# Patient Record
Sex: MALE | Race: BLACK OR AFRICAN AMERICAN | Employment: OTHER | URBAN - METROPOLITAN AREA
[De-identification: names, ages, dates, MRNs, and addresses within clinical notes are randomized per-mention and may not be internally consistent; named-entity substitution may affect disease eponyms.]

---

## 2017-04-26 RX ORDER — CHLORTHALIDONE 25 MG/1
TABLET ORAL
Qty: 90 TABLET | Refills: 3 | Status: SHIPPED | OUTPATIENT
Start: 2017-04-26 | End: 2017-06-27

## 2017-04-26 RX ORDER — AMLODIPINE BESYLATE 10 MG/1
TABLET ORAL
Qty: 90 TABLET | Refills: 3 | Status: SHIPPED | OUTPATIENT
Start: 2017-04-26 | End: 2017-06-27 | Stop reason: SDUPTHER

## 2017-06-27 ENCOUNTER — OFFICE VISIT (OUTPATIENT)
Dept: INTERNAL MEDICINE | Age: 62
End: 2017-06-27

## 2017-06-27 VITALS
OXYGEN SATURATION: 96 % | BODY MASS INDEX: 26.48 KG/M2 | DIASTOLIC BLOOD PRESSURE: 48 MMHG | RESPIRATION RATE: 20 BRPM | HEART RATE: 82 BPM | WEIGHT: 178.8 LBS | TEMPERATURE: 97.8 F | SYSTOLIC BLOOD PRESSURE: 116 MMHG | HEIGHT: 69 IN

## 2017-06-27 DIAGNOSIS — J44.9 OBSTRUCTIVE CHRONIC BRONCHITIS WITHOUT EXACERBATION (HCC): ICD-10-CM

## 2017-06-27 DIAGNOSIS — Z13.220 SCREENING FOR CHOLESTEROL LEVEL: ICD-10-CM

## 2017-06-27 DIAGNOSIS — I10 ESSENTIAL HYPERTENSION, BENIGN: Primary | ICD-10-CM

## 2017-06-27 DIAGNOSIS — Z12.5 SCREENING FOR PROSTATE CANCER: ICD-10-CM

## 2017-06-27 PROBLEM — J44.89 OBSTRUCTIVE CHRONIC BRONCHITIS WITHOUT EXACERBATION: Status: ACTIVE | Noted: 2017-06-27

## 2017-06-27 LAB
ALBUMIN SERPL-MCNC: 4.3 G/DL (ref 3.9–4.9)
ALP BLD-CCNC: 99 U/L (ref 35–104)
ALT SERPL-CCNC: 43 U/L (ref 0–41)
ANION GAP SERPL CALCULATED.3IONS-SCNC: 12 MEQ/L (ref 7–13)
AST SERPL-CCNC: 48 U/L (ref 0–40)
BILIRUB SERPL-MCNC: 0.5 MG/DL (ref 0–1.2)
BUN BLDV-MCNC: 11 MG/DL (ref 8–23)
CALCIUM SERPL-MCNC: 9.5 MG/DL (ref 8.6–10.2)
CHLORIDE BLD-SCNC: 99 MEQ/L (ref 98–107)
CHOLESTEROL, TOTAL: 209 MG/DL (ref 0–199)
CO2: 28 MEQ/L (ref 22–29)
CREAT SERPL-MCNC: 0.7 MG/DL (ref 0.7–1.2)
GFR AFRICAN AMERICAN: >60
GFR NON-AFRICAN AMERICAN: >60
GLOBULIN: 2.6 G/DL (ref 2.3–3.5)
GLUCOSE BLD-MCNC: 86 MG/DL (ref 74–109)
HDLC SERPL-MCNC: 69 MG/DL (ref 40–59)
LDL CHOLESTEROL CALCULATED: 104 MG/DL (ref 0–129)
POTASSIUM SERPL-SCNC: 4.3 MEQ/L (ref 3.5–5.1)
PROSTATE SPECIFIC ANTIGEN: 2.84 NG/ML (ref 0–5.4)
SODIUM BLD-SCNC: 139 MEQ/L (ref 132–144)
TOTAL PROTEIN: 6.9 G/DL (ref 6.4–8.1)
TRIGL SERPL-MCNC: 181 MG/DL (ref 0–200)

## 2017-06-27 PROCEDURE — G8926 SPIRO NO PERF OR DOC: HCPCS | Performed by: FAMILY MEDICINE

## 2017-06-27 PROCEDURE — 36415 COLL VENOUS BLD VENIPUNCTURE: CPT | Performed by: FAMILY MEDICINE

## 2017-06-27 PROCEDURE — 94010 BREATHING CAPACITY TEST: CPT | Performed by: FAMILY MEDICINE

## 2017-06-27 PROCEDURE — 4004F PT TOBACCO SCREEN RCVD TLK: CPT | Performed by: FAMILY MEDICINE

## 2017-06-27 PROCEDURE — 3023F SPIROM DOC REV: CPT | Performed by: FAMILY MEDICINE

## 2017-06-27 PROCEDURE — G8419 CALC BMI OUT NRM PARAM NOF/U: HCPCS | Performed by: FAMILY MEDICINE

## 2017-06-27 PROCEDURE — G8427 DOCREV CUR MEDS BY ELIG CLIN: HCPCS | Performed by: FAMILY MEDICINE

## 2017-06-27 PROCEDURE — 99214 OFFICE O/P EST MOD 30 MIN: CPT | Performed by: FAMILY MEDICINE

## 2017-06-27 PROCEDURE — 3017F COLORECTAL CA SCREEN DOC REV: CPT | Performed by: FAMILY MEDICINE

## 2017-06-27 RX ORDER — OXYCODONE HCL 40 MG/1
40 TABLET, FILM COATED, EXTENDED RELEASE ORAL EVERY 12 HOURS
COMMUNITY
End: 2019-02-11 | Stop reason: ALTCHOICE

## 2017-06-27 RX ORDER — POTASSIUM CHLORIDE 750 MG/1
10 TABLET, FILM COATED, EXTENDED RELEASE ORAL DAILY
Qty: 90 TABLET | Refills: 3 | Status: CANCELLED | OUTPATIENT
Start: 2017-06-27

## 2017-06-27 RX ORDER — AMLODIPINE BESYLATE 5 MG/1
5 TABLET ORAL DAILY
Qty: 90 TABLET | Refills: 1 | Status: SHIPPED | OUTPATIENT
Start: 2017-06-27 | End: 2017-12-18 | Stop reason: SDUPTHER

## 2017-06-27 ASSESSMENT — PATIENT HEALTH QUESTIONNAIRE - PHQ9
SUM OF ALL RESPONSES TO PHQ QUESTIONS 1-9: 0
SUM OF ALL RESPONSES TO PHQ9 QUESTIONS 1 & 2: 0
2. FEELING DOWN, DEPRESSED OR HOPELESS: 0
1. LITTLE INTEREST OR PLEASURE IN DOING THINGS: 0

## 2017-06-28 DIAGNOSIS — E78.5 DYSLIPIDEMIA: Primary | ICD-10-CM

## 2017-06-28 RX ORDER — ATORVASTATIN CALCIUM 10 MG/1
10 TABLET, FILM COATED ORAL DAILY
Qty: 30 TABLET | Refills: 5 | Status: SHIPPED | OUTPATIENT
Start: 2017-06-28 | End: 2017-09-06 | Stop reason: SDUPTHER

## 2017-07-21 ENCOUNTER — OFFICE VISIT (OUTPATIENT)
Dept: INTERNAL MEDICINE | Age: 62
End: 2017-07-21

## 2017-07-21 VITALS
OXYGEN SATURATION: 97 % | WEIGHT: 175.6 LBS | BODY MASS INDEX: 26.01 KG/M2 | RESPIRATION RATE: 16 BRPM | TEMPERATURE: 98 F | DIASTOLIC BLOOD PRESSURE: 70 MMHG | HEART RATE: 92 BPM | SYSTOLIC BLOOD PRESSURE: 154 MMHG | HEIGHT: 69 IN

## 2017-07-21 DIAGNOSIS — M54.2 NECK PAIN ON RIGHT SIDE: ICD-10-CM

## 2017-07-21 DIAGNOSIS — L23.7 POISON IVY DERMATITIS: Primary | ICD-10-CM

## 2017-07-21 DIAGNOSIS — L21.9 SEBORRHEIC DERMATITIS: ICD-10-CM

## 2017-07-21 PROCEDURE — G8419 CALC BMI OUT NRM PARAM NOF/U: HCPCS | Performed by: FAMILY MEDICINE

## 2017-07-21 PROCEDURE — G8427 DOCREV CUR MEDS BY ELIG CLIN: HCPCS | Performed by: FAMILY MEDICINE

## 2017-07-21 PROCEDURE — 4004F PT TOBACCO SCREEN RCVD TLK: CPT | Performed by: FAMILY MEDICINE

## 2017-07-21 PROCEDURE — 99213 OFFICE O/P EST LOW 20 MIN: CPT | Performed by: FAMILY MEDICINE

## 2017-07-21 PROCEDURE — 3017F COLORECTAL CA SCREEN DOC REV: CPT | Performed by: FAMILY MEDICINE

## 2017-07-21 RX ORDER — HYDROCODONE BITARTRATE AND ACETAMINOPHEN 5; 325 MG/1; MG/1
1 TABLET ORAL EVERY 6 HOURS PRN
Qty: 28 TABLET | Refills: 0 | Status: SHIPPED | OUTPATIENT
Start: 2017-07-21 | End: 2017-07-28

## 2017-07-21 RX ORDER — KETOROLAC TROMETHAMINE 30 MG/ML
60 INJECTION, SOLUTION INTRAMUSCULAR; INTRAVENOUS ONCE
Status: DISCONTINUED | OUTPATIENT
Start: 2017-07-21 | End: 2017-07-21

## 2017-07-21 RX ORDER — KETOCONAZOLE 20 MG/G
CREAM TOPICAL
Qty: 15 G | Refills: 0 | Status: SHIPPED | OUTPATIENT
Start: 2017-07-21 | End: 2019-02-11 | Stop reason: ALTCHOICE

## 2017-07-21 RX ORDER — PREDNISONE 1 MG/1
TABLET ORAL
Qty: 30 TABLET | Refills: 0 | Status: SHIPPED | OUTPATIENT
Start: 2017-07-21 | End: 2018-01-23 | Stop reason: ALTCHOICE

## 2017-08-10 ENCOUNTER — NURSE ONLY (OUTPATIENT)
Dept: INTERNAL MEDICINE | Age: 62
End: 2017-08-10

## 2017-08-10 ENCOUNTER — TELEPHONE (OUTPATIENT)
Dept: INTERNAL MEDICINE | Age: 62
End: 2017-08-10

## 2017-08-10 VITALS
TEMPERATURE: 98.6 F | WEIGHT: 174.4 LBS | HEART RATE: 83 BPM | DIASTOLIC BLOOD PRESSURE: 60 MMHG | BODY MASS INDEX: 25.83 KG/M2 | SYSTOLIC BLOOD PRESSURE: 130 MMHG | RESPIRATION RATE: 16 BRPM | HEIGHT: 69 IN | OXYGEN SATURATION: 96 %

## 2017-08-10 DIAGNOSIS — I10 ESSENTIAL HYPERTENSION, BENIGN: Primary | ICD-10-CM

## 2017-09-06 RX ORDER — ATORVASTATIN CALCIUM 10 MG/1
10 TABLET, FILM COATED ORAL DAILY
Qty: 90 TABLET | Refills: 1 | Status: SHIPPED | OUTPATIENT
Start: 2017-09-06 | End: 2018-03-10 | Stop reason: SDUPTHER

## 2017-10-02 ENCOUNTER — CARE COORDINATION (OUTPATIENT)
Dept: CARE COORDINATION | Age: 62
End: 2017-10-02

## 2017-10-02 NOTE — CARE COORDINATION
Patient Excluded from Care Coordination?  Yes     The patient will be excluded from Care Coordination for the following reason: Patient declined care coordination

## 2017-12-14 ENCOUNTER — OFFICE VISIT (OUTPATIENT)
Dept: INTERNAL MEDICINE | Age: 62
End: 2017-12-14

## 2017-12-14 VITALS
SYSTOLIC BLOOD PRESSURE: 140 MMHG | WEIGHT: 178.4 LBS | OXYGEN SATURATION: 95 % | HEIGHT: 69 IN | BODY MASS INDEX: 26.42 KG/M2 | RESPIRATION RATE: 16 BRPM | DIASTOLIC BLOOD PRESSURE: 62 MMHG | TEMPERATURE: 98.3 F | HEART RATE: 87 BPM

## 2017-12-14 DIAGNOSIS — Z11.59 ENCOUNTER FOR HEPATITIS C SCREENING TEST FOR LOW RISK PATIENT: ICD-10-CM

## 2017-12-14 DIAGNOSIS — E78.5 DYSLIPIDEMIA: ICD-10-CM

## 2017-12-14 DIAGNOSIS — Z23 NEED FOR INFLUENZA VACCINATION: ICD-10-CM

## 2017-12-14 DIAGNOSIS — J06.9 UPPER RESPIRATORY TRACT INFECTION, UNSPECIFIED TYPE: ICD-10-CM

## 2017-12-14 DIAGNOSIS — Z11.4 ENCOUNTER FOR SCREENING FOR HIV: ICD-10-CM

## 2017-12-14 DIAGNOSIS — I10 ESSENTIAL HYPERTENSION, BENIGN: Primary | ICD-10-CM

## 2017-12-14 LAB
ALBUMIN SERPL-MCNC: 4.3 G/DL (ref 3.9–4.9)
ALP BLD-CCNC: 94 U/L (ref 35–104)
ALT SERPL-CCNC: 29 U/L (ref 0–41)
ANION GAP SERPL CALCULATED.3IONS-SCNC: 13 MEQ/L (ref 7–13)
AST SERPL-CCNC: 37 U/L (ref 0–40)
BILIRUB SERPL-MCNC: 0.3 MG/DL (ref 0–1.2)
BUN BLDV-MCNC: 13 MG/DL (ref 8–23)
CALCIUM SERPL-MCNC: 9.9 MG/DL (ref 8.6–10.2)
CHLORIDE BLD-SCNC: 98 MEQ/L (ref 98–107)
CHOLESTEROL, TOTAL: 150 MG/DL (ref 0–199)
CO2: 27 MEQ/L (ref 22–29)
CREAT SERPL-MCNC: 0.7 MG/DL (ref 0.7–1.2)
GFR AFRICAN AMERICAN: >60
GFR NON-AFRICAN AMERICAN: >60
GLOBULIN: 2.8 G/DL (ref 2.3–3.5)
GLUCOSE BLD-MCNC: 109 MG/DL (ref 74–109)
HDLC SERPL-MCNC: 71 MG/DL (ref 40–59)
HEPATITIS C ANTIBODY INTERPRETATION: NORMAL
LDL CHOLESTEROL CALCULATED: 69 MG/DL (ref 0–129)
POTASSIUM SERPL-SCNC: 4.3 MEQ/L (ref 3.5–5.1)
SODIUM BLD-SCNC: 138 MEQ/L (ref 132–144)
TOTAL PROTEIN: 7.1 G/DL (ref 6.4–8.1)
TRIGL SERPL-MCNC: 51 MG/DL (ref 0–200)

## 2017-12-14 PROCEDURE — G8427 DOCREV CUR MEDS BY ELIG CLIN: HCPCS | Performed by: FAMILY MEDICINE

## 2017-12-14 PROCEDURE — G0008 ADMIN INFLUENZA VIRUS VAC: HCPCS | Performed by: FAMILY MEDICINE

## 2017-12-14 PROCEDURE — 99214 OFFICE O/P EST MOD 30 MIN: CPT | Performed by: FAMILY MEDICINE

## 2017-12-14 PROCEDURE — G8484 FLU IMMUNIZE NO ADMIN: HCPCS | Performed by: FAMILY MEDICINE

## 2017-12-14 PROCEDURE — 4004F PT TOBACCO SCREEN RCVD TLK: CPT | Performed by: FAMILY MEDICINE

## 2017-12-14 PROCEDURE — 36415 COLL VENOUS BLD VENIPUNCTURE: CPT | Performed by: FAMILY MEDICINE

## 2017-12-14 PROCEDURE — G8417 CALC BMI ABV UP PARAM F/U: HCPCS | Performed by: FAMILY MEDICINE

## 2017-12-14 PROCEDURE — 90688 IIV4 VACCINE SPLT 0.5 ML IM: CPT | Performed by: FAMILY MEDICINE

## 2017-12-14 PROCEDURE — 3017F COLORECTAL CA SCREEN DOC REV: CPT | Performed by: FAMILY MEDICINE

## 2017-12-14 RX ORDER — AZITHROMYCIN 250 MG/1
TABLET, FILM COATED ORAL
Qty: 1 PACKET | Refills: 0 | Status: SHIPPED | OUTPATIENT
Start: 2017-12-14 | End: 2017-12-24

## 2017-12-14 NOTE — PROGRESS NOTES
Patient: Bert Sever    YOB: 1955    Date: 12/14/17    Chief Complaint   Patient presents with    Hypertension     6 month follow up. He is doing well. He is due for lab work    Flu Vaccine     He would like to get flu vaccine. Patient Active Problem List    Diagnosis Date Noted    Dyslipidemia 06/28/2017    Obstructive chronic bronchitis without exacerbation ([facility] [address] 06/27/2017    Cervical radiculopathy at C8 10/13/2013    Essential hypertension, benign 09/25/2013    Chronic back pain     Insomnia        Allergies   Allergen Reactions    Ace Inhibitors      ANGIOEDEMA    Nsaids        Vitals:    12/14/17 0904 12/14/17 0910   BP: (!) 142/60 (!) 140/62   Site: Right Arm Left Arm   Position: Sitting Sitting   Cuff Size: Large Adult Large Adult   Pulse: 87    Resp: 16    Temp: 98.3 °F (36.8 °C)    TempSrc: Oral    SpO2: 95%    Weight: 178 lb 6.4 oz (80.9 kg)    Height: 5' 9\" (1.753 m)      Body mass index is 26.35 kg/m². HPI    He is feeling well and just got back from vacation. He denies any cough chest pain ear pain nausea vomiting or diarrhea. It is flu vaccine today. We need to get some lab work to follow-up on his hypertension his lipids. Review of Systems    Constitutional: Negative for fatigue, fever and sweats. HEENT: Negative for eye discharge and vision loss. Negative for ear drainage, hearing loss and nasal drainage. Respiratory: Negative for cough, dyspnea and wheezing. Cardiovascular:  Negative for chest pain, claudication and irregular heartbeat/palpitations. Gastrointestinal: Negative for abdominal pain, nausea, vomiting, constipation and diarrhea. Genitourinary: No dysuria or penile discharge. Metabolic/Endocrine: Negative for cold intolerance, heat intolerance, polydipsia and polyphagia. No unintended weight loss or gain. Neuro/Psychiatric: Negative for gait disturbance. Negative for psychiatric symptoms.   Dermatologic: Negative for pruritus and

## 2017-12-16 LAB — HIV-1 AND HIV-2 ANTIBODIES: NEGATIVE

## 2018-01-23 ENCOUNTER — HOSPITAL ENCOUNTER (OUTPATIENT)
Dept: GENERAL RADIOLOGY | Age: 63
Discharge: HOME | End: 2018-01-23
Payer: MEDICARE

## 2018-01-23 ENCOUNTER — OFFICE VISIT (OUTPATIENT)
Dept: INTERNAL MEDICINE CLINIC | Age: 63
End: 2018-01-23
Payer: MEDICARE

## 2018-01-23 VITALS
TEMPERATURE: 98.2 F | BODY MASS INDEX: 25.98 KG/M2 | SYSTOLIC BLOOD PRESSURE: 138 MMHG | HEIGHT: 69 IN | WEIGHT: 175.4 LBS | HEART RATE: 83 BPM | DIASTOLIC BLOOD PRESSURE: 90 MMHG | OXYGEN SATURATION: 97 %

## 2018-01-23 DIAGNOSIS — R07.1 CHEST PAIN ON BREATHING: ICD-10-CM

## 2018-01-23 DIAGNOSIS — M54.6 ACUTE MIDLINE THORACIC BACK PAIN: ICD-10-CM

## 2018-01-23 DIAGNOSIS — R07.1 CHEST PAIN ON BREATHING: Primary | ICD-10-CM

## 2018-01-23 DIAGNOSIS — I10 ESSENTIAL HYPERTENSION: ICD-10-CM

## 2018-01-23 DIAGNOSIS — J44.9 CHRONIC OBSTRUCTIVE PULMONARY DISEASE, UNSPECIFIED COPD TYPE (HCC): ICD-10-CM

## 2018-01-23 PROCEDURE — 3023F SPIROM DOC REV: CPT | Performed by: PHYSICIAN ASSISTANT

## 2018-01-23 PROCEDURE — 4004F PT TOBACCO SCREEN RCVD TLK: CPT | Performed by: PHYSICIAN ASSISTANT

## 2018-01-23 PROCEDURE — G8427 DOCREV CUR MEDS BY ELIG CLIN: HCPCS | Performed by: PHYSICIAN ASSISTANT

## 2018-01-23 PROCEDURE — 99213 OFFICE O/P EST LOW 20 MIN: CPT | Performed by: PHYSICIAN ASSISTANT

## 2018-01-23 PROCEDURE — G8926 SPIRO NO PERF OR DOC: HCPCS | Performed by: PHYSICIAN ASSISTANT

## 2018-01-23 PROCEDURE — 3017F COLORECTAL CA SCREEN DOC REV: CPT | Performed by: PHYSICIAN ASSISTANT

## 2018-01-23 PROCEDURE — G8417 CALC BMI ABV UP PARAM F/U: HCPCS | Performed by: PHYSICIAN ASSISTANT

## 2018-01-23 PROCEDURE — G8484 FLU IMMUNIZE NO ADMIN: HCPCS | Performed by: PHYSICIAN ASSISTANT

## 2018-01-23 PROCEDURE — 71046 X-RAY EXAM CHEST 2 VIEWS: CPT

## 2018-01-23 PROCEDURE — 72074 X-RAY EXAM THORAC SPINE4/>VW: CPT

## 2018-01-23 RX ORDER — CYCLOBENZAPRINE HCL 10 MG
10 TABLET ORAL 3 TIMES DAILY PRN
Qty: 30 TABLET | Refills: 0 | Status: SHIPPED | OUTPATIENT
Start: 2018-01-23 | End: 2018-02-02

## 2018-01-23 ASSESSMENT — ENCOUNTER SYMPTOMS
SORE THROAT: 0
HEARTBURN: 0
ABDOMINAL PAIN: 0
BLURRED VISION: 0
NAUSEA: 0
DIARRHEA: 0
DOUBLE VISION: 0
SINUS PAIN: 0
BACK PAIN: 1
WHEEZING: 0
SHORTNESS OF BREATH: 0

## 2018-03-12 RX ORDER — ATORVASTATIN CALCIUM 10 MG/1
TABLET, FILM COATED ORAL
Qty: 90 TABLET | Refills: 3 | Status: SHIPPED | OUTPATIENT
Start: 2018-03-12 | End: 2019-02-26 | Stop reason: SDUPTHER

## 2018-04-18 RX ORDER — CHLORTHALIDONE 25 MG/1
TABLET ORAL
Qty: 90 TABLET | Refills: 0 | Status: SHIPPED | OUTPATIENT
Start: 2018-04-18 | End: 2019-02-11 | Stop reason: ALTCHOICE

## 2018-12-09 ENCOUNTER — CARE COORDINATION (OUTPATIENT)
Dept: CARE COORDINATION | Age: 63
End: 2018-12-09

## 2018-12-10 ENCOUNTER — CARE COORDINATION (OUTPATIENT)
Dept: CARE COORDINATION | Age: 63
End: 2018-12-10

## 2018-12-19 ENCOUNTER — CARE COORDINATION (OUTPATIENT)
Dept: CARE COORDINATION | Age: 63
End: 2018-12-19

## 2018-12-26 ENCOUNTER — CARE COORDINATION (OUTPATIENT)
Dept: CARE COORDINATION | Age: 63
End: 2018-12-26

## 2019-02-11 ENCOUNTER — OFFICE VISIT (OUTPATIENT)
Dept: INTERNAL MEDICINE CLINIC | Age: 64
End: 2019-02-11
Payer: MEDICARE

## 2019-02-11 VITALS
TEMPERATURE: 98 F | BODY MASS INDEX: 29.21 KG/M2 | SYSTOLIC BLOOD PRESSURE: 164 MMHG | RESPIRATION RATE: 12 BRPM | HEIGHT: 69 IN | DIASTOLIC BLOOD PRESSURE: 60 MMHG | WEIGHT: 197.2 LBS | OXYGEN SATURATION: 96 % | HEART RATE: 105 BPM

## 2019-02-11 DIAGNOSIS — J44.9 OBSTRUCTIVE CHRONIC BRONCHITIS WITHOUT EXACERBATION (HCC): ICD-10-CM

## 2019-02-11 DIAGNOSIS — F51.01 PRIMARY INSOMNIA: ICD-10-CM

## 2019-02-11 DIAGNOSIS — I10 ESSENTIAL HYPERTENSION, BENIGN: Primary | ICD-10-CM

## 2019-02-11 DIAGNOSIS — E78.5 DYSLIPIDEMIA: ICD-10-CM

## 2019-02-11 DIAGNOSIS — Z23 NEEDS FLU SHOT: ICD-10-CM

## 2019-02-11 PROCEDURE — 4004F PT TOBACCO SCREEN RCVD TLK: CPT | Performed by: FAMILY MEDICINE

## 2019-02-11 PROCEDURE — 3017F COLORECTAL CA SCREEN DOC REV: CPT | Performed by: FAMILY MEDICINE

## 2019-02-11 PROCEDURE — G8926 SPIRO NO PERF OR DOC: HCPCS | Performed by: FAMILY MEDICINE

## 2019-02-11 PROCEDURE — G8427 DOCREV CUR MEDS BY ELIG CLIN: HCPCS | Performed by: FAMILY MEDICINE

## 2019-02-11 PROCEDURE — G8482 FLU IMMUNIZE ORDER/ADMIN: HCPCS | Performed by: FAMILY MEDICINE

## 2019-02-11 PROCEDURE — G8419 CALC BMI OUT NRM PARAM NOF/U: HCPCS | Performed by: FAMILY MEDICINE

## 2019-02-11 PROCEDURE — 90688 IIV4 VACCINE SPLT 0.5 ML IM: CPT | Performed by: FAMILY MEDICINE

## 2019-02-11 PROCEDURE — 99214 OFFICE O/P EST MOD 30 MIN: CPT | Performed by: FAMILY MEDICINE

## 2019-02-11 PROCEDURE — G0008 ADMIN INFLUENZA VIRUS VAC: HCPCS | Performed by: FAMILY MEDICINE

## 2019-02-11 PROCEDURE — 3023F SPIROM DOC REV: CPT | Performed by: FAMILY MEDICINE

## 2019-02-11 RX ORDER — TRAZODONE HYDROCHLORIDE 50 MG/1
TABLET ORAL
Refills: 0 | COMMUNITY
Start: 2019-01-17 | End: 2019-02-11 | Stop reason: SDUPTHER

## 2019-02-11 RX ORDER — TRAZODONE HYDROCHLORIDE 50 MG/1
50 TABLET ORAL NIGHTLY
Qty: 90 TABLET | Refills: 1 | Status: SHIPPED | OUTPATIENT
Start: 2019-02-11 | End: 2019-08-19 | Stop reason: SDUPTHER

## 2019-02-11 RX ORDER — TRIAMTERENE AND HYDROCHLOROTHIAZIDE 37.5; 25 MG/1; MG/1
1 TABLET ORAL DAILY
Qty: 90 TABLET | Refills: 3 | Status: SHIPPED | OUTPATIENT
Start: 2019-02-11 | End: 2020-03-30 | Stop reason: SDUPTHER

## 2019-02-11 ASSESSMENT — PATIENT HEALTH QUESTIONNAIRE - PHQ9
SUM OF ALL RESPONSES TO PHQ QUESTIONS 1-9: 0
SUM OF ALL RESPONSES TO PHQ QUESTIONS 1-9: 0
1. LITTLE INTEREST OR PLEASURE IN DOING THINGS: 0
DEPRESSION UNABLE TO ASSESS: FUNCTIONAL CAPACITY MOTIVATION LIMITS ACCURACY
2. FEELING DOWN, DEPRESSED OR HOPELESS: 0
SUM OF ALL RESPONSES TO PHQ9 QUESTIONS 1 & 2: 0

## 2019-02-26 RX ORDER — ATORVASTATIN CALCIUM 10 MG/1
TABLET, FILM COATED ORAL
Qty: 90 TABLET | Refills: 3 | Status: SHIPPED | OUTPATIENT
Start: 2019-02-26 | End: 2019-11-06 | Stop reason: SDUPTHER

## 2019-08-19 DIAGNOSIS — F51.01 PRIMARY INSOMNIA: ICD-10-CM

## 2019-08-19 RX ORDER — TRAZODONE HYDROCHLORIDE 50 MG/1
50 TABLET ORAL NIGHTLY
Qty: 90 TABLET | Refills: 1 | Status: SHIPPED | OUTPATIENT
Start: 2019-08-19 | End: 2020-02-07

## 2019-11-06 ENCOUNTER — OFFICE VISIT (OUTPATIENT)
Dept: FAMILY MEDICINE CLINIC | Age: 64
End: 2019-11-06
Payer: MEDICARE

## 2019-11-06 VITALS
HEIGHT: 69 IN | BODY MASS INDEX: 27.4 KG/M2 | HEART RATE: 108 BPM | DIASTOLIC BLOOD PRESSURE: 82 MMHG | SYSTOLIC BLOOD PRESSURE: 170 MMHG | RESPIRATION RATE: 11 BRPM | WEIGHT: 185 LBS | TEMPERATURE: 99.4 F | OXYGEN SATURATION: 87 %

## 2019-11-06 DIAGNOSIS — E78.5 DYSLIPIDEMIA: ICD-10-CM

## 2019-11-06 DIAGNOSIS — J44.9 OBSTRUCTIVE CHRONIC BRONCHITIS WITHOUT EXACERBATION (HCC): ICD-10-CM

## 2019-11-06 DIAGNOSIS — I10 ESSENTIAL HYPERTENSION, BENIGN: Primary | ICD-10-CM

## 2019-11-06 DIAGNOSIS — R25.1 TREMORS OF NERVOUS SYSTEM: ICD-10-CM

## 2019-11-06 PROCEDURE — G8417 CALC BMI ABV UP PARAM F/U: HCPCS | Performed by: FAMILY MEDICINE

## 2019-11-06 PROCEDURE — 4040F PNEUMOC VAC/ADMIN/RCVD: CPT | Performed by: FAMILY MEDICINE

## 2019-11-06 PROCEDURE — 1123F ACP DISCUSS/DSCN MKR DOCD: CPT | Performed by: FAMILY MEDICINE

## 2019-11-06 PROCEDURE — 3023F SPIROM DOC REV: CPT | Performed by: FAMILY MEDICINE

## 2019-11-06 PROCEDURE — G8482 FLU IMMUNIZE ORDER/ADMIN: HCPCS | Performed by: FAMILY MEDICINE

## 2019-11-06 PROCEDURE — 99214 OFFICE O/P EST MOD 30 MIN: CPT | Performed by: FAMILY MEDICINE

## 2019-11-06 PROCEDURE — 4004F PT TOBACCO SCREEN RCVD TLK: CPT | Performed by: FAMILY MEDICINE

## 2019-11-06 PROCEDURE — G8926 SPIRO NO PERF OR DOC: HCPCS | Performed by: FAMILY MEDICINE

## 2019-11-06 PROCEDURE — G8427 DOCREV CUR MEDS BY ELIG CLIN: HCPCS | Performed by: FAMILY MEDICINE

## 2019-11-06 PROCEDURE — 3017F COLORECTAL CA SCREEN DOC REV: CPT | Performed by: FAMILY MEDICINE

## 2019-11-06 RX ORDER — METOPROLOL SUCCINATE 100 MG/1
100 TABLET, EXTENDED RELEASE ORAL DAILY
Qty: 90 TABLET | Refills: 1 | Status: SHIPPED | OUTPATIENT
Start: 2019-11-06 | End: 2020-03-30 | Stop reason: SDUPTHER

## 2019-11-06 RX ORDER — ATORVASTATIN CALCIUM 40 MG/1
40 TABLET, FILM COATED ORAL DAILY
Qty: 90 TABLET | Refills: 1 | Status: SHIPPED | OUTPATIENT
Start: 2019-11-06 | End: 2020-04-28

## 2019-11-22 DIAGNOSIS — E78.5 DYSLIPIDEMIA: ICD-10-CM

## 2019-11-22 DIAGNOSIS — I10 ESSENTIAL HYPERTENSION, BENIGN: ICD-10-CM

## 2019-11-22 LAB
ALBUMIN SERPL-MCNC: 4.6 G/DL (ref 3.5–4.6)
ALP BLD-CCNC: 92 U/L (ref 35–104)
ALT SERPL-CCNC: 30 U/L (ref 0–41)
ANION GAP SERPL CALCULATED.3IONS-SCNC: 19 MEQ/L (ref 9–15)
AST SERPL-CCNC: 62 U/L (ref 0–40)
BILIRUB SERPL-MCNC: 0.4 MG/DL (ref 0.2–0.7)
BUN BLDV-MCNC: 17 MG/DL (ref 8–23)
CALCIUM SERPL-MCNC: 9.9 MG/DL (ref 8.5–9.9)
CHLORIDE BLD-SCNC: 98 MEQ/L (ref 95–107)
CHOLESTEROL, FASTING: 151 MG/DL (ref 0–199)
CO2: 22 MEQ/L (ref 20–31)
CREAT SERPL-MCNC: 1.02 MG/DL (ref 0.7–1.2)
GFR AFRICAN AMERICAN: >60
GFR NON-AFRICAN AMERICAN: >60
GLOBULIN: 3.4 G/DL (ref 2.3–3.5)
GLUCOSE BLD-MCNC: 64 MG/DL (ref 70–99)
HDLC SERPL-MCNC: 84 MG/DL (ref 40–59)
LDL CHOLESTEROL CALCULATED: 46 MG/DL (ref 0–129)
POTASSIUM SERPL-SCNC: 4.2 MEQ/L (ref 3.4–4.9)
SODIUM BLD-SCNC: 139 MEQ/L (ref 135–144)
TOTAL PROTEIN: 8 G/DL (ref 6.3–8)
TRIGLYCERIDE, FASTING: 107 MG/DL (ref 0–150)

## 2019-12-04 ENCOUNTER — OFFICE VISIT (OUTPATIENT)
Dept: FAMILY MEDICINE CLINIC | Age: 64
End: 2019-12-04
Payer: MEDICARE

## 2019-12-04 VITALS
OXYGEN SATURATION: 98 % | WEIGHT: 179.8 LBS | TEMPERATURE: 99.2 F | HEART RATE: 95 BPM | SYSTOLIC BLOOD PRESSURE: 126 MMHG | BODY MASS INDEX: 26.63 KG/M2 | RESPIRATION RATE: 18 BRPM | HEIGHT: 69 IN | DIASTOLIC BLOOD PRESSURE: 82 MMHG

## 2019-12-04 DIAGNOSIS — E78.5 DYSLIPIDEMIA: ICD-10-CM

## 2019-12-04 DIAGNOSIS — I10 ESSENTIAL HYPERTENSION, BENIGN: Primary | ICD-10-CM

## 2019-12-04 DIAGNOSIS — R25.1 TREMORS OF NERVOUS SYSTEM: ICD-10-CM

## 2019-12-04 PROCEDURE — 4040F PNEUMOC VAC/ADMIN/RCVD: CPT | Performed by: FAMILY MEDICINE

## 2019-12-04 PROCEDURE — G8427 DOCREV CUR MEDS BY ELIG CLIN: HCPCS | Performed by: FAMILY MEDICINE

## 2019-12-04 PROCEDURE — G8417 CALC BMI ABV UP PARAM F/U: HCPCS | Performed by: FAMILY MEDICINE

## 2019-12-04 PROCEDURE — 4004F PT TOBACCO SCREEN RCVD TLK: CPT | Performed by: FAMILY MEDICINE

## 2019-12-04 PROCEDURE — 1123F ACP DISCUSS/DSCN MKR DOCD: CPT | Performed by: FAMILY MEDICINE

## 2019-12-04 PROCEDURE — 3017F COLORECTAL CA SCREEN DOC REV: CPT | Performed by: FAMILY MEDICINE

## 2019-12-04 PROCEDURE — 99213 OFFICE O/P EST LOW 20 MIN: CPT | Performed by: FAMILY MEDICINE

## 2019-12-04 PROCEDURE — G8482 FLU IMMUNIZE ORDER/ADMIN: HCPCS | Performed by: FAMILY MEDICINE

## 2019-12-04 RX ORDER — AMITRIPTYLINE HYDROCHLORIDE 25 MG/1
TABLET, FILM COATED ORAL
Status: ON HOLD | COMMUNITY
Start: 2004-09-09 | End: 2020-03-16

## 2020-02-07 RX ORDER — TRAZODONE HYDROCHLORIDE 50 MG/1
50 TABLET ORAL NIGHTLY
Qty: 90 TABLET | Refills: 1 | Status: SHIPPED | OUTPATIENT
Start: 2020-02-07 | End: 2020-08-03

## 2020-03-09 ENCOUNTER — OFFICE VISIT (OUTPATIENT)
Dept: NEUROLOGY | Age: 65
End: 2020-03-09
Payer: MEDICARE

## 2020-03-09 VITALS
WEIGHT: 192.7 LBS | DIASTOLIC BLOOD PRESSURE: 54 MMHG | SYSTOLIC BLOOD PRESSURE: 137 MMHG | BODY MASS INDEX: 28.46 KG/M2 | HEART RATE: 76 BPM

## 2020-03-09 DIAGNOSIS — R25.1 TREMOR: ICD-10-CM

## 2020-03-09 LAB
ALBUMIN SERPL-MCNC: 3.8 G/DL (ref 3.5–4.6)
ALP BLD-CCNC: 95 U/L (ref 35–104)
ALT SERPL-CCNC: 11 U/L (ref 0–41)
AST SERPL-CCNC: 18 U/L (ref 0–40)
BILIRUB SERPL-MCNC: <0.2 MG/DL (ref 0.2–0.7)
BILIRUBIN DIRECT: <0.2 MG/DL (ref 0–0.4)
BILIRUBIN, INDIRECT: NORMAL MG/DL (ref 0–0.6)
TOTAL PROTEIN: 6.9 G/DL (ref 6.3–8)
TSH REFLEX: 2.17 UIU/ML (ref 0.44–3.86)

## 2020-03-09 PROCEDURE — G8417 CALC BMI ABV UP PARAM F/U: HCPCS | Performed by: PSYCHIATRY & NEUROLOGY

## 2020-03-09 PROCEDURE — 99204 OFFICE O/P NEW MOD 45 MIN: CPT | Performed by: PSYCHIATRY & NEUROLOGY

## 2020-03-09 PROCEDURE — 1123F ACP DISCUSS/DSCN MKR DOCD: CPT | Performed by: PSYCHIATRY & NEUROLOGY

## 2020-03-09 PROCEDURE — G8482 FLU IMMUNIZE ORDER/ADMIN: HCPCS | Performed by: PSYCHIATRY & NEUROLOGY

## 2020-03-09 PROCEDURE — 3017F COLORECTAL CA SCREEN DOC REV: CPT | Performed by: PSYCHIATRY & NEUROLOGY

## 2020-03-09 PROCEDURE — 4004F PT TOBACCO SCREEN RCVD TLK: CPT | Performed by: PSYCHIATRY & NEUROLOGY

## 2020-03-09 PROCEDURE — G8427 DOCREV CUR MEDS BY ELIG CLIN: HCPCS | Performed by: PSYCHIATRY & NEUROLOGY

## 2020-03-09 PROCEDURE — 4040F PNEUMOC VAC/ADMIN/RCVD: CPT | Performed by: PSYCHIATRY & NEUROLOGY

## 2020-03-09 RX ORDER — PRIMIDONE 50 MG/1
TABLET ORAL
Qty: 60 TABLET | Refills: 3 | Status: ON HOLD | OUTPATIENT
Start: 2020-03-09 | End: 2020-03-16

## 2020-03-09 ASSESSMENT — ENCOUNTER SYMPTOMS
VOMITING: 0
SHORTNESS OF BREATH: 0
NAUSEA: 0
BACK PAIN: 0
PHOTOPHOBIA: 0
TROUBLE SWALLOWING: 0
CHOKING: 0

## 2020-03-09 NOTE — PROGRESS NOTES
unremarkable. CONCLUSION: NO ACUTE PROCESS    Xr Thoracic Spine (min 4 Views)    Result Date: 1/24/2018  XR THORACIC SPINE STANDARD WIRE DURING MEASURING 8 OH BILATERAL VERTEBRAL PULMONARY VASCULAR VW CLINICAL HISTORY:  M54.6 Acute midline thoracic back pain ICD10 no history of trauma reported COMPARISON:  none FINDINGS: 4 views of the thoracic spine are submitted. There are no acute fractures. Disk spaces are intact. No significant spondylolisthesis. There is multilevel degenerative changes marginal spurring noted. NO ACUTE FRACTURES. Lab Results   Component Value Date    WBC 7.2 02/03/2016    RBC 3.74 02/03/2016    RBC 3.78 04/01/2012    HGB 12.2 02/03/2016    HCT 36.8 02/03/2016    MCV 98.4 02/03/2016    MCH 32.7 02/03/2016    MCHC 33.2 02/03/2016    RDW 16.4 02/03/2016     02/03/2016    MPV 7.4 02/22/2014     Lab Results   Component Value Date     11/22/2019    K 4.2 11/22/2019    CL 98 11/22/2019    CO2 22 11/22/2019    BUN 17 11/22/2019    CREATININE 1.02 11/22/2019    GFRAA >60.0 11/22/2019    LABGLOM >60.0 11/22/2019    GLUCOSE 64 11/22/2019    GLUCOSE 86 04/01/2012    PROT 8.0 11/22/2019    LABALBU 4.6 11/22/2019    LABALBU 3.5 04/01/2012    CALCIUM 9.9 11/22/2019    BILITOT 0.4 11/22/2019    ALKPHOS 92 11/22/2019    AST 62 11/22/2019    ALT 30 11/22/2019     Lab Results   Component Value Date    PROTIME 10.5 02/22/2014    PROTIME 10.5 04/01/2012    INR 1.0 02/22/2014     Lab Results   Component Value Date    TSH 0.941 02/21/2014     Lab Results   Component Value Date    TRIG 51 12/14/2017    HDL 84 11/22/2019    LDLCALC 46 11/22/2019     No results found for: Catherin Shells, LABBENZ, CANNAB, COCAINESCRN, LABMETH, OPIATESCREENURINE, PHENCYCLIDINESCREENURINE, PPXUR, ETOH  No results found for: LITHIUM, DILFRTOT, VALPROATE    Assessment:       Diagnosis Orders   1. Tremor  Heavy Metals, Blood    TSH with Reflex    Hepatic Function Panel   2.  Tremors of nervous system     Action

## 2020-03-12 LAB
ARSENIC BLOOD: <10 UG/L (ref 0–12)
LEAD LEVEL BLOOD: 6.9 UG/DL (ref 0–4.9)
MERCURY BLOOD: <2.5 UG/L (ref 0–10)

## 2020-03-16 ENCOUNTER — HOSPITAL ENCOUNTER (INPATIENT)
Age: 65
LOS: 8 days | Discharge: HOME | DRG: 377 | End: 2020-03-24
Attending: EMERGENCY MEDICINE | Admitting: INTERNAL MEDICINE
Payer: MEDICARE

## 2020-03-16 ENCOUNTER — APPOINTMENT (OUTPATIENT)
Dept: GENERAL RADIOLOGY | Age: 65
DRG: 377 | End: 2020-03-16
Payer: MEDICARE

## 2020-03-16 PROBLEM — D64.9 ANEMIA: Status: ACTIVE | Noted: 2020-03-16

## 2020-03-16 PROBLEM — K92.2 GI BLEED: Status: ACTIVE | Noted: 2020-03-16

## 2020-03-16 LAB
ABO/RH: NORMAL
ALBUMIN SERPL-MCNC: 3.8 G/DL (ref 3.5–4.6)
ALP BLD-CCNC: 92 U/L (ref 35–104)
ALT SERPL-CCNC: <5 U/L (ref 0–41)
ANION GAP SERPL CALCULATED.3IONS-SCNC: 13 MEQ/L (ref 9–15)
ANISOCYTOSIS: ABNORMAL
ANTIBODY SCREEN: NORMAL
AST SERPL-CCNC: 19 U/L (ref 0–40)
ATYPICAL LYMPHOCYTE RELATIVE PERCENT: 4 %
BASOPHILS ABSOLUTE: 0 K/UL (ref 0–0.2)
BASOPHILS RELATIVE PERCENT: 0.6 %
BILIRUB SERPL-MCNC: 0.4 MG/DL (ref 0.2–0.7)
BUN BLDV-MCNC: 6 MG/DL (ref 8–23)
CALCIUM SERPL-MCNC: 9.2 MG/DL (ref 8.5–9.9)
CHLORIDE BLD-SCNC: 104 MEQ/L (ref 95–107)
CO2: 24 MEQ/L (ref 20–31)
CREAT SERPL-MCNC: 0.69 MG/DL (ref 0.7–1.2)
EOSINOPHILS ABSOLUTE: 0.1 K/UL (ref 0–0.7)
EOSINOPHILS RELATIVE PERCENT: 1 %
FERRITIN: 20.2 NG/ML (ref 30–400)
FOLATE: 6.2 NG/ML (ref 7.3–26.1)
GFR AFRICAN AMERICAN: >60
GFR NON-AFRICAN AMERICAN: >60
GLOBULIN: 3.3 G/DL (ref 2.3–3.5)
GLUCOSE BLD-MCNC: 101 MG/DL (ref 70–99)
HCT VFR BLD CALC: 18.2 % (ref 42–52)
HCT VFR BLD CALC: 19.7 % (ref 42–52)
HEMATOLOGY PATH CONSULT: YES
HEMOGLOBIN: 6 G/DL (ref 14–18)
HYPOCHROMIA: ABNORMAL
INFLUENZA A BY PCR: NEGATIVE
INFLUENZA B BY PCR: NEGATIVE
INR BLD: 1
IRON SATURATION: 10 % (ref 11–46)
IRON: 40 UG/DL (ref 59–158)
LACTATE DEHYDROGENASE: 225 U/L (ref 135–225)
LACTIC ACID: 1.9 MMOL/L (ref 0.5–2.2)
LYMPHOCYTES ABSOLUTE: 1.1 K/UL (ref 1–4.8)
LYMPHOCYTES RELATIVE PERCENT: 9 %
MACROCYTES: ABNORMAL
MCH RBC QN AUTO: 24.3 PG (ref 27–31.3)
MCHC RBC AUTO-ENTMCNC: 30.7 % (ref 33–37)
MCV RBC AUTO: 79 FL (ref 80–100)
MICROCYTES: ABNORMAL
MONOCYTES ABSOLUTE: 0.4 K/UL (ref 0.2–0.8)
MONOCYTES RELATIVE PERCENT: 3.8 %
NEUTROPHILS ABSOLUTE: 6.7 K/UL (ref 1.4–6.5)
NEUTROPHILS RELATIVE PERCENT: 81 %
PDW BLD-RTO: 27.5 % (ref 11.5–14.5)
PLATELET # BLD: 330 K/UL (ref 130–400)
PLATELET SLIDE REVIEW: ADEQUATE
POIKILOCYTES: ABNORMAL
POTASSIUM SERPL-SCNC: 3.7 MEQ/L (ref 3.4–4.9)
PRO-BNP: 1276 PG/ML
PROMONOCYTES: 1 %
PROTHROMBIN TIME: 14.1 SEC (ref 12.3–14.9)
RBC # BLD: 2.49 M/UL (ref 4.7–6.1)
RETICULOCYTE ABSOLUTE COUNT: 0.04 M/CUMM (ref 0.02–0.11)
RETICULOCYTE COUNT PCT: 1.5 % (ref 0.6–2.2)
SCHISTOCYTES: ABNORMAL
SLIDE REVIEW: ABNORMAL
SMUDGE CELLS: 0.9
SODIUM BLD-SCNC: 141 MEQ/L (ref 135–144)
SPHEROCYTES: ABNORMAL
TARGET CELLS: ABNORMAL
TEAR DROP CELLS: ABNORMAL
TOTAL IRON BINDING CAPACITY: 392 UG/DL (ref 178–450)
TOTAL PROTEIN: 7.1 G/DL (ref 6.3–8)
TROPONIN: <0.01 NG/ML (ref 0–0.01)
VITAMIN B-12: 252 PG/ML (ref 232–1245)
WBC # BLD: 8.3 K/UL (ref 4.8–10.8)

## 2020-03-16 PROCEDURE — 6370000000 HC RX 637 (ALT 250 FOR IP): Performed by: INTERNAL MEDICINE

## 2020-03-16 PROCEDURE — 86923 COMPATIBILITY TEST ELECTRIC: CPT

## 2020-03-16 PROCEDURE — 84484 ASSAY OF TROPONIN QUANT: CPT

## 2020-03-16 PROCEDURE — 85025 COMPLETE CBC W/AUTO DIFF WBC: CPT

## 2020-03-16 PROCEDURE — 36430 TRANSFUSION BLD/BLD COMPNT: CPT

## 2020-03-16 PROCEDURE — 96366 THER/PROPH/DIAG IV INF ADDON: CPT

## 2020-03-16 PROCEDURE — 84155 ASSAY OF PROTEIN SERUM: CPT

## 2020-03-16 PROCEDURE — 83880 ASSAY OF NATRIURETIC PEPTIDE: CPT

## 2020-03-16 PROCEDURE — 82746 ASSAY OF FOLIC ACID SERUM: CPT

## 2020-03-16 PROCEDURE — 86900 BLOOD TYPING SEROLOGIC ABO: CPT

## 2020-03-16 PROCEDURE — 2580000003 HC RX 258: Performed by: EMERGENCY MEDICINE

## 2020-03-16 PROCEDURE — 86850 RBC ANTIBODY SCREEN: CPT

## 2020-03-16 PROCEDURE — 6370000000 HC RX 637 (ALT 250 FOR IP): Performed by: EMERGENCY MEDICINE

## 2020-03-16 PROCEDURE — 82728 ASSAY OF FERRITIN: CPT

## 2020-03-16 PROCEDURE — 99285 EMERGENCY DEPT VISIT HI MDM: CPT

## 2020-03-16 PROCEDURE — P9016 RBC LEUKOCYTES REDUCED: HCPCS

## 2020-03-16 PROCEDURE — 6360000002 HC RX W HCPCS: Performed by: EMERGENCY MEDICINE

## 2020-03-16 PROCEDURE — 86334 IMMUNOFIX E-PHORESIS SERUM: CPT

## 2020-03-16 PROCEDURE — 94640 AIRWAY INHALATION TREATMENT: CPT

## 2020-03-16 PROCEDURE — 82955 ASSAY OF G6PD ENZYME: CPT

## 2020-03-16 PROCEDURE — 87502 INFLUENZA DNA AMP PROBE: CPT

## 2020-03-16 PROCEDURE — 84165 PROTEIN E-PHORESIS SERUM: CPT

## 2020-03-16 PROCEDURE — 85046 RETICYTE/HGB CONCENTRATE: CPT

## 2020-03-16 PROCEDURE — 85610 PROTHROMBIN TIME: CPT

## 2020-03-16 PROCEDURE — 82607 VITAMIN B-12: CPT

## 2020-03-16 PROCEDURE — 83615 LACTATE (LD) (LDH) ENZYME: CPT

## 2020-03-16 PROCEDURE — 71046 X-RAY EXAM CHEST 2 VIEWS: CPT

## 2020-03-16 PROCEDURE — 83605 ASSAY OF LACTIC ACID: CPT

## 2020-03-16 PROCEDURE — 96365 THER/PROPH/DIAG IV INF INIT: CPT

## 2020-03-16 PROCEDURE — 83540 ASSAY OF IRON: CPT

## 2020-03-16 PROCEDURE — 83550 IRON BINDING TEST: CPT

## 2020-03-16 PROCEDURE — 86901 BLOOD TYPING SEROLOGIC RH(D): CPT

## 2020-03-16 PROCEDURE — 83010 ASSAY OF HAPTOGLOBIN QUANT: CPT

## 2020-03-16 PROCEDURE — APPNB60 APP NON BILLABLE TIME 46-60 MINS: Performed by: NURSE PRACTITIONER

## 2020-03-16 PROCEDURE — 2580000003 HC RX 258: Performed by: INTERNAL MEDICINE

## 2020-03-16 PROCEDURE — 93005 ELECTROCARDIOGRAM TRACING: CPT | Performed by: EMERGENCY MEDICINE

## 2020-03-16 PROCEDURE — 36415 COLL VENOUS BLD VENIPUNCTURE: CPT

## 2020-03-16 PROCEDURE — 94761 N-INVAS EAR/PLS OXIMETRY MLT: CPT

## 2020-03-16 PROCEDURE — 82784 ASSAY IGA/IGD/IGG/IGM EACH: CPT

## 2020-03-16 PROCEDURE — 1210000000 HC MED SURG R&B

## 2020-03-16 PROCEDURE — 80053 COMPREHEN METABOLIC PANEL: CPT

## 2020-03-16 PROCEDURE — C9113 INJ PANTOPRAZOLE SODIUM, VIA: HCPCS | Performed by: EMERGENCY MEDICINE

## 2020-03-16 RX ORDER — IPRATROPIUM BROMIDE AND ALBUTEROL SULFATE 2.5; .5 MG/3ML; MG/3ML
1 SOLUTION RESPIRATORY (INHALATION) PRN
Status: DISCONTINUED | OUTPATIENT
Start: 2020-03-16 | End: 2020-03-17

## 2020-03-16 RX ORDER — SODIUM CHLORIDE 0.9 % (FLUSH) 0.9 %
10 SYRINGE (ML) INJECTION PRN
Status: DISCONTINUED | OUTPATIENT
Start: 2020-03-16 | End: 2020-03-24 | Stop reason: HOSPADM

## 2020-03-16 RX ORDER — METOPROLOL SUCCINATE 50 MG/1
100 TABLET, EXTENDED RELEASE ORAL ONCE
Status: COMPLETED | OUTPATIENT
Start: 2020-03-16 | End: 2020-03-16

## 2020-03-16 RX ORDER — TRAZODONE HYDROCHLORIDE 50 MG/1
50 TABLET ORAL NIGHTLY
Status: DISCONTINUED | OUTPATIENT
Start: 2020-03-16 | End: 2020-03-19

## 2020-03-16 RX ORDER — METOPROLOL SUCCINATE 50 MG/1
50 TABLET, EXTENDED RELEASE ORAL 2 TIMES DAILY
Status: DISCONTINUED | OUTPATIENT
Start: 2020-03-16 | End: 2020-03-21

## 2020-03-16 RX ORDER — SODIUM CHLORIDE 9 MG/ML
INJECTION, SOLUTION INTRAVENOUS CONTINUOUS
Status: DISCONTINUED | OUTPATIENT
Start: 2020-03-16 | End: 2020-03-17

## 2020-03-16 RX ORDER — POLYETHYLENE GLYCOL 3350 17 G/17G
17 POWDER, FOR SOLUTION ORAL DAILY PRN
Status: DISCONTINUED | OUTPATIENT
Start: 2020-03-16 | End: 2020-03-24 | Stop reason: HOSPADM

## 2020-03-16 RX ORDER — SODIUM CHLORIDE 0.9 % (FLUSH) 0.9 %
10 SYRINGE (ML) INJECTION EVERY 12 HOURS SCHEDULED
Status: DISCONTINUED | OUTPATIENT
Start: 2020-03-16 | End: 2020-03-24 | Stop reason: HOSPADM

## 2020-03-16 RX ORDER — ACETAMINOPHEN 325 MG/1
650 TABLET ORAL EVERY 6 HOURS PRN
Status: DISCONTINUED | OUTPATIENT
Start: 2020-03-16 | End: 2020-03-24 | Stop reason: HOSPADM

## 2020-03-16 RX ORDER — 0.9 % SODIUM CHLORIDE 0.9 %
20 INTRAVENOUS SOLUTION INTRAVENOUS ONCE
Status: COMPLETED | OUTPATIENT
Start: 2020-03-16 | End: 2020-03-16

## 2020-03-16 RX ORDER — ATORVASTATIN CALCIUM 40 MG/1
40 TABLET, FILM COATED ORAL DAILY
Status: DISCONTINUED | OUTPATIENT
Start: 2020-03-16 | End: 2020-03-24 | Stop reason: HOSPADM

## 2020-03-16 RX ORDER — PROMETHAZINE HYDROCHLORIDE 12.5 MG/1
12.5 TABLET ORAL EVERY 6 HOURS PRN
Status: DISCONTINUED | OUTPATIENT
Start: 2020-03-16 | End: 2020-03-24 | Stop reason: HOSPADM

## 2020-03-16 RX ORDER — ONDANSETRON 2 MG/ML
4 INJECTION INTRAMUSCULAR; INTRAVENOUS EVERY 6 HOURS PRN
Status: DISCONTINUED | OUTPATIENT
Start: 2020-03-16 | End: 2020-03-24 | Stop reason: HOSPADM

## 2020-03-16 RX ORDER — ACETAMINOPHEN 650 MG/1
650 SUPPOSITORY RECTAL EVERY 6 HOURS PRN
Status: DISCONTINUED | OUTPATIENT
Start: 2020-03-16 | End: 2020-03-24 | Stop reason: HOSPADM

## 2020-03-16 RX ORDER — TRIAMTERENE AND HYDROCHLOROTHIAZIDE 37.5; 25 MG/1; MG/1
1 TABLET ORAL ONCE
Status: COMPLETED | OUTPATIENT
Start: 2020-03-16 | End: 2020-03-16

## 2020-03-16 RX ORDER — HYDRALAZINE HYDROCHLORIDE 20 MG/ML
5 INJECTION INTRAMUSCULAR; INTRAVENOUS EVERY 4 HOURS PRN
Status: DISCONTINUED | OUTPATIENT
Start: 2020-03-16 | End: 2020-03-18

## 2020-03-16 RX ADMIN — METOPROLOL SUCCINATE 50 MG: 50 TABLET, FILM COATED, EXTENDED RELEASE ORAL at 14:00

## 2020-03-16 RX ADMIN — SODIUM CHLORIDE 80 MG: 9 INJECTION, SOLUTION INTRAVENOUS at 09:58

## 2020-03-16 RX ADMIN — SODIUM CHLORIDE: 9 INJECTION, SOLUTION INTRAVENOUS at 14:39

## 2020-03-16 RX ADMIN — SODIUM CHLORIDE 8 MG/HR: 9 INJECTION, SOLUTION INTRAVENOUS at 10:35

## 2020-03-16 RX ADMIN — METOPROLOL SUCCINATE 50 MG: 50 TABLET, FILM COATED, EXTENDED RELEASE ORAL at 21:19

## 2020-03-16 RX ADMIN — SODIUM CHLORIDE 20 ML: 9 INJECTION, SOLUTION INTRAVENOUS at 14:39

## 2020-03-16 RX ADMIN — IPRATROPIUM BROMIDE AND ALBUTEROL SULFATE 1 AMPULE: .5; 3 SOLUTION RESPIRATORY (INHALATION) at 08:26

## 2020-03-16 RX ADMIN — ATORVASTATIN CALCIUM 40 MG: 40 TABLET, FILM COATED ORAL at 14:00

## 2020-03-16 RX ADMIN — TRIAMTERENE AND HYDROCHLOROTHIAZIDE 1 TABLET: 37.5; 25 TABLET ORAL at 09:38

## 2020-03-16 RX ADMIN — Medication 10 ML: at 21:19

## 2020-03-16 RX ADMIN — METOPROLOL SUCCINATE 100 MG: 50 TABLET, EXTENDED RELEASE ORAL at 09:38

## 2020-03-16 RX ADMIN — SODIUM CHLORIDE 8 MG/HR: 9 INJECTION, SOLUTION INTRAVENOUS at 21:34

## 2020-03-16 RX ADMIN — TRAZODONE HYDROCHLORIDE 50 MG: 50 TABLET ORAL at 21:19

## 2020-03-16 ASSESSMENT — ENCOUNTER SYMPTOMS
VOMITING: 0
COUGH: 0
ABDOMINAL PAIN: 0
CHEST TIGHTNESS: 0
NAUSEA: 0
SORE THROAT: 0
SHORTNESS OF BREATH: 1
EYE PAIN: 0

## 2020-03-16 ASSESSMENT — PAIN SCALES - GENERAL
PAINLEVEL_OUTOF10: 0
PAINLEVEL_OUTOF10: 0

## 2020-03-16 ASSESSMENT — PULMONARY FUNCTION TESTS: PEFR_L/MIN: 230

## 2020-03-16 NOTE — H&P
Hospital Medicine  History and Physical    Patient:  Rudi May  MRN: [id number]    CHIEF COMPLAINT:    Chief Complaint   Patient presents with    Shortness of Breath     sob for the last 4 days. denies cough       History Obtained From:  Patient, EMR  Primary Care Physician: Gavin Levy MD    HISTORY OF PRESENT ILLNESS:   The patient is a 71 y.o. male hypertension. He has been having progressive shortness of breath and dyspnea on exertion. He was found to have a hemoglobin of 6. Patient denies any fever, chills, hematemesis, melena. No chest pain or palpitation. No skin rash. There are no abdominal pain. Past Medical History:      Diagnosis Date    Allergic rhinitis     Aortic regurgitation     Cervical radiculopathy at C8 10/13/2013    Chronic back pain     COPD (chronic obstructive pulmonary disease) (HCC)     Erectile dysfunction     GERD (gastroesophageal reflux disease)     Hyperlipidemia     Hypertension     Insomnia     Osteoarthritis     PUD (peptic ulcer disease)     Vitamin D deficiency        Past Surgical History:      Procedure Laterality Date    ELBOW SURGERY  12/2013    Dr. Lawana Pallas REPLACEMENT  2008    Left hip replacement    SPINE SURGERY  2006    fusion L4-L5       Medications Prior to Admission:    Prior to Admission medications    Medication Sig Start Date End Date Taking? Authorizing Provider   traZODone (DESYREL) 50 MG tablet take 1 tablet by mouth NIGHTLY 2/7/20  Yes Gavin Levy MD   metoprolol succinate (TOPROL XL) 100 MG extended release tablet Take 1 tablet by mouth daily 11/6/19  Yes Gavin Levy MD   atorvastatin (LIPITOR) 40 MG tablet Take 1 tablet by mouth daily 11/6/19   Gavin Levy MD   triamterene-hydrochlorothiazide [facility]) 37.5-25 MG per tablet Take 1 tablet by mouth daily 2/11/19   Gavin Levy MD       Allergies:  Patient has no known allergies. Social History:   TOBACCO:   reports that he has been smoking cigars.  He has smoked for the past 30.00 years. He has never used smokeless tobacco.  ETOH:   reports current alcohol use. Family History:       Problem Relation Age of Onset    Cancer Mother 67        brain       REVIEW OF SYSTEMS:  Ten systems reviewed and negative except for stated in HPI    Physical Exam:    Vitals: BP (!) 168/53   Pulse 56   Temp 98.1 °F (36.7 °C) (Oral)   Resp 16   Ht 5' 9\" (1.753 m)   Wt 190 lb (86.2 kg)   SpO2 100%   BMI 28.06 kg/m²   General appearance: alert, appears stated age and cooperative, no acute distress  Skin: Skin color, texture, turgor normal. No rashes or lesions pale conjunctiva  HEENT: Head: Normocephalic, no lesions, without obvious abnormality. Neck: no adenopathy, no carotid bruit, no JVD, supple, symmetrical, trachea midline and thyroid not enlarged, symmetric, no tenderness/mass/nodules  Lungs: clear to auscultation bilaterally  Heart: regular rate and rhythm, S1, S2 normal, no murmur, click, rub or gallop  Abdomen: soft, non-tender; bowel sounds normal; no masses,  no organomegaly  Extremities: extremities normal, atraumatic, no cyanosis or edema  Neurologic: Mental status: Alert, oriented, thought content appropriate     Recent Labs     03/16/20  0815   WBC 8.3   HGB 6.0*        Recent Labs     03/16/20  0815      K 3.7      CO2 24   BUN 6*   CREATININE 0.69*   GLUCOSE 101*   AST 19   ALT <5   BILITOT 0.4   ALKPHOS 92     Troponin T:   Recent Labs     03/16/20 0815   TROPONINI <0.010       ABGs: No results found for: PHART, PO2ART, CSG4XKQ  INR:   Recent Labs     03/16/20 0815   INR 1.0     URINALYSIS:No results for input(s): NITRITE, COLORU, PHUR, LABCAST, WBCUA, RBCUA, MUCUS, TRICHOMONAS, YEAST, BACTERIA, CLARITYU, SPECGRAV, LEUKOCYTESUR, UROBILINOGEN, BILIRUBINUR, BLOODU, GLUCOSEU, AMORPHOUS in the last 72 hours.     Invalid input(s): Cyndi Monroy  -----------------------------------------------------------------   Xr Chest Standard (2 Vw)    Result

## 2020-03-16 NOTE — ED NOTES
Pt resting quietly in ED cot w/no s/s of distress noted. Resp even and unlabored; pt denies SOB at this time. Pt's wife updated on pt's admission status, per pt's request.  Pt medicated per MAR.      Simba Alfredo RN  03/16/20 [id number]

## 2020-03-16 NOTE — CONSULTS
Relationship status: Not on file    Intimate partner violence     Fear of current or ex partner: Not on file     Emotionally abused: Not on file     Physically abused: Not on file     Forced sexual activity: Not on file   Other Topics Concern    Not on file   Social History Narrative    Not on file      [] Unable to obtain due to ventilated and/ or neurologic status    Home Medications:      Medications Prior to Admission: traZODone (DESYREL) 50 MG tablet, take 1 tablet by mouth NIGHTLY  metoprolol succinate (TOPROL XL) 100 MG extended release tablet, Take 1 tablet by mouth daily  [DISCONTINUED] primidone (MYSOLINE) 50 MG tablet, Half qhs for 1 week, then one qhs for 1 week, then 2 qhs  [DISCONTINUED] amitriptyline (ELAVIL) 25 MG tablet, Take by mouth  atorvastatin (LIPITOR) 40 MG tablet, Take 1 tablet by mouth daily  triamterene-hydrochlorothiazide (MAXZIDE-25) 37.5-25 MG per tablet, Take 1 tablet by mouth daily    Current Hospital Medications:   Scheduled Meds:   sodium chloride  20 mL Intravenous Once    atorvastatin  40 mg Oral Daily    metoprolol succinate  50 mg Oral BID    traZODone  50 mg Oral Nightly    sodium chloride flush  10 mL Intravenous 2 times per day     Continuous Infusions:   pantoprozole (PROTONIX) infusion 8 mg/hr (03/16/20 1035)    sodium chloride 50 mL/hr at 03/16/20 1439     PRN Meds:.ipratropium-albuterol, hydrALAZINE, sodium chloride flush, acetaminophen **OR** acetaminophen, polyethylene glycol, promethazine **OR** ondansetron   pantoprozole (PROTONIX) infusion 8 mg/hr (03/16/20 1035)    sodium chloride 50 mL/hr at 03/16/20 1439      Allergies:   No Known Allergies   Review of Systems:       [x] CV, Resp, Neuro, , and all other systems reviewed and negative other than listed in HPI.         Objective Findings:     Vitals:   Vitals:    03/16/20 1004 03/16/20 1153 03/16/20 1430 03/16/20 1453   BP: (!) 145/52 (!) 168/53 (!) 146/48 (!) 155/58   Pulse: 58 56 81 80   Resp: 13 16 16 16   Temp:  98.1 °F (36.7 °C) 98.4 °F (36.9 °C) 98.4 °F (36.9 °C)   TempSrc:  Oral Oral Oral   SpO2: 99% 100% 99% 98%   Weight:       Height:            Physical Examination:  General: A/O x3  HEENT: Normocephalic, no scleral icterus. Neck: No JVD. Heart: Regular, no murmur, no rub/gallop. No RV heave. Lungs: Clear to ascultation, no rales/wheezing/rhonchi. Good chest wall excursion. Abdomen: Appearance:, no Distension, Soft, mild epigastric tenderness,  Bowel sounds present   Extremities: No clubbing/cyanosis, no edema. Skin: Warm, dry, normal turgor, no rash, no bruise, no petichiae. Neuro: No myoclonus or tremor. Psych: Normal affect    Results/ Medications reviewed 3/16/2020, 3:52 PM     Laboratory, Microbiology, Pathology, Radiology, Cardiology, Medications and Transcriptions reviewed  Scheduled Meds:   sodium chloride  20 mL Intravenous Once    atorvastatin  40 mg Oral Daily    metoprolol succinate  50 mg Oral BID    traZODone  50 mg Oral Nightly    sodium chloride flush  10 mL Intravenous 2 times per day     Continuous Infusions:   pantoprozole (PROTONIX) infusion 8 mg/hr (03/16/20 1035)    sodium chloride 50 mL/hr at 03/16/20 1439       Recent Labs     03/16/20  0815 03/16/20  1423   WBC 8.3  --    HGB 6.0*  --    HCT 19.7* 18.2*   MCV 79.0*  --      --      Recent Labs     03/16/20  0815      K 3.7      CO2 24   BUN 6*   CREATININE 0.69*     Recent Labs     03/16/20  0815   AST 19   ALT <5   BILITOT 0.4   ALKPHOS 92     No results for input(s): LIPASE, AMYLASE in the last 72 hours. Recent Labs     03/16/20  0815   PROT 7.1   INR 1.0     Xr Chest Standard (2 Vw)    Result Date: 3/16/2020  XR CHEST (2 VW) Exam Date/Time:  3/16/2020 8:15 AM Clinical History:      Shortness of breath. Comparison:  1/23/2018  RESULT: Lungs and pleura:  Shallow inspiration with bibasilar atelectasis/opacities. No pleural effusion. No pneumothorax.  Mildly coarsened interstitial markings, accentuated by shallow inspiration. Cardiomediastinal silhouette:  Stable cardiomediastinal silhouette. Aortic atherosclerotic calcification. Other:  No acute osseous findings. Shallow inspiration with bibasilar atelectasis/opacities. Impression:   69-year-old -American male with microcytic anemia, dyspnea on exertion, no overt GI bleeding, no previous history of EGD, however previous history of colonoscopy in 2013 was positive for colon polyps internal hemorrhoids and diverticulosis. No alarming features such as dysphagia, unintentional weight loss, changes to his bowel habits, hematemesis, melena, or hematochezia. He would benefit from endoscopic evaluation and iron studies, timing to be determined by clinical course. May possibly need hematology work-up. Plan:   1. Supportive care, trend H&H, and transfuse to keep hemoglobin greater than 7   2. Agree with PPI  3. endoscopic evaluation timing to be determined by clinical course  4. Okay for clear liquid diet  5.  N.p.o. for EGD tomorrow, with colonoscopy to follow pending clinical course. Comments: Thank you for allowing us to participate in the care of this patient. Will continue to follow. Please call if questions or concerns arise. Electronically signed by NOAH Paniagua CNP on 3/16/2020 at 3:52 PM    Please note this report has been partially produced using speech recognition software and may cause contain errors related to that system including grammar, punctuation and spelling as well as words and phrases that may seem inappropriate. If there are questions or concerns please feel free to contact me to clarify.

## 2020-03-16 NOTE — ED PROVIDER NOTES
DEPARTMENT COURSE and DIFFERENTIAL DIAGNOSIS/MDM:   Vitals:    Vitals:    03/16/20 0741 03/16/20 0815 03/16/20 0902   BP: (!) 181/83 (!) 157/56 (!) 167/61   Pulse: 64 66 65   Resp: 19 20    Temp: 98 °F (36.7 °C)     TempSrc: Oral     SpO2: 96% 98% 98%   Weight: 190 lb (86.2 kg)     Height: 5' 9\" (1.753 m)         Patient comes in with shortness of breath. Patient was found to have a hemoglobin of 6. He is heme-negative at this moment in time. Vital signs stable. Patient has history of bleeding ulcer but has not noticed any blood or change in stool. I believe his dyspnea is related to his anemia. Iron studies have been ordered and blood has also been ordered 1 unit packed RBCs. MDM      REASSESSMENT          CRITICAL CARE TIME   Total Critical Care time was 30 minutes, excluding separatelyreportable procedures. There was a high probability ofclinically significant/life threatening deterioration in the patient's condition which required my urgent intervention. CONSULTS:  None    PROCEDURES:  Unless otherwise noted below, none     Procedures    FINAL IMPRESSION      1. Dyspnea, unspecified type    2. Iron deficiency anemia, unspecified iron deficiency anemia type    3. History of peptic ulcer disease          DISPOSITION/PLAN   DISPOSITION        PATIENT REFERREDTO:  No follow-up provider specified. DISCHARGEMEDICATIONS:  New Prescriptions    No medications on file     Controlled Substances Monitoring:     No flowsheet data found.     (Please note that portions of this note were completed with a voice recognition program.  Efforts were made to edit the dictations but occasionally words are mis-transcribed.)    Marv Teran DO (electronically signed)  Attending Emergency Physician          Marv Teran DO  03/16/20 3654

## 2020-03-16 NOTE — ED NOTES
Consent for blood transfusion obtained at this time.      [facility], [facility]  03/16/20 9412

## 2020-03-17 ENCOUNTER — ANESTHESIA EVENT (OUTPATIENT)
Dept: ICU | Age: 65
DRG: 377 | End: 2020-03-17
Payer: MEDICARE

## 2020-03-17 ENCOUNTER — APPOINTMENT (OUTPATIENT)
Dept: GENERAL RADIOLOGY | Age: 65
DRG: 377 | End: 2020-03-17
Payer: MEDICARE

## 2020-03-17 ENCOUNTER — APPOINTMENT (OUTPATIENT)
Dept: CT IMAGING | Age: 65
DRG: 377 | End: 2020-03-17
Payer: MEDICARE

## 2020-03-17 ENCOUNTER — ANESTHESIA (OUTPATIENT)
Dept: ICU | Age: 65
DRG: 377 | End: 2020-03-17
Payer: MEDICARE

## 2020-03-17 VITALS — OXYGEN SATURATION: 91 % | DIASTOLIC BLOOD PRESSURE: 91 MMHG | SYSTOLIC BLOOD PRESSURE: 216 MMHG

## 2020-03-17 LAB
ALBUMIN SERPL-MCNC: 3.8 G/DL (ref 3.5–4.6)
ALP BLD-CCNC: 90 U/L (ref 35–104)
ALT SERPL-CCNC: 10 U/L (ref 0–41)
ANION GAP SERPL CALCULATED.3IONS-SCNC: 14 MEQ/L (ref 9–15)
ANION GAP SERPL CALCULATED.3IONS-SCNC: 17 MEQ/L (ref 9–15)
ANISOCYTOSIS: ABNORMAL
AST SERPL-CCNC: 22 U/L (ref 0–40)
ATYPICAL LYMPHOCYTE RELATIVE PERCENT: 1 %
BASE EXCESS ARTERIAL: -5 (ref -3–3)
BASOPHILS ABSOLUTE: 0 K/UL (ref 0–0.2)
BASOPHILS RELATIVE PERCENT: 0.9 %
BILIRUB SERPL-MCNC: 1.4 MG/DL (ref 0.2–0.7)
BLOOD BANK DISPENSE STATUS: NORMAL
BLOOD BANK DISPENSE STATUS: NORMAL
BLOOD BANK PRODUCT CODE: NORMAL
BLOOD BANK PRODUCT CODE: NORMAL
BPU ID: NORMAL
BPU ID: NORMAL
BUN BLDV-MCNC: 6 MG/DL (ref 8–23)
BUN BLDV-MCNC: 6 MG/DL (ref 8–23)
CALCIUM IONIZED: 1.31 MMOL/L (ref 1.12–1.32)
CALCIUM SERPL-MCNC: 8.7 MG/DL (ref 8.5–9.9)
CALCIUM SERPL-MCNC: 9.4 MG/DL (ref 8.5–9.9)
CHLORIDE BLD-SCNC: 104 MEQ/L (ref 95–107)
CHLORIDE BLD-SCNC: 104 MEQ/L (ref 95–107)
CO2: 19 MEQ/L (ref 20–31)
CO2: 21 MEQ/L (ref 20–31)
CREAT SERPL-MCNC: 0.7 MG/DL (ref 0.7–1.2)
CREAT SERPL-MCNC: 0.76 MG/DL (ref 0.7–1.2)
DESCRIPTION BLOOD BANK: NORMAL
DESCRIPTION BLOOD BANK: NORMAL
EKG ATRIAL RATE: 59 BPM
EKG ATRIAL RATE: 76 BPM
EKG P AXIS: 37 DEGREES
EKG P-R INTERVAL: 204 MS
EKG Q-T INTERVAL: 406 MS
EKG Q-T INTERVAL: 446 MS
EKG QRS DURATION: 76 MS
EKG QRS DURATION: 86 MS
EKG QTC CALCULATION (BAZETT): 434 MS
EKG QTC CALCULATION (BAZETT): 459 MS
EKG R AXIS: -43 DEGREES
EKG R AXIS: -52 DEGREES
EKG T AXIS: -9 DEGREES
EKG T AXIS: 11 DEGREES
EKG VENTRICULAR RATE: 57 BPM
EKG VENTRICULAR RATE: 77 BPM
EOSINOPHILS ABSOLUTE: 0.2 K/UL (ref 0–0.7)
EOSINOPHILS RELATIVE PERCENT: 2 %
G-6-PD, QUANT: 26.1 U/G HB (ref 9.9–16.6)
GFR AFRICAN AMERICAN: >60
GFR NON-AFRICAN AMERICAN: >60
GLOBULIN: 3.2 G/DL (ref 2.3–3.5)
GLUCOSE BLD-MCNC: 101 MG/DL (ref 70–99)
GLUCOSE BLD-MCNC: 142 MG/DL (ref 60–115)
GLUCOSE BLD-MCNC: 170 MG/DL (ref 70–99)
HCO3 ARTERIAL: 23.2 MMOL/L (ref 21–29)
HCT VFR BLD CALC: 20.4 % (ref 42–52)
HCT VFR BLD CALC: 24.3 % (ref 42–52)
HCT VFR BLD CALC: 24.5 % (ref 42–52)
HCT VFR BLD CALC: 24.7 % (ref 42–52)
HCT VFR BLD CALC: 26.2 % (ref 42–52)
HEMATOLOGY PATH CONSULT: NORMAL
HEMOGLOBIN: 6.3 G/DL (ref 14–18)
HEMOGLOBIN: 7.5 G/DL (ref 14–18)
HEMOGLOBIN: 7.7 G/DL (ref 14–18)
HEMOGLOBIN: 8 G/DL (ref 14–18)
HEMOGLOBIN: 8 G/DL (ref 14–18)
HEMOGLOBIN: 8.6 GM/DL (ref 13.5–17.5)
HYPOCHROMIA: ABNORMAL
LACTATE: 4.31 MMOL/L (ref 0.4–2)
LV EF: 60 %
LVEF MODALITY: NORMAL
LYMPHOCYTES ABSOLUTE: 1.3 K/UL (ref 1–4.8)
LYMPHOCYTES RELATIVE PERCENT: 11 %
MACROCYTES: ABNORMAL
MCH RBC QN AUTO: 25.8 PG (ref 27–31.3)
MCH RBC QN AUTO: 26.5 PG (ref 27–31.3)
MCHC RBC AUTO-ENTMCNC: 30.3 % (ref 33–37)
MCHC RBC AUTO-ENTMCNC: 32.2 % (ref 33–37)
MCV RBC AUTO: 82.2 FL (ref 80–100)
MCV RBC AUTO: 85.1 FL (ref 80–100)
MICROCYTES: ABNORMAL
MONOCYTES ABSOLUTE: 0.9 K/UL (ref 0.2–0.8)
MONOCYTES RELATIVE PERCENT: 7.6 %
NEUTROPHILS ABSOLUTE: 8.3 K/UL (ref 1.4–6.5)
NEUTROPHILS RELATIVE PERCENT: 78 %
O2 SAT, ARTERIAL: 100 % (ref 93–100)
OVALOCYTES: ABNORMAL
PCO2 ARTERIAL: 64 MM HG (ref 35–45)
PDW BLD-RTO: 23.5 % (ref 11.5–14.5)
PDW BLD-RTO: 24.1 % (ref 11.5–14.5)
PERFORMED ON: ABNORMAL
PH ARTERIAL: 7.17 (ref 7.35–7.45)
PLATELET # BLD: 249 K/UL (ref 130–400)
PLATELET # BLD: 276 K/UL (ref 130–400)
PLATELET SLIDE REVIEW: NORMAL
PO2 ARTERIAL: 327 MM HG (ref 75–108)
POC CHLORIDE: 107 MEQ/L (ref 99–110)
POC CREATININE: 0.7 MG/DL (ref 0.8–1.3)
POC FIO2: 100
POC HEMATOCRIT: 25 % (ref 41–53)
POC POTASSIUM: 3.2 MEQ/L (ref 3.5–5.1)
POC SAMPLE TYPE: ABNORMAL
POC SODIUM: 140 MEQ/L (ref 136–145)
POIKILOCYTES: ABNORMAL
POLYCHROMASIA: ABNORMAL
POTASSIUM REFLEX MAGNESIUM: 3.6 MEQ/L (ref 3.4–4.9)
POTASSIUM SERPL-SCNC: 3.8 MEQ/L (ref 3.4–4.9)
PRO-BNP: 1607 PG/ML
PROCALCITONIN: 0.03 NG/ML (ref 0–0.15)
RBC # BLD: 3.01 M/UL (ref 4.7–6.1)
RBC # BLD: 3.08 M/UL (ref 4.7–6.1)
SLIDE REVIEW: ABNORMAL
SODIUM BLD-SCNC: 139 MEQ/L (ref 135–144)
SODIUM BLD-SCNC: 140 MEQ/L (ref 135–144)
TCO2 ARTERIAL: 25 (ref 22–29)
TOTAL PROTEIN: 7 G/DL (ref 6.3–8)
TROPONIN: <0.01 NG/ML (ref 0–0.01)
WBC # BLD: 10.7 K/UL (ref 4.8–10.8)
WBC # BLD: 11.9 K/UL (ref 4.8–10.8)

## 2020-03-17 PROCEDURE — 87070 CULTURE OTHR SPECIMN AEROBIC: CPT

## 2020-03-17 PROCEDURE — 74177 CT ABD & PELVIS W/CONTRAST: CPT

## 2020-03-17 PROCEDURE — 71045 X-RAY EXAM CHEST 1 VIEW: CPT

## 2020-03-17 PROCEDURE — 93010 ELECTROCARDIOGRAM REPORT: CPT | Performed by: INTERNAL MEDICINE

## 2020-03-17 PROCEDURE — C9113 INJ PANTOPRAZOLE SODIUM, VIA: HCPCS | Performed by: INTERNAL MEDICINE

## 2020-03-17 PROCEDURE — 0BH17EZ INSERTION OF ENDOTRACHEAL AIRWAY INTO TRACHEA, VIA NATURAL OR ARTIFICIAL OPENING: ICD-10-PCS | Performed by: INTERNAL MEDICINE

## 2020-03-17 PROCEDURE — 7100000000 HC PACU RECOVERY - FIRST 15 MIN: Performed by: SPECIALIST

## 2020-03-17 PROCEDURE — 83605 ASSAY OF LACTIC ACID: CPT

## 2020-03-17 PROCEDURE — 84484 ASSAY OF TROPONIN QUANT: CPT

## 2020-03-17 PROCEDURE — 82330 ASSAY OF CALCIUM: CPT

## 2020-03-17 PROCEDURE — 6360000002 HC RX W HCPCS: Performed by: INTERNAL MEDICINE

## 2020-03-17 PROCEDURE — 83880 ASSAY OF NATRIURETIC PEPTIDE: CPT

## 2020-03-17 PROCEDURE — 3700000001 HC ADD 15 MINUTES (ANESTHESIA)

## 2020-03-17 PROCEDURE — 2580000003 HC RX 258: Performed by: INTERNAL MEDICINE

## 2020-03-17 PROCEDURE — 93306 TTE W/DOPPLER COMPLETE: CPT

## 2020-03-17 PROCEDURE — 85027 COMPLETE CBC AUTOMATED: CPT

## 2020-03-17 PROCEDURE — 3700000000 HC ANESTHESIA ATTENDED CARE

## 2020-03-17 PROCEDURE — 84145 PROCALCITONIN (PCT): CPT

## 2020-03-17 PROCEDURE — 87040 BLOOD CULTURE FOR BACTERIA: CPT

## 2020-03-17 PROCEDURE — 94640 AIRWAY INHALATION TREATMENT: CPT

## 2020-03-17 PROCEDURE — 6360000002 HC RX W HCPCS: Performed by: EMERGENCY MEDICINE

## 2020-03-17 PROCEDURE — 92950 HEART/LUNG RESUSCITATION CPR: CPT

## 2020-03-17 PROCEDURE — 6360000002 HC RX W HCPCS: Performed by: NURSE ANESTHETIST, CERTIFIED REGISTERED

## 2020-03-17 PROCEDURE — 94761 N-INVAS EAR/PLS OXIMETRY MLT: CPT

## 2020-03-17 PROCEDURE — 93005 ELECTROCARDIOGRAM TRACING: CPT | Performed by: INTERNAL MEDICINE

## 2020-03-17 PROCEDURE — 2500000003 HC RX 250 WO HCPCS: Performed by: NURSE ANESTHETIST, CERTIFIED REGISTERED

## 2020-03-17 PROCEDURE — 87205 SMEAR GRAM STAIN: CPT

## 2020-03-17 PROCEDURE — 94002 VENT MGMT INPAT INIT DAY: CPT

## 2020-03-17 PROCEDURE — 6370000000 HC RX 637 (ALT 250 FOR IP): Performed by: INTERNAL MEDICINE

## 2020-03-17 PROCEDURE — 6360000004 HC RX CONTRAST MEDICATION: Performed by: INTERNAL MEDICINE

## 2020-03-17 PROCEDURE — 5A12012 PERFORMANCE OF CARDIAC OUTPUT, SINGLE, MANUAL: ICD-10-PCS | Performed by: INTERNAL MEDICINE

## 2020-03-17 PROCEDURE — 43235 EGD DIAGNOSTIC BRUSH WASH: CPT | Performed by: SPECIALIST

## 2020-03-17 PROCEDURE — 2000000000 HC ICU R&B

## 2020-03-17 PROCEDURE — 85018 HEMOGLOBIN: CPT

## 2020-03-17 PROCEDURE — 82565 ASSAY OF CREATININE: CPT

## 2020-03-17 PROCEDURE — 80053 COMPREHEN METABOLIC PANEL: CPT

## 2020-03-17 PROCEDURE — 2500000003 HC RX 250 WO HCPCS: Performed by: INTERNAL MEDICINE

## 2020-03-17 PROCEDURE — 2580000003 HC RX 258

## 2020-03-17 PROCEDURE — C9113 INJ PANTOPRAZOLE SODIUM, VIA: HCPCS | Performed by: EMERGENCY MEDICINE

## 2020-03-17 PROCEDURE — 99291 CRITICAL CARE FIRST HOUR: CPT | Performed by: INTERNAL MEDICINE

## 2020-03-17 PROCEDURE — 36415 COLL VENOUS BLD VENIPUNCTURE: CPT

## 2020-03-17 PROCEDURE — 82435 ASSAY OF BLOOD CHLORIDE: CPT

## 2020-03-17 PROCEDURE — 51702 INSERT TEMP BLADDER CATH: CPT

## 2020-03-17 PROCEDURE — 82803 BLOOD GASES ANY COMBINATION: CPT

## 2020-03-17 PROCEDURE — 3609017100 HC EGD: Performed by: SPECIALIST

## 2020-03-17 PROCEDURE — 0DJ08ZZ INSPECTION OF UPPER INTESTINAL TRACT, VIA NATURAL OR ARTIFICIAL OPENING ENDOSCOPIC: ICD-10-PCS | Performed by: SPECIALIST

## 2020-03-17 PROCEDURE — 85025 COMPLETE CBC W/AUTO DIFF WBC: CPT

## 2020-03-17 PROCEDURE — 36600 WITHDRAWAL OF ARTERIAL BLOOD: CPT

## 2020-03-17 PROCEDURE — 84132 ASSAY OF SERUM POTASSIUM: CPT

## 2020-03-17 PROCEDURE — 2580000003 HC RX 258: Performed by: EMERGENCY MEDICINE

## 2020-03-17 PROCEDURE — 71275 CT ANGIOGRAPHY CHEST: CPT

## 2020-03-17 PROCEDURE — 85014 HEMATOCRIT: CPT

## 2020-03-17 PROCEDURE — 2709999900 HC NON-CHARGEABLE SUPPLY: Performed by: SPECIALIST

## 2020-03-17 PROCEDURE — 84295 ASSAY OF SERUM SODIUM: CPT

## 2020-03-17 PROCEDURE — 36430 TRANSFUSION BLD/BLD COMPNT: CPT

## 2020-03-17 PROCEDURE — 7100000001 HC PACU RECOVERY - ADDTL 15 MIN: Performed by: SPECIALIST

## 2020-03-17 PROCEDURE — 5A1935Z RESPIRATORY VENTILATION, LESS THAN 24 CONSECUTIVE HOURS: ICD-10-PCS | Performed by: INTERNAL MEDICINE

## 2020-03-17 RX ORDER — LIDOCAINE HYDROCHLORIDE 20 MG/ML
INJECTION, SOLUTION EPIDURAL; INFILTRATION; INTRACAUDAL; PERINEURAL PRN
Status: DISCONTINUED | OUTPATIENT
Start: 2020-03-17 | End: 2020-03-17 | Stop reason: SDUPTHER

## 2020-03-17 RX ORDER — SODIUM CHLORIDE 9 MG/ML
INJECTION, SOLUTION INTRAVENOUS
Status: COMPLETED
Start: 2020-03-17 | End: 2020-03-17

## 2020-03-17 RX ORDER — PANTOPRAZOLE SODIUM 40 MG/10ML
40 INJECTION, POWDER, LYOPHILIZED, FOR SOLUTION INTRAVENOUS DAILY
Status: DISCONTINUED | OUTPATIENT
Start: 2020-03-17 | End: 2020-03-19

## 2020-03-17 RX ORDER — PROPOFOL 10 MG/ML
INJECTION, EMULSION INTRAVENOUS
Status: DISPENSED
Start: 2020-03-17 | End: 2020-03-18

## 2020-03-17 RX ORDER — SODIUM CHLORIDE 0.9 % (FLUSH) 0.9 %
10 SYRINGE (ML) INJECTION PRN
Status: DISCONTINUED | OUTPATIENT
Start: 2020-03-17 | End: 2020-03-24 | Stop reason: HOSPADM

## 2020-03-17 RX ORDER — ONDANSETRON 2 MG/ML
4 INJECTION INTRAMUSCULAR; INTRAVENOUS
Status: DISCONTINUED | OUTPATIENT
Start: 2020-03-17 | End: 2020-03-17 | Stop reason: HOSPADM

## 2020-03-17 RX ORDER — PANTOPRAZOLE SODIUM 40 MG/1
40 TABLET, DELAYED RELEASE ORAL
Status: DISCONTINUED | OUTPATIENT
Start: 2020-03-17 | End: 2020-03-17

## 2020-03-17 RX ORDER — IPRATROPIUM BROMIDE AND ALBUTEROL SULFATE 2.5; .5 MG/3ML; MG/3ML
1 SOLUTION RESPIRATORY (INHALATION) ONCE
Status: COMPLETED | OUTPATIENT
Start: 2020-03-17 | End: 2020-03-17

## 2020-03-17 RX ORDER — POLYETHYLENE GLYCOL 3350, SODIUM CHLORIDE, POTASSIUM CHLORIDE, SODIUM BICARBONATE, AND SODIUM SULFATE 240; 5.84; 2.98; 6.72; 22.72 G/4L; G/4L; G/4L; G/4L; G/4L
4000 POWDER, FOR SOLUTION ORAL ONCE
Status: CANCELLED | OUTPATIENT
Start: 2020-03-17 | End: 2020-03-17

## 2020-03-17 RX ORDER — 0.9 % SODIUM CHLORIDE 0.9 %
20 INTRAVENOUS SOLUTION INTRAVENOUS ONCE
Status: COMPLETED | OUTPATIENT
Start: 2020-03-17 | End: 2020-03-17

## 2020-03-17 RX ORDER — PROPOFOL 10 MG/ML
INJECTION, EMULSION INTRAVENOUS CONTINUOUS PRN
Status: COMPLETED | OUTPATIENT
Start: 2020-03-17 | End: 2020-03-17

## 2020-03-17 RX ORDER — MIDAZOLAM HYDROCHLORIDE 1 MG/ML
INJECTION INTRAMUSCULAR; INTRAVENOUS
Status: COMPLETED | OUTPATIENT
Start: 2020-03-17 | End: 2020-03-17

## 2020-03-17 RX ORDER — SODIUM CHLORIDE 9 MG/ML
10 INJECTION INTRAVENOUS DAILY
Status: DISCONTINUED | OUTPATIENT
Start: 2020-03-17 | End: 2020-03-24 | Stop reason: HOSPADM

## 2020-03-17 RX ORDER — SODIUM CHLORIDE 0.9 % (FLUSH) 0.9 %
10 SYRINGE (ML) INJECTION EVERY 12 HOURS SCHEDULED
Status: DISCONTINUED | OUTPATIENT
Start: 2020-03-17 | End: 2020-03-24 | Stop reason: HOSPADM

## 2020-03-17 RX ORDER — PROPOFOL 10 MG/ML
10 INJECTION, EMULSION INTRAVENOUS
Status: DISCONTINUED | OUTPATIENT
Start: 2020-03-17 | End: 2020-03-19

## 2020-03-17 RX ORDER — SODIUM CHLORIDE 9 MG/ML
INJECTION, SOLUTION INTRAVENOUS ONCE
Status: DISCONTINUED | OUTPATIENT
Start: 2020-03-17 | End: 2020-03-19

## 2020-03-17 RX ORDER — 0.9 % SODIUM CHLORIDE 0.9 %
250 INTRAVENOUS SOLUTION INTRAVENOUS ONCE
Status: COMPLETED | OUTPATIENT
Start: 2020-03-17 | End: 2020-03-17

## 2020-03-17 RX ORDER — CYANOCOBALAMIN 1000 UG/ML
1000 INJECTION INTRAMUSCULAR; SUBCUTANEOUS DAILY
Status: COMPLETED | OUTPATIENT
Start: 2020-03-17 | End: 2020-03-19

## 2020-03-17 RX ORDER — PROPOFOL 10 MG/ML
INJECTION, EMULSION INTRAVENOUS PRN
Status: DISCONTINUED | OUTPATIENT
Start: 2020-03-17 | End: 2020-03-17 | Stop reason: SDUPTHER

## 2020-03-17 RX ORDER — ALBUTEROL SULFATE 90 UG/1
4 AEROSOL, METERED RESPIRATORY (INHALATION) 4 TIMES DAILY
Status: DISCONTINUED | OUTPATIENT
Start: 2020-03-17 | End: 2020-03-18

## 2020-03-17 RX ORDER — SODIUM CHLORIDE, SODIUM LACTATE, POTASSIUM CHLORIDE, CALCIUM CHLORIDE 600; 310; 30; 20 MG/100ML; MG/100ML; MG/100ML; MG/100ML
INJECTION, SOLUTION INTRAVENOUS CONTINUOUS
Status: DISCONTINUED | OUTPATIENT
Start: 2020-03-17 | End: 2020-03-19

## 2020-03-17 RX ADMIN — SODIUM CHLORIDE 8 MG/HR: 9 INJECTION, SOLUTION INTRAVENOUS at 06:08

## 2020-03-17 RX ADMIN — MIDAZOLAM HYDROCHLORIDE 2 MG: 2 INJECTION, SOLUTION INTRAMUSCULAR; INTRAVENOUS at 14:12

## 2020-03-17 RX ADMIN — LIDOCAINE HYDROCHLORIDE 50 MG: 20 INJECTION, SOLUTION EPIDURAL; INFILTRATION; INTRACAUDAL; PERINEURAL at 12:01

## 2020-03-17 RX ADMIN — EPINEPHRINE 1 MG: 0.1 INJECTION INTRACARDIAC; INTRAVENOUS at 14:06

## 2020-03-17 RX ADMIN — SODIUM CHLORIDE 20 ML: 9 INJECTION, SOLUTION INTRAVENOUS at 02:46

## 2020-03-17 RX ADMIN — IPRATROPIUM BROMIDE AND ALBUTEROL SULFATE 1 AMPULE: .5; 3 SOLUTION RESPIRATORY (INHALATION) at 05:36

## 2020-03-17 RX ADMIN — PROPOFOL 40 MG: 10 INJECTION, EMULSION INTRAVENOUS at 12:07

## 2020-03-17 RX ADMIN — ATORVASTATIN CALCIUM 40 MG: 40 TABLET, FILM COATED ORAL at 10:07

## 2020-03-17 RX ADMIN — VANCOMYCIN HYDROCHLORIDE 1250 MG: 5 INJECTION, POWDER, LYOPHILIZED, FOR SOLUTION INTRAVENOUS at 17:11

## 2020-03-17 RX ADMIN — FENTANYL CITRATE 25 MCG/HR: 50 INJECTION, SOLUTION INTRAMUSCULAR; INTRAVENOUS at 18:16

## 2020-03-17 RX ADMIN — DOXYCYCLINE 100 MG: 100 INJECTION, POWDER, LYOPHILIZED, FOR SOLUTION INTRAVENOUS at 17:08

## 2020-03-17 RX ADMIN — SODIUM CHLORIDE: 9 INJECTION, SOLUTION INTRAVENOUS at 13:40

## 2020-03-17 RX ADMIN — CYANOCOBALAMIN 1000 MCG: 1000 INJECTION, SOLUTION INTRAMUSCULAR; SUBCUTANEOUS at 10:07

## 2020-03-17 RX ADMIN — IOPAMIDOL 100 ML: 755 INJECTION, SOLUTION INTRAVENOUS at 16:44

## 2020-03-17 RX ADMIN — PANTOPRAZOLE SODIUM 40 MG: 40 TABLET, DELAYED RELEASE ORAL at 10:07

## 2020-03-17 RX ADMIN — PIPERACILLIN AND TAZOBACTAM 3.38 G: 3; .375 INJECTION, POWDER, LYOPHILIZED, FOR SOLUTION INTRAVENOUS at 17:11

## 2020-03-17 RX ADMIN — PROPOFOL 120 MG: 10 INJECTION, EMULSION INTRAVENOUS at 12:01

## 2020-03-17 RX ADMIN — SODIUM CHLORIDE, POTASSIUM CHLORIDE, SODIUM LACTATE AND CALCIUM CHLORIDE: 600; 310; 30; 20 INJECTION, SOLUTION INTRAVENOUS at 10:08

## 2020-03-17 RX ADMIN — PANTOPRAZOLE SODIUM 40 MG: 40 INJECTION, POWDER, FOR SOLUTION INTRAVENOUS at 17:08

## 2020-03-17 RX ADMIN — METOPROLOL SUCCINATE 50 MG: 50 TABLET, FILM COATED, EXTENDED RELEASE ORAL at 10:08

## 2020-03-17 RX ADMIN — SODIUM CHLORIDE 250 ML: 9 INJECTION, SOLUTION INTRAVENOUS at 13:40

## 2020-03-17 RX ADMIN — PROPOFOL 10 MCG/KG/MIN: 10 INJECTION, EMULSION INTRAVENOUS at 14:32

## 2020-03-17 RX ADMIN — PROPOFOL 40 MCG/KG/MIN: 10 INJECTION, EMULSION INTRAVENOUS at 17:31

## 2020-03-17 RX ADMIN — SODIUM CHLORIDE, POTASSIUM CHLORIDE, SODIUM LACTATE AND CALCIUM CHLORIDE: 600; 310; 30; 20 INJECTION, SOLUTION INTRAVENOUS at 20:45

## 2020-03-17 RX ADMIN — Medication 10 ML: at 17:33

## 2020-03-17 RX ADMIN — PROPOFOL 50 MCG/KG/MIN: 10 INJECTION, EMULSION INTRAVENOUS at 21:12

## 2020-03-17 RX ADMIN — Medication 10 ML: at 10:08

## 2020-03-17 RX ADMIN — MIDAZOLAM HYDROCHLORIDE 2 MG: 2 INJECTION, SOLUTION INTRAMUSCULAR; INTRAVENOUS at 14:10

## 2020-03-17 ASSESSMENT — PULMONARY FUNCTION TESTS
PIF_VALUE: 25
PIF_VALUE: 0
PIF_VALUE: 35
PIF_VALUE: 1
PIF_VALUE: 25
PIF_VALUE: 1
PIF_VALUE: 26
PIF_VALUE: 1
PIF_VALUE: 26
PIF_VALUE: 1
PIF_VALUE: 0
PIF_VALUE: 0
PIF_VALUE: 24
PIF_VALUE: 1
PIF_VALUE: 35
PIF_VALUE: 1
PIF_VALUE: 1
PIF_VALUE: 0
PIF_VALUE: 31
PIF_VALUE: 0
PIF_VALUE: 1
PIF_VALUE: 1
PIF_VALUE: 0
PIF_VALUE: 0
PIF_VALUE: 26
PIF_VALUE: 25
PIF_VALUE: 27
PIF_VALUE: 25
PIF_VALUE: 1
PIF_VALUE: 29
PIF_VALUE: 30
PIF_VALUE: 1

## 2020-03-17 ASSESSMENT — LIFESTYLE VARIABLES: SMOKING_STATUS: 1

## 2020-03-17 ASSESSMENT — PAIN SCALES - GENERAL
PAINLEVEL_OUTOF10: 0

## 2020-03-17 NOTE — PROGRESS NOTES
14:30 Patient transferred to ICU bed 16. Pt placed on monitor, vital signs obtained and stable. Pt on ventilator, sedation. 16:00 Wife called, updated on patient's condition. 17:10 Colonoscopy cancelled. 17:45 While in hallway, noticed patient sat up at side of bed despite being at maximum dose of propofol. Helped patient back into bed. ET tube still in place. Restraints still on. Repositioned patient and sent message to Dr. Caprice Blaek about sedation. 18:05 Called placed to Dr. Jen Callahan about sedation. New orders received.

## 2020-03-17 NOTE — PROGRESS NOTES
Patient continues to have subtle shortness of breath and dyspnea on exertion. No chest pain or palpitation. No abdominal pain, nausea, vomiting, diarrhea, dysuria or hematuria. ROS: 12 system review otherwise is negative for acute signs or symptoms over the last 24 hrs. Exam: Awake, alert oriented, in no apparent distress, morbidly obese, no distress. HEENT: Pink conjunctiva and buccal mucosa. Normal and throat and nose  Neck: Supple, no nuchal rigidity. Endo: No thyromegaly. Vascular: No JVD or carotid bruit. Chest: Clear to auscultation, no dullness to percussion. Heart: Regular rate and rhythm, no extra sounds. No murmur, no rub. Abdomen: Soft, no tenderness, no rebound, no rigidity. Increased abdominal girth therefore clinically I could not exclude the possibility of intra-abdominal mass or organomegaly. LE: No cyanosis or clubbing, no varices or edema. Neuro: Awake, alert, oriented, normal speech, normal comprehension, normal extension, normal cranial nerves, normal and symmetrical motor and tone examination throughout. MS: No joint effusion or tenderness. Skin: No skin rash, itching, bruising or significant findings. Assessment and plan: This documentation may or may not be complete, inclusive or conclusive. *Anemia, Hemoccult stool is negative. No evidence of active or massive GI blood loss. Patient denies any hematuria. His reticulocyte count and LDH are normal making the possibility of hemolysis less likely. Haptoglobin is still pending. Anemia could be related to the following:    *Iron deficiency anemia, rule out the possibility of intermittent or chronic GI blood loss. *B12 deficiency. *Folate deficiency. *Hypertension    Plan:  I started the patient on daily supplementation of B12, folate and iron infusion intravenously. Patient is to be seen by GI and hematology for further advice and recommendations.   Hemoglobin electrophoresis pending although it is less likely that patient has thalassemia or sickle cell given his normal hemoglobin a few months ago. Patient will definitely need age-appropriate cancer screening including but not limited to colonoscopy, prostate exam, PSA and others to be done by primary care doctor. I may or may not have addressed all of this pt symptoms, medical issues, abnormal labs and findings. Pt will need additional work up, investigation, testing, surveillance, and treatment to be done at a later time and date during this hospitalization or post discharge by PCP and other out pt providers. Portion of patient care is managed by other providers. Please refer to their notes for details. I will follow specialists recommendations given their expertise in specialty subject matters. I will transfer patient to a tertiary care center if recommended by specialists. Further workup and plan will be determined based on the clinical progression and a follow-up tests result. Night and next week  hospitalist will take care of the patient in my absence.

## 2020-03-17 NOTE — FLOWSHEET NOTE
Pt A & O x4. Fine crackles noted in RLE patient stating 99% on 2L SOB improving. Bowel sounds present with last BM 3/16/20. Pt is NPO for procedure. Will continue to monitor. 1140 - patient off floor to short stay. 1330 - pt back on floor from EGD.    1405 - patient sitting on side of bed talking to  and stated \"I am short of breath\" and eyes rolled back into head.  called rapid response and began CPR. Patient was intubated and transferred to ICU. This nurse gave report and answered all questions to Jose Eduardo Mansfield on ICU. Getachew Sanchezerd wife was called and message left to contact ICU with phone number.

## 2020-03-17 NOTE — PROGRESS NOTES
Patient stated he feels the way he did when he came in, short of breath and dizzy. Blood transfusion started, patient observed for the initial 15 minutes. Pt tolerated blood transfusion well with no complaints.  Will continue to monitor Electronically signed by Víctor Pena RN on 3/17/2020 at 2:58 AM

## 2020-03-17 NOTE — ANESTHESIA POSTPROCEDURE EVALUATION
Department of Anesthesiology  Postprocedure Note    Patient: Yadira Phillips  MRN: [id number]  YOB: 1955  Date of evaluation: 3/17/2020  Time:  12:22 PM     Procedure Summary     Date:  03/17/20 Room / Location:  [facility]    Anesthesia Start:  1157 Anesthesia Stop:      Procedure:  EGD ESOPHAGOGASTRODUODENOSCOPY (N/A ) Diagnosis:  (INPATIENT)    Surgeon:  Rubi Hutson MD Responsible Provider:  NOAH Salcedo CRNA    Anesthesia Type:  MAC ASA Status:  3          Anesthesia Type: MAC    Sabrina Phase I:      Sabrina Phase II:      Last vitals: Reviewed and per EMR flowsheets.        Anesthesia Post Evaluation    Patient location during evaluation: PACU  Pain score: 0  Airway patency: patent  Nausea & Vomiting: no vomiting and no nausea  Complications: no  Cardiovascular status: hemodynamically stable  Respiratory status: acceptable and nasal cannula  Hydration status: stable

## 2020-03-17 NOTE — PLAN OF CARE
Problem: Activity:  Goal: Fatigue will decrease  Description: Fatigue will decrease  Outcome: Ongoing  Goal: Ability to tolerate increased activity will improve  Description: Ability to tolerate increased activity will improve  Outcome: Ongoing  Goal: Ability to maintain optimal joint mobility will improve  Description: Ability to maintain optimal joint mobility will improve  Outcome: Ongoing     Problem:  Bowel/Gastric:  Goal: Ability to achieve a regular elimination pattern will improve  Description: Ability to achieve a regular elimination pattern will improve  Outcome: Ongoing     Problem: Cardiac:  Goal: Ability to maintain an adequate cardiac output will improve  Description: Ability to maintain an adequate cardiac output will improve  Outcome: Ongoing  Goal: Ability to maintain adequate ventilation will improve  Description: Ability to maintain adequate ventilation will improve  Outcome: Ongoing  Goal: Ability to achieve and maintain adequate cardiopulmonary perfusion will improve  Description: Ability to achieve and maintain adequate cardiopulmonary perfusion will improve  Outcome: Ongoing     Problem: Coping:  Goal: Ability to adjust to condition or change in health will improve  Description: Ability to adjust to condition or change in health will improve  Outcome: Ongoing  Goal: Communication of feelings regarding changes in body function or appearance will improve  Description: Communication of feelings regarding changes in body function or appearance will improve  Outcome: Ongoing     Problem: Health Behavior:  Goal: Identification of resources available to assist in meeting health care needs will improve  Description: Identification of resources available to assist in meeting health care needs will improve  Outcome: Ongoing     Problem: Nutritional:  Goal: Maintenance of adequate nutrition will improve  Description: Maintenance of adequate nutrition will improve  Outcome: Ongoing     Problem: Physical

## 2020-03-17 NOTE — PROGRESS NOTES
I called the wife to give her an update but she did not answer the phone. I left a message for her to call ICU.

## 2020-03-17 NOTE — SIGNIFICANT EVENT
CODE BLUE alert. According to the nursing staff patient came back from the EGD lab without any new complaints. Patient was witnessed by 2 nurses sitting up in a chair without any significant discomfort. Few minutes after that patient had witnessed sudden loss of consciousness. Patient was a placed in bed and a CODE BLUE was activated. Upon my arrival I found the patient lying in bed surrounded by nursing staff. Patient was pulseless. Cardiopulmonary resuscitation was started including bagging with oxygen and a chest compression. Patient briefly had an episode of asystolic. Patient was given epinephrine. The patient developed PEA. Shortly after that patient regained his pulse back. Patient after that was having intermittent sudden my clonus movements and agonal breathing while he is being bagged. His oxygen saturations did not drop below 92%. I successfully intubated patient. CO2 color change and bilateral breath sounds heard. Assessment  Cardiopulmonary arrest, unknown trigger or etiology. Patient just came back from the EGD lab shortly prior to that. Rule out the possibility of procedure related complications. Rule out shortness of breath as a cause of his shortness of breath and cardiopulmonary arrest.  Although patient did not have any hypoxemia whatsoever since admission. Saturation was 96 to 100% on 2 L. Patient did not have any Calves swelling or tenderness to suggest any DVT. Patient is transferred to the intensive care unit. I had discussed his case with Dr. Alice Serna. Dr. Alice Serna will evaluate his condition and provide us with further advice and recommendations. Meanwhile I requested stat CT chest to rule out any complications or pulmonary embolism. Once again patient did not have any desaturation since admission. CT abdomen and pelvis rule out intra-abdominal bleed. Patient did not report any abdominal pain or any flank pain or tenderness.     Dr. Alice Serna is to address his

## 2020-03-17 NOTE — CONSULTS
which suggest respiratory arrest as the trigger  2. No clear evidence of cardiac event, unremarkable echo as detailed above, but will obtain troponins  3. Diffuse bilateral pulmonary infiltrates patient just had a difficult EGD the possibility of aspiration cannot be excluded but patient was apparently stable immediately post EGD and was responsive sitting upright. We are suctioning very thick yellowish purulent secretions mixed with blood,  mucous plugging leading to arrest is possible as well. Pulmonary embolism is another possible cause of cardiac arrest but his oxygenation status and rapid recovery argues against that  4. Severe bronchospasm probably due to acute lung injury versus pulmonary edema, underlying obstructive lung disease cannot be excluded, will treat with aggressive bronchodilator therapy for now  5. Aspiration pneumonia is again suspected will initiate broad-spectrum antimicrobial therapy  6. Difficulty with EGD was reported, but no evidence of acute mediastinitis clinically prior to his arrest.  We will continue vent support, wean down FiO2, I changed vent settings to help with his acidosis, still somewhat concerned about aggressive fluid resuscitation, will use vasopressors PRN to maintain adequate hemodynamics, CT angiogram to rule out PE investigate other possible causes of his arrest.  I would add CT of the abdomen given severe anemia with unclear source of bleeding. Due to the immediate potential for life-threatening deterioration due to cardiac arrest and respiratory failure, I spent 47 minutes providing critical care. This time is excluding time spent performing procedures.           Electronically signed by Wellington Mckeon MD, FCCP on 3/17/2020 at 3:29 PM

## 2020-03-17 NOTE — PROGRESS NOTES
Pt is alert and oriented X4, calm and cooperative. LS clear, BS present in all four quadrants and heart sounds normal. NSR on tele. Pt ambulates in the room independently, tolerates ambulation well. Pt denies pain. SOB, nausea or vomiting.  Will continue to monitor Electronically signed by Jonathon Cockayne, RN on 3/17/2020 at 1:02 AM

## 2020-03-17 NOTE — ANESTHESIA PRE PROCEDURE
Department of Anesthesiology  Preprocedure Note       Name:  Kev Waterman   Age:  71 y.o.  :  1955                                          MRN:  [id number]         Date:  3/17/2020      Surgeon: Dustin Davies):  Kevin Camarillo MD    Procedure: EGD ESOPHAGOGASTRODUODENOSCOPY (N/A )    Medications prior to admission:   Prior to Admission medications    Medication Sig Start Date End Date Taking?  Authorizing Provider   traZODone (DESYREL) 50 MG tablet take 1 tablet by mouth NIGHTLY 20  Yes Vidhi Marlow MD   metoprolol succinate (TOPROL XL) 100 MG extended release tablet Take 1 tablet by mouth daily 19  Yes Vidhi Marlow MD   atorvastatin (LIPITOR) 40 MG tablet Take 1 tablet by mouth daily 19   Vidhi Marlow MD   triamterene-hydrochlorothiazide [facility]) 37.5-25 MG per tablet Take 1 tablet by mouth daily 19   Vidhi Marlow MD       Current medications:    Current Facility-Administered Medications   Medication Dose Route Frequency Provider Last Rate Last Dose    cyanocobalamin injection 1,000 mcg  1,000 mcg Intramuscular Daily Danny Epstein MD   1,000 mcg at  0867    folic acid 1 mg in dextrose 5 % 50 mL IVPB  1 mg Intravenous Daily Norman Fuentes MD        iron sucrose (VENOFER) 200 mg in sodium chloride 0.9 % 100 mL IVPB  200 mg Intravenous Q24H Norman Fuentes MD        pantoprazole (PROTONIX) tablet 40 mg  40 mg Oral QAM AC Norman Fuentes MD   40 mg at 20 1007    lactated ringers infusion   Intravenous Continuous Norman Fuentes MD 50 mL/hr at 20 1008      atorvastatin (LIPITOR) tablet 40 mg  40 mg Oral Daily Norman Fuentes MD   40 mg at 20 1007    metoprolol succinate (TOPROL XL) extended release tablet 50 mg  50 mg Oral BID Norman Fuentes MD   50 mg at 20 1008    traZODone (DESYREL) tablet 50 mg  50 mg Oral Nightly Norman Fuentes MD   50 mg at 20 211    hydrALAZINE (APRESOLINE) injection 5 mg  5 mg Intravenous Q4H PRN Norman Fuentes MD        sodium chloride flush 0.9 % injection 10 mL  10 mL Intravenous 2 times per day Sofya Benoit MD   10 mL at 03/17/20 1008    sodium chloride flush 0.9 % injection 10 mL  10 mL Intravenous PRN Sofya Benoit MD        acetaminophen (TYLENOL) tablet 650 mg  650 mg Oral Q6H PRN Normna Fuentes MD        Or    acetaminophen (TYLENOL) suppository 650 mg  650 mg Rectal Q6H PRN Norman Fuentes MD        polyethylene glycol (GLYCOLAX) packet 17 g  17 g Oral Daily PRN Norman Fuentes MD        promethazine (PHENERGAN) tablet 12.5 mg  12.5 mg Oral Q6H PRN Norman Fuentes MD        Or    ondansetron (ZOFRAN) injection 4 mg  4 mg Intravenous Q6H PRN Sofya Benoit MD           Allergies:  No Known Allergies    Problem List:    Patient Active Problem List   Diagnosis Code    Chronic back pain M54.9, G89.29    Insomnia G47.00    Essential hypertension, benign I10    Sprain of lumbar region S33. 5XXA    Obstructive chronic bronchitis without exacerbation ([facility] [address] J44.9    Dyslipidemia E78.5    Degeneration of lumbar or lumbosacral intervertebral disc M51.37    Displacement of lumbar intervertebral disc without myelopathy M51.26    Lumbosacral spondylosis without myelopathy M47.817    Tremors of nervous system R25.1    Anemia D64.9    GI bleed K92.2       Past Medical History:        Diagnosis Date    Allergic rhinitis     Aortic regurgitation     Cervical radiculopathy at C8 10/13/2013    Chronic back pain     COPD (chronic obstructive pulmonary disease) (HCC)     Erectile dysfunction     GERD (gastroesophageal reflux disease)     Hyperlipidemia     Hypertension     Insomnia     Osteoarthritis     PUD (peptic ulcer disease)     Vitamin D deficiency        Past Surgical History:        Procedure Laterality Date    ELBOW SURGERY  12/2013    Dr. Alda Jackson REPLACEMENT  2008    Left hip replacement    SPINE SURGERY  2006    fusion L4-L5       Social History:    Social History     Tobacco Use  Smoking status: Current Every Day Smoker     Years: 30.00     Types: Cigars    Smokeless tobacco: Never Used    Tobacco comment: smokes at least 3 cigars a day   Substance Use Topics    Alcohol use: Yes     Comment: has one can of beer a day                                Ready to quit: Not Answered  Counseling given: Not Answered  Comment: smokes at least 3 cigars a day      Vital Signs (Current):   Vitals:    03/17/20 0729 03/17/20 1000 03/17/20 1007 03/17/20 1112   BP: (!) 188/72 (!) 165/66 (!) 165/66    Pulse: 72 59 59 59   Resp: 18 16     Temp: 98.2 °F (36.8 °C) 98.2 °F (36.8 °C)     TempSrc: Oral Oral     SpO2: 95% 99%     Weight:       Height:                                                  BP Readings from Last 3 Encounters:   03/17/20 (!) 165/66   03/09/20 (!) 137/54   12/04/19 126/82       NPO Status: Time of last liquid consumption: 0000                        Time of last solid consumption: 0000                        Date of last liquid consumption: 03/17/20                        Date of last solid food consumption: 03/17/20    BMI:   Wt Readings from Last 3 Encounters:   03/16/20 190 lb (86.2 kg)   03/09/20 192 lb 11.2 oz (87.4 kg)   12/04/19 179 lb 12.8 oz (81.6 kg)     Body mass index is 28.06 kg/m².     CBC:   Lab Results   Component Value Date    WBC 10.7 03/17/2020    RBC 3.01 03/17/2020    RBC 3.78 04/01/2012    HGB 7.7 03/17/2020    HCT 24.3 03/17/2020    MCV 82.2 03/17/2020    RDW 24.1 03/17/2020     03/17/2020       CMP:   Lab Results   Component Value Date     03/17/2020    K 3.6 03/17/2020     03/17/2020    CO2 21 03/17/2020    BUN 6 03/17/2020    CREATININE 0.70 03/17/2020    GFRAA >60.0 03/17/2020    LABGLOM >60.0 03/17/2020    GLUCOSE 101 03/17/2020    GLUCOSE 86 04/01/2012    PROT 7.0 03/17/2020    CALCIUM 9.4 03/17/2020    BILITOT 1.4 03/17/2020    ALKPHOS 90 03/17/2020    AST 22 03/17/2020    ALT 10 03/17/2020       POC Tests: No results for input(s): POCGLU, Faby Eric, POCCL, POCBUN, POCHEMO, POCHCT in the last 72 hours. Coags:   Lab Results   Component Value Date    PROTIME 14.1 03/16/2020    PROTIME 10.5 04/01/2012    INR 1.0 03/16/2020    APTT 29.2 02/22/2014       HCG (If Applicable): No results found for: PREGTESTUR, PREGSERUM, HCG, HCGQUANT     ABGs: No results found for: PHART, PO2ART, IXV9AWW, OUE3RFA, BEART, P7UYUQVG     Type & Screen (If Applicable):  No results found for: LABABO, [address]    Anesthesia Evaluation  Patient summary reviewed and Nursing notes reviewed no history of anesthetic complications:   Airway: Mallampati: II  TM distance: >3 FB   Neck ROM: full  Mouth opening: > = 3 FB Dental: normal exam         Pulmonary:normal exam  breath sounds clear to auscultation  (+) COPD:  current smoker          Patient did not smoke on day of surgery. Cardiovascular:  Exercise tolerance: good (>4 METS),   (+) hypertension:, hyperlipidemia      ECG reviewed  Rhythm: regular  Rate: normal           Beta Blocker:  Not on Beta Blocker      ROS comment: Undetermined rhythm  Left axis deviation  Abnormal ECG  No previous ECGs available     Neuro/Psych:   (+) neuromuscular disease:,             GI/Hepatic/Renal:   (+) GERD:, PUD,           Endo/Other:    (+) blood dyscrasia: anemia, arthritis: OA., . Pt had PAT visit. Abdominal:           Vascular: negative vascular ROS. Anesthesia Plan      MAC     ASA 3       Induction: intravenous. MIPS: Prophylactic antiemetics administered. Anesthetic plan and risks discussed with patient. Plan discussed with CRNA.     Attending anesthesiologist reviewed and agrees with Quinn Kessler DO   3/17/2020

## 2020-03-17 NOTE — CONSULTS
Hematology/Oncology Consult  Encounter Date: 3/17/2020 9:35 AM    Mr. Samson Vanessa is a 71 y.o. male  : 1955  MRN: [id number]  Acct Number: [de-identified]  Requesting Provider: DR Zane Amezquita   Reason for request: anemia      CONSULTANT: Horacio Baum    HPI: Jane Araya was admitted for shortness of breathe. CBC showed hemoglobin 6, MCV 79, WBC 8300 and platelets 660E. Serum iron 40, TIBC 392, percent saturation 10 and ferritin 20. Retic count 1.5. Folate low at 6.2. LDH normal. Former drinker of Vodka  1 pint daily. Former smoker and uses cigar. Last colonoscopy .      Patient Active Problem List   Diagnosis    Chronic back pain    Insomnia    Essential hypertension, benign    Sprain of lumbar region    Obstructive chronic bronchitis without exacerbation (HCC)    Dyslipidemia    Degeneration of lumbar or lumbosacral intervertebral disc    Displacement of lumbar intervertebral disc without myelopathy    Lumbosacral spondylosis without myelopathy    Tremors of nervous system    Anemia    GI bleed     Past Medical History:   Diagnosis Date    Allergic rhinitis     Aortic regurgitation     Cervical radiculopathy at C8 10/13/2013    Chronic back pain     COPD (chronic obstructive pulmonary disease) (HCC)     Erectile dysfunction     GERD (gastroesophageal reflux disease)     Hyperlipidemia     Hypertension     Insomnia     Osteoarthritis     PUD (peptic ulcer disease)     Vitamin D deficiency      @PSH@  Family History   Problem Relation Age of Onset    Cancer Mother 67        brain     Social History     Socioeconomic History    Marital status:      Spouse name: Not on file    Number of children: Not on file    Years of education: Not on file    Highest education level: Not on file   Occupational History    Not on file   Social Needs    Financial resource strain: Not on file    Food insecurity     Worry: Not on file     Inability: Not on file   Risk I/O needs Medical: Not on file     Non-medical: Not on file   Tobacco Use    Smoking status: Current Every Day Smoker     Years: 30.00     Types: Cigars    Smokeless tobacco: Never Used    Tobacco comment: smokes at least 3 cigars a day   Substance and Sexual Activity    Alcohol use: Yes     Comment: has one can of beer a day    Drug use: No    Sexual activity: Not Currently   Lifestyle    Physical activity     Days per week: Not on file     Minutes per session: Not on file    Stress: Not on file   Relationships    Social connections     Talks on phone: Not on file     Gets together: Not on file     Attends [employer] service: Not on file     Active member of club or organization: Not on file     Attends meetings of clubs or organizations: Not on file     Relationship status: Not on file    Intimate partner violence     Fear of current or ex partner: Not on file     Emotionally abused: Not on file     Physically abused: Not on file     Forced sexual activity: Not on file   Other Topics Concern    Not on file   Social History Narrative    Not on file            Current Facility-Administered Medications   Medication Dose Route Frequency Provider Last Rate Last Dose    cyanocobalamin injection 1,000 mcg  1,000 mcg Intramuscular Daily Danny Epstein MD        folic acid 1 mg in dextrose 5 % 50 mL IVPB  1 mg Intravenous Daily Danny Epstein MD        iron sucrose (VENOFER) 200 mg in sodium chloride 0.9 % 100 mL IVPB  200 mg Intravenous Q24H Danny Epstein MD        pantoprazole (PROTONIX) tablet 40 mg  40 mg Oral QAM AC Norman uFentes MD        lactated ringers infusion   Intravenous Continuous Norman Fuentes MD        atorvastatin (LIPITOR) tablet 40 mg  40 mg Oral Daily Norman Fuentes MD   40 mg at 03/16/20 1400    metoprolol succinate (TOPROL XL) extended release tablet 50 mg  50 mg Oral BID Danny Epstein MD   50 mg at 03/16/20 2119    traZODone (DESYREL) tablet 50 mg  50 mg Oral Nightly Danny Epstein MD 50 mg at 03/16/20 2119    hydrALAZINE (APRESOLINE) injection 5 mg  5 mg Intravenous Q4H PRN Norman Fuentes MD        sodium chloride flush 0.9 % injection 10 mL  10 mL Intravenous 2 times per day Sedrick Griffith MD   10 mL at 03/16/20 2119    sodium chloride flush 0.9 % injection 10 mL  10 mL Intravenous PRN Sedrick Griffith MD        acetaminophen (TYLENOL) tablet 650 mg  650 mg Oral Q6H PRN Norman Fuentes MD        Or    acetaminophen (TYLENOL) suppository 650 mg  650 mg Rectal Q6H PRN Norman Fuentes MD        polyethylene glycol (GLYCOLAX) packet 17 g  17 g Oral Daily PRN Norman Fuentes MD        promethazine (PHENERGAN) tablet 12.5 mg  12.5 mg Oral Q6H PRN Norman Fuentes MD        Or    ondansetron (ZOFRAN) injection 4 mg  4 mg Intravenous Q6H PRN Sedrick Griffith MD         [unfilled]  No Known Allergies     Review of Systems  All other systems are normal other than ones mentioned in HPI. PHYSICAL EXAMINATION:   VITAL SIGNS: BP (!) 188/72   Pulse 72   Temp 98.2 °F (36.8 °C) (Oral)   Resp 18   Ht 5' 9\" (1.753 m)   Wt 190 lb (86.2 kg)   SpO2 95%   BMI 28.06 kg/m²         GENERAL: In no acute distress, well- nourished, well- developed,alert and oriented to person place and time. SKIN: Warm and dry, withoutjaundice, ecchymoses, or petechiae. HEENT: Normocephalic, sclera anicteric, oral mucosa moist without lesion or exudate in the visible oral cavity or oropharynx, tongue mid-line with good mobility and no deviation with extension. NODES: No palpable adenopathy in the neck Levels I-V, bilateral   Supraclavicular fossae, axillary chains, or inguinal regions. LUNGS: Good inspiratory effort, no accessory muscle use, clear bilaterally, no focal wheeze, rales or rhonchi. CARDIAC: Regular rate and rhythm, without murmurs, rubs or gallops. ABDOMINAL: Normal bowel soundspresent, soft, non-tender, no mass or  organomegaly.   MUSKL:     LAB RESULTS:  Recent Results (from the past 24 hour(s))   Vitamin B12 & American >60.0 >60    GFR  >60.0 >60    Calcium 9.4 8.5 - 9.9 mg/dL    Total Protein 7.0 6.3 - 8.0 g/dL    Alb 3.8 3.5 - 4.6 g/dL    Total Bilirubin 1.4 (H) 0.2 - 0.7 mg/dL    Alkaline Phosphatase 90 35 - 104 U/L    ALT 10 0 - 41 U/L    AST 22 0 - 40 U/L    Globulin 3.2 2.3 - 3.5 g/dL     Recent Labs     20  0702   GLUCOSE 101*        Pathology:     RADIOLOGY RESULTS:  Xr Chest Standard (2 Vw)    Result Date: 3/16/2020  XR CHEST (2 VW) Exam Date/Time:  3/16/2020 8:15 AM Clinical History:      Shortness of breath. Comparison:  2018  RESULT: Lungs and pleura:  Shallow inspiration with bibasilar atelectasis/opacities. No pleural effusion. No pneumothorax. Mildly coarsened interstitial markings, accentuated by shallow inspiration. Cardiomediastinal silhouette:  Stable cardiomediastinal silhouette. Aortic atherosclerotic calcification. Other:  No acute osseous findings. Shallow inspiration with bibasilar atelectasis/opacities. Xr Chest Portable    Result Date: 3/17/2020  Patient MRN: [id number] : 1955 Age:  71 years Gender: Male Order Date: 3/17/2020 5:15 AM. Exam: XR CHEST PORTABLE Number of Views: 1 Indication:  Shortness of breath, evaluate for infiltrate, dyspnea Comparison: 3/16/2020 Findings: Stable, prominent cardiomediastinal silhouette with underlying mild pulmonary edema and bibasilar airspace disease. Vascular calcifications thoracic aorta. No pneumothorax. Small right pleural effusion. Impression:  1. Stable, prominent cardiomediastinal silhouette with underlying mild pulmonary edema. 2. Small right pleural effusion. 3. Nonspecific bibasilar airspace disease, findings can be seen in infiltrate/pneumonia and/or atelectasis. .     ASSESSMENT AND PLAN  1. Anemia with iron deficiency. Agree with parenteral iron. Needs GI work up. Referred to Dr Mahendra Andrews. He will follow up in our office on discharge.      Electronically signed by Gamaliel Burrell MD on 3/17/2020 at

## 2020-03-18 ENCOUNTER — APPOINTMENT (OUTPATIENT)
Dept: GENERAL RADIOLOGY | Age: 65
DRG: 377 | End: 2020-03-18
Payer: MEDICARE

## 2020-03-18 LAB
ANION GAP SERPL CALCULATED.3IONS-SCNC: 14 MEQ/L (ref 9–15)
BASOPHILS ABSOLUTE: 0.1 K/UL (ref 0–0.2)
BASOPHILS RELATIVE PERCENT: 0.5 %
BUN BLDV-MCNC: 8 MG/DL (ref 8–23)
CALCIUM SERPL-MCNC: 9 MG/DL (ref 8.5–9.9)
CHLORIDE BLD-SCNC: 105 MEQ/L (ref 95–107)
CO2: 20 MEQ/L (ref 20–31)
CREAT SERPL-MCNC: 0.74 MG/DL (ref 0.7–1.2)
EOSINOPHILS ABSOLUTE: 0.1 K/UL (ref 0–0.7)
EOSINOPHILS RELATIVE PERCENT: 0.8 %
GFR AFRICAN AMERICAN: >60
GFR NON-AFRICAN AMERICAN: >60
GLUCOSE BLD-MCNC: 83 MG/DL (ref 70–99)
HCT VFR BLD CALC: 24.7 % (ref 42–52)
HCT VFR BLD CALC: 26 % (ref 42–52)
HEMOGLOBIN: 7.6 G/DL (ref 14–18)
HEMOGLOBIN: 8.1 G/DL (ref 14–18)
LYMPHOCYTES ABSOLUTE: 3 K/UL (ref 1–4.8)
LYMPHOCYTES RELATIVE PERCENT: 21.4 %
MCH RBC QN AUTO: 25.9 PG (ref 27–31.3)
MCHC RBC AUTO-ENTMCNC: 30.7 % (ref 33–37)
MCV RBC AUTO: 84.4 FL (ref 80–100)
MONOCYTES ABSOLUTE: 1.8 K/UL (ref 0.2–0.8)
MONOCYTES RELATIVE PERCENT: 13.4 %
NEUTROPHILS ABSOLUTE: 8.8 K/UL (ref 1.4–6.5)
NEUTROPHILS RELATIVE PERCENT: 63.9 %
PDW BLD-RTO: 24.4 % (ref 11.5–14.5)
PLATELET # BLD: 199 K/UL (ref 130–400)
POTASSIUM SERPL-SCNC: 3.6 MEQ/L (ref 3.4–4.9)
RBC # BLD: 2.92 M/UL (ref 4.7–6.1)
SODIUM BLD-SCNC: 139 MEQ/L (ref 135–144)
WBC # BLD: 13.8 K/UL (ref 4.8–10.8)

## 2020-03-18 PROCEDURE — 31624 DX BRONCHOSCOPE/LAVAGE: CPT

## 2020-03-18 PROCEDURE — 2000000000 HC ICU R&B

## 2020-03-18 PROCEDURE — 0BC78ZZ EXTIRPATION OF MATTER FROM LEFT MAIN BRONCHUS, VIA NATURAL OR ARTIFICIAL OPENING ENDOSCOPIC: ICD-10-PCS | Performed by: INTERNAL MEDICINE

## 2020-03-18 PROCEDURE — 0BC38ZZ EXTIRPATION OF MATTER FROM RIGHT MAIN BRONCHUS, VIA NATURAL OR ARTIFICIAL OPENING ENDOSCOPIC: ICD-10-PCS | Performed by: INTERNAL MEDICINE

## 2020-03-18 PROCEDURE — 0BC58ZZ EXTIRPATION OF MATTER FROM RIGHT MIDDLE LOBE BRONCHUS, VIA NATURAL OR ARTIFICIAL OPENING ENDOSCOPIC: ICD-10-PCS | Performed by: INTERNAL MEDICINE

## 2020-03-18 PROCEDURE — C9113 INJ PANTOPRAZOLE SODIUM, VIA: HCPCS | Performed by: INTERNAL MEDICINE

## 2020-03-18 PROCEDURE — 94640 AIRWAY INHALATION TREATMENT: CPT

## 2020-03-18 PROCEDURE — 2580000003 HC RX 258: Performed by: INTERNAL MEDICINE

## 2020-03-18 PROCEDURE — 85014 HEMATOCRIT: CPT

## 2020-03-18 PROCEDURE — 6360000002 HC RX W HCPCS: Performed by: INTERNAL MEDICINE

## 2020-03-18 PROCEDURE — 99291 CRITICAL CARE FIRST HOUR: CPT | Performed by: INTERNAL MEDICINE

## 2020-03-18 PROCEDURE — 94660 CPAP INITIATION&MGMT: CPT

## 2020-03-18 PROCEDURE — 0BCB8ZZ EXTIRPATION OF MATTER FROM LEFT LOWER LOBE BRONCHUS, VIA NATURAL OR ARTIFICIAL OPENING ENDOSCOPIC: ICD-10-PCS | Performed by: INTERNAL MEDICINE

## 2020-03-18 PROCEDURE — 2580000003 HC RX 258: Performed by: SPECIALIST

## 2020-03-18 PROCEDURE — 2700000000 HC OXYGEN THERAPY PER DAY

## 2020-03-18 PROCEDURE — 6370000000 HC RX 637 (ALT 250 FOR IP): Performed by: INTERNAL MEDICINE

## 2020-03-18 PROCEDURE — 0BC88ZZ EXTIRPATION OF MATTER FROM LEFT UPPER LOBE BRONCHUS, VIA NATURAL OR ARTIFICIAL OPENING ENDOSCOPIC: ICD-10-PCS | Performed by: INTERNAL MEDICINE

## 2020-03-18 PROCEDURE — 99231 SBSQ HOSP IP/OBS SF/LOW 25: CPT | Performed by: SPECIALIST

## 2020-03-18 PROCEDURE — 94003 VENT MGMT INPAT SUBQ DAY: CPT

## 2020-03-18 PROCEDURE — 36415 COLL VENOUS BLD VENIPUNCTURE: CPT

## 2020-03-18 PROCEDURE — 85018 HEMOGLOBIN: CPT

## 2020-03-18 PROCEDURE — 31645 BRNCHSC W/THER ASPIR 1ST: CPT | Performed by: INTERNAL MEDICINE

## 2020-03-18 PROCEDURE — 71045 X-RAY EXAM CHEST 1 VIEW: CPT

## 2020-03-18 PROCEDURE — 0BC48ZZ EXTIRPATION OF MATTER FROM RIGHT UPPER LOBE BRONCHUS, VIA NATURAL OR ARTIFICIAL OPENING ENDOSCOPIC: ICD-10-PCS | Performed by: INTERNAL MEDICINE

## 2020-03-18 PROCEDURE — 80048 BASIC METABOLIC PNL TOTAL CA: CPT

## 2020-03-18 PROCEDURE — 94761 N-INVAS EAR/PLS OXIMETRY MLT: CPT

## 2020-03-18 PROCEDURE — 0BC68ZZ EXTIRPATION OF MATTER FROM RIGHT LOWER LOBE BRONCHUS, VIA NATURAL OR ARTIFICIAL OPENING ENDOSCOPIC: ICD-10-PCS | Performed by: INTERNAL MEDICINE

## 2020-03-18 PROCEDURE — 85025 COMPLETE CBC W/AUTO DIFF WBC: CPT

## 2020-03-18 PROCEDURE — 2500000003 HC RX 250 WO HCPCS: Performed by: INTERNAL MEDICINE

## 2020-03-18 RX ORDER — HYDRALAZINE HYDROCHLORIDE 20 MG/ML
10 INJECTION INTRAMUSCULAR; INTRAVENOUS EVERY 4 HOURS PRN
Status: DISCONTINUED | OUTPATIENT
Start: 2020-03-18 | End: 2020-03-24 | Stop reason: HOSPADM

## 2020-03-18 RX ORDER — HYDRALAZINE HYDROCHLORIDE 20 MG/ML
20 INJECTION INTRAMUSCULAR; INTRAVENOUS EVERY 4 HOURS PRN
Status: DISCONTINUED | OUTPATIENT
Start: 2020-03-18 | End: 2020-03-19

## 2020-03-18 RX ORDER — LIDOCAINE HYDROCHLORIDE 20 MG/ML
INJECTION, SOLUTION INFILTRATION; PERINEURAL
Status: DISPENSED
Start: 2020-03-18 | End: 2020-03-18

## 2020-03-18 RX ORDER — CYANOCOBALAMIN 1000 UG/ML
1000 INJECTION INTRAMUSCULAR; SUBCUTANEOUS
Status: DISCONTINUED | OUTPATIENT
Start: 2020-04-06 | End: 2020-03-24 | Stop reason: HOSPADM

## 2020-03-18 RX ORDER — IPRATROPIUM BROMIDE AND ALBUTEROL SULFATE 2.5; .5 MG/3ML; MG/3ML
1 SOLUTION RESPIRATORY (INHALATION) 4 TIMES DAILY
Status: DISCONTINUED | OUTPATIENT
Start: 2020-03-18 | End: 2020-03-19

## 2020-03-18 RX ORDER — MORPHINE SULFATE 2 MG/ML
1 INJECTION, SOLUTION INTRAMUSCULAR; INTRAVENOUS EVERY 4 HOURS PRN
Status: DISCONTINUED | OUTPATIENT
Start: 2020-03-18 | End: 2020-03-19

## 2020-03-18 RX ORDER — MAGNESIUM HYDROXIDE 1200 MG/15ML
LIQUID ORAL
Status: DISPENSED
Start: 2020-03-18 | End: 2020-03-18

## 2020-03-18 RX ADMIN — METOPROLOL SUCCINATE 50 MG: 50 TABLET, FILM COATED, EXTENDED RELEASE ORAL at 18:50

## 2020-03-18 RX ADMIN — TRAZODONE HYDROCHLORIDE 50 MG: 50 TABLET ORAL at 21:37

## 2020-03-18 RX ADMIN — Medication 10 ML: at 08:14

## 2020-03-18 RX ADMIN — HYDRALAZINE HYDROCHLORIDE 5 MG: 20 INJECTION INTRAMUSCULAR; INTRAVENOUS at 20:09

## 2020-03-18 RX ADMIN — IPRATROPIUM BROMIDE AND ALBUTEROL SULFATE 1 AMPULE: .5; 3 SOLUTION RESPIRATORY (INHALATION) at 16:35

## 2020-03-18 RX ADMIN — HYDRALAZINE HYDROCHLORIDE 20 MG: 20 INJECTION INTRAMUSCULAR; INTRAVENOUS at 22:48

## 2020-03-18 RX ADMIN — FENTANYL CITRATE 75 MCG/HR: 50 INJECTION, SOLUTION INTRAMUSCULAR; INTRAVENOUS at 05:15

## 2020-03-18 RX ADMIN — FOLIC ACID 1 MG: 5 INJECTION, SOLUTION INTRAMUSCULAR; INTRAVENOUS; SUBCUTANEOUS at 21:38

## 2020-03-18 RX ADMIN — PIPERACILLIN AND TAZOBACTAM 3.38 G: 3; .375 INJECTION, POWDER, LYOPHILIZED, FOR SOLUTION INTRAVENOUS at 19:20

## 2020-03-18 RX ADMIN — FOLIC ACID 1 MG: 5 INJECTION, SOLUTION INTRAMUSCULAR; INTRAVENOUS; SUBCUTANEOUS at 00:12

## 2020-03-18 RX ADMIN — CYANOCOBALAMIN 1000 MCG: 1000 INJECTION, SOLUTION INTRAMUSCULAR; SUBCUTANEOUS at 08:13

## 2020-03-18 RX ADMIN — DOXYCYCLINE 100 MG: 100 INJECTION, POWDER, LYOPHILIZED, FOR SOLUTION INTRAVENOUS at 03:17

## 2020-03-18 RX ADMIN — PIPERACILLIN AND TAZOBACTAM 3.38 G: 3; .375 INJECTION, POWDER, LYOPHILIZED, FOR SOLUTION INTRAVENOUS at 01:08

## 2020-03-18 RX ADMIN — ATORVASTATIN CALCIUM 40 MG: 40 TABLET, FILM COATED ORAL at 08:14

## 2020-03-18 RX ADMIN — ALBUTEROL SULFATE 4 PUFF: 90 AEROSOL, METERED RESPIRATORY (INHALATION) at 04:41

## 2020-03-18 RX ADMIN — IRON SUCROSE 200 MG: 20 INJECTION, SOLUTION INTRAVENOUS at 21:38

## 2020-03-18 RX ADMIN — Medication 10 ML: at 08:13

## 2020-03-18 RX ADMIN — SODIUM CHLORIDE, POTASSIUM CHLORIDE, SODIUM LACTATE AND CALCIUM CHLORIDE: 600; 310; 30; 20 INJECTION, SOLUTION INTRAVENOUS at 08:37

## 2020-03-18 RX ADMIN — VANCOMYCIN HYDROCHLORIDE 1250 MG: 5 INJECTION, POWDER, LYOPHILIZED, FOR SOLUTION INTRAVENOUS at 04:14

## 2020-03-18 RX ADMIN — PROPOFOL 45 MCG/KG/MIN: 10 INJECTION, EMULSION INTRAVENOUS at 05:15

## 2020-03-18 RX ADMIN — PROPOFOL 30 MCG/KG/MIN: 10 INJECTION, EMULSION INTRAVENOUS at 10:06

## 2020-03-18 RX ADMIN — MORPHINE SULFATE 1 MG: 2 INJECTION, SOLUTION INTRAMUSCULAR; INTRAVENOUS at 22:30

## 2020-03-18 RX ADMIN — DOXYCYCLINE 100 MG: 100 INJECTION, POWDER, LYOPHILIZED, FOR SOLUTION INTRAVENOUS at 16:00

## 2020-03-18 RX ADMIN — ACETAMINOPHEN 650 MG: 325 TABLET ORAL at 21:54

## 2020-03-18 RX ADMIN — PANTOPRAZOLE SODIUM 40 MG: 40 INJECTION, POWDER, FOR SOLUTION INTRAVENOUS at 08:13

## 2020-03-18 RX ADMIN — HYDRALAZINE HYDROCHLORIDE 5 MG: 20 INJECTION INTRAMUSCULAR; INTRAVENOUS at 16:10

## 2020-03-18 RX ADMIN — IPRATROPIUM BROMIDE 4 PUFF: 17 AEROSOL, METERED RESPIRATORY (INHALATION) at 04:40

## 2020-03-18 RX ADMIN — ALBUTEROL SULFATE 4 PUFF: 90 AEROSOL, METERED RESPIRATORY (INHALATION) at 11:03

## 2020-03-18 RX ADMIN — VANCOMYCIN HYDROCHLORIDE 1250 MG: 5 INJECTION, POWDER, LYOPHILIZED, FOR SOLUTION INTRAVENOUS at 17:26

## 2020-03-18 RX ADMIN — PIPERACILLIN AND TAZOBACTAM 3.38 G: 3; .375 INJECTION, POWDER, LYOPHILIZED, FOR SOLUTION INTRAVENOUS at 08:37

## 2020-03-18 RX ADMIN — IPRATROPIUM BROMIDE 4 PUFF: 17 AEROSOL, METERED RESPIRATORY (INHALATION) at 11:02

## 2020-03-18 RX ADMIN — Medication 10 ML: at 21:37

## 2020-03-18 RX ADMIN — IRON SUCROSE 200 MG: 20 INJECTION, SOLUTION INTRAVENOUS at 00:13

## 2020-03-18 RX ADMIN — Medication 10 ML: at 21:38

## 2020-03-18 RX ADMIN — IPRATROPIUM BROMIDE AND ALBUTEROL SULFATE 1 AMPULE: .5; 3 SOLUTION RESPIRATORY (INHALATION) at 20:24

## 2020-03-18 ASSESSMENT — PAIN SCALES - GENERAL
PAINLEVEL_OUTOF10: 0
PAINLEVEL_OUTOF10: 8
PAINLEVEL_OUTOF10: 8
PAINLEVEL_OUTOF10: 0
PAINLEVEL_OUTOF10: 0
PAINLEVEL_OUTOF10: 6
PAINLEVEL_OUTOF10: 0

## 2020-03-18 ASSESSMENT — PULMONARY FUNCTION TESTS
PIF_VALUE: 29
PIF_VALUE: 26
PIF_VALUE: 26
PIF_VALUE: 13
PIF_VALUE: 27
PIF_VALUE: 24
PIF_VALUE: 32
PIF_VALUE: 26
PIF_VALUE: 16
PIF_VALUE: 14
PIF_VALUE: 24
PIF_VALUE: 24
PIF_VALUE: 26
PIF_VALUE: 26
PIF_VALUE: 25
PIF_VALUE: 14
PIF_VALUE: 13
PIF_VALUE: 25
PIF_VALUE: 14
PIF_VALUE: 32
PIF_VALUE: 8.3
PIF_VALUE: 25
PIF_VALUE: 16
PIF_VALUE: 26
PIF_VALUE: 29
PIF_VALUE: 23
PIF_VALUE: 7.7
PIF_VALUE: 24
PIF_VALUE: 25
PIF_VALUE: 27
PIF_VALUE: 12
PIF_VALUE: 14
PIF_VALUE: 15
PIF_VALUE: 27
PIF_VALUE: 34
PIF_VALUE: 7

## 2020-03-18 ASSESSMENT — PAIN DESCRIPTION - LOCATION
LOCATION: CHEST
LOCATION: CHEST

## 2020-03-18 NOTE — PROCEDURES
Mitzi Andrade is a 71 y.o. male patient. 1. Dyspnea, unspecified type    2. Iron deficiency anemia, unspecified iron deficiency anemia type    3. History of peptic ulcer disease      Past Medical History:   Diagnosis Date    Allergic rhinitis     Aortic regurgitation     Cervical radiculopathy at  10/13/2013    Chronic back pain     COPD (chronic obstructive pulmonary disease) (HCC)     Erectile dysfunction     GERD (gastroesophageal reflux disease)     Hyperlipidemia     Hypertension     Insomnia     Osteoarthritis     PUD (peptic ulcer disease)     Vitamin D deficiency      Blood pressure (!) 170/62, pulse 71, temperature 97.7 °F (36.5 °C), temperature source Oral, resp. rate 16, height 5' 9\" (1.753 m), weight 203 lb 7.8 oz (92.3 kg), SpO2 97 %. Procedures     Fiberoptic bronchoscopy with bronchial washings    Indications:   Left lower lobe atelectasis and respiratory failure    Consent:   Obtained from patient's wife prior to procedure after explaining indications, risks, and alternatives    Sedation:   Deep sedation applied with Diprivan, patient was already sedated and intubated, additional doses of dipper Governor Skillern were needed during the procedure    Procedure:   Patient was adequately sedated first, he had continuous oxygen monitoring as well as hemodynamic monitoring throughout the procedure. Fiberoptic bronchoscope was passed through the endotracheal tube, the tip appeared to be in good position about 2 to 3 cm above the debby. Bronchoscope was then advanced into the trachea with no endotracheal lesions or abnormalities noted. Bronchoscope was then advanced into the right main bronchus and right upper, right lower, and right middle lobes were all inspected thoroughly and moderate to large amount of thick yellowish to whitish secretions with some whitish mucous plugs were seen and mobilized with suctioning and lavaged with saline but no endobronchial lesion seen.   Bronchoscope was then withdrawn to the debby level and advanced into the left main bronchus. Left upper lobe, lingula, and left lower lobe bronchi were all inspected thoroughly and similar findings of airway secretions mobilized with suctioning and lavages with saline but no endobronchial lesion noted  Bronchoscope was then withdrawn out to the debby and completely withdrawn out of the patient. Patient tolerated the procedure very well with no complications noted. EBL:  None    Postoperative diagnosis:   Bilateral endobronchial secretions removed with suctioning.   No endobronchial lesions or other abnormalities        Electronically signed by Johanny Sol MD, FCCP on 3/18/2020 at 4:03 PM    Johanny Sol MD  3/18/2020

## 2020-03-18 NOTE — PROGRESS NOTES
Angela Valdez is a 71 y.o. male patient.     Current Facility-Administered Medications   Medication Dose Route Frequency Provider Last Rate Last Dose    [START ON 4/6/2020] cyanocobalamin injection 1,000 mcg  1,000 mcg Intramuscular Q30 Days Norman Fuentes MD        sodium chloride 0.9 % irrigation             lidocaine 2 % injection             ipratropium-albuterol (DUONEB) nebulizer solution 1 ampule  1 ampule Inhalation 4x daily Lei Barrera MD        cyanocobalamin injection 1,000 mcg  1,000 mcg Intramuscular Daily Eloy Arredondo MD   1,000 mcg at 74/96/90 6510    folic acid 1 mg in dextrose 5 % 50 mL IVPB  1 mg Intravenous Daily Eloy Arredondo MD   Stopped at 03/18/20 0045    iron sucrose (VENOFER) 200 mg in sodium chloride 0.9 % 100 mL IVPB  200 mg Intravenous Q24H Eloy Arredondo MD   Stopped at 03/18/20 0121    lactated ringers infusion   Intravenous Continuous Eloy Arredondo MD 50 mL/hr at 03/18/20 0837      sodium chloride flush 0.9 % injection 10 mL  10 mL Intravenous 2 times per day Jaime Burris MD   10 mL at 03/18/20 0814    sodium chloride flush 0.9 % injection 10 mL  10 mL Intravenous PRN Jaime Burris MD        0.9 % sodium chloride infusion   Intravenous Once Eloy Arredondo MD        pantoprazole (PROTONIX) injection 40 mg  40 mg Intravenous Daily Norman Fuentes MD   40 mg at 03/18/20 0813    And    sodium chloride (PF) 0.9 % injection 10 mL  10 mL Intravenous Daily Eloy Arredondo MD   10 mL at 03/18/20 0813    piperacillin-tazobactam (ZOSYN) 3.375 g in dextrose 5 % 50 mL IVPB extended infusion (mini-bag)  3.375 g Intravenous Leoncio Garrett MD   Stopped at 03/18/20 1230    doxycycline (VIBRAMYCIN) 100 mg in dextrose 5 % 100 mL IVPB  100 mg Intravenous Q12H Norman Fuentes  mL/hr at 03/18/20 1600 100 mg at 03/18/20 1600    norepinephrine (LEVOPHED) 16 mg in dextrose 5 % 250 mL infusion  2 mcg/min Intravenous Continuous Lei Barrera MD        vancomycin Vitaly Fix) °C), temperature source Oral, resp. rate 16, height 5' 9\" (1.753 m), weight 203 lb 7.8 oz (92.3 kg), SpO2 97 %. Subjective   Objective remains intubated, no signs of active GI bleeding. Assessment & Plan GI bleeding source unclear. Consider colonoscopy when patient is medically stable.     Osmany Fish MD  3/18/2020

## 2020-03-18 NOTE — PROGRESS NOTES
I called his wife Miriam Fung. I updated her on his condition. I had provided her information about his disease, etiologies, diagnosis and other related items. I have answered all of her questions.

## 2020-03-18 NOTE — FLOWSHEET NOTE
1915 report received at bedside, skin inspected with CHACHO Ricardo. no breakdown noted, mepliex in place to coccyx. Pt calm but cristal up with any hands on care, attempts to reach towards tube, coughs. Not following commands at this time. Restraints maintained to prevent line and tube removal. Repositioned. 2000 assessment complete, see flowsheet. Remains on vent, LS dim with exp wheezing and scattered rhonchi. Suctioned small amount of pink frothy sputum from ETT. PERRL 3 b/l. Pt MCK[place] but does not follow commands. Trace NP edema to legs, PPP, MP SB in 40s when asleep but SR 60s-80s with any stimulation. Brice CD yellow urine. abd s/nt bs + x4.     0840 spoke with pt wife, Ambrose Reed, updated per request.    2105 page out to DR. Landon re: if colonoscopy on for tomorrow (pt still on OR schedule)    2109 spoke with Dr. Shae Irene, no OR tomorrow, do not prep patient. 1079-1345 complete bath and linen change done. Pt agitated with hands on care, strong, attempting to pull at ETT, unable to redirect. Repositioned. SCD's placed and heel mepliex placed as pt keeps kicking legs intermittently. _Electronically signed by Negrita Haile RN on 3/17/2020 at 10:57 PM    0000 no change from previous assessment except less rhonchi noted. Propofol decreased per RASS. Repositioned using sling. Vss.    6057-2349 pt sats suddenly 81%, pt not awake/moving/biting tube. Suctioned mod amount of thick red secretions from ETT. sats still 89%, O2 bolus 100% given. Zuleika MUHAMMAD called, bagged and lavaged moderate size blood clots. Pt awake, trying to sit up, grab at tube. Sedation adjusted for comfort. Repositioned/boosted in bed. _Electronically signed by Negrita Haile RN on 3/18/2020 at 2:45 AM     0400 no change from assessment from 0000. Oral care done. Pt noted to desat to mid 80s- not biting tube/holding breath or agitated. Suctioning done, large amount dark red secretions from ETT.   Pt responded to this and O2 bolus with no need for increased O2.  Repositioned using sling. _Electronically signed by Janet Branham RN on 3/18/2020 at 4:27 AM

## 2020-03-18 NOTE — PROGRESS NOTES
yesterday after intubation until suctioning have been performed. Night nurse stated that patient had sudden desaturation overnight requiring urgent suctioning that had resolved the desaturation. Possibility of another mucous plug. CT scan did for sure show basilar consolidation associated with the pleural effusion. Patient is currently being treated with antibiotic to cover aspiration and healthcare associated pneumonia. *Pneumonia, aspiration versus healthcare associated. Patient is on antibiotic to cover gram positives, negatives, anaerobes and MRSA. *Hyportension. Patient is on a small dose of the Levophed. Could be secondary to developing sepsis and/or secondary to her sedation.     *Anemia, Hemoccult stool is negative. No evidence of active or massive GI blood loss. Patient denies any hematuria. His reticulocyte count and LDH are normal making the possibility of hemolysis less likely. Haptoglobin is still pending. Anemia could be related to the following:     *Iron deficiency anemia, rule out the possibility of intermittent or chronic GI blood loss.     *B12 deficiency.     *Folate deficiency.     *Hypertension. Plan:  Continue antibiotic and gentle diuresis if his blood pressure is stable. Currently he is on a small dose of the Levophed. Continue to monitor his hemoglobin. Patient had received 2 units of RBC transfusion and his hemoglobin is hovering between 7.5 and 8. No evidence of active bleed. CT scan did not show any retroperitoneal bleed. Patient is at higher risk having a DVT but also is at higher risk having sudden GI blood loss. Benefit/risk of anticoagulation in this particular patient is difficult address. At this time we will continue SCD and if his hemoglobin is a stable over the next 24 to 48 hour I would cautiously initiate pharmacological DVT prophylaxis.     Day to day evaluation and medical management  in the intensive care unit will be cared on by intensivist.

## 2020-03-19 ENCOUNTER — APPOINTMENT (OUTPATIENT)
Dept: GENERAL RADIOLOGY | Age: 65
DRG: 377 | End: 2020-03-19
Payer: MEDICARE

## 2020-03-19 LAB
ALBUMIN SERPL-MCNC: 3.2 G/DL (ref 3.5–4.6)
ALBUMIN SERPL-MCNC: 3.35 G/DL (ref 3.75–5.01)
ALP BLD-CCNC: 74 U/L (ref 35–104)
ALPHA-1-GLOBULIN: 0.35 G/DL (ref 0.19–0.46)
ALPHA-2-GLOBULIN: 0.81 G/DL (ref 0.48–1.05)
ALT SERPL-CCNC: 10 U/L (ref 0–41)
ANION GAP SERPL CALCULATED.3IONS-SCNC: 15 MEQ/L (ref 9–15)
ANISOCYTOSIS: ABNORMAL
AST SERPL-CCNC: 19 U/L (ref 0–40)
BASOPHILS ABSOLUTE: 0.2 K/UL (ref 0–0.2)
BASOPHILS RELATIVE PERCENT: 1 %
BETA GLOBULIN: 0.86 G/DL (ref 0.48–1.1)
BILIRUB SERPL-MCNC: 1.1 MG/DL (ref 0.2–0.7)
BILIRUBIN DIRECT: 0.4 MG/DL (ref 0–0.4)
BILIRUBIN, INDIRECT: 0.7 MG/DL (ref 0–0.6)
BUN BLDV-MCNC: 6 MG/DL (ref 8–23)
BURR CELLS: ABNORMAL
CALCIUM SERPL-MCNC: 9 MG/DL (ref 8.5–9.9)
CHLORIDE BLD-SCNC: 103 MEQ/L (ref 95–107)
CO2: 20 MEQ/L (ref 20–31)
CREAT SERPL-MCNC: 0.74 MG/DL (ref 0.7–1.2)
EOSINOPHILS ABSOLUTE: 0 K/UL (ref 0–0.7)
EOSINOPHILS RELATIVE PERCENT: 0.2 %
GAMMA GLOBULIN: 0.93 G/DL (ref 0.62–1.51)
GFR AFRICAN AMERICAN: >60
GFR NON-AFRICAN AMERICAN: >60
GLUCOSE BLD-MCNC: 88 MG/DL (ref 70–99)
HAPTOGLOBIN: 149 MG/DL (ref 30–200)
HCT VFR BLD CALC: 23.5 % (ref 42–52)
HCT VFR BLD CALC: 25.2 % (ref 42–52)
HEMATOLOGY PATH CONSULT: NO
HEMOGLOBIN: 7.4 G/DL (ref 14–18)
HEMOGLOBIN: 7.8 G/DL (ref 14–18)
HYPOCHROMIA: ABNORMAL
IGA: 148 MG/DL (ref 68–408)
IGG: 960 MG/DL (ref 768–1632)
IGM: 55 MG/DL (ref 35–263)
IMMUNOFIXATION REFLEX: ABNORMAL
LYMPHOCYTES ABSOLUTE: 0.9 K/UL (ref 1–4.8)
LYMPHOCYTES RELATIVE PERCENT: 6 %
MAGNESIUM: 1.7 MG/DL (ref 1.7–2.4)
MCH RBC QN AUTO: 26.1 PG (ref 27–31.3)
MCHC RBC AUTO-ENTMCNC: 31.6 % (ref 33–37)
MCV RBC AUTO: 82.5 FL (ref 80–100)
MONOCYTES ABSOLUTE: 1.3 K/UL (ref 0.2–0.8)
MONOCYTES RELATIVE PERCENT: 7.5 %
NEUTROPHILS ABSOLUTE: 13.5 K/UL (ref 1.4–6.5)
NEUTROPHILS RELATIVE PERCENT: 86 %
NUCLEATED RED BLOOD CELLS: 2 /100 WBC
PDW BLD-RTO: 24.1 % (ref 11.5–14.5)
PHOSPHORUS: 3.2 MG/DL (ref 2.3–4.8)
PLATELET # BLD: 220 K/UL (ref 130–400)
PLATELET SLIDE REVIEW: NORMAL
POIKILOCYTES: ABNORMAL
POLYCHROMASIA: ABNORMAL
POTASSIUM SERPL-SCNC: 2.9 MEQ/L (ref 3.4–4.9)
POTASSIUM SERPL-SCNC: 3.4 MEQ/L (ref 3.4–4.9)
RBC # BLD: 2.85 M/UL (ref 4.7–6.1)
SCHISTOCYTES: ABNORMAL
SMUDGE CELLS: 1.9
SODIUM BLD-SCNC: 138 MEQ/L (ref 135–144)
SPE/IFE INTERPRETATION: ABNORMAL
TARGET CELLS: ABNORMAL
TEAR DROP CELLS: ABNORMAL
TOTAL PROTEIN: 6.2 G/DL (ref 6.3–8)
TOTAL PROTEIN: 6.3 G/DL (ref 6.3–8.2)
VACUOLATED NEUTROPHILS: PRESENT
VANCOMYCIN TROUGH: 15.8 UG/ML (ref 10–20)
WBC # BLD: 15.7 K/UL (ref 4.8–10.8)

## 2020-03-19 PROCEDURE — 2500000003 HC RX 250 WO HCPCS: Performed by: INTERNAL MEDICINE

## 2020-03-19 PROCEDURE — 99231 SBSQ HOSP IP/OBS SF/LOW 25: CPT | Performed by: SPECIALIST

## 2020-03-19 PROCEDURE — 6370000000 HC RX 637 (ALT 250 FOR IP): Performed by: INTERNAL MEDICINE

## 2020-03-19 PROCEDURE — 71045 X-RAY EXAM CHEST 1 VIEW: CPT

## 2020-03-19 PROCEDURE — 6360000002 HC RX W HCPCS: Performed by: INTERNAL MEDICINE

## 2020-03-19 PROCEDURE — 2580000003 HC RX 258: Performed by: INTERNAL MEDICINE

## 2020-03-19 PROCEDURE — 94640 AIRWAY INHALATION TREATMENT: CPT

## 2020-03-19 PROCEDURE — 2580000003 HC RX 258: Performed by: SPECIALIST

## 2020-03-19 PROCEDURE — 84100 ASSAY OF PHOSPHORUS: CPT

## 2020-03-19 PROCEDURE — 80048 BASIC METABOLIC PNL TOTAL CA: CPT

## 2020-03-19 PROCEDURE — 2700000000 HC OXYGEN THERAPY PER DAY

## 2020-03-19 PROCEDURE — 85025 COMPLETE CBC W/AUTO DIFF WBC: CPT

## 2020-03-19 PROCEDURE — 1210000000 HC MED SURG R&B

## 2020-03-19 PROCEDURE — 84132 ASSAY OF SERUM POTASSIUM: CPT

## 2020-03-19 PROCEDURE — 83735 ASSAY OF MAGNESIUM: CPT

## 2020-03-19 PROCEDURE — C9113 INJ PANTOPRAZOLE SODIUM, VIA: HCPCS | Performed by: INTERNAL MEDICINE

## 2020-03-19 PROCEDURE — 99233 SBSQ HOSP IP/OBS HIGH 50: CPT | Performed by: INTERNAL MEDICINE

## 2020-03-19 PROCEDURE — 36415 COLL VENOUS BLD VENIPUNCTURE: CPT

## 2020-03-19 PROCEDURE — 80202 ASSAY OF VANCOMYCIN: CPT

## 2020-03-19 PROCEDURE — 94761 N-INVAS EAR/PLS OXIMETRY MLT: CPT

## 2020-03-19 PROCEDURE — 80076 HEPATIC FUNCTION PANEL: CPT

## 2020-03-19 RX ORDER — PANTOPRAZOLE SODIUM 40 MG/1
40 TABLET, DELAYED RELEASE ORAL
Status: DISCONTINUED | OUTPATIENT
Start: 2020-03-20 | End: 2020-03-24 | Stop reason: HOSPADM

## 2020-03-19 RX ORDER — MORPHINE SULFATE 4 MG/ML
3 INJECTION, SOLUTION INTRAMUSCULAR; INTRAVENOUS EVERY 4 HOURS PRN
Status: DISCONTINUED | OUTPATIENT
Start: 2020-03-19 | End: 2020-03-24 | Stop reason: HOSPADM

## 2020-03-19 RX ORDER — IPRATROPIUM BROMIDE AND ALBUTEROL SULFATE 2.5; .5 MG/3ML; MG/3ML
1 SOLUTION RESPIRATORY (INHALATION) 3 TIMES DAILY
Status: DISCONTINUED | OUTPATIENT
Start: 2020-03-19 | End: 2020-03-24

## 2020-03-19 RX ORDER — POTASSIUM CHLORIDE 750 MG/1
40 TABLET, FILM COATED, EXTENDED RELEASE ORAL 3 TIMES DAILY
Status: DISCONTINUED | OUTPATIENT
Start: 2020-03-19 | End: 2020-03-20

## 2020-03-19 RX ORDER — HEPARIN SODIUM 5000 [USP'U]/ML
5000 INJECTION, SOLUTION INTRAVENOUS; SUBCUTANEOUS 2 TIMES DAILY
Status: DISCONTINUED | OUTPATIENT
Start: 2020-03-19 | End: 2020-03-24 | Stop reason: HOSPADM

## 2020-03-19 RX ORDER — CLONIDINE HYDROCHLORIDE 0.1 MG/1
0.1 TABLET ORAL EVERY 4 HOURS PRN
Status: DISCONTINUED | OUTPATIENT
Start: 2020-03-19 | End: 2020-03-24 | Stop reason: HOSPADM

## 2020-03-19 RX ORDER — POTASSIUM CHLORIDE 750 MG/1
40 TABLET, FILM COATED, EXTENDED RELEASE ORAL 2 TIMES DAILY
Status: DISCONTINUED | OUTPATIENT
Start: 2020-03-19 | End: 2020-03-19

## 2020-03-19 RX ORDER — AMLODIPINE BESYLATE 10 MG/1
10 TABLET ORAL DAILY
Status: DISCONTINUED | OUTPATIENT
Start: 2020-03-19 | End: 2020-03-24 | Stop reason: HOSPADM

## 2020-03-19 RX ORDER — MORPHINE SULFATE 2 MG/ML
2 INJECTION, SOLUTION INTRAMUSCULAR; INTRAVENOUS EVERY 4 HOURS PRN
Status: DISCONTINUED | OUTPATIENT
Start: 2020-03-19 | End: 2020-03-24 | Stop reason: HOSPADM

## 2020-03-19 RX ORDER — ENALAPRILAT 2.5 MG/2ML
2.5 INJECTION INTRAVENOUS EVERY 4 HOURS PRN
Status: DISCONTINUED | OUTPATIENT
Start: 2020-03-19 | End: 2020-03-24 | Stop reason: HOSPADM

## 2020-03-19 RX ORDER — FOLIC ACID 1 MG/1
2 TABLET ORAL DAILY
Status: DISCONTINUED | OUTPATIENT
Start: 2020-03-19 | End: 2020-03-24 | Stop reason: HOSPADM

## 2020-03-19 RX ORDER — LIDOCAINE 4 G/G
1 PATCH TOPICAL DAILY
Status: DISCONTINUED | OUTPATIENT
Start: 2020-03-19 | End: 2020-03-24 | Stop reason: HOSPADM

## 2020-03-19 RX ORDER — FUROSEMIDE 10 MG/ML
40 INJECTION INTRAMUSCULAR; INTRAVENOUS 2 TIMES DAILY
Status: DISCONTINUED | OUTPATIENT
Start: 2020-03-19 | End: 2020-03-21

## 2020-03-19 RX ORDER — ALBUTEROL SULFATE 2.5 MG/3ML
2.5 SOLUTION RESPIRATORY (INHALATION)
Status: DISCONTINUED | OUTPATIENT
Start: 2020-03-19 | End: 2020-03-24 | Stop reason: HOSPADM

## 2020-03-19 RX ADMIN — SODIUM CHLORIDE, POTASSIUM CHLORIDE, SODIUM LACTATE AND CALCIUM CHLORIDE: 600; 310; 30; 20 INJECTION, SOLUTION INTRAVENOUS at 07:54

## 2020-03-19 RX ADMIN — PANTOPRAZOLE SODIUM 40 MG: 40 INJECTION, POWDER, FOR SOLUTION INTRAVENOUS at 08:02

## 2020-03-19 RX ADMIN — FUROSEMIDE 40 MG: 10 INJECTION, SOLUTION INTRAMUSCULAR; INTRAVENOUS at 11:50

## 2020-03-19 RX ADMIN — IPRATROPIUM BROMIDE AND ALBUTEROL SULFATE 1 AMPULE: .5; 3 SOLUTION RESPIRATORY (INHALATION) at 15:32

## 2020-03-19 RX ADMIN — DOXYCYCLINE 100 MG: 100 INJECTION, POWDER, LYOPHILIZED, FOR SOLUTION INTRAVENOUS at 02:54

## 2020-03-19 RX ADMIN — VANCOMYCIN HYDROCHLORIDE 1250 MG: 5 INJECTION, POWDER, LYOPHILIZED, FOR SOLUTION INTRAVENOUS at 19:48

## 2020-03-19 RX ADMIN — POTASSIUM CHLORIDE 40 MEQ: 750 TABLET, FILM COATED, EXTENDED RELEASE ORAL at 13:50

## 2020-03-19 RX ADMIN — VANCOMYCIN HYDROCHLORIDE 1250 MG: 5 INJECTION, POWDER, LYOPHILIZED, FOR SOLUTION INTRAVENOUS at 03:39

## 2020-03-19 RX ADMIN — HEPARIN SODIUM 5000 UNITS: 5000 INJECTION INTRAVENOUS; SUBCUTANEOUS at 11:50

## 2020-03-19 RX ADMIN — CYANOCOBALAMIN 1000 MCG: 1000 INJECTION, SOLUTION INTRAMUSCULAR; SUBCUTANEOUS at 07:28

## 2020-03-19 RX ADMIN — ATORVASTATIN CALCIUM 40 MG: 40 TABLET, FILM COATED ORAL at 07:54

## 2020-03-19 RX ADMIN — DOXYCYCLINE 100 MG: 100 INJECTION, POWDER, LYOPHILIZED, FOR SOLUTION INTRAVENOUS at 17:33

## 2020-03-19 RX ADMIN — Medication 10 ML: at 21:10

## 2020-03-19 RX ADMIN — IPRATROPIUM BROMIDE AND ALBUTEROL SULFATE 1 AMPULE: .5; 3 SOLUTION RESPIRATORY (INHALATION) at 04:00

## 2020-03-19 RX ADMIN — ACETAMINOPHEN 650 MG: 325 TABLET ORAL at 21:10

## 2020-03-19 RX ADMIN — Medication 10 ML: at 07:59

## 2020-03-19 RX ADMIN — CLONIDINE HYDROCHLORIDE 0.1 MG: 0.1 TABLET ORAL at 11:51

## 2020-03-19 RX ADMIN — IPRATROPIUM BROMIDE AND ALBUTEROL SULFATE 1 AMPULE: .5; 3 SOLUTION RESPIRATORY (INHALATION) at 09:34

## 2020-03-19 RX ADMIN — FUROSEMIDE 40 MG: 10 INJECTION, SOLUTION INTRAMUSCULAR; INTRAVENOUS at 17:32

## 2020-03-19 RX ADMIN — POTASSIUM CHLORIDE 40 MEQ: 750 TABLET, FILM COATED, EXTENDED RELEASE ORAL at 08:33

## 2020-03-19 RX ADMIN — Medication 10 ML: at 19:48

## 2020-03-19 RX ADMIN — IPRATROPIUM BROMIDE AND ALBUTEROL SULFATE 1 AMPULE: 2.5; .5 SOLUTION RESPIRATORY (INHALATION) at 19:44

## 2020-03-19 RX ADMIN — PIPERACILLIN AND TAZOBACTAM 3.38 G: 3; .375 INJECTION, POWDER, LYOPHILIZED, FOR SOLUTION INTRAVENOUS at 21:10

## 2020-03-19 RX ADMIN — POTASSIUM CHLORIDE 40 MEQ: 750 TABLET, FILM COATED, EXTENDED RELEASE ORAL at 19:49

## 2020-03-19 RX ADMIN — FOLIC ACID 2 MG: 1 TABLET ORAL at 11:51

## 2020-03-19 RX ADMIN — MORPHINE SULFATE 3 MG: 4 INJECTION INTRAVENOUS at 09:24

## 2020-03-19 RX ADMIN — MORPHINE SULFATE 3 MG: 4 INJECTION INTRAVENOUS at 05:02

## 2020-03-19 RX ADMIN — HEPARIN SODIUM 5000 UNITS: 5000 INJECTION INTRAVENOUS; SUBCUTANEOUS at 19:49

## 2020-03-19 RX ADMIN — PIPERACILLIN AND TAZOBACTAM 3.38 G: 3; .375 INJECTION, POWDER, LYOPHILIZED, FOR SOLUTION INTRAVENOUS at 12:45

## 2020-03-19 RX ADMIN — METOPROLOL SUCCINATE 50 MG: 50 TABLET, FILM COATED, EXTENDED RELEASE ORAL at 07:54

## 2020-03-19 RX ADMIN — AMLODIPINE BESYLATE 10 MG: 10 TABLET ORAL at 11:51

## 2020-03-19 RX ADMIN — METOPROLOL SUCCINATE 50 MG: 50 TABLET, FILM COATED, EXTENDED RELEASE ORAL at 19:49

## 2020-03-19 RX ADMIN — HYDRALAZINE HYDROCHLORIDE 10 MG: 20 INJECTION INTRAMUSCULAR; INTRAVENOUS at 09:26

## 2020-03-19 RX ADMIN — METOPROLOL SUCCINATE 50 MG: 50 TABLET, FILM COATED, EXTENDED RELEASE ORAL at 00:15

## 2020-03-19 RX ADMIN — PIPERACILLIN AND TAZOBACTAM 3.38 G: 3; .375 INJECTION, POWDER, LYOPHILIZED, FOR SOLUTION INTRAVENOUS at 03:34

## 2020-03-19 RX ADMIN — HYDRALAZINE HYDROCHLORIDE 10 MG: 20 INJECTION INTRAMUSCULAR; INTRAVENOUS at 05:11

## 2020-03-19 RX ADMIN — MORPHINE SULFATE 3 MG: 4 INJECTION INTRAVENOUS at 00:34

## 2020-03-19 ASSESSMENT — PAIN SCALES - GENERAL
PAINLEVEL_OUTOF10: 7
PAINLEVEL_OUTOF10: 0
PAINLEVEL_OUTOF10: 3
PAINLEVEL_OUTOF10: 0
PAINLEVEL_OUTOF10: 0
PAINLEVEL_OUTOF10: 7
PAINLEVEL_OUTOF10: 3
PAINLEVEL_OUTOF10: 7
PAINLEVEL_OUTOF10: 2

## 2020-03-19 ASSESSMENT — PAIN DESCRIPTION - LOCATION
LOCATION: CHEST
LOCATION: CHEST

## 2020-03-19 NOTE — PROGRESS NOTES
Order Status: Completed Specimen: Nasopharyngeal Updated: 03/16/20 1037    Influenza A by PCR Negative    Influenza B by PCR Negative       Ht Readings from Last 1 Encounters:   03/16/20 5' 9\" (1.753 m)        Wt Readings from Last 1 Encounters:   03/19/20 201 lb 15.1 oz (91.6 kg)         Body mass index is 29.82 kg/m². Estimated Creatinine Clearance: 110 mL/min (based on SCr of 0.74 mg/dL). Trough: 15.8 mcg/mL    Assessment/Plan:  Trough is therapeutic at 15.8 mcg/mL for a goal range of 15-20 mcg/mL. Continue current regimen of vancomycin 1,250 mg IV every 12 hours. Obtain next trough 30 minutes prior to the 4th dose, on 03/21/20 at 0330.     Tonie ParrD   3/19/2020 9:15 AM

## 2020-03-19 NOTE — PROGRESS NOTES
Pulmonary Disorder  (acute or chronic)  [x]   Severe or Chronic w/ Exacerbation  []     Surgical Status No [x]   Surgeries     General []   Surgery Lower []   Abdominal Thoracic or []   Upper Abdominal Thoracic with  PulmonaryDisorder  []     Chest X-ray Clear/Not  Ordered     []  Chronic Changes  Results Pending  []  Infiltrates, atelectasis, pleural effusion, or edema  []  Infiltrates in more than one lobe [x]  Infiltrate + Atelectasis, &/or pleural effusion  []    Respiratory Pattern Regular,  RR = 12-20 [x]  Increased,  RR = 21-25 []  DANIELSON, irregular,  or RR = 26-30 []  Decreased FEV1  or RR = 31-35 []  Severe SOB, use  of accessory muscles, or RR ? 35  []    Mental Status Alert, oriented,  Cooperative [x]  Confused but Follows commands []  Lethargic or unable to follow commands []  Obtunded  []  Comatose  []    Breath Sounds Clear to  auscultation  []  Decreased unilaterally or  in bases only []  Decreased  bilaterally  []  Crackles or intermittent wheezes [x]  Wheezes []    Cough Strong, Spontan., & nonproductive [x]  Strong,  spontaneous, &  productive []  Weak,  Nonproductive []  Weak, productive or  with wheezes []  No spontaneous  cough or may require suctioning []    Level of Activity Ambulatory [x]  Ambulatory w/ Assist  []  Non-ambulatory []  Paraplegic []  Quadriplegic []    Total    Score:___9____     Triage Score:__4______      Tri       Triage:     1. (>20) Freq: Q3    2. (16-20) Freq: Q4   3. (11-15) Freq: QID & Albuterol Q2 PRN    4. (6-10) Freq: TID & Albuterol Q2 PRN    5. (0-5) Freq Q4prn

## 2020-03-19 NOTE — CARE COORDINATION
[facility] Case Management Initial Discharge Assessment    Met with Patient to discuss discharge plan. PCP: Andrés Lane MD                                Date of Last Visit: 12/04/2019    If no PCP, list provided? N/A    Discharge Planning    Living Arrangements: independently at home    Who do you live with? wife    Who helps you with your care:  self    If lives at home:     Do you have any barriers navigating in your home? no    Patient can perform ADL? Yes    Current Services (outpatient and in home) :  None    Dialysis: No    Is transportation available to get to your appointments? Yes - patient drives    DME Equipment:  yes - cane/ uses PRN for long distance only    Respiratory equipment: None    Respiratory provider:  no     Pharmacy:  yes - [facility] - [facility] and [place] with Medication Assistance Program?  No      Patient agreeable to Delicia Colon? No - Considering if needed     Patient agreeable to SNF/Rehab? Declined    Other discharge needs identified? Other - follow for O2 or other respiratory needs    Freedom of choice list provided with basic dialogue that supports the patient's individualized plan of care/goals and shares the quality data associated with the providers. N/A    Does Patient Have a High-Risk for Readmission Diagnosis (CHF, PN, MI, COPD)? No    If Yes,    Consult with pulmonologist? Yes  Consult with cardiologist? No  Cardiac Rehab referral if EF <35%? N/A  Consult with Pharmacy for medication assessment prior to discharge? No  Consult with Behavioral health to aid in depression, anxiety, or coping issues? N/A  Palliative Care Consult? No  Pulmonary Rehab order for COPD, PN, and CHF (if EF > 35%)? No   Does patient have a reliable scale and know how to read it (for CHF)? N/A  Nutrition consult for CHF?  No  Respiratory therapy consult that includes bedside instruction on administration of nebulizers and/or inhalers, and assessment of oxygen and equipment needs in the home? N/A    The plan for Transition of Care is related to the following treatment goals: Stable Hgb, improved breathing. Initial Discharge Plan? (Note: please see concurrent daily documentation for any updates after initial note). Spoke to patient. States from home w/ wife, indep, no prior history of needing SNF/ Rehab. Patient has been on HFNC and now on NC. Patient denies any use of respiratory equipment in the past. Currently denies any needs. Requested PT/OT to assess needs. Patient @ present plans to return home and denies needs. Requested PT/OT to assess and will follow for any O2 needs @ discharge. The Patient and/or patient representative: Patient was provided with choice of any post-acute providers for care and equipment and agrees with discharge plan  N/A and yes.      Electronically signed by Coco Antunez RN on 3/19/2020 at 2:28 PM

## 2020-03-19 NOTE — PROGRESS NOTES
Patient arrived to room 286 and resting comfortable. IV to L forearm infiltrated- warm, red, with lg amt of edema. IV to R AC infiltrated- moderate amt of edema, pink, warm. New 20 IV started to R FA. Patient states that it has been like that for most of the day.     Electronically signed by Ramo Crews RN on 3/19/2020 at 6:55 PM\

## 2020-03-19 NOTE — PROGRESS NOTES
Patient up in bed watching TV. Declined to get up in chair. lOOKS BETTER THAN LAST ASSESSMEnt. On high flow oxygen. K+2.9 given supplementation. Shift goals established: no abnormal bleeding, ease of breathing, increased mobility, decision on colonoscopy, PO nutrition. Dr. Ernie Swann called awaiting to get clearance from [facility]. Dr. Laurent Bella here and aware of BP >170 after all routine and prn anti hypertensive options have been provided. Orders received. Wife called, given update, and asked if she wants nurse to advocate anything during rounds to help liaison through the visitor restriction. Oxygen weaned. Nasal cannula 4L was able to wean to 3L. Consent for colonoscopy obtained. Report called to nurse on [address] [facility] at [phone]  No answer. Emi Sosa given report. Patient called wife and informed her of the transfer. Telemtry monitor applied. Brice removed.  patient left floor at 436pm with no issues

## 2020-03-19 NOTE — PROGRESS NOTES
Uneventful shift. Vital signs stable. Hemoglobin low and dr Shai Ibanez did not want to transfuse at this time. Tolerating high flow nasal cannula.

## 2020-03-19 NOTE — PROGRESS NOTES
Shantel Sin is a 76 y.o. male patient.     Current Facility-Administered Medications   Medication Dose Route Frequency Provider Last Rate Last Dose    lidocaine 4 % external patch 1 patch  1 patch Transdermal Daily Violetta Leghorn Sedar, DO   1 patch at 03/19/20 0751    morphine (PF) injection 2 mg  2 mg Intravenous Q4H PRN Violetta Leghorn Sedar, DO        Or    morphine injection 3 mg  3 mg Intravenous Q4H PRN Violetta Leghorn Sedar, DO   3 mg at 03/19/20 0924    [START ON 3/20/2020] pantoprazole (PROTONIX) tablet 40 mg  40 mg Oral QAM AC Norman Fuentes MD        folic acid (FOLVITE) tablet 2 mg  2 mg Oral Daily Norman Fuentes MD   2 mg at 03/19/20 1151    amLODIPine (NORVASC) tablet 10 mg  10 mg Oral Daily Norman Fuentes MD   10 mg at 03/19/20 1151    cloNIDine (CATAPRES) tablet 0.1 mg  0.1 mg Oral Q4H PRN Norman Fuentes MD   0.1 mg at 03/19/20 1151    enalaprilat (VASOTEC) injection 2.5 mg  2.5 mg Intravenous Q4H PRN Norman Fuentes MD        furosemide (LASIX) injection 40 mg  40 mg Intravenous BID Norman Fuentes MD   40 mg at 03/19/20 1150    potassium chloride (KLOR-CON) extended release tablet 40 mEq  40 mEq Oral TID Lalo Allen MD   40 mEq at 03/19/20 1350    heparin (porcine) injection 5,000 Units  5,000 Units Subcutaneous BID Lalo Allen MD   5,000 Units at 03/19/20 1150    ipratropium-albuterol (DUONEB) nebulizer solution 1 ampule  1 ampule Inhalation TID David Monroy MD        albuterol (PROVENTIL) nebulizer solution 2.5 mg  2.5 mg Nebulization Q2H PRN David Monroy MD        [START ON 4/6/2020] cyanocobalamin injection 1,000 mcg  1,000 mcg Intramuscular Q30 Days Norman Fuentes MD        hydrALAZINE (APRESOLINE) injection 10 mg  10 mg Intravenous Q4H PRN Violetta Leghorn Sedar, DO   10 mg at 03/19/20 0926    iron sucrose (VENOFER) 200 mg in sodium chloride 0.9 % 100 mL IVPB  200 mg Intravenous Q24H Lalo Allen MD   Stopped at 03/18/20 2238    sodium chloride flush 0.9 % injection 10 mL  10 mL Intravenous 2 times per day Elisabeth Mariee MD   10 mL at 03/19/20 0759    sodium chloride flush 0.9 % injection 10 mL  10 mL Intravenous PRN Elisabeth Mariee MD        sodium chloride (PF) 0.9 % injection 10 mL  10 mL Intravenous Daily Lalo Allen MD   10 mL at 03/19/20 0759    piperacillin-tazobactam (ZOSYN) 3.375 g in dextrose 5 % 50 mL IVPB extended infusion (mini-bag)  3.375 g Intravenous Corie Albert MD   Stopped at 03/19/20 1642    doxycycline (VIBRAMYCIN) 100 mg in dextrose 5 % 100 mL IVPB  100 mg Intravenous Q12H Lalo Allen MD   Stopped at 03/19/20 0354    vancomycin (VANCOCIN) intermittent dosing (placeholder)   Other RX Placeholder David Monroy MD        vancomycin (VANCOCIN) 1,250 mg in dextrose 5 % 250 mL IVPB  1,250 mg Intravenous Q12H David Monroy MD   Stopped at 03/19/20 0539    atorvastatin (LIPITOR) tablet 40 mg  40 mg Oral Daily Norman Fuentes MD   40 mg at 03/19/20 0754    metoprolol succinate (TOPROL XL) extended release tablet 50 mg  50 mg Oral BID Lalo Allen MD   50 mg at 03/19/20 0754    sodium chloride flush 0.9 % injection 10 mL  10 mL Intravenous 2 times per day Lalo Allen MD   10 mL at 03/18/20 2138    sodium chloride flush 0.9 % injection 10 mL  10 mL Intravenous PRN Lalo Allen MD        acetaminophen (TYLENOL) tablet 650 mg  650 mg Oral Q6H PRN Lalo Allen MD   650 mg at 03/18/20 2154    Or    acetaminophen (TYLENOL) suppository 650 mg  650 mg Rectal Q6H PRN Norman Fuentes MD        polyethylene glycol (GLYCOLAX) packet 17 g  17 g Oral Daily PRN Norman Fuentes MD        promethazine (PHENERGAN) tablet 12.5 mg  12.5 mg Oral Q6H PRN Norman Fuentes MD        Or    ondansetron (ZOFRAN) injection 4 mg  4 mg Intravenous Q6H PRN Norman Fuentes MD         No Known Allergies  Active Problems:    Anemia    GI bleed    Dyspnea    Cardiac arrest ([address]    Aspiration pneumonia of both lungs ([address]    Acute respiratory failure with hypoxia and hypercapnia ([address]  Resolved

## 2020-03-19 NOTE — PROGRESS NOTES
Patient was extubated yesterday. He is on high flow oxygen. He continues to have shortness of breath. No chest pain. No abdominal pain. No nausea or vomiting. No headaches, loss of consciousness or seizure. His blood pressure continues to be elevated. Exam:  Awake and oriented on high flow oxygen, mild respiratory distress, morbidly obese. HEENT: Pink conjunctiva and buccal mucosa.  Normal and throat and nose  Neck: Supple, no nuchal rigidity. Endo: No thyromegaly. Vascular: No JVD or carotid bruit. Chest:     Improvement of the bilateral crackles. Heart: Regular rate and rhythm, no extra sounds.  No murmur, no rub. Abdomen: Soft, no tenderness, no rebound, no rigidity.  Increased abdominal girth therefore clinically I could not exclude the possibility of intra-abdominal mass or organomegaly. LE: No cyanosis or clubbing, no varices or edema. Neuro: Awake, alert, oriented, normal speech, normal comprehension, normal extension, normal cranial nerves, normal and symmetrical motor and tone examination throughout. MS: No joint effusion or tenderness. Skin: No skin rash, itching, bruising or significant findings.     Assessment and plan: This documentation may or may not be complete, inclusive or conclusive.     *Acute hypoxic respiratory failure, improved, patient was extubated and currently on high flow oxygen.     *Pneumonia, aspiration versus healthcare associated. Patient is on antibiotic to cover gram positives, negatives, anaerobes and MRSA. Bronchoscopy revealed large amount of secretions.     *Hypertension, uncontrolled, off Levophed drip. I added the amlodipine and as needed clonidine and Vasotec.   Continue PRN hydralazine.     *Anemia, Hemoccult stool is negative.  No evidence of active or massive GI blood loss.  Patient denies any hematuria.  His reticulocyte count and LDH are normal making the possibility of hemolysis less likely.  Haptoglobin is still pending.  Anemia could be related to the following:     *Iron deficiency anemia, rule out the possibility of intermittent or chronic GI blood loss.     *B12 deficiency.     *Folate deficiency.     *Hypertension. *Hypokalemia, repletion. Check magnesium and phosphorus level.     Plan:  Continue antibiotic and gentle diuresis if his blood pressure is stable. Continue to monitor his hemoglobin. Patient had received 2 units of RBC transfusion and his hemoglobin is hovering between 7.5 and 8. No evidence of active bleed. CT scan did not show any retroperitoneal bleed.     Patient is at higher risk having a DVT but also is at higher risk having sudden GI blood loss. Benefit/risk of anticoagulation in this particular patient is difficult address. At this time we will continue SCD. Patient might have colonoscopy to the. If colonoscopy is negative we would do start anticoagulation for DVT prophylaxis.

## 2020-03-19 NOTE — PROGRESS NOTES
interval not displayed. Recent Labs     03/17/20  0702  03/18/20  0508 03/19/20  0339      < > 139 138   K 3.6   < > 3.6 2.9*      < > 105 103   CO2 21   < > 20 20   BUN 6*   < > 8 6*   CREATININE 0.70   < > 0.74 0.74   GLUCOSE 101*   < > 83 88   CALCIUM 9.4   < > 9.0 9.0   PROT 7.0  --   --  6.2*   LABALBU 3.8  --   --  3.2*   BILITOT 1.4*  --   --  1.1*   ALKPHOS 90  --   --  74   AST 22  --   --  19   ALT 10  --   --  10   LABGLOM >60.0   < > >60.0 >60.0   GFRAA >60.0   < > >60.0 >60.0   GLOB 3.2  --   --   --     < > = values in this interval not displayed.        MV Settings:     Vent Mode: CPAP  Vt Ordered: 550 mL  Rate Set: 12 bmp  FiO2 : 40 %  PEEP/CPAP: 6  Pressure Support: 0 cmH20  Peak Inspiratory Pressure: 8.3 cmH2O  Mean Airway Pressure: 11 cmH20  I:E Ratio: 1:2.70    Recent Labs     03/17/20  1448   PHART 7.166*   [id number] 64*   PO2ART 327*   ULD2BFG 23.2   BEART -5*   [id number] 100*       O2 Device: Nasal cannula  O2 Flow Rate (L/min): 3 L/min    DIET CLEAR LIQUID;     MEDICATIONS during current hospitalization:    Continuous Infusions:    Scheduled Meds:   lidocaine  1 patch Transdermal Daily    [START ON 3/20/2020] pantoprazole  40 mg Oral QAM AC    folic acid  2 mg Oral Daily    amLODIPine  10 mg Oral Daily    furosemide  40 mg Intravenous BID    potassium chloride  40 mEq Oral TID    heparin (porcine)  5,000 Units Subcutaneous BID    [START ON 4/6/2020] cyanocobalamin  1,000 mcg Intramuscular Q30 Days    ipratropium-albuterol  1 ampule Inhalation 4x daily    iron sucrose  200 mg Intravenous Q24H    sodium chloride flush  10 mL Intravenous 2 times per day    sodium chloride (PF)  10 mL Intravenous Daily    piperacillin-tazobactam  3.375 g Intravenous Q8H    doxycycline (VIBRAMYCIN) IV  100 mg Intravenous Q12H    vancomycin (VANCOCIN) intermittent dosing (placeholder)   Other RX Placeholder    vancomycin  1,250 mg Intravenous Q12H    atorvastatin  40 mg Oral Daily   

## 2020-03-20 ENCOUNTER — APPOINTMENT (OUTPATIENT)
Dept: GENERAL RADIOLOGY | Age: 65
DRG: 377 | End: 2020-03-20
Payer: MEDICARE

## 2020-03-20 LAB
ALBUMIN SERPL-MCNC: 2.9 G/DL (ref 3.5–4.6)
ALP BLD-CCNC: 63 U/L (ref 35–104)
ALT SERPL-CCNC: 8 U/L (ref 0–41)
ANION GAP SERPL CALCULATED.3IONS-SCNC: 15 MEQ/L (ref 9–15)
AST SERPL-CCNC: 20 U/L (ref 0–40)
BASOPHILS ABSOLUTE: 0.1 K/UL (ref 0–0.2)
BASOPHILS RELATIVE PERCENT: 0.8 %
BILIRUB SERPL-MCNC: 1.6 MG/DL (ref 0.2–0.7)
BILIRUBIN DIRECT: 0.5 MG/DL (ref 0–0.4)
BILIRUBIN, INDIRECT: 1.1 MG/DL (ref 0–0.6)
BLOOD BANK DISPENSE STATUS: NORMAL
BLOOD BANK PRODUCT CODE: NORMAL
BPU ID: NORMAL
BUN BLDV-MCNC: 9 MG/DL (ref 8–23)
CALCIUM SERPL-MCNC: 9.1 MG/DL (ref 8.5–9.9)
CHLORIDE BLD-SCNC: 105 MEQ/L (ref 95–107)
CO2: 19 MEQ/L (ref 20–31)
CREAT SERPL-MCNC: 0.87 MG/DL (ref 0.7–1.2)
CULTURE, RESPIRATORY: NORMAL
DESCRIPTION BLOOD BANK: NORMAL
EOSINOPHILS ABSOLUTE: 0 K/UL (ref 0–0.7)
EOSINOPHILS RELATIVE PERCENT: 0.1 %
GFR AFRICAN AMERICAN: >60
GFR NON-AFRICAN AMERICAN: >60
GLUCOSE BLD-MCNC: 94 MG/DL (ref 70–99)
GRAM STAIN RESULT: NORMAL
HCT VFR BLD CALC: 23.4 % (ref 42–52)
HEMOGLOBIN: 7.3 G/DL (ref 14–18)
LYMPHOCYTES ABSOLUTE: 4.3 K/UL (ref 1–4.8)
LYMPHOCYTES RELATIVE PERCENT: 27.5 %
MAGNESIUM: 1.7 MG/DL (ref 1.7–2.4)
MCH RBC QN AUTO: 26.1 PG (ref 27–31.3)
MCHC RBC AUTO-ENTMCNC: 31.1 % (ref 33–37)
MCV RBC AUTO: 84.1 FL (ref 80–100)
MONOCYTES ABSOLUTE: 1.4 K/UL (ref 0.2–0.8)
MONOCYTES RELATIVE PERCENT: 8.8 %
NEUTROPHILS ABSOLUTE: 9.8 K/UL (ref 1.4–6.5)
NEUTROPHILS RELATIVE PERCENT: 62.8 %
PDW BLD-RTO: 25.3 % (ref 11.5–14.5)
PLATELET # BLD: 205 K/UL (ref 130–400)
POTASSIUM SERPL-SCNC: 3.8 MEQ/L (ref 3.4–4.9)
RBC # BLD: 2.78 M/UL (ref 4.7–6.1)
SODIUM BLD-SCNC: 139 MEQ/L (ref 135–144)
TOTAL PROTEIN: 6.2 G/DL (ref 6.3–8)
WBC # BLD: 15.6 K/UL (ref 4.8–10.8)

## 2020-03-20 PROCEDURE — 94150 VITAL CAPACITY TEST: CPT

## 2020-03-20 PROCEDURE — 6360000002 HC RX W HCPCS: Performed by: INTERNAL MEDICINE

## 2020-03-20 PROCEDURE — 80048 BASIC METABOLIC PNL TOTAL CA: CPT

## 2020-03-20 PROCEDURE — 83735 ASSAY OF MAGNESIUM: CPT

## 2020-03-20 PROCEDURE — 2580000003 HC RX 258: Performed by: SPECIALIST

## 2020-03-20 PROCEDURE — 99232 SBSQ HOSP IP/OBS MODERATE 35: CPT | Performed by: INTERNAL MEDICINE

## 2020-03-20 PROCEDURE — 6370000000 HC RX 637 (ALT 250 FOR IP): Performed by: INTERNAL MEDICINE

## 2020-03-20 PROCEDURE — 97166 OT EVAL MOD COMPLEX 45 MIN: CPT

## 2020-03-20 PROCEDURE — 1210000000 HC MED SURG R&B

## 2020-03-20 PROCEDURE — 94640 AIRWAY INHALATION TREATMENT: CPT

## 2020-03-20 PROCEDURE — 97162 PT EVAL MOD COMPLEX 30 MIN: CPT

## 2020-03-20 PROCEDURE — 94761 N-INVAS EAR/PLS OXIMETRY MLT: CPT

## 2020-03-20 PROCEDURE — 85025 COMPLETE CBC W/AUTO DIFF WBC: CPT

## 2020-03-20 PROCEDURE — 2700000000 HC OXYGEN THERAPY PER DAY

## 2020-03-20 PROCEDURE — 2500000003 HC RX 250 WO HCPCS: Performed by: INTERNAL MEDICINE

## 2020-03-20 PROCEDURE — 2580000003 HC RX 258: Performed by: INTERNAL MEDICINE

## 2020-03-20 PROCEDURE — 71045 X-RAY EXAM CHEST 1 VIEW: CPT

## 2020-03-20 PROCEDURE — 36415 COLL VENOUS BLD VENIPUNCTURE: CPT

## 2020-03-20 PROCEDURE — 80076 HEPATIC FUNCTION PANEL: CPT

## 2020-03-20 RX ORDER — AMOXICILLIN AND CLAVULANATE POTASSIUM 875; 125 MG/1; MG/1
1 TABLET, FILM COATED ORAL EVERY 12 HOURS SCHEDULED
Status: COMPLETED | OUTPATIENT
Start: 2020-03-20 | End: 2020-03-24

## 2020-03-20 RX ORDER — TRAMADOL HYDROCHLORIDE 50 MG/1
50 TABLET ORAL EVERY 8 HOURS PRN
Status: DISCONTINUED | OUTPATIENT
Start: 2020-03-20 | End: 2020-03-24 | Stop reason: HOSPADM

## 2020-03-20 RX ORDER — POTASSIUM CHLORIDE 750 MG/1
40 TABLET, FILM COATED, EXTENDED RELEASE ORAL 2 TIMES DAILY
Status: DISCONTINUED | OUTPATIENT
Start: 2020-03-20 | End: 2020-03-21

## 2020-03-20 RX ADMIN — HEPARIN SODIUM 5000 UNITS: 5000 INJECTION INTRAVENOUS; SUBCUTANEOUS at 08:15

## 2020-03-20 RX ADMIN — IRON SUCROSE 200 MG: 20 INJECTION, SOLUTION INTRAVENOUS at 01:54

## 2020-03-20 RX ADMIN — Medication 10 ML: at 09:35

## 2020-03-20 RX ADMIN — ATORVASTATIN CALCIUM 40 MG: 40 TABLET, FILM COATED ORAL at 08:14

## 2020-03-20 RX ADMIN — IPRATROPIUM BROMIDE AND ALBUTEROL SULFATE 1 AMPULE: 2.5; .5 SOLUTION RESPIRATORY (INHALATION) at 18:59

## 2020-03-20 RX ADMIN — POTASSIUM CHLORIDE 40 MEQ: 750 TABLET, FILM COATED, EXTENDED RELEASE ORAL at 08:14

## 2020-03-20 RX ADMIN — PANTOPRAZOLE SODIUM 40 MG: 40 TABLET, DELAYED RELEASE ORAL at 05:47

## 2020-03-20 RX ADMIN — TRAMADOL HYDROCHLORIDE 50 MG: 50 TABLET, FILM COATED ORAL at 15:22

## 2020-03-20 RX ADMIN — METOPROLOL SUCCINATE 50 MG: 50 TABLET, FILM COATED, EXTENDED RELEASE ORAL at 08:14

## 2020-03-20 RX ADMIN — PIPERACILLIN AND TAZOBACTAM 3.38 G: 3; .375 INJECTION, POWDER, LYOPHILIZED, FOR SOLUTION INTRAVENOUS at 04:35

## 2020-03-20 RX ADMIN — ACETAMINOPHEN 650 MG: 325 TABLET ORAL at 05:48

## 2020-03-20 RX ADMIN — Medication 10 ML: at 08:16

## 2020-03-20 RX ADMIN — FUROSEMIDE 40 MG: 10 INJECTION, SOLUTION INTRAMUSCULAR; INTRAVENOUS at 08:15

## 2020-03-20 RX ADMIN — VANCOMYCIN HYDROCHLORIDE 1250 MG: 5 INJECTION, POWDER, LYOPHILIZED, FOR SOLUTION INTRAVENOUS at 09:34

## 2020-03-20 RX ADMIN — AMLODIPINE BESYLATE 10 MG: 10 TABLET ORAL at 08:14

## 2020-03-20 RX ADMIN — POTASSIUM CHLORIDE 40 MEQ: 750 TABLET, FILM COATED, EXTENDED RELEASE ORAL at 20:08

## 2020-03-20 RX ADMIN — TRAMADOL HYDROCHLORIDE 50 MG: 50 TABLET, FILM COATED ORAL at 23:26

## 2020-03-20 RX ADMIN — ACETAMINOPHEN 650 MG: 325 TABLET ORAL at 18:01

## 2020-03-20 RX ADMIN — FOLIC ACID 2 MG: 1 TABLET ORAL at 08:14

## 2020-03-20 RX ADMIN — METOPROLOL SUCCINATE 50 MG: 50 TABLET, FILM COATED, EXTENDED RELEASE ORAL at 20:08

## 2020-03-20 RX ADMIN — HEPARIN SODIUM 5000 UNITS: 5000 INJECTION INTRAVENOUS; SUBCUTANEOUS at 20:08

## 2020-03-20 RX ADMIN — DOXYCYCLINE 100 MG: 100 INJECTION, POWDER, LYOPHILIZED, FOR SOLUTION INTRAVENOUS at 03:05

## 2020-03-20 RX ADMIN — ALBUTEROL SULFATE 2.5 MG: 2.5 SOLUTION RESPIRATORY (INHALATION) at 01:34

## 2020-03-20 RX ADMIN — AMOXICILLIN AND CLAVULANATE POTASSIUM 1 TABLET: 875; 125 TABLET, FILM COATED ORAL at 20:08

## 2020-03-20 RX ADMIN — Medication 10 ML: at 20:11

## 2020-03-20 RX ADMIN — IPRATROPIUM BROMIDE AND ALBUTEROL SULFATE 1 AMPULE: 2.5; .5 SOLUTION RESPIRATORY (INHALATION) at 13:45

## 2020-03-20 RX ADMIN — FUROSEMIDE 40 MG: 10 INJECTION, SOLUTION INTRAMUSCULAR; INTRAVENOUS at 18:01

## 2020-03-20 RX ADMIN — IPRATROPIUM BROMIDE AND ALBUTEROL SULFATE 1 AMPULE: 2.5; .5 SOLUTION RESPIRATORY (INHALATION) at 07:03

## 2020-03-20 ASSESSMENT — PAIN SCALES - GENERAL
PAINLEVEL_OUTOF10: 6
PAINLEVEL_OUTOF10: 7
PAINLEVEL_OUTOF10: 6
PAINLEVEL_OUTOF10: 0
PAINLEVEL_OUTOF10: 10
PAINLEVEL_OUTOF10: 0
PAINLEVEL_OUTOF10: 4

## 2020-03-20 ASSESSMENT — PAIN DESCRIPTION - LOCATION: LOCATION: CHEST

## 2020-03-20 ASSESSMENT — PAIN DESCRIPTION - DESCRIPTORS: DESCRIPTORS: DISCOMFORT

## 2020-03-20 NOTE — FLOWSHEET NOTE
0830- Patient alert and oriented x4. Complains of pain in chest from CPR. Lidocaine patch applied this morning. PERRLA. Lungs are diminished. Spontaneous cough with small amounts of yellow sputum. Abdomen is round and non tender. Last BM 3/16. Patient has +2edema on upper extremities, right hand slightly larger than left. 1145- Patient up to chair with therapy. Gait is steady with walker. 1445- Patient complaining of pain in his chest, upper abdomen from CPR. Message to doctor for a PO medication stronger than tylenol.

## 2020-03-20 NOTE — PROGRESS NOTES
Screening  Pain at present: 4  Scale Used: Numeric Score  Intervention List: Patient able to continue with treatment  Comments / Details: RN Julio Newton notified     Pain Reassessment:   Pain Assessment  Patient Currently in Pain: Yes  Pain Assessment: 0-10  Pain Level: 7  Pain Location: Chest  Pain Descriptors: Discomfort       Prior Level of Function:  Social/Functional History  Lives With: Spouse  Type of Home: House  Home Layout: Two level, Bed/Bath upstairs, 1/2 bath on main level, Able to Live on Main level with bedroom/bathroom(9 steps up, no HR)  Home Access: Stairs to enter with rails  Entrance Stairs - Number of Steps: 3  Entrance Stairs - Rails: Right(up)  Bathroom Shower/Tub: Tub/Shower unit  Bathroom Equipment: Shower chair, Hand-held shower, Grab bars in shower  Home Equipment: Cane  ADL Assistance: Independent  Homemaking Assistance: Independent  Ambulation Assistance: Independent(no AD)  Transfer Assistance: Independent  Active : Yes    OBJECTIVE:     Orientation Status:  Orientation  Overall Orientation Status: Within Functional Limits    Observation:  Observation/Palpation  Posture: Good  Observation: 3L02  Edema: bilat hand R>L    Cognition Status:  Cognition  Overall Cognitive Status: WFL    Perception Status:  Perception  Overall Perceptual Status: WFL    Sensation Status:  Sensation  Overall Sensation Status: WFL    Vision and Hearing Status:  Vision  Vision: Impaired  Vision Exceptions: Wears glasses for reading  Hearing  Hearing: Within functional limits     ROM:   LUE AROM (degrees)  LUE AROM : WFL  Left Hand AROM (degrees)  Left Hand AROM: WFL  RUE AROM (degrees)  RUE General AROM: less than 45° shoulder flexion, limited by pain   Right Hand AROM (degrees)  Right Hand AROM: WFL    Strength:  LUE Strength  L Hand General: 4-/5  LUE Strength Comment: 4-/5 grossly assessed   RUE Strength  R Hand General: 3+/5  RUE Strength Comment: 3/5 grossly assessed     Coordination, Tone, Quality of Movement: Tone RUE  RUE Tone: Normotonic  Tone LUE  LUE Tone: Normotonic  Coordination  Coordination and Movement description: Fine motor impairments, Tremors, Left UE, Right UE  Quality of Movement Other  Comment: pt reported tremors started in hospital     Hand Dominance:  Hand Dominance  Hand Dominance: Right    ADL Status:  ADL  Feeding: Setup  Grooming: Setup  UE Bathing: Stand by assistance  LE Bathing: Minimal assistance  UE Dressing: Minimal assistance  LE Dressing: Minimal assistance  Toileting: Stand by assistance  Additional Comments: Pt increased time don/doff sock, pt with report of pain in R shoulder during AROM, other ADL' s simulated.    Toilet Transfers  Toilet Transfer: Unable to assess  Toilet Transfers Comments: anticipate SBA        Therapy key for assistance levels -   Independent = Pt. is able to perform task with no assistance but may require a device   Stand by assistance = Pt. does not perform task at an independent level but does not need physical assistance, requires verbal cues  Minimal, Moderate, Maximal Assistance = Pt. requires physical assistance (25%, 50%, 75% assist from helper) for task but is able to actively participate in task   Dependent = Pt. requires total assistance with task and is not able to actively participate with task completion     Functional Mobility:  Functional Mobility  Functional - Mobility Device: Rolling Walker  Activity: Other(toward chair and side step to [address])  Assist Level: Stand by assistance  Transfers  Sit to stand: Stand by assistance  Stand to sit: Stand by assistance  Transfer Comments: VC for hand placement     Bed Mobility  Bed mobility  Supine to Sit: Supervision  Sit to Supine: Supervision    Seated and Standing Balance:  Balance  Sitting Balance: Supervision  Standing Balance: Stand by assistance    Functional Endurance:  Activity Tolerance  Activity Tolerance: fair     D/C Recommendations:  OT D/C RECOMMENDATIONS  REQUIRES OT FOLLOW UP:

## 2020-03-20 NOTE — PLAN OF CARE
Nutrition Problem: Inadequate oral intake(Anticipate resolution with start of general diet)  Intervention: Food and/or Nutrient Delivery: Modify current diet(Liberalize to general diet)  Nutritional Goals: PO intake >75% of meals. Stable weight ~190 lbs.  (anticipate some loss with improved edema)

## 2020-03-20 NOTE — PROGRESS NOTES
Physical Therapy Med Surg Initial Assessment  [facility]       NAME: Edwardo Emery  : 1955 (1 y.o.)  MRN: [id number]  CODE STATUS: Full Code    Date of Service: 3/20/2020    Patient Diagnosis(es): Anemia [D64.9]  GI bleed [K92.2]  Anemia [D64.9]  GI bleed [K92.2]   Chief Complaint   Patient presents with    Shortness of Breath     sob for the last 4 days.  denies cough     Patient Active Problem List    Diagnosis Date Noted    [facility]     [facility] pneumonia of both lungs ([facility]     Acute respiratory failure with hypoxia and hypercapnia ([facility]     Dyspnea     Anemia 2020    GI bleed 2020    Tremors of nervous system 2020    Dyslipidemia 2017    Obstructive chronic bronchitis without exacerbation (HCC) 2017    Chronic back pain     Insomnia     Essential hypertension, benign 2004    Lumbosacral spondylosis without myelopathy 2004    Sprain of lumbar region 2004    Degeneration of lumbar or lumbosacral intervertebral disc 2004    Displacement of lumbar intervertebral disc without myelopathy 2004        Past Medical History:   Diagnosis Date    Allergic rhinitis     Aortic regurgitation     Cervical radiculopathy at C8 10/13/2013    Chronic back pain     COPD (chronic obstructive pulmonary disease) (HCC)     Erectile dysfunction     GERD (gastroesophageal reflux disease)     Hyperlipidemia     Hypertension     Insomnia     Osteoarthritis     PUD (peptic ulcer disease)     Vitamin D deficiency      Past Surgical History:   Procedure Laterality Date    ELBOW SURGERY  2013    Dr. Schneider Fly REPLACEMENT      Left hip replacement    SPINE SURGERY  2006    fusion L4-L5       Chart Reviewed: Yes  Patient assessed for rehabilitation services?: Yes  Family / Caregiver Present: No  General Comment  Comments: Pt asleep in bed, easily awakened, agreeable to PT dung.    Restrictions:  Restrictions/Precautions: Fall Risk     SUBJECTIVE: Subjective: Reports he did stand today to use the urinal, difficulty with ambulation. Willing to try again.     Pain  Pre Treatment Pain Screening  Pain at present: 0    Post Treatment Pain Screening:   Pain Screening  Patient Currently in Pain: Denies  Pain Assessment  Pain Level: 0    Prior Level of Function:  Social/Functional History  Lives With: Spouse  Type of Home: House  Home Layout: Two level, Bed/Bath upstairs, 1/2 bath on main level  Home Access: Stairs to enter with rails  Entrance Stairs - Number of Steps: 2  Home Equipment: Cane  ADL Assistance: Independent  Homemaking Assistance: Independent  Ambulation Assistance: Independent(no AD)  Transfer Assistance: Independent  Active : Yes    OBJECTIVE:        Cognition:  Follows Commands: Within Functional Limits    Observation/Palpation  Posture: Good  Observation: 3L02  Edema: bilat hand    ROM:  RLE AROM: WFL  LLE AROM : WFL    Strength:  Strength RLE  Comment: grossly 4/5  Strength LLE  Comment: grossly 4/5    Neuro:  Balance  Sitting - Static: Good  Sitting - Dynamic: Good  Standing - Static: Good;-  Standing - Dynamic: Fair;+             Bed mobility  Supine to Sit: Supervision    Transfers  Sit to Stand: Stand by assistance  Stand to sit: Stand by assistance  Bed to Chair: Stand by assistance(cues for safety with walker)    Ambulation  Ambulation?: Yes  Ambulation 1  Surface: level tile  Device: Rolling Walker  Other Apparatus: O2  Assistance: Stand by assistance  Quality of Gait: short steps, slow iliana, careful gait  Distance: 6 ft  Comments: pt fearful to ambulate further due to SOB. 02 95% at this time, agrees to try ambulating further this afternoon              Activity Tolerance  Activity Tolerance: Patient limited by endurance          PT Education  PT Education: PT Role;Goals;Transfer Training;Energy Conservation;General Safety  Patient Education: progressive mobility    ASSESSMENT:   Body structures, Functions, Activity limitations: Decreased functional mobility ; Decreased balance;Decreased endurance;Decreased strength  Decision Making: Medium Complexity  History: medium  Exam: medium  Clinical Presentation: medium    Prognosis: Good  Patient Education: progressive mobility    DISCHARGE RECOMMENDATIONS:  Discharge Recommendations: Patient would benefit from continued therapy after discharge    Assessment: Pt most limited by endurance though only presented with minimal SOB when transferring bed to chair, about 6 ft away. Agrees to PT POC and progressing mobility as tolerated in hopes of returning home with spouse. REQUIRES PT FOLLOW UP: Yes      PLAN OF CARE:  Plan  Times per week: 3-6  Current Treatment Recommendations: Strengthening, Transfer Training, Endurance Training, Neuromuscular Re-education, Equipment Evaluation, Education, & procurement, Balance Training, Gait Training, Home Exercise Program, Functional Mobility Training, Stair training, Safety Education & Training, Patient/Caregiver Education & Training  Safety Devices  Type of devices: Call light within reach, Chair alarm in place, Nurse notified    Goals:  Patient goals : to go home  Long term goals  Long term goal 1: pt to be indep with bed mobility  Long term goal 2: pt to be indep with transfers  Long term goal 3: pt to ambulate >50 ft with LRAD mod indep  Long term goal 4: pt to tolerate >10 min standing activity without SOB    [facility] (6 CLICK) [address] Mobility Raw Score : 19     Therapy Time:   Individual   Time In 1130   Time Out 1143   Minutes 13           Danitza Gee, 03/20/20 at 12:27 PM         Definitions for assistance levels  Independent = pt does not require any physical supervision or assistance from another person for activity completion. Device may be needed.   Stand by assistance = pt requires verbal cues or instructions from another person, close to but not

## 2020-03-20 NOTE — PLAN OF CARE
See OT evaluation for all goals and OT POC.  Electronically signed by ERVIN Tracy/L on 3/20/2020 at 2:59 PM

## 2020-03-20 NOTE — PROGRESS NOTES
03/20/20  0538   WBC 15.7* 15.6*   HGB 7.4* 7.3*   HCT 23.5* 23.4*   MCV 82.5 84.1    205     Recent Labs     03/19/20  0339 03/19/20  1946 03/20/20  0538     --  139   K 2.9* 3.4 3.8     --  105   CO2 20  --  19*   BUN 6*  --  9   CREATININE 0.74  --  0.87   GLUCOSE 88  --  94   CALCIUM 9.0  --  9.1   PROT 6.2*  --  6.2*   LABALBU 3.2*  --  2.9*   BILITOT 1.1*  --  1.6*   ALKPHOS 74  --  63   AST 19  --  20   ALT 10  --  8   LABGLOM >60.0  --  >60.0   GFRAA >60.0  --  >60.0       MV Settings:     Vent Mode: CPAP  Vt Ordered: 550 mL  Rate Set: 12 bmp  FiO2 : 40 %  PEEP/CPAP: 6  Pressure Support: 0 cmH20  Peak Inspiratory Pressure: 8.3 cmH2O  Mean Airway Pressure: 11 cmH20  I:E Ratio: 1:2.70    Recent Labs     03/17/20  1448   PHART 7.166*   SLT4IHP 64*   PO2ART 327*   ZGI3MIP 23.2   BEART -5*   H4KZPQMT 100*       O2 Device: Nasal cannula  O2 Flow Rate (L/min): (87 PLACED BACK ON O2 3LPM)    DIET GENERAL;     MEDICATIONS during current hospitalization:    Continuous Infusions:    Scheduled Meds:   potassium chloride  40 mEq Oral BID    lidocaine  1 patch Transdermal Daily    pantoprazole  40 mg Oral QAM AC    folic acid  2 mg Oral Daily    amLODIPine  10 mg Oral Daily    furosemide  40 mg Intravenous BID    heparin (porcine)  5,000 Units Subcutaneous BID    ipratropium-albuterol  1 ampule Inhalation TID    [START ON 4/6/2020] cyanocobalamin  1,000 mcg Intramuscular Q30 Days    sodium chloride flush  10 mL Intravenous 2 times per day    sodium chloride (PF)  10 mL Intravenous Daily    piperacillin-tazobactam  3.375 g Intravenous Q8H    doxycycline (VIBRAMYCIN) IV  100 mg Intravenous Q12H    vancomycin (VANCOCIN) intermittent dosing (placeholder)   Other RX Placeholder    vancomycin  1,250 mg Intravenous Q12H    atorvastatin  40 mg Oral Daily    metoprolol succinate  50 mg Oral BID    sodium chloride flush  10 mL Intravenous 2 times per day       PRN Meds:morphine **OR** morphine, thoracic inlet through the ischial tuberosities. Multiplanar reformats were obtained. Comparison: CT chest from April 1, 2012 Findings: CHEST: Visualized portion of the thyroid gland is within normal limits. No axillary, mediastinal, or hilar lymphadenopathy. The endotracheal tube is present and terminates approximately 6 cm from the debby. An enteric tube is present within the esophagus and terminates in the lumen of the stomach. No aortic aneurysm or dissection. Atherosclerotic calcification of the thoracic aorta. No filling defect within the pulmonary arteries to suggest pulmonary embolism. Heart size is within normal limits. No significant pericardial effusion. Esophagus is within normal limits. Moderate bilateral pleural effusions with adjacent consolidations. Patchy groundglass and nodular opacities throughout both lungs. No pneumothorax. Acute minimally displaced fracture of right ribs 4 anteriorly. Degenerative changes of the spine. ABDOMEN/PELVIS: Well-circumscribed tiny hypodensities of the liver most likely related to hepatic cysts. The liver is otherwise unremarkable. The gallbladder, spleen, stomach, pancreas, and adrenal glands are within normal limits. The kidneys enhance uniformly. 3 mm left renal calculus versus renal vascular calcification. No right-sided urinary tract calculi. No hydronephrosis. Urinary bladder is decompressed around a Brice catheter, findings which are poorly evaluated secondary to streak artifact from left total hip arthroplasty prosthesis. Abdominal aorta is nonaneurysmal and demonstrates atherosclerotic calcification. No retroperitoneal or abdominal/pelvic lymphadenopathy. No small bowel obstruction. Colonic diverticuli are identified, most significantly involving the distal descending colon. No overt colonic mass or pericolonic inflammation. Appendix is within normal limits. No free fluid or free air. Degenerative changes of the spine.  Postsurgical changes of lumbar spine fusion with posterior decompression at L3-L5. There is sclerosis of the superior humeral head compatible with avascular necrosis without collapse. No pulmonary embolism. Moderate bilateral pleural effusions with adjacent consolidations. Groundglass and nodular densities throughout both lungs may be infectious/ inflammatory in etiology or due to pulmonary edema. Acute minimally displaced fracture of anterior right ribs 4. All CT scans at this facility use dose modulation, iterative reconstruction, and/or weight based dosing when appropriate to reduce radiation dose to as low as reasonably achievable. Xr Chest Portable    Result Date: 3/20/2020  EXAMINATION: XR CHEST PORTABLE CLINICAL HISTORY: PNEUMONIA. EFFUSION. COMPARISONS: MARCH 19, 2020 FINDINGS: Osseous structures intact. Cardiopericardial silhouette normal. Aorta calcified. Bilateral diffuse airspace disease right greater than left, again identified, and essentially unchanged given differences in technique. Blunting left costophrenic angle. BILATERAL ATELECTASIS/PNEUMONIA VERSUS EDEMA WITH SMALL LEFT EFFUSION, UNCHANGED. Xr Chest Portable    Result Date: 3/19/2020  EXAMINATION: XR CHEST PORTABLE CLINICAL HISTORY: PNEUMONIA, EFFUSION COMPARISONS: CHEST RADIOGRAPH MARCH 18, 2020, CT CHEST, MARCH 17, 2020 FINDINGS: Endotracheal tube, nasogastric tube is been removed. Osseous structures intact. Cardiopericardial silhouette normal. Pulmonary vasculature normal. Ill-defined areas increase opacity bilateral lungs, greatest in lower lung zones. Blunting costophrenic angles. BILATERAL PNEUMONIA/EDEMA WITH SMALL BILATERAL PLEURAL EFFUSIONS    Xr Chest Portable    Result Date: 3/18/2020  XR CHEST PORTABLE : 3/18/2020 CLINICAL HISTORY:  PNA Effusion . Respiratory failure and intubated. COMPARISON: Portable chest and chest CTA 3/17/2020. Two portable upright AP radiographs of the chest were obtained.  FINDINGS: Endotracheal and orogastric tubes remain in good position. Small to moderate-sized pleural effusions and moderate infiltrate/consolidation predominantly of the mid to lower lung fields is mildly evolving, greater on the left. There is no pneumothorax or other significant changes identified. The heart remains mildly enlarged, exaggerated by technique. MILDLY EVOLVING PREDOMINANTLY MID TO LOWER LUNG FIELD PULMONARY INFILTRATES. OTHERWISE, STABLE CHEST FROM YESTERDAY. Xr Chest Portable    Result Date: 3/17/2020  Portable chest radiograph History: Intubation. Dyspnea. Technique: AP portable view of the chest obtained. Comparison: Portable chest radiograph from earlier on the same date. Findings: Interval placement of endotracheal tube with its tip terminating at the level of the clavicles. Enteric tube is present and traverses the diaphragm however its distal tip is not visualized as it is outside of the field of view. Atherosclerotic calcification of the thoracic aorta. Heart size is mildly enlarged. Interval worsening of bilateral pulmonary opacities most significant within the lung bases. No pneumothorax. Small bilateral pleural effusions. Bones of the thorax appear intact. Interval placement of endotracheal tube with its tip terminating at the level of the clavicles. Interval development of bilateral lung infiltrates. Xr Chest Portable    Result Date: 3/17/2020  Patient MRN: [id number] : 1955 Age:  71 years Gender: Male Order Date: 3/17/2020 5:15 AM. Exam: XR CHEST PORTABLE Number of Views: 1 Indication:  Shortness of breath, evaluate for infiltrate, dyspnea Comparison: 3/16/2020 Findings: Stable, prominent cardiomediastinal silhouette with underlying mild pulmonary edema and bibasilar airspace disease. Vascular calcifications thoracic aorta. No pneumothorax. Small right pleural effusion. Impression:  1. Stable, prominent cardiomediastinal silhouette with underlying mild pulmonary edema. 2. Small right pleural effusion.  3. Nonspecific

## 2020-03-21 ENCOUNTER — APPOINTMENT (OUTPATIENT)
Dept: GENERAL RADIOLOGY | Age: 65
DRG: 377 | End: 2020-03-21
Payer: MEDICARE

## 2020-03-21 LAB
ANION GAP SERPL CALCULATED.3IONS-SCNC: 14 MEQ/L (ref 9–15)
ANISOCYTOSIS: ABNORMAL
BASOPHILS ABSOLUTE: 0.1 K/UL (ref 0–0.2)
BASOPHILS RELATIVE PERCENT: 0.4 %
BUN BLDV-MCNC: 10 MG/DL (ref 8–23)
CALCIUM SERPL-MCNC: 9.5 MG/DL (ref 8.5–9.9)
CHLORIDE BLD-SCNC: 100 MEQ/L (ref 95–107)
CO2: 23 MEQ/L (ref 20–31)
CREAT SERPL-MCNC: 0.99 MG/DL (ref 0.7–1.2)
EOSINOPHILS ABSOLUTE: 0.1 K/UL (ref 0–0.7)
EOSINOPHILS RELATIVE PERCENT: 0.5 %
GFR AFRICAN AMERICAN: >60
GFR NON-AFRICAN AMERICAN: >60
GLUCOSE BLD-MCNC: 109 MG/DL (ref 70–99)
HCT VFR BLD CALC: 22.5 % (ref 42–52)
HEMOGLOBIN: 7.2 G/DL (ref 14–18)
LYMPHOCYTES ABSOLUTE: 3.8 K/UL (ref 1–4.8)
LYMPHOCYTES RELATIVE PERCENT: 25.7 %
MCH RBC QN AUTO: 26.5 PG (ref 27–31.3)
MCHC RBC AUTO-ENTMCNC: 31.9 % (ref 33–37)
MCV RBC AUTO: 83.2 FL (ref 80–100)
MONOCYTES ABSOLUTE: 1.6 K/UL (ref 0.2–0.8)
MONOCYTES RELATIVE PERCENT: 10.7 %
NEUTROPHILS ABSOLUTE: 9.3 K/UL (ref 1.4–6.5)
NEUTROPHILS RELATIVE PERCENT: 62.7 %
PDW BLD-RTO: 24.8 % (ref 11.5–14.5)
PLATELET # BLD: 207 K/UL (ref 130–400)
PLATELET SLIDE REVIEW: ADEQUATE
POTASSIUM SERPL-SCNC: 3.6 MEQ/L (ref 3.4–4.9)
RBC # BLD: 2.71 M/UL (ref 4.7–6.1)
SLIDE REVIEW: ABNORMAL
SODIUM BLD-SCNC: 137 MEQ/L (ref 135–144)
WBC # BLD: 14.8 K/UL (ref 4.8–10.8)

## 2020-03-21 PROCEDURE — 6370000000 HC RX 637 (ALT 250 FOR IP): Performed by: INTERNAL MEDICINE

## 2020-03-21 PROCEDURE — 99232 SBSQ HOSP IP/OBS MODERATE 35: CPT | Performed by: INTERNAL MEDICINE

## 2020-03-21 PROCEDURE — 94761 N-INVAS EAR/PLS OXIMETRY MLT: CPT

## 2020-03-21 PROCEDURE — 80048 BASIC METABOLIC PNL TOTAL CA: CPT

## 2020-03-21 PROCEDURE — 94760 N-INVAS EAR/PLS OXIMETRY 1: CPT

## 2020-03-21 PROCEDURE — 94664 DEMO&/EVAL PT USE INHALER: CPT

## 2020-03-21 PROCEDURE — 2580000003 HC RX 258: Performed by: SPECIALIST

## 2020-03-21 PROCEDURE — 6360000002 HC RX W HCPCS: Performed by: INTERNAL MEDICINE

## 2020-03-21 PROCEDURE — 71045 X-RAY EXAM CHEST 1 VIEW: CPT

## 2020-03-21 PROCEDURE — 85025 COMPLETE CBC W/AUTO DIFF WBC: CPT

## 2020-03-21 PROCEDURE — 36415 COLL VENOUS BLD VENIPUNCTURE: CPT

## 2020-03-21 PROCEDURE — 94640 AIRWAY INHALATION TREATMENT: CPT

## 2020-03-21 PROCEDURE — 2700000000 HC OXYGEN THERAPY PER DAY

## 2020-03-21 PROCEDURE — 1210000000 HC MED SURG R&B

## 2020-03-21 RX ORDER — POTASSIUM CHLORIDE 750 MG/1
40 TABLET, FILM COATED, EXTENDED RELEASE ORAL DAILY
Status: DISCONTINUED | OUTPATIENT
Start: 2020-03-21 | End: 2020-03-24 | Stop reason: HOSPADM

## 2020-03-21 RX ORDER — METOPROLOL SUCCINATE 100 MG/1
100 TABLET, EXTENDED RELEASE ORAL DAILY
Status: DISCONTINUED | OUTPATIENT
Start: 2020-03-21 | End: 2020-03-24 | Stop reason: HOSPADM

## 2020-03-21 RX ORDER — FUROSEMIDE 10 MG/ML
40 INJECTION INTRAMUSCULAR; INTRAVENOUS DAILY
Status: DISCONTINUED | OUTPATIENT
Start: 2020-03-21 | End: 2020-03-24 | Stop reason: HOSPADM

## 2020-03-21 RX ADMIN — Medication 10 ML: at 21:27

## 2020-03-21 RX ADMIN — HEPARIN SODIUM 5000 UNITS: 5000 INJECTION INTRAVENOUS; SUBCUTANEOUS at 09:11

## 2020-03-21 RX ADMIN — FUROSEMIDE 40 MG: 10 INJECTION, SOLUTION INTRAMUSCULAR; INTRAVENOUS at 09:11

## 2020-03-21 RX ADMIN — TRAMADOL HYDROCHLORIDE 50 MG: 50 TABLET, FILM COATED ORAL at 09:10

## 2020-03-21 RX ADMIN — IPRATROPIUM BROMIDE AND ALBUTEROL SULFATE 1 AMPULE: 2.5; .5 SOLUTION RESPIRATORY (INHALATION) at 13:19

## 2020-03-21 RX ADMIN — TRAMADOL HYDROCHLORIDE 50 MG: 50 TABLET, FILM COATED ORAL at 21:40

## 2020-03-21 RX ADMIN — FOLIC ACID 2 MG: 1 TABLET ORAL at 09:10

## 2020-03-21 RX ADMIN — PANTOPRAZOLE SODIUM 40 MG: 40 TABLET, DELAYED RELEASE ORAL at 05:15

## 2020-03-21 RX ADMIN — METOPROLOL SUCCINATE 100 MG: 100 TABLET, EXTENDED RELEASE ORAL at 09:10

## 2020-03-21 RX ADMIN — IPRATROPIUM BROMIDE AND ALBUTEROL SULFATE 1 AMPULE: 2.5; .5 SOLUTION RESPIRATORY (INHALATION) at 07:34

## 2020-03-21 RX ADMIN — AMLODIPINE BESYLATE 10 MG: 10 TABLET ORAL at 09:10

## 2020-03-21 RX ADMIN — POTASSIUM CHLORIDE 40 MEQ: 750 TABLET, FILM COATED, EXTENDED RELEASE ORAL at 09:10

## 2020-03-21 RX ADMIN — AMOXICILLIN AND CLAVULANATE POTASSIUM 1 TABLET: 875; 125 TABLET, FILM COATED ORAL at 21:27

## 2020-03-21 RX ADMIN — IPRATROPIUM BROMIDE AND ALBUTEROL SULFATE 1 AMPULE: 2.5; .5 SOLUTION RESPIRATORY (INHALATION) at 19:36

## 2020-03-21 RX ADMIN — ATORVASTATIN CALCIUM 40 MG: 40 TABLET, FILM COATED ORAL at 09:10

## 2020-03-21 RX ADMIN — Medication 10 ML: at 09:10

## 2020-03-21 RX ADMIN — AMOXICILLIN AND CLAVULANATE POTASSIUM 1 TABLET: 875; 125 TABLET, FILM COATED ORAL at 09:10

## 2020-03-21 RX ADMIN — HEPARIN SODIUM 5000 UNITS: 5000 INJECTION INTRAVENOUS; SUBCUTANEOUS at 21:32

## 2020-03-21 ASSESSMENT — PAIN SCALES - GENERAL
PAINLEVEL_OUTOF10: 5
PAINLEVEL_OUTOF10: 2
PAINLEVEL_OUTOF10: 5
PAINLEVEL_OUTOF10: 5

## 2020-03-21 NOTE — PROGRESS NOTES
2016: Patient is alert and oriented x4. Clear lung sounds, active bowel sounds, and regular heart sounds. The patient does complain of discomfort to his chest from compressions. Patient is being medicated with ultram and Lidoderm patch. Patient is NSR on teley. No abdominal pain or tenderness. Patient reports dyspnea with exertion but comfort when at rest. +2 edema noted to BUE, pulses palpable. Patient ambulates with walker, slightly unsteady gait due to weakness. VSS. Patient has no requests at this time, will monitor.

## 2020-03-21 NOTE — FLOWSHEET NOTE
Kerry Fernando reached per phone and notified of patients right hand and arm swelling. Informed by physician that swelling is less than has been. No new orders.

## 2020-03-21 NOTE — PROGRESS NOTES
6*  --  9 10   CREATININE 0.74  --  0.87 0.99   GLUCOSE 88  --  94 109*   CALCIUM 9.0  --  9.1 9.5   PROT 6.2*  --  6.2*  --    LABALBU 3.2*  --  2.9*  --    BILITOT 1.1*  --  1.6*  --    ALKPHOS 74  --  63  --    AST 19  --  20  --    ALT 10  --  8  --    LABGLOM >60.0  --  >60.0 >60.0   GFRAA >60.0  --  >60.0 >60.0    < > = values in this interval not displayed. No results for input(s): PHART, HSU9UUO, PO2ART, AEJ0URS, BEART, [id number] in the last 72 hours. O2 Device: Nasal cannula  O2 Flow Rate (L/min): 2 L/min    DIET GENERAL;     MEDICATIONS during current hospitalization:    Continuous Infusions:    Scheduled Meds:   metoprolol succinate  100 mg Oral Daily    furosemide  40 mg Intravenous Daily    potassium chloride  40 mEq Oral Daily    amoxicillin-clavulanate  1 tablet Oral 2 times per day    lidocaine  1 patch Transdermal Daily    pantoprazole  40 mg Oral QAM AC    folic acid  2 mg Oral Daily    amLODIPine  10 mg Oral Daily    heparin (porcine)  5,000 Units Subcutaneous BID    ipratropium-albuterol  1 ampule Inhalation TID    [START ON 4/6/2020] cyanocobalamin  1,000 mcg Intramuscular Q30 Days    sodium chloride flush  10 mL Intravenous 2 times per day    sodium chloride (PF)  10 mL Intravenous Daily    atorvastatin  40 mg Oral Daily    sodium chloride flush  10 mL Intravenous 2 times per day       PRN Meds:traMADol, morphine **OR** morphine, cloNIDine, enalaprilat, albuterol, hydrALAZINE **OR** [DISCONTINUED] hydrALAZINE, sodium chloride flush, sodium chloride flush, acetaminophen **OR** acetaminophen, polyethylene glycol, promethazine **OR** ondansetron    Radiology  Xr Chest Standard (2 Vw)    Result Date: 3/16/2020  XR CHEST (2 VW) Exam Date/Time:  3/16/2020 8:15 AM Clinical History:      Shortness of breath. Comparison:  1/23/2018  RESULT: Lungs and pleura:  Shallow inspiration with bibasilar atelectasis/opacities. No pleural effusion. No pneumothorax.  Mildly coarsened interstitial markings, accentuated by shallow inspiration. Cardiomediastinal silhouette:  Stable cardiomediastinal silhouette. Aortic atherosclerotic calcification. Other:  No acute osseous findings. Shallow inspiration with bibasilar atelectasis/opacities. Cta Chest W Wo Contrast    Result Date: 3/17/2020  EXAM: CT of the chest abdomen and pelvis with contrast History: Cardiopulmonary arrest. Pulmonary embolism. Technique: CT of the chest abdomen and pelvis was performed with contrast from the thoracic inlet through the ischial tuberosities. Multiplanar reformats were obtained. Comparison: CT chest from April 1, 2012 Findings: CHEST: Visualized portion of the thyroid gland is within normal limits. No axillary, mediastinal, or hilar lymphadenopathy. The endotracheal tube is present and terminates approximately 6 cm from the debby. An enteric tube is present within the esophagus and terminates in the lumen of the stomach. No aortic aneurysm or dissection. Atherosclerotic calcification of the thoracic aorta. No filling defect within the pulmonary arteries to suggest pulmonary embolism. Heart size is within normal limits. No significant pericardial effusion. Esophagus is within normal limits. Moderate bilateral pleural effusions with adjacent consolidations. Patchy groundglass and nodular opacities throughout both lungs. No pneumothorax. Acute minimally displaced fracture of right ribs 4 anteriorly. Degenerative changes of the spine. ABDOMEN/PELVIS: Well-circumscribed tiny hypodensities of the liver most likely related to hepatic cysts. The liver is otherwise unremarkable. The gallbladder, spleen, stomach, pancreas, and adrenal glands are within normal limits. The kidneys enhance uniformly. 3 mm left renal calculus versus renal vascular calcification. No right-sided urinary tract calculi. No hydronephrosis.  Urinary bladder is decompressed around a Brice catheter, findings which are poorly evaluated secondary to streak artifact from left total hip arthroplasty prosthesis. Abdominal aorta is nonaneurysmal and demonstrates atherosclerotic calcification. No retroperitoneal or abdominal/pelvic lymphadenopathy. No small bowel obstruction. Colonic diverticuli are identified, most significantly involving the distal descending colon. No overt colonic mass or pericolonic inflammation. Appendix is within normal limits. No free fluid or free air. Degenerative changes of the spine. Postsurgical changes of lumbar spine fusion with posterior decompression at L3-L5. There is sclerosis of the superior humeral head compatible with avascular necrosis without collapse. No pulmonary embolism. Moderate bilateral pleural effusions with adjacent consolidations. Groundglass and nodular densities throughout both lungs may be infectious/ inflammatory in etiology or due to pulmonary edema. Acute minimally displaced fracture of anterior right ribs 4. All CT scans at this facility use dose modulation, iterative reconstruction, and/or weight based dosing when appropriate to reduce radiation dose to as low as reasonably achievable. Ct Abdomen Pelvis W Iv Contrast Additional Contrast? None    Result Date: 3/17/2020  EXAM: CT of the chest abdomen and pelvis with contrast History: Cardiopulmonary arrest. Pulmonary embolism. Technique: CT of the chest abdomen and pelvis was performed with contrast from the thoracic inlet through the ischial tuberosities. Multiplanar reformats were obtained. Comparison: CT chest from April 1, 2012 Findings: CHEST: Visualized portion of the thyroid gland is within normal limits. No axillary, mediastinal, or hilar lymphadenopathy. The endotracheal tube is present and terminates approximately 6 cm from the debby. An enteric tube is present within the esophagus and terminates in the lumen of the stomach. No aortic aneurysm or dissection. Atherosclerotic calcification of the thoracic aorta.  No filling defect within the

## 2020-03-21 NOTE — PLAN OF CARE
health will improve  Description: Ability to adjust to condition or change in health will improve  3/21/2020 0216 by Jeffrey Arnett RN  Outcome: Ongoing  3/20/2020 1457 by Lorena Motneiro RN  Outcome: Ongoing  Goal: Communication of feelings regarding changes in body function or appearance will improve  Description: Communication of feelings regarding changes in body function or appearance will improve  3/21/2020 0216 by Jeffrey Arnett RN  Outcome: Ongoing  3/20/2020 1457 by Alyson Maldonado RN  Outcome: Ongoing     Problem: Health Behavior:  Goal: Identification of resources available to assist in meeting health care needs will improve  Description: Identification of resources available to assist in meeting health care needs will improve  3/21/2020 0216 by Jeffrey Arnett RN  Outcome: Ongoing  3/20/2020 1457 by Alyson Maldonado RN  Outcome: Ongoing     Problem: Nutritional:  Goal: Maintenance of adequate nutrition will improve  Description: Maintenance of adequate nutrition will improve  3/21/2020 0216 by Jeffrey Arnett RN  Outcome: Ongoing  3/20/2020 1457 by Alyson Maldonado RN  Outcome: Ongoing     Problem: Physical Regulation:  Goal: Signs and symptoms of infection will decrease  Description: Signs and symptoms of infection will decrease  3/21/2020 0216 by Jeffrey Arnett RN  Outcome: Ongoing  3/20/2020 1457 by Alyson Maldonado RN  Outcome: Ongoing  Goal: Will show no signs and symptoms of excessive bleeding  Description: Will show no signs and symptoms of excessive bleeding  3/21/2020 0216 by Jeffrey Arnett RN  Outcome: Ongoing  3/20/2020 1457 by Lorena Monteiro RN  Outcome: Ongoing  Goal: Complications related to the disease process, condition or treatment will be avoided or minimized  Description: Complications related to the disease process, condition or treatment will be avoided or minimized  3/21/2020 0216 by Robbi Tanner Flash Akins RN  Outcome: Ongoing  3/20/2020 1457 by Filippo Monteiro RN  Outcome: Ongoing     Problem: Safety:  Goal: Ability to remain free from injury will improve  Description: Ability to remain free from injury will improve  3/21/2020 0216 by Ayan Cabral RN  Outcome: Ongoing  3/20/2020 1457 by Mariposa Jennings RN  Outcome: Ongoing     Problem: Sensory:  Goal: Pain level will decrease  Description: Pain level will decrease  3/21/2020 0216 by Ayan Cabral RN  Outcome: Ongoing  3/20/2020 1457 by Mariposa Jennings RN  Outcome: Ongoing  Goal: General experience of comfort will improve  Description: General experience of comfort will improve  3/21/2020 0216 by Ayan Cabral RN  Outcome: Ongoing  3/20/2020 1457 by Mariposa Jennings RN  Outcome: Ongoing     Problem: Skin Integrity:  Goal: Skin integrity will improve  Description: Skin integrity will improve  3/21/2020 0216 by Ayan Cabral RN  Outcome: Ongoing  3/20/2020 1457 by Mariposa Jennings RN  Outcome: Ongoing  Goal: Signs of wound healing will improve  Description: Signs of wound healing will improve  3/21/2020 0216 by Ayan Cabral RN  Outcome: Ongoing  3/20/2020 1457 by Filippo Monteiro RN  Outcome: Ongoing     Problem: Tissue Perfusion:  Goal: Ability to maintain adequate tissue perfusion will improve  Description: Ability to maintain adequate tissue perfusion will improve  3/21/2020 0216 by Ayan Cabral RN  Outcome: Ongoing  3/20/2020 1457 by Mariposa Jennings RN  Outcome: Ongoing  Goal: Ability to maintain a stable neurologic state will improve  Description: Ability to maintain a stable neurologic state will improve  3/21/2020 0216 by Ayan Cabral RN  Outcome: Ongoing  3/20/2020 1457 by Mariposa Jennings RN  Outcome: Ongoing     Problem: Falls - Risk of:  Goal: Will remain free from falls  Description: Will remain free from falls  3/21/2020 0216 by Vimal Allen RN  Outcome: Ongoing  3/20/2020 1457 by Damien Monteiro RN  Outcome: Ongoing  Goal: Absence of physical injury  Description: Absence of physical injury  3/21/2020 0216 by Vimal Allen RN  Outcome: Ongoing  3/20/2020 1457 by Peyton Barlow RN  Outcome: Ongoing     Problem: Nutrition  Goal: Optimal nutrition therapy  3/21/2020 0216 by Vimal Allen RN  Outcome: Ongoing  3/20/2020 1457 by Peyton Barlow RN  Outcome: Ongoing     Problem: Mobility - Impaired:  Goal: Mobility will improve  Description: Mobility will improve  3/21/2020 0216 by Vimal Allen RN  Outcome: Ongoing  3/20/2020 1457 by Peyton Barlow RN  Outcome: Ongoing  Goal: Mobility will improve  Description: Mobility will improve  3/21/2020 0216 by Vimal Allen RN  Outcome: Ongoing  3/20/2020 1457 by Peyton Barlow RN  Outcome: Ongoing     Problem: Pain:  Goal: Pain level will decrease  Description: Pain level will decrease  3/21/2020 0216 by Vimal Allen RN  Outcome: Ongoing  3/20/2020 1457 by Peyton Barlow RN  Outcome: Ongoing  Goal: Control of acute pain  Description: Control of acute pain  3/21/2020 0216 by Vimal Allen RN  Outcome: Ongoing  3/20/2020 1457 by Peyton Barlow RN  Outcome: Ongoing  Goal: Control of chronic pain  Description: Control of chronic pain  3/21/2020 0216 by Vimal Allen RN  Outcome: Ongoing  3/20/2020 1457 by Peyton Barlow RN  Outcome: Ongoing     Problem: IP DRESSINGS LOWER EXTREMITIES  Goal: LTG - patient will dress lower body with or without assistive device  3/21/2020 0216 by Vimal Allen RN  Outcome: Ongoing  3/20/2020 1459 by Kayla Arnett OTR/L  Outcome: Ongoing

## 2020-03-21 NOTE — FLOWSHEET NOTE
1400- dressing to right antecubital skin tear fell off, replaced with xeroform, 2x2 and julian. Right arm elevated on 2 pillows.

## 2020-03-21 NOTE — PROGRESS NOTES
Darius Sorto is still pending. Ricardo Craven could be related to the following:     *Iron deficiency anemia, rule out the possibility of intermittent or chronic GI blood loss.     *B12 deficiency.     *Folate deficiency.     *Hypokalemia, repletion.    Magnesium and phosphorus are normal.    *Functional impairment in the debility. Nonfocal.  The patient may require short-term skilled oral rehabilitation.     Plan:  Continue antibiotic and gentle diuresis if his blood pressure is stable. Continue to monitor his hemoglobin.  Patient had received 2 units of RBC transfusion and his hemoglobin is hovering between 7.5 and 8. No evidence of active bleed.  CT scan did not show any retroperitoneal bleed.     Given his recent decompensation after sedation with the EGD. Given the lack of any clinical evidence of active GI blood loss would like to be on the safe side and postpone his colonoscopy for a week or 2 to be done in the outpatient setting. Functional impairment. Patient may require short-term skilled or rehab.   We will discuss with case management.     If the patient continues to improve potentially could be discharged on Sunday or Monday

## 2020-03-21 NOTE — FLOWSHEET NOTE
1000- Patient was doing A.M. care and removed dressing and tape from right A.C. from morning lab draw. Upon removal of tape 2 small skin tears noted  One is 2x1 cm  Another is 1 x 1 cm. Xeroform gauze and mepelex applied.

## 2020-03-22 ENCOUNTER — APPOINTMENT (OUTPATIENT)
Dept: GENERAL RADIOLOGY | Age: 65
DRG: 377 | End: 2020-03-22
Payer: MEDICARE

## 2020-03-22 LAB
ANION GAP SERPL CALCULATED.3IONS-SCNC: 14 MEQ/L (ref 9–15)
BASOPHILS ABSOLUTE: 0 K/UL (ref 0–0.2)
BASOPHILS RELATIVE PERCENT: 0.3 %
BUN BLDV-MCNC: 7 MG/DL (ref 8–23)
CALCIUM SERPL-MCNC: 9.4 MG/DL (ref 8.5–9.9)
CHLORIDE BLD-SCNC: 98 MEQ/L (ref 95–107)
CO2: 23 MEQ/L (ref 20–31)
CREAT SERPL-MCNC: 0.87 MG/DL (ref 0.7–1.2)
EOSINOPHILS ABSOLUTE: 0 K/UL (ref 0–0.7)
EOSINOPHILS RELATIVE PERCENT: 0.3 %
GFR AFRICAN AMERICAN: >60
GFR NON-AFRICAN AMERICAN: >60
GLUCOSE BLD-MCNC: 102 MG/DL (ref 70–99)
HCT VFR BLD CALC: 22.3 % (ref 42–52)
HEMOGLOBIN: 7.1 G/DL (ref 14–18)
LYMPHOCYTES ABSOLUTE: 4.5 K/UL (ref 1–4.8)
LYMPHOCYTES RELATIVE PERCENT: 33.8 %
MCH RBC QN AUTO: 26.5 PG (ref 27–31.3)
MCHC RBC AUTO-ENTMCNC: 31.8 % (ref 33–37)
MCV RBC AUTO: 83.4 FL (ref 80–100)
MONOCYTES ABSOLUTE: 1.2 K/UL (ref 0.2–0.8)
MONOCYTES RELATIVE PERCENT: 9.2 %
NEUTROPHILS ABSOLUTE: 7.6 K/UL (ref 1.4–6.5)
NEUTROPHILS RELATIVE PERCENT: 56.4 %
PDW BLD-RTO: 26.3 % (ref 11.5–14.5)
PLATELET # BLD: 241 K/UL (ref 130–400)
POTASSIUM SERPL-SCNC: 3.7 MEQ/L (ref 3.4–4.9)
RBC # BLD: 2.67 M/UL (ref 4.7–6.1)
SODIUM BLD-SCNC: 135 MEQ/L (ref 135–144)
WBC # BLD: 13.4 K/UL (ref 4.8–10.8)

## 2020-03-22 PROCEDURE — 99232 SBSQ HOSP IP/OBS MODERATE 35: CPT | Performed by: INTERNAL MEDICINE

## 2020-03-22 PROCEDURE — 94761 N-INVAS EAR/PLS OXIMETRY MLT: CPT

## 2020-03-22 PROCEDURE — 6370000000 HC RX 637 (ALT 250 FOR IP): Performed by: INTERNAL MEDICINE

## 2020-03-22 PROCEDURE — 6360000002 HC RX W HCPCS: Performed by: INTERNAL MEDICINE

## 2020-03-22 PROCEDURE — 36415 COLL VENOUS BLD VENIPUNCTURE: CPT

## 2020-03-22 PROCEDURE — 2700000000 HC OXYGEN THERAPY PER DAY

## 2020-03-22 PROCEDURE — 85025 COMPLETE CBC W/AUTO DIFF WBC: CPT

## 2020-03-22 PROCEDURE — 71045 X-RAY EXAM CHEST 1 VIEW: CPT

## 2020-03-22 PROCEDURE — 2580000003 HC RX 258: Performed by: SPECIALIST

## 2020-03-22 PROCEDURE — 80048 BASIC METABOLIC PNL TOTAL CA: CPT

## 2020-03-22 PROCEDURE — 1210000000 HC MED SURG R&B

## 2020-03-22 PROCEDURE — 2580000003 HC RX 258: Performed by: INTERNAL MEDICINE

## 2020-03-22 PROCEDURE — 94640 AIRWAY INHALATION TREATMENT: CPT

## 2020-03-22 RX ORDER — FERROUS SULFATE 325(65) MG
325 TABLET ORAL 2 TIMES DAILY WITH MEALS
Status: DISCONTINUED | OUTPATIENT
Start: 2020-03-22 | End: 2020-03-24 | Stop reason: HOSPADM

## 2020-03-22 RX ADMIN — IPRATROPIUM BROMIDE AND ALBUTEROL SULFATE 1 AMPULE: 2.5; .5 SOLUTION RESPIRATORY (INHALATION) at 19:46

## 2020-03-22 RX ADMIN — Medication 10 ML: at 21:30

## 2020-03-22 RX ADMIN — AMOXICILLIN AND CLAVULANATE POTASSIUM 1 TABLET: 875; 125 TABLET, FILM COATED ORAL at 09:07

## 2020-03-22 RX ADMIN — Medication 10 ML: at 09:12

## 2020-03-22 RX ADMIN — Medication 10 ML: at 20:57

## 2020-03-22 RX ADMIN — FERROUS SULFATE TAB 325 MG (65 MG ELEMENTAL FE) 325 MG: 325 (65 FE) TAB at 17:42

## 2020-03-22 RX ADMIN — HEPARIN SODIUM 5000 UNITS: 5000 INJECTION INTRAVENOUS; SUBCUTANEOUS at 13:06

## 2020-03-22 RX ADMIN — IPRATROPIUM BROMIDE AND ALBUTEROL SULFATE 1 AMPULE: 2.5; .5 SOLUTION RESPIRATORY (INHALATION) at 12:59

## 2020-03-22 RX ADMIN — ATORVASTATIN CALCIUM 40 MG: 40 TABLET, FILM COATED ORAL at 09:08

## 2020-03-22 RX ADMIN — MORPHINE SULFATE 2 MG: 2 INJECTION, SOLUTION INTRAMUSCULAR; INTRAVENOUS at 21:04

## 2020-03-22 RX ADMIN — FOLIC ACID 2 MG: 1 TABLET ORAL at 09:07

## 2020-03-22 RX ADMIN — AMOXICILLIN AND CLAVULANATE POTASSIUM 1 TABLET: 875; 125 TABLET, FILM COATED ORAL at 20:56

## 2020-03-22 RX ADMIN — PANTOPRAZOLE SODIUM 40 MG: 40 TABLET, DELAYED RELEASE ORAL at 06:40

## 2020-03-22 RX ADMIN — AMLODIPINE BESYLATE 10 MG: 10 TABLET ORAL at 09:07

## 2020-03-22 RX ADMIN — HEPARIN SODIUM 5000 UNITS: 5000 INJECTION INTRAVENOUS; SUBCUTANEOUS at 20:58

## 2020-03-22 RX ADMIN — FUROSEMIDE 40 MG: 10 INJECTION, SOLUTION INTRAMUSCULAR; INTRAVENOUS at 09:09

## 2020-03-22 RX ADMIN — IPRATROPIUM BROMIDE AND ALBUTEROL SULFATE 1 AMPULE: 2.5; .5 SOLUTION RESPIRATORY (INHALATION) at 07:26

## 2020-03-22 RX ADMIN — POTASSIUM CHLORIDE 40 MEQ: 750 TABLET, FILM COATED, EXTENDED RELEASE ORAL at 09:05

## 2020-03-22 RX ADMIN — FERROUS SULFATE TAB 325 MG (65 MG ELEMENTAL FE) 325 MG: 325 (65 FE) TAB at 13:03

## 2020-03-22 RX ADMIN — METOPROLOL SUCCINATE 100 MG: 100 TABLET, EXTENDED RELEASE ORAL at 09:06

## 2020-03-22 RX ADMIN — TRAMADOL HYDROCHLORIDE 50 MG: 50 TABLET, FILM COATED ORAL at 17:42

## 2020-03-22 ASSESSMENT — PAIN SCALES - GENERAL
PAINLEVEL_OUTOF10: 8
PAINLEVEL_OUTOF10: 4
PAINLEVEL_OUTOF10: 6
PAINLEVEL_OUTOF10: 4
PAINLEVEL_OUTOF10: 2
PAINLEVEL_OUTOF10: 3

## 2020-03-22 ASSESSMENT — PAIN DESCRIPTION - ORIENTATION
ORIENTATION: RIGHT
ORIENTATION: RIGHT

## 2020-03-22 ASSESSMENT — PAIN DESCRIPTION - FREQUENCY: FREQUENCY: INTERMITTENT

## 2020-03-22 ASSESSMENT — PAIN DESCRIPTION - LOCATION
LOCATION: CHEST;RIB CAGE
LOCATION: CHEST;RIB CAGE

## 2020-03-22 NOTE — PROGRESS NOTES
INPATIENT PROGRESS NOTES    PATIENT NAME: Jose Ngo  MRN: [id number]  SERVICE DATE:  March 22, 2020   SERVICE TIME:  12:46 PM      PRIMARY SERVICE: Pulmonary Disease    CHIEF COMPLAINTS: Shortness of breath    INTERVAL HPI: Patient seen and examined at bedside, Interval Notes, orders reviewed. Nursing notes noted  Patient feels better today. He appeared in no respiratory distress. OBJECTIVE    Body mass index is 27.7 kg/m². PHYSICAL EXAM:  Vitals:  BP (!) 165/65   Pulse 110   Temp 99 °F (37.2 °C) (Oral)   Resp 20   Ht 5' 9\" (1.753 m)   Wt 187 lb 9.6 oz (85.1 kg)   SpO2 91%   BMI 27.70 kg/m²   General: Patient is alert, awake . comfortable in bed, No distress. Head: Atraumatic , Normocephalic   Eyes: PERRL. No icteric sclera. No conjunctival injection. No discharge   ENT: No nasal  discharge. Pharynx clear. Neck:  Trachea midline. No thyromegaly, no JVD, No cervical adenopathy. Chest : Adequate effort, symmetric bilateral excursions  Lung : Diminished breath sounds bilaterally, scattered rhonchi  Heart[de-identified] Regular rhythm and rate. No mumur ,  Rub or gallop  ABD: Benign. Non-tender. Non-distended. No masses or organmegaly. Normal bowel sounds. EXT: Trace pitting edema both lower extremities , No Cyanosis No clubbing  Neuro: no focal weakness  Skin: Warm and dry. No erythema or rash on exposed extremities.       DATA:   Recent Labs     03/21/20  0542 03/22/20  0527   WBC 14.8* 13.4*   HGB 7.2* 7.1*   HCT 22.5* 22.3*   MCV 83.2 83.4    241     Recent Labs     03/20/20  0538 03/21/20  0542 03/22/20  0527    137 135   K 3.8 3.6 3.7    100 98   CO2 19* 23 23   BUN 9 10 7*   CREATININE 0.87 0.99 0.87   GLUCOSE 94 109* 102*   CALCIUM 9.1 9.5 9.4   PROT 6.2*  --   --    LABALBU 2.9*  --   --    BILITOT 1.6*  --   --    ALKPHOS 63  --   --    AST 20  --   --    ALT 8  --   --    LABGLOM >60.0 >60.0 >60.0   GFRAA >60.0 >60.0 >60.0       No results for input(s): PHART, BIJ3FRJ, PO2ART, Cardiopulmonary arrest. Pulmonary embolism. Technique: CT of the chest abdomen and pelvis was performed with contrast from the thoracic inlet through the ischial tuberosities. Multiplanar reformats were obtained. Comparison: CT chest from April 1, 2012 Findings: CHEST: Visualized portion of the thyroid gland is within normal limits. No axillary, mediastinal, or hilar lymphadenopathy. The endotracheal tube is present and terminates approximately 6 cm from the debby. An enteric tube is present within the esophagus and terminates in the lumen of the stomach. No aortic aneurysm or dissection. Atherosclerotic calcification of the thoracic aorta. No filling defect within the pulmonary arteries to suggest pulmonary embolism. Heart size is within normal limits. No significant pericardial effusion. Esophagus is within normal limits. Moderate bilateral pleural effusions with adjacent consolidations. Patchy groundglass and nodular opacities throughout both lungs. No pneumothorax. Acute minimally displaced fracture of right ribs 4 anteriorly. Degenerative changes of the spine. ABDOMEN/PELVIS: Well-circumscribed tiny hypodensities of the liver most likely related to hepatic cysts. The liver is otherwise unremarkable. The gallbladder, spleen, stomach, pancreas, and adrenal glands are within normal limits. The kidneys enhance uniformly. 3 mm left renal calculus versus renal vascular calcification. No right-sided urinary tract calculi. No hydronephrosis. Urinary bladder is decompressed around a Brice catheter, findings which are poorly evaluated secondary to streak artifact from left total hip arthroplasty prosthesis. Abdominal aorta is nonaneurysmal and demonstrates atherosclerotic calcification. No retroperitoneal or abdominal/pelvic lymphadenopathy. No small bowel obstruction. Colonic diverticuli are identified, most significantly involving the distal descending colon. No overt colonic mass or pericolonic inflammation. Appendix is within normal limits. No free fluid or free air. Degenerative changes of the spine. Postsurgical changes of lumbar spine fusion with posterior decompression at L3-L5. There is sclerosis of the superior humeral head compatible with avascular necrosis without collapse. No pulmonary embolism. Moderate bilateral pleural effusions with adjacent consolidations. Groundglass and nodular densities throughout both lungs may be infectious/ inflammatory in etiology or due to pulmonary edema. Acute minimally displaced fracture of anterior right ribs 4. All CT scans at this facility use dose modulation, iterative reconstruction, and/or weight based dosing when appropriate to reduce radiation dose to as low as reasonably achievable. Ct Abdomen Pelvis W Iv Contrast Additional Contrast? None    Result Date: 3/17/2020  EXAM: CT of the chest abdomen and pelvis with contrast History: Cardiopulmonary arrest. Pulmonary embolism. Technique: CT of the chest abdomen and pelvis was performed with contrast from the thoracic inlet through the ischial tuberosities. Multiplanar reformats were obtained. Comparison: CT chest from April 1, 2012 Findings: CHEST: Visualized portion of the thyroid gland is within normal limits. No axillary, mediastinal, or hilar lymphadenopathy. The endotracheal tube is present and terminates approximately 6 cm from the debby. An enteric tube is present within the esophagus and terminates in the lumen of the stomach. No aortic aneurysm or dissection. Atherosclerotic calcification of the thoracic aorta. No filling defect within the pulmonary arteries to suggest pulmonary embolism. Heart size is within normal limits. No significant pericardial effusion. Esophagus is within normal limits. Moderate bilateral pleural effusions with adjacent consolidations. Patchy groundglass and nodular opacities throughout both lungs. No pneumothorax. Acute minimally displaced fracture of right ribs 4 anteriorly. Degenerative changes of the spine. ABDOMEN/PELVIS: Well-circumscribed tiny hypodensities of the liver most likely related to hepatic cysts. The liver is otherwise unremarkable. The gallbladder, spleen, stomach, pancreas, and adrenal glands are within normal limits. The kidneys enhance uniformly. 3 mm left renal calculus versus renal vascular calcification. No right-sided urinary tract calculi. No hydronephrosis. Urinary bladder is decompressed around a Brice catheter, findings which are poorly evaluated secondary to streak artifact from left total hip arthroplasty prosthesis. Abdominal aorta is nonaneurysmal and demonstrates atherosclerotic calcification. No retroperitoneal or abdominal/pelvic lymphadenopathy. No small bowel obstruction. Colonic diverticuli are identified, most significantly involving the distal descending colon. No overt colonic mass or pericolonic inflammation. Appendix is within normal limits. No free fluid or free air. Degenerative changes of the spine. Postsurgical changes of lumbar spine fusion with posterior decompression at L3-L5. There is sclerosis of the superior humeral head compatible with avascular necrosis without collapse. No pulmonary embolism. Moderate bilateral pleural effusions with adjacent consolidations. Groundglass and nodular densities throughout both lungs may be infectious/ inflammatory in etiology or due to pulmonary edema. Acute minimally displaced fracture of anterior right ribs 4. All CT scans at this facility use dose modulation, iterative reconstruction, and/or weight based dosing when appropriate to reduce radiation dose to as low as reasonably achievable. Xr Chest Portable    Result Date: 3/22/2020  XR CHEST PORTABLE : 3/22/2020 CLINICAL HISTORY:  PNA Effusion. COMPARISON: Portable chest 3/21/2020 and chest CTA 3/17/2020. TECHNIQUE: A portable upright AP radiograph of the chest was obtained.  FINDINGS: Moderately severe pulmonary infiltrate/consolidations angles. BILATERAL PNEUMONIA/EDEMA WITH SMALL BILATERAL PLEURAL EFFUSIONS    Xr Chest Portable    Result Date: 3/18/2020  XR CHEST PORTABLE : 3/18/2020 CLINICAL HISTORY:  PNA Effusion . Respiratory failure and intubated. COMPARISON: Portable chest and chest CTA 3/17/2020. Two portable upright AP radiographs of the chest were obtained. FINDINGS: Endotracheal and orogastric tubes remain in good position. Small to moderate-sized pleural effusions and moderate infiltrate/consolidation predominantly of the mid to lower lung fields is mildly evolving, greater on the left. There is no pneumothorax or other significant changes identified. The heart remains mildly enlarged, exaggerated by technique. MILDLY EVOLVING PREDOMINANTLY MID TO LOWER LUNG FIELD PULMONARY INFILTRATES. OTHERWISE, STABLE CHEST FROM YESTERDAY. Xr Chest Portable    Result Date: 3/17/2020  Portable chest radiograph History: Intubation. Dyspnea. Technique: AP portable view of the chest obtained. Comparison: Portable chest radiograph from earlier on the same date. Findings: Interval placement of endotracheal tube with its tip terminating at the level of the clavicles. Enteric tube is present and traverses the diaphragm however its distal tip is not visualized as it is outside of the field of view. Atherosclerotic calcification of the thoracic aorta. Heart size is mildly enlarged. Interval worsening of bilateral pulmonary opacities most significant within the lung bases. No pneumothorax. Small bilateral pleural effusions. Bones of the thorax appear intact. Interval placement of endotracheal tube with its tip terminating at the level of the clavicles. Interval development of bilateral lung infiltrates.     Xr Chest Portable    Result Date: 3/17/2020  Patient MRN: [id number] : 1955 Age:  71 years Gender: Male Order Date: 3/17/2020 5:15 AM. Exam: XR CHEST PORTABLE Number of Views: 1 Indication:  Shortness of breath, evaluate for infiltrate, dyspnea Comparison: 3/16/2020 Findings: Stable, prominent cardiomediastinal silhouette with underlying mild pulmonary edema and bibasilar airspace disease. Vascular calcifications thoracic aorta. No pneumothorax. Small right pleural effusion. Impression:  1. Stable, prominent cardiomediastinal silhouette with underlying mild pulmonary edema. 2. Small right pleural effusion. 3. Nonspecific bibasilar airspace disease, findings can be seen in infiltrate/pneumonia and/or atelectasis. IMPRESSION AND SUGGESTION:  1. Acute hypoxic and hypercapnic respiratory failure which is improved  2. Status post asystolic cardiac arrest recovered quickly with intubation  3. Aspiration pneumonia with retained airway secretions requiring aggressive pulmonary toileting with improvement  4. Rib fracture associated with recent CPR  5. Hypertension much better controlled now  6. Anemia with no evidence of active blood loss identified  Continue current treatment plans patient is gradually improving, stressed the importance of mobilizing her with secretions.           Electronically signed by Jamaal Abdi MD, FCCP on 3/22/2020 at 12:46 PM

## 2020-03-22 NOTE — PROGRESS NOTES
Patient continues to feel better. Improved cough, shortness of breath. Saturation 96% but the nurse took him off oxygen and he dropped to 91%. Patient walked to the bathroom and his saturation dropped to 88%. No chest pain. No abdominal pain. No nausea or vomiting. No hematemesis or melena. Review of system: 12 system review otherwise negative for acute signs or symptoms.     Exam:  Awake and oriented on nasal cannula. No respiratory distress at rest but mild to moderate with exertion. HEENT: Pink conjunctiva and buccal mucosa.  Normal and throat and nose  Neck: Supple, no nuchal rigidity. Endo: No thyromegaly. Vascular: No JVD or carotid bruit. Chest:     Improvement of the bilateral crackles. Heart: Regular rate and rhythm, no extra sounds.  No murmur, no rub. Abdomen: Soft, no tenderness, no rebound, no rigidity.  Increased abdominal girth therefore clinically I could not exclude the possibility of intra-abdominal mass or organomegaly. LE: No cyanosis or clubbing, no varices or edema. Neuro: Awake, alert, oriented, normal speech, normal comprehension, normal extension, normal cranial nerves, normal and symmetrical motor and tone examination throughout. MS: No joint effusion or tenderness. Skin: No skin rash, itching, bruising or significant findings.     Assessment and plan:  This documentation may or may not be complete, inclusive or conclusive.     *Acute hypoxic respiratory failure, improved, patient was extubated and currently on nasal cannula     *Pneumonia, aspiration versus healthcare associated.  Patient is on antibiotic to cover gram positives, negatives, anaerobes and MRSA.  Bronchoscopy revealed large amount of secretions.     *Hypertension, better controlled.  Continue current regimen.     *Anemia, Hemoccult stool is negative.  No evidence of active or massive GI blood loss.  Patient denies any hematuria.  His reticulocyte count and LDH are normal making the possibility of

## 2020-03-22 NOTE — CARE COORDINATION
Spoke to patient. Denies Delicia Colon needs @ discharge. Patient taken off O2 dropped to 89% w/ ambulation and became tachy in the 120's per RN report. Continue to follow for home O2 needs. Patient denies additional needs.

## 2020-03-23 ENCOUNTER — APPOINTMENT (OUTPATIENT)
Dept: GENERAL RADIOLOGY | Age: 65
DRG: 377 | End: 2020-03-23
Payer: MEDICARE

## 2020-03-23 LAB
ANION GAP SERPL CALCULATED.3IONS-SCNC: 15 MEQ/L (ref 9–15)
BASOPHILS ABSOLUTE: 0 K/UL (ref 0–0.2)
BASOPHILS RELATIVE PERCENT: 0.3 %
BLOOD CULTURE, ROUTINE: NORMAL
BUN BLDV-MCNC: 8 MG/DL (ref 8–23)
CALCIUM SERPL-MCNC: 9.1 MG/DL (ref 8.5–9.9)
CHLORIDE BLD-SCNC: 96 MEQ/L (ref 95–107)
CO2: 23 MEQ/L (ref 20–31)
CREAT SERPL-MCNC: 0.83 MG/DL (ref 0.7–1.2)
CULTURE, BLOOD 2: NORMAL
EOSINOPHILS ABSOLUTE: 0.1 K/UL (ref 0–0.7)
EOSINOPHILS RELATIVE PERCENT: 1.1 %
GFR AFRICAN AMERICAN: >60
GFR NON-AFRICAN AMERICAN: >60
GLUCOSE BLD-MCNC: 107 MG/DL (ref 70–99)
HCT VFR BLD CALC: 22.8 % (ref 42–52)
HEMOGLOBIN: 7.2 G/DL (ref 14–18)
LYMPHOCYTES ABSOLUTE: 3.4 K/UL (ref 1–4.8)
LYMPHOCYTES RELATIVE PERCENT: 34.7 %
MCH RBC QN AUTO: 26.4 PG (ref 27–31.3)
MCHC RBC AUTO-ENTMCNC: 31.7 % (ref 33–37)
MCV RBC AUTO: 83.3 FL (ref 80–100)
MONOCYTES ABSOLUTE: 1.1 K/UL (ref 0.2–0.8)
MONOCYTES RELATIVE PERCENT: 11.5 %
NEUTROPHILS ABSOLUTE: 5.1 K/UL (ref 1.4–6.5)
NEUTROPHILS RELATIVE PERCENT: 52.4 %
PDW BLD-RTO: 26 % (ref 11.5–14.5)
PLATELET # BLD: 281 K/UL (ref 130–400)
POTASSIUM SERPL-SCNC: 3.6 MEQ/L (ref 3.4–4.9)
RBC # BLD: 2.73 M/UL (ref 4.7–6.1)
SODIUM BLD-SCNC: 134 MEQ/L (ref 135–144)
WBC # BLD: 9.8 K/UL (ref 4.8–10.8)

## 2020-03-23 PROCEDURE — 94761 N-INVAS EAR/PLS OXIMETRY MLT: CPT

## 2020-03-23 PROCEDURE — 6360000002 HC RX W HCPCS: Performed by: INTERNAL MEDICINE

## 2020-03-23 PROCEDURE — 36415 COLL VENOUS BLD VENIPUNCTURE: CPT

## 2020-03-23 PROCEDURE — 2700000000 HC OXYGEN THERAPY PER DAY

## 2020-03-23 PROCEDURE — 6370000000 HC RX 637 (ALT 250 FOR IP): Performed by: INTERNAL MEDICINE

## 2020-03-23 PROCEDURE — 80048 BASIC METABOLIC PNL TOTAL CA: CPT

## 2020-03-23 PROCEDURE — 1210000000 HC MED SURG R&B

## 2020-03-23 PROCEDURE — 97116 GAIT TRAINING THERAPY: CPT

## 2020-03-23 PROCEDURE — 85025 COMPLETE CBC W/AUTO DIFF WBC: CPT

## 2020-03-23 PROCEDURE — 94760 N-INVAS EAR/PLS OXIMETRY 1: CPT

## 2020-03-23 PROCEDURE — 2580000003 HC RX 258: Performed by: SPECIALIST

## 2020-03-23 PROCEDURE — 71046 X-RAY EXAM CHEST 2 VIEWS: CPT

## 2020-03-23 PROCEDURE — 94762 N-INVAS EAR/PLS OXIMTRY CONT: CPT

## 2020-03-23 PROCEDURE — 2580000003 HC RX 258: Performed by: INTERNAL MEDICINE

## 2020-03-23 PROCEDURE — 99233 SBSQ HOSP IP/OBS HIGH 50: CPT | Performed by: INTERNAL MEDICINE

## 2020-03-23 PROCEDURE — 94640 AIRWAY INHALATION TREATMENT: CPT

## 2020-03-23 RX ORDER — PANTOPRAZOLE SODIUM 40 MG/1
40 TABLET, DELAYED RELEASE ORAL
Qty: 30 TABLET | Refills: 3 | Status: SHIPPED | OUTPATIENT
Start: 2020-03-24 | End: 2020-05-26

## 2020-03-23 RX ORDER — AMLODIPINE BESYLATE 10 MG/1
10 TABLET ORAL DAILY
Qty: 30 TABLET | Refills: 0 | Status: SHIPPED | OUTPATIENT
Start: 2020-03-23 | End: 2020-05-22 | Stop reason: SDUPTHER

## 2020-03-23 RX ORDER — AMOXICILLIN AND CLAVULANATE POTASSIUM 875; 125 MG/1; MG/1
1 TABLET, FILM COATED ORAL EVERY 12 HOURS SCHEDULED
Qty: 20 TABLET | Refills: 0 | Status: SHIPPED | OUTPATIENT
Start: 2020-03-23 | End: 2020-04-02

## 2020-03-23 RX ORDER — ALBUTEROL SULFATE 2.5 MG/3ML
2.5 SOLUTION RESPIRATORY (INHALATION)
Qty: 120 EACH | Refills: 3 | Status: SHIPPED | OUTPATIENT
Start: 2020-03-23

## 2020-03-23 RX ADMIN — POTASSIUM CHLORIDE 40 MEQ: 750 TABLET, FILM COATED, EXTENDED RELEASE ORAL at 09:13

## 2020-03-23 RX ADMIN — ATORVASTATIN CALCIUM 40 MG: 40 TABLET, FILM COATED ORAL at 09:13

## 2020-03-23 RX ADMIN — AMOXICILLIN AND CLAVULANATE POTASSIUM 1 TABLET: 875; 125 TABLET, FILM COATED ORAL at 09:13

## 2020-03-23 RX ADMIN — AMLODIPINE BESYLATE 10 MG: 10 TABLET ORAL at 09:13

## 2020-03-23 RX ADMIN — HEPARIN SODIUM 5000 UNITS: 5000 INJECTION INTRAVENOUS; SUBCUTANEOUS at 21:33

## 2020-03-23 RX ADMIN — AMOXICILLIN AND CLAVULANATE POTASSIUM 1 TABLET: 875; 125 TABLET, FILM COATED ORAL at 21:33

## 2020-03-23 RX ADMIN — IPRATROPIUM BROMIDE AND ALBUTEROL SULFATE 1 AMPULE: 2.5; .5 SOLUTION RESPIRATORY (INHALATION) at 14:08

## 2020-03-23 RX ADMIN — IPRATROPIUM BROMIDE AND ALBUTEROL SULFATE 1 AMPULE: 2.5; .5 SOLUTION RESPIRATORY (INHALATION) at 10:13

## 2020-03-23 RX ADMIN — PANTOPRAZOLE SODIUM 40 MG: 40 TABLET, DELAYED RELEASE ORAL at 05:41

## 2020-03-23 RX ADMIN — FUROSEMIDE 40 MG: 10 INJECTION, SOLUTION INTRAMUSCULAR; INTRAVENOUS at 09:12

## 2020-03-23 RX ADMIN — HEPARIN SODIUM 5000 UNITS: 5000 INJECTION INTRAVENOUS; SUBCUTANEOUS at 09:12

## 2020-03-23 RX ADMIN — FOLIC ACID 2 MG: 1 TABLET ORAL at 09:13

## 2020-03-23 RX ADMIN — Medication 10 ML: at 16:39

## 2020-03-23 RX ADMIN — METOPROLOL SUCCINATE 100 MG: 100 TABLET, EXTENDED RELEASE ORAL at 09:13

## 2020-03-23 RX ADMIN — FERROUS SULFATE TAB 325 MG (65 MG ELEMENTAL FE) 325 MG: 325 (65 FE) TAB at 09:13

## 2020-03-23 RX ADMIN — FERROUS SULFATE TAB 325 MG (65 MG ELEMENTAL FE) 325 MG: 325 (65 FE) TAB at 16:39

## 2020-03-23 RX ADMIN — TRAMADOL HYDROCHLORIDE 50 MG: 50 TABLET, FILM COATED ORAL at 17:47

## 2020-03-23 RX ADMIN — Medication 10 ML: at 21:36

## 2020-03-23 RX ADMIN — IPRATROPIUM BROMIDE AND ALBUTEROL SULFATE 1 AMPULE: 2.5; .5 SOLUTION RESPIRATORY (INHALATION) at 20:25

## 2020-03-23 ASSESSMENT — PAIN SCALES - GENERAL
PAINLEVEL_OUTOF10: 4

## 2020-03-23 ASSESSMENT — PAIN DESCRIPTION - FREQUENCY: FREQUENCY: INTERMITTENT

## 2020-03-23 ASSESSMENT — PAIN DESCRIPTION - DESCRIPTORS: DESCRIPTORS: DISCOMFORT

## 2020-03-23 ASSESSMENT — PAIN DESCRIPTION - ORIENTATION
ORIENTATION: RIGHT
ORIENTATION: RIGHT

## 2020-03-23 ASSESSMENT — PAIN DESCRIPTION - LOCATION
LOCATION: RIB CAGE
LOCATION: RIB CAGE

## 2020-03-23 NOTE — PROGRESS NOTES
Physical Therapy Med Surg Daily Treatment Note  [facility]  Room: I0/D078-92       NAME: Kane Campbell  : 1955 (72 y.o.)  MRN: [id number]  CODE STATUS: Full Code    Date of Service: 3/23/2020    Patient Diagnosis(es): Anemia [D64.9]  GI bleed [K92.2]  Anemia [D64.9]  GI bleed [K92.2]   Chief Complaint   Patient presents with    Shortness of Breath     sob for the last 4 days.  denies cough     Patient Active Problem List    Diagnosis Date Noted    Cardiac arrest ([facility]     Aspiration pneumonia of both lungs ([facility] [facility]     Acute respiratory failure with hypoxia and hypercapnia ([facility] [address] [facility]     Dyspnea     Anemia 2020    GI bleed 2020    Tremors of nervous system 2020    Dyslipidemia 2017    Obstructive chronic bronchitis without exacerbation (HCC) 2017    Chronic back pain     Insomnia     Essential hypertension, benign 2004    Lumbosacral spondylosis without myelopathy 2004    Sprain of lumbar region 2004    Degeneration of lumbar or lumbosacral intervertebral disc 2004    Displacement of lumbar intervertebral disc without myelopathy 2004        Past Medical History:   Diagnosis Date    Allergic rhinitis     Aortic regurgitation     Cervical radiculopathy at C8 10/13/2013    Chronic back pain     COPD (chronic obstructive pulmonary disease) (HCC)     Erectile dysfunction     GERD (gastroesophageal reflux disease)     Hyperlipidemia     Hypertension     Insomnia     Osteoarthritis     PUD (peptic ulcer disease)     Vitamin D deficiency      Past Surgical History:   Procedure Laterality Date    ELBOW SURGERY  2013    Dr. Watson Coronado REPLACEMENT  2008    Left hip replacement    SPINE SURGERY  2006    fusion L4-L5          Restrictions  Restrictions/Precautions: Fall Risk    SUBJECTIVE   Subjective  Subjective: I don't want to go home unless I'm good. (pt referring to increased HR of

## 2020-03-23 NOTE — PROGRESS NOTES
bilaterally without Rales/Wheezes/Rhonchi. Cardiovascular: Regular rate and rhythm with normal S1/S2 without murmurs, rubs or gallops. Abdomen: Soft, non-tender, non-distended with normal bowel sounds. Musculoskeletal: No clubbing, cyanosis or edema bilaterally. Full range of motion without deformity. Skin: Skin color, texture, turgor normal.  No rashes or lesions. Neuro: Non Focal. Symetrical motor and tone. Nl Comprehension, Alert,awake and oriented. NL CN. Symetrical tone and reflexes. Psychiatric: Alert and oriented, thought content appropriate, normal insight  Capillary Refill: Brisk,< 3 seconds   Peripheral Pulses: +2 palpable, equal bilaterally       Labs:   Recent Labs     03/21/20 0542 03/22/20 0527 03/23/20 0518   WBC 14.8* 13.4* 9.8   HGB 7.2* 7.1* 7.2*   HCT 22.5* 22.3* 22.8*    241 281     Recent Labs     03/21/20 0542 03/22/20 0527 03/23/20 0518    135 134*   K 3.6 3.7 3.6    98 96   CO2 23 23 23   BUN 10 7* 8   CREATININE 0.99 0.87 0.83   CALCIUM 9.5 9.4 9.1     No results for input(s): AST, ALT, BILIDIR, BILITOT, ALKPHOS in the last 72 hours. No results for input(s): INR in the last 72 hours. No results for input(s): Maverick Kopperl in the last 72 hours. Urinalysis:      Lab Results   Component Value Date    BLOODU neg 02/29/2012    SPECGRAV 1.010 02/29/2012    GLUCOSEU neg 02/29/2012       Radiology:  XR CHEST PORTABLE   Final Result      STABLE CHEST FROM YESTERDAY. XR CHEST PORTABLE   Final Result      STABLE CHEST FROM YESTERDAY. XR CHEST PORTABLE   Final Result   BILATERAL ATELECTASIS/PNEUMONIA VERSUS EDEMA WITH SMALL LEFT EFFUSION, UNCHANGED. XR CHEST PORTABLE   Final Result   BILATERAL PNEUMONIA/EDEMA WITH SMALL BILATERAL PLEURAL EFFUSIONS      XR CHEST PORTABLE   Final Result      MILDLY EVOLVING PREDOMINANTLY MID TO LOWER LUNG FIELD PULMONARY INFILTRATES. OTHERWISE, STABLE CHEST FROM YESTERDAY. CTA CHEST W WO CONTRAST   Final Result      No pulmonary embolism. Moderate bilateral pleural effusions with adjacent consolidations. Groundglass and nodular densities throughout both lungs may be infectious/ inflammatory in etiology or due to pulmonary edema. Acute minimally displaced fracture of anterior right ribs 4. All CT scans at this facility use dose modulation, iterative reconstruction, and/or weight based dosing when appropriate to reduce radiation dose to as low as reasonably achievable. CT ABDOMEN PELVIS W IV CONTRAST Additional Contrast? None   Final Result      No pulmonary embolism. Moderate bilateral pleural effusions with adjacent consolidations. Groundglass and nodular densities throughout both lungs may be infectious/ inflammatory in etiology or due to pulmonary edema. Acute minimally displaced fracture of anterior right ribs 4. All CT scans at this facility use dose modulation, iterative reconstruction, and/or weight based dosing when appropriate to reduce radiation dose to as low as reasonably achievable. XR CHEST PORTABLE   Final Result      Interval placement of endotracheal tube with its tip terminating at the level of the clavicles. Interval development of bilateral lung infiltrates. XR CHEST PORTABLE   Final Result   Impression:     1. Stable, prominent cardiomediastinal silhouette with underlying mild pulmonary edema. 2. Small right pleural effusion. 3. Nonspecific bibasilar airspace disease, findings can be seen in infiltrate/pneumonia and/or atelectasis. XR CHEST STANDARD (2 VW)   Final Result      Shallow inspiration with bibasilar atelectasis/opacities.             XR CHEST STANDARD (2 VW)    (Results Pending)           Assessment/Plan:    Active Hospital Problems    Diagnosis Date Noted    Cardiac arrest ([address] [I46.9]     Aspiration pneumonia of both lungs ([address] [J69.0]     Acute respiratory

## 2020-03-23 NOTE — PROGRESS NOTES
INPATIENT PROGRESS NOTES    PATIENT NAME: Maximilian Arrieta  MRN: [id number]  SERVICE DATE:  March 23, 2020   SERVICE TIME:  2:14 PM      PRIMARY SERVICE: Pulmonary Disease    CHIEF COMPLAIN: Shortness of breath. INTERVAL HPI: Patient seen and examined at bedside, Interval Notes, orders reviewed. Nursing notes noted  Patient is feeling better. He is not having any complaint of shortness of breath. His room air O2 saturation 92%. He has ambulatory pulse oximetry done but did not qualify for oxygen. He has no complaint of cough or sputum production. OBJECTIVE    Body mass index is 27.85 kg/m². PHYSICAL EXAM:  Vitals:  BP (!) 149/50   Pulse 90   Temp 99.5 °F (37.5 °C)   Resp 16   Ht 5' 9\" (1.753 m)   Wt 188 lb 9.6 oz (85.5 kg)   SpO2 92%   BMI 27.85 kg/m²   General:  Alert, awake . comfortable in bed, No distress. appears stated age and cooperative  Head: Atraumatic , Normocephalic   Eyes: PERRL. No sclera icterus. No conjunctival injection. No discharge   ENT: No nasal  discharge. Pharynx clear. lips, teeth, mucosa and gums are normal, tongue protrudes in the midline  Neck:  Trachea midline. No thyromegaly, no JVD, No cervical adenopathy. Chest : Bilaterally symmetrical ,Normal effort,  No accessory muscle use  Lung : . Fair BS bilateral, decreased BS at bases. No Rales. Few scattered rhonchi. Heart[de-identified] Normal  rate. Regular rhythm. No mumur ,  Rub or gallop  ABD: Non-tender. Non-distended. No masses. No organmegaly. Normal bowel sounds. No hernia.   Ext : No Pitting both leg , No Cyanosis No clubbing  Neuro: no focal weakness          DATA:   Recent Labs     03/22/20 0527 03/23/20 0518   WBC 13.4* 9.8   HGB 7.1* 7.2*   HCT 22.3* 22.8*   MCV 83.4 83.3    281     Recent Labs     03/22/20 0527 03/23/20 0518    134*   K 3.7 3.6   CL 98 96   CO2 23 23   BUN 7* 8   CREATININE 0.87 0.83   GLUCOSE 102* 107*   CALCIUM 9.4 9.1   LABGLOM >60.0 >60.0   GFRAA >60.0 >60.0       MV Settings: Vent Mode: CPAP  Vt Ordered: 550 mL  Rate Set: 12 bmp  FiO2 : 40 %  PEEP/CPAP: 6  Pressure Support: 0 cmH20  Peak Inspiratory Pressure: 8.3 cmH2O  Mean Airway Pressure: 11 cmH20  I:E Ratio: 1:2.70    No results for input(s): PHART, IOR5WCH, PO2ART, UDS0JHU, BEART, K0XUKUEE in the last 72 hours. O2 Device: Nasal cannula  O2 Flow Rate (L/min): 2 L/min    DIET GENERAL;     MEDICATIONS during current hospitalization:    Continuous Infusions:    Scheduled Meds:   ferrous sulfate  325 mg Oral BID WC    metoprolol succinate  100 mg Oral Daily    furosemide  40 mg Intravenous Daily    potassium chloride  40 mEq Oral Daily    amoxicillin-clavulanate  1 tablet Oral 2 times per day    lidocaine  1 patch Transdermal Daily    pantoprazole  40 mg Oral QAM AC    folic acid  2 mg Oral Daily    amLODIPine  10 mg Oral Daily    heparin (porcine)  5,000 Units Subcutaneous BID    ipratropium-albuterol  1 ampule Inhalation TID    [START ON 4/6/2020] cyanocobalamin  1,000 mcg Intramuscular Q30 Days    sodium chloride flush  10 mL Intravenous 2 times per day    sodium chloride (PF)  10 mL Intravenous Daily    atorvastatin  40 mg Oral Daily    sodium chloride flush  10 mL Intravenous 2 times per day       PRN Meds:traMADol, morphine **OR** morphine, cloNIDine, enalaprilat, albuterol, hydrALAZINE **OR** [DISCONTINUED] hydrALAZINE, sodium chloride flush, sodium chloride flush, acetaminophen **OR** acetaminophen, polyethylene glycol, promethazine **OR** ondansetron    Radiology  Xr Chest Standard (2 Vw)    Result Date: 3/23/2020  EXAMINATION: XR CHEST (2 VW)  CLINICAL HISTORY:  Pneumonia, pleural effusions COMPARISONS: March 22, 2020  FINDINGS: Two views of the chest are submitted. The cardiac silhouette is of normal size configuration. Pulmonary vascular unchanged Right sided trachea. Diffuse patchy multifocal to confluent areas of airspace disease within the right upper, right middle right lower and left lower lobes. is within normal limits. Moderate bilateral pleural effusions with adjacent consolidations. Patchy groundglass and nodular opacities throughout both lungs. No pneumothorax. Acute minimally displaced fracture of right ribs 4 anteriorly. Degenerative changes of the spine. ABDOMEN/PELVIS: Well-circumscribed tiny hypodensities of the liver most likely related to hepatic cysts. The liver is otherwise unremarkable. The gallbladder, spleen, stomach, pancreas, and adrenal glands are within normal limits. The kidneys enhance uniformly. 3 mm left renal calculus versus renal vascular calcification. No right-sided urinary tract calculi. No hydronephrosis. Urinary bladder is decompressed around a Brice catheter, findings which are poorly evaluated secondary to streak artifact from left total hip arthroplasty prosthesis. Abdominal aorta is nonaneurysmal and demonstrates atherosclerotic calcification. No retroperitoneal or abdominal/pelvic lymphadenopathy. No small bowel obstruction. Colonic diverticuli are identified, most significantly involving the distal descending colon. No overt colonic mass or pericolonic inflammation. Appendix is within normal limits. No free fluid or free air. Degenerative changes of the spine. Postsurgical changes of lumbar spine fusion with posterior decompression at L3-L5. There is sclerosis of the superior humeral head compatible with avascular necrosis without collapse. No pulmonary embolism. Moderate bilateral pleural effusions with adjacent consolidations. Groundglass and nodular densities throughout both lungs may be infectious/ inflammatory in etiology or due to pulmonary edema. Acute minimally displaced fracture of anterior right ribs 4. All CT scans at this facility use dose modulation, iterative reconstruction, and/or weight based dosing when appropriate to reduce radiation dose to as low as reasonably achievable.      Ct Abdomen Pelvis W Iv Contrast Additional Contrast? None    Result Date: 3/17/2020  EXAM: CT of the chest abdomen and pelvis with contrast History: Cardiopulmonary arrest. Pulmonary embolism. Technique: CT of the chest abdomen and pelvis was performed with contrast from the thoracic inlet through the ischial tuberosities. Multiplanar reformats were obtained. Comparison: CT chest from April 1, 2012 Findings: CHEST: Visualized portion of the thyroid gland is within normal limits. No axillary, mediastinal, or hilar lymphadenopathy. The endotracheal tube is present and terminates approximately 6 cm from the debby. An enteric tube is present within the esophagus and terminates in the lumen of the stomach. No aortic aneurysm or dissection. Atherosclerotic calcification of the thoracic aorta. No filling defect within the pulmonary arteries to suggest pulmonary embolism. Heart size is within normal limits. No significant pericardial effusion. Esophagus is within normal limits. Moderate bilateral pleural effusions with adjacent consolidations. Patchy groundglass and nodular opacities throughout both lungs. No pneumothorax. Acute minimally displaced fracture of right ribs 4 anteriorly. Degenerative changes of the spine. ABDOMEN/PELVIS: Well-circumscribed tiny hypodensities of the liver most likely related to hepatic cysts. The liver is otherwise unremarkable. The gallbladder, spleen, stomach, pancreas, and adrenal glands are within normal limits. The kidneys enhance uniformly. 3 mm left renal calculus versus renal vascular calcification. No right-sided urinary tract calculi. No hydronephrosis. Urinary bladder is decompressed around a Brice catheter, findings which are poorly evaluated secondary to streak artifact from left total hip arthroplasty prosthesis. Abdominal aorta is nonaneurysmal and demonstrates atherosclerotic calcification. No retroperitoneal or abdominal/pelvic lymphadenopathy. No small bowel obstruction.  Colonic diverticuli are identified, most significantly involving the distal descending colon. No overt colonic mass or pericolonic inflammation. Appendix is within normal limits. No free fluid or free air. Degenerative changes of the spine. Postsurgical changes of lumbar spine fusion with posterior decompression at L3-L5. There is sclerosis of the superior humeral head compatible with avascular necrosis without collapse. No pulmonary embolism. Moderate bilateral pleural effusions with adjacent consolidations. Groundglass and nodular densities throughout both lungs may be infectious/ inflammatory in etiology or due to pulmonary edema. Acute minimally displaced fracture of anterior right ribs 4. All CT scans at this facility use dose modulation, iterative reconstruction, and/or weight based dosing when appropriate to reduce radiation dose to as low as reasonably achievable. Xr Chest Portable    Result Date: 3/22/2020  XR CHEST PORTABLE : 3/22/2020 CLINICAL HISTORY:  PNA Effusion. COMPARISON: Portable chest 3/21/2020 and chest CTA 3/17/2020. TECHNIQUE: A portable upright AP radiograph of the chest was obtained. FINDINGS: Moderately severe pulmonary infiltrate/consolidations worsening inferiorly with small to moderate-sized pleural effusions have not significantly changed, worse on the left. The heart remains borderline enlarged. There is no pneumothorax, or other significant changes identified. The nondisplaced anterior right fourth rib fracture noted on the recent chest CTA is not identified. STABLE CHEST FROM YESTERDAY. Xr Chest Portable    Result Date: 3/21/2020  XR CHEST PORTABLE : 3/21/2020 CLINICAL HISTORY:  PNA Effusion . COMPARISON: Portable chest 3/20/2020 and chest CTA 3/17/2020. TECHNIQUE: A portable upright AP radiograph of the chest was obtained. FINDINGS: Moderately severe pulmonary infiltrate/consolidations worsening inferiorly with small to moderate-sized pleural effusions have not significantly changed. The heart remains borderline enlarged.  There is no pneumothorax, or other significant changes identified. The nondisplaced anterior right fourth rib fracture noted on the recent CT is not identified. STABLE CHEST FROM YESTERDAY. Xr Chest Portable    Result Date: 3/20/2020  EXAMINATION: XR CHEST PORTABLE CLINICAL HISTORY: PNEUMONIA. EFFUSION. COMPARISONS: MARCH 19, 2020 FINDINGS: Osseous structures intact. Cardiopericardial silhouette normal. Aorta calcified. Bilateral diffuse airspace disease right greater than left, again identified, and essentially unchanged given differences in technique. Blunting left costophrenic angle. BILATERAL ATELECTASIS/PNEUMONIA VERSUS EDEMA WITH SMALL LEFT EFFUSION, UNCHANGED. Xr Chest Portable    Result Date: 3/19/2020  EXAMINATION: XR CHEST PORTABLE CLINICAL HISTORY: PNEUMONIA, EFFUSION COMPARISONS: CHEST RADIOGRAPH MARCH 18, 2020, CT CHEST, MARCH 17, 2020 FINDINGS: Endotracheal tube, nasogastric tube is been removed. Osseous structures intact. Cardiopericardial silhouette normal. Pulmonary vasculature normal. Ill-defined areas increase opacity bilateral lungs, greatest in lower lung zones. Blunting costophrenic angles. BILATERAL PNEUMONIA/EDEMA WITH SMALL BILATERAL PLEURAL EFFUSIONS    Xr Chest Portable    Result Date: 3/18/2020  XR CHEST PORTABLE : 3/18/2020 CLINICAL HISTORY:  PNA Effusion . Respiratory failure and intubated. COMPARISON: Portable chest and chest CTA 3/17/2020. Two portable upright AP radiographs of the chest were obtained. FINDINGS: Endotracheal and orogastric tubes remain in good position. Small to moderate-sized pleural effusions and moderate infiltrate/consolidation predominantly of the mid to lower lung fields is mildly evolving, greater on the left. There is no pneumothorax or other significant changes identified. The heart remains mildly enlarged, exaggerated by technique. MILDLY EVOLVING PREDOMINANTLY MID TO LOWER LUNG FIELD PULMONARY INFILTRATES. OTHERWISE, STABLE CHEST FROM YESTERDAY.

## 2020-03-23 NOTE — DISCHARGE SUMMARY
Physician Discharge Summary     Patient ID:  Siira Zurita  [id number]  76 y.o.  1955    Admit date: 3/16/2020    Discharge date :  03/24/20      Admitting Physician: Kwasi Estrada MD     Discharge Physician: Donna Parry DO     Admission Diagnoses: Anemia [D64.9]  GI bleed [K92.2]  Anemia [D64.9]  GI bleed [K92.2]    Discharge Diagnoses: Anemia, GIb    Admission Condition: Serious    Discharged Condition: Stable    Hospital Course: This is a 76year old male who presented with shortness of breath and weakness. Found to have Hb of 6 on admission. GI was consulted for rule out of acute blood loss anemia. EGD was performed 3/17/20 which did not show evidence of acute GIB. Just after getting EGD, patient suffered cardiopulmonary arrest of unclear etiology. CTa chest was performed and was negative for PE. Patient was intubated emergently. Found to be hypoxic and hypercapnic. Patient was maintained on vent in ICU. Bronchoscopy was performed by critical care with suctioning of secretions, suggestive of aspiration pneumonia. Patient was extubated 3/19/2020. He has been on antibioitics and improving since that time. He was evaluated by PT who felt patient would benefit from inpatient vs C but patient is refusing at this time. He does qualify for home oxygen based on his acute hypoxic/hypercapnic respiratory failure and possible OHS as a complicating factor. He is medically stable for discharge. He will need to follow up with is PCP in one week. Patient will also need outpatient colonoscopy once he has completed antibiotics, in 1-2 weeks per GI recs. Consults: pulmonary/intensive care and GI    Significant Diagnostic Studies: See below. Discharge Exam:  General appearance: No apparent distress, appears stated age and cooperative. HEENT: Pupils equal, round, and reactive to light. Conjunctivae/corneas clear. Neck: Supple, with full range of motion. No jugular venous distention.  Trachea midline. Respiratory:  Normal respiratory effort. Clear to auscultation, bilaterally without Rales/Wheezes/Rhonchi. Cardiovascular: Regular rate and rhythm with normal S1/S2 without murmurs, rubs or gallops. Abdomen: Soft, non-tender, non-distended with normal bowel sounds. Musculoskeletal: No clubbing, cyanosis or edema bilaterally. Full range of motion without deformity. Skin: Skin color, texture, turgor normal.  No rashes or lesions. Neuro: Non Focal. Symetrical motor and tone. Nl Comprehension, Alert,awake and oriented. NL CN. Symetrical tone and reflexes. Psychiatric: Alert and oriented, thought content appropriate, normal insight  Capillary Refill: Brisk,< 3 seconds   Peripheral Pulses: +2 palpable, equal bilaterally   Labs:   Recent Labs     03/21/20 0542 03/22/20 0527 03/23/20 0518   WBC 14.8* 13.4* 9.8   HGB 7.2* 7.1* 7.2*   HCT 22.5* 22.3* 22.8*    241 281     Recent Labs     03/21/20 0542 03/22/20 0527 03/23/20 0518    135 134*   K 3.6 3.7 3.6    98 96   CO2 23 23 23   BUN 10 7* 8   CREATININE 0.99 0.87 0.83   CALCIUM 9.5 9.4 9.1     No results for input(s): AST, ALT, BILIDIR, BILITOT, ALKPHOS in the last 72 hours. No results for input(s): INR in the last 72 hours. No results for input(s): [place] in the last 72 hours. Urinalysis:   Lab Results   Component Value Date    BLOODU neg 02/29/2012    SPECGRAV 1.010 02/29/2012    GLUCOSEU neg 02/29/2012       Radiology:   Most recent    Chest CT      WITH CONTRAST:No results found for this or any previous visit. WITHOUT CONTRAST: No results found for this or any previous visit. CXR      2-view:   Results for orders placed during the hospital encounter of 03/16/20   XR CHEST STANDARD (2 VW)    Narrative XR CHEST (2 VW)    Exam Date/Time:  3/16/2020 8:15 AM  Clinical History:      Shortness of breath.   Comparison:  1/23/2018      RESULT:         Lungs and pleura:  Shallow inspiration with bibasilar Dyslipidemia      triamterene-hydrochlorothiazide (MAXZIDE-25) 37.5-25 MG per tablet Take 1 tablet by mouth daily  Qty: 90 tablet, Refills: 3    Associated Diagnoses: Essential hypertension, benign         STOP taking these medications       primidone (MYSOLINE) 50 MG tablet Comments:   Reason for Stopping:         amitriptyline (ELAVIL) 25 MG tablet Comments:   Reason for Stopping:             Activity: activity as tolerated           DC time 35 minutes    Signed:  Electronically signed by Fatimah Mac DO on  03/24/20   at 8:45 AM

## 2020-03-24 ENCOUNTER — APPOINTMENT (OUTPATIENT)
Dept: GENERAL RADIOLOGY | Age: 65
DRG: 377 | End: 2020-03-24
Payer: MEDICARE

## 2020-03-24 VITALS
DIASTOLIC BLOOD PRESSURE: 43 MMHG | RESPIRATION RATE: 18 BRPM | BODY MASS INDEX: 27.93 KG/M2 | HEART RATE: 99 BPM | SYSTOLIC BLOOD PRESSURE: 151 MMHG | WEIGHT: 188.6 LBS | HEIGHT: 69 IN | TEMPERATURE: 98.8 F | OXYGEN SATURATION: 96 %

## 2020-03-24 LAB
ANION GAP SERPL CALCULATED.3IONS-SCNC: 13 MEQ/L (ref 9–15)
ANISOCYTOSIS: ABNORMAL
ATYPICAL LYMPHOCYTE RELATIVE PERCENT: 2 %
BANDED NEUTROPHILS RELATIVE PERCENT: 4 %
BASOPHILS ABSOLUTE: 0.1 K/UL (ref 0–0.2)
BASOPHILS RELATIVE PERCENT: 1 %
BUN BLDV-MCNC: 7 MG/DL (ref 8–23)
CALCIUM SERPL-MCNC: 9.2 MG/DL (ref 8.5–9.9)
CHLORIDE BLD-SCNC: 94 MEQ/L (ref 95–107)
CO2: 26 MEQ/L (ref 20–31)
CREAT SERPL-MCNC: 0.85 MG/DL (ref 0.7–1.2)
EOSINOPHILS ABSOLUTE: 0.3 K/UL (ref 0–0.7)
EOSINOPHILS RELATIVE PERCENT: 3 %
GFR AFRICAN AMERICAN: >60
GFR NON-AFRICAN AMERICAN: >60
GLUCOSE BLD-MCNC: 94 MG/DL (ref 70–99)
HCT VFR BLD CALC: 23.4 % (ref 42–52)
HEMOGLOBIN: 7.5 G/DL (ref 14–18)
HYPOCHROMIA: ABNORMAL
LYMPHOCYTES ABSOLUTE: 0.9 K/UL (ref 1–4.8)
LYMPHOCYTES RELATIVE PERCENT: 8 %
MACROCYTES: ABNORMAL
MCH RBC QN AUTO: 26.9 PG (ref 27–31.3)
MCHC RBC AUTO-ENTMCNC: 32 % (ref 33–37)
MCV RBC AUTO: 84 FL (ref 80–100)
MICROCYTES: ABNORMAL
MONOCYTES ABSOLUTE: 1.2 K/UL (ref 0.2–0.8)
MONOCYTES RELATIVE PERCENT: 12.5 %
NEUTROPHILS ABSOLUTE: 6.7 K/UL (ref 1.4–6.5)
NEUTROPHILS RELATIVE PERCENT: 70 %
PDW BLD-RTO: 26.6 % (ref 11.5–14.5)
PLATELET # BLD: 329 K/UL (ref 130–400)
PLATELET SLIDE REVIEW: ADEQUATE
POIKILOCYTES: ABNORMAL
POLYCHROMASIA: ABNORMAL
POTASSIUM SERPL-SCNC: 3.5 MEQ/L (ref 3.4–4.9)
RBC # BLD: 2.78 M/UL (ref 4.7–6.1)
SODIUM BLD-SCNC: 133 MEQ/L (ref 135–144)
TARGET CELLS: ABNORMAL
VACUOLATED NEUTROPHILS: ABNORMAL
WBC # BLD: 9.1 K/UL (ref 4.8–10.8)

## 2020-03-24 PROCEDURE — 6370000000 HC RX 637 (ALT 250 FOR IP): Performed by: INTERNAL MEDICINE

## 2020-03-24 PROCEDURE — 6360000002 HC RX W HCPCS: Performed by: INTERNAL MEDICINE

## 2020-03-24 PROCEDURE — 71046 X-RAY EXAM CHEST 2 VIEWS: CPT

## 2020-03-24 PROCEDURE — 94664 DEMO&/EVAL PT USE INHALER: CPT

## 2020-03-24 PROCEDURE — 99232 SBSQ HOSP IP/OBS MODERATE 35: CPT | Performed by: INTERNAL MEDICINE

## 2020-03-24 PROCEDURE — 85025 COMPLETE CBC W/AUTO DIFF WBC: CPT

## 2020-03-24 PROCEDURE — 97116 GAIT TRAINING THERAPY: CPT

## 2020-03-24 PROCEDURE — 36415 COLL VENOUS BLD VENIPUNCTURE: CPT

## 2020-03-24 PROCEDURE — 2580000003 HC RX 258: Performed by: INTERNAL MEDICINE

## 2020-03-24 PROCEDURE — 80048 BASIC METABOLIC PNL TOTAL CA: CPT

## 2020-03-24 RX ORDER — IPRATROPIUM BROMIDE AND ALBUTEROL SULFATE 2.5; .5 MG/3ML; MG/3ML
1 SOLUTION RESPIRATORY (INHALATION) EVERY 4 HOURS PRN
Status: DISCONTINUED | OUTPATIENT
Start: 2020-03-24 | End: 2020-03-24 | Stop reason: HOSPADM

## 2020-03-24 RX ADMIN — AMOXICILLIN AND CLAVULANATE POTASSIUM 1 TABLET: 875; 125 TABLET, FILM COATED ORAL at 08:13

## 2020-03-24 RX ADMIN — METOPROLOL SUCCINATE 100 MG: 100 TABLET, EXTENDED RELEASE ORAL at 08:22

## 2020-03-24 RX ADMIN — ATORVASTATIN CALCIUM 40 MG: 40 TABLET, FILM COATED ORAL at 08:13

## 2020-03-24 RX ADMIN — FUROSEMIDE 40 MG: 10 INJECTION, SOLUTION INTRAMUSCULAR; INTRAVENOUS at 08:12

## 2020-03-24 RX ADMIN — TRAMADOL HYDROCHLORIDE 50 MG: 50 TABLET, FILM COATED ORAL at 01:37

## 2020-03-24 RX ADMIN — Medication 10 ML: at 08:24

## 2020-03-24 RX ADMIN — FERROUS SULFATE TAB 325 MG (65 MG ELEMENTAL FE) 325 MG: 325 (65 FE) TAB at 17:27

## 2020-03-24 RX ADMIN — FERROUS SULFATE TAB 325 MG (65 MG ELEMENTAL FE) 325 MG: 325 (65 FE) TAB at 08:13

## 2020-03-24 RX ADMIN — HEPARIN SODIUM 5000 UNITS: 5000 INJECTION INTRAVENOUS; SUBCUTANEOUS at 08:12

## 2020-03-24 RX ADMIN — TRAMADOL HYDROCHLORIDE 50 MG: 50 TABLET, FILM COATED ORAL at 10:19

## 2020-03-24 RX ADMIN — POTASSIUM CHLORIDE 40 MEQ: 750 TABLET, FILM COATED, EXTENDED RELEASE ORAL at 08:13

## 2020-03-24 RX ADMIN — AMLODIPINE BESYLATE 10 MG: 10 TABLET ORAL at 08:13

## 2020-03-24 RX ADMIN — PANTOPRAZOLE SODIUM 40 MG: 40 TABLET, DELAYED RELEASE ORAL at 06:18

## 2020-03-24 RX ADMIN — FOLIC ACID 2 MG: 1 TABLET ORAL at 08:13

## 2020-03-24 ASSESSMENT — PAIN SCALES - GENERAL
PAINLEVEL_OUTOF10: 4
PAINLEVEL_OUTOF10: 4
PAINLEVEL_OUTOF10: 3
PAINLEVEL_OUTOF10: 0
PAINLEVEL_OUTOF10: 4
PAINLEVEL_OUTOF10: 4

## 2020-03-24 ASSESSMENT — PAIN DESCRIPTION - DESCRIPTORS
DESCRIPTORS: SORE
DESCRIPTORS: ACHING

## 2020-03-24 ASSESSMENT — PAIN DESCRIPTION - LOCATION
LOCATION: HIP
LOCATION: CHEST

## 2020-03-24 ASSESSMENT — PAIN DESCRIPTION - FREQUENCY
FREQUENCY: INTERMITTENT
FREQUENCY: INTERMITTENT

## 2020-03-24 ASSESSMENT — PAIN DESCRIPTION - ORIENTATION: ORIENTATION: RIGHT

## 2020-03-24 NOTE — PROGRESS NOTES
so sore\"  General Comment  Comments: agreeable to tox    Pre-Session Pain Report  Pre Treatment Pain Screening  Pain at present: 4  Scale Used: Numeric Score  Intervention List: Patient able to continue with treatment  Pain Screening  Patient Currently in Pain: Yes       Post-Session Pain Report  Pain Assessment  Pain Assessment: 0-10  Pain Level: 4  Pain Location: Hip  Pain Orientation: Right  Pain Descriptors: Aching  Pain Frequency: Intermittent         OBJECTIVE   Orientation  Overall Orientation Status: Within Normal Limits    Bed mobility  Supine to Sit: Independent  Sit to Supine: Independent    Transfers  Sit to Stand: Independent  Stand to sit: Independent    Ambulation  Ambulation?: Yes  Ambulation 1  Surface: level tile  Device: No Device  Assistance: Independent  Quality of Gait: steady, reciprocal gt pattern, swings arms appropriately  Gait Deviations: Deviated path  Distance: 150 feet with turns  Comments: 100% post gt on RA    Stairs/Curb  Stairs?: No(declined)     ASSESSMENT pt tolerated session well and with mild complaint of hip pain. States he feels better but is concerned about ending up in the hospital again. sats at 100% on RA after gt on room air. pt not donning 02 when I arrived. Declined to use ww at this time. Did not need one at this time.         Discharge Recommendations:  Patient would benefit from continued therapy after discharge    Goals  Long term goals  Long term goal 1: pt to be indep with bed mobility  Long term goal 2: pt to be indep with transfers  Long term goal 3: pt to ambulate >50 ft with LRAD mod indep  Long term goal 4: pt to tolerate >10 min standing activity without SOB  Patient Goals   Patient goals : to go home    PLAN          [facility] (6 CLICK) BASIC MOBILITY  AM-PAC Inpatient Mobility Raw Score : 23     Therapy Time   Individual   Time In  1025   Time Out 1041   Minutes 16   8 minutes gt  8 minutes transfers          Katt Florence PTA, 03/24/20 at 10:45 AM Definitions for assistance levels  Independent = pt does not require any physical supervision or assistance from another person for activity completion. Device may be needed.   Stand by assistance = pt requires verbal cues or instructions from another person, close to but not touching, to perform the activity  Minimal assistance= pt performs 75% or more of the activity; assistance is required to complete the activity  Moderate assistance= pt performs 50% of the activity; assistance is required to complete the activity  Maximal assistance = pt performs 25% of the activity; assistance is required to complete the activity  Dependent = pt requires total physical assistance to accomplish the task

## 2020-03-24 NOTE — PROGRESS NOTES
Discharge instructions reviewed with patient. Patient verbalizes understanding. Tele pack removed and returned to [address] [place] [address] to left hand D/C'd. All belongings taken by patient.

## 2020-03-24 NOTE — PROGRESS NOTES
INPATIENT PROGRESS NOTES    PATIENT NAME: Renae Donaldson  MRN: [id number]  SERVICE DATE:  March 24, 2020   SERVICE TIME:  4:02 PM      PRIMARY SERVICE: Pulmonary Disease    CHIEF COMPLAIN: Shortness of breath. INTERVAL HPI: Patient seen and examined at bedside, Interval Notes, orders reviewed. Nursing notes noted  Patient is feeling better. He wants to go home. He had overnight pulse oximetry done. He has room air O2 saturation was 87% with 2 L he still has low O2 sat during sleep recommend to use 3 L during sleep. He is not having any complaint of shortness of breath. He has no complaint of cough or sputum production. OBJECTIVE    Body mass index is 27.85 kg/m². PHYSICAL EXAM:  Vitals:  BP (!) 151/43   Pulse 99   Temp 98.8 °F (37.1 °C) (Oral)   Resp 18   Ht 5' 9\" (1.753 m)   Wt 188 lb 9.6 oz (85.5 kg)   SpO2 96%   BMI 27.85 kg/m²   General:  Alert, awake . comfortable in bed, No distress. appears stated age and cooperative  Head: Atraumatic , Normocephalic   Eyes: PERRL. No sclera icterus. No conjunctival injection. No discharge   ENT: No nasal  discharge. Pharynx clear. lips, teeth, mucosa and gums are normal, tongue protrudes in the midline  Neck:  Trachea midline. No thyromegaly, no JVD, No cervical adenopathy. Chest : Bilaterally symmetrical ,Normal effort,  No accessory muscle use  Lung : . Fair BS bilateral, decreased BS at bases. No Rales. Few scattered rhonchi. Heart[de-identified] Normal  rate. Regular rhythm. No mumur ,  Rub or gallop  ABD: Non-tender. Non-distended. No masses. No organmegaly. Normal bowel sounds. No hernia.   Ext : No Pitting both leg , No Cyanosis No clubbing  Neuro: no focal weakness          DATA:   Recent Labs     03/23/20 0518 03/24/20 0521   WBC 9.8 9.1   HGB 7.2* 7.5*   HCT 22.8* 23.4*   MCV 83.3 84.0    329     Recent Labs     03/23/20 0518 03/24/20 0521   * 133*   K 3.6 3.5   CL 96 94*   CO2 23 26   BUN 8 7*   CREATININE 0.83 0.85   GLUCOSE 107* 94 appearance of improvement likely artifactual due to better respiratory effort on today's exam than on prior. Trace/small bilateral pleural effusions. No Pneumothoraces. Diffuse patchy multifocal to confluent areas of airspace disease within the right upper, right middle right lower and left lower lobes with trace to small bilateral pleural effusions    Xr Chest Standard (2 Vw)    Result Date: 3/16/2020  XR CHEST (2 VW) Exam Date/Time:  3/16/2020 8:15 AM Clinical History:      Shortness of breath. Comparison:  1/23/2018  RESULT: Lungs and pleura:  Shallow inspiration with bibasilar atelectasis/opacities. No pleural effusion. No pneumothorax. Mildly coarsened interstitial markings, accentuated by shallow inspiration. Cardiomediastinal silhouette:  Stable cardiomediastinal silhouette. Aortic atherosclerotic calcification. Other:  No acute osseous findings. Shallow inspiration with bibasilar atelectasis/opacities. Cta Chest W Wo Contrast    Result Date: 3/17/2020  EXAM: CT of the chest abdomen and pelvis with contrast History: Cardiopulmonary arrest. Pulmonary embolism. Technique: CT of the chest abdomen and pelvis was performed with contrast from the thoracic inlet through the ischial tuberosities. Multiplanar reformats were obtained. Comparison: CT chest from April 1, 2012 Findings: CHEST: Visualized portion of the thyroid gland is within normal limits. No axillary, mediastinal, or hilar lymphadenopathy. The endotracheal tube is present and terminates approximately 6 cm from the debby. An enteric tube is present within the esophagus and terminates in the lumen of the stomach. No aortic aneurysm or dissection. Atherosclerotic calcification of the thoracic aorta. No filling defect within the pulmonary arteries to suggest pulmonary embolism. Heart size is within normal limits. No significant pericardial effusion.  Esophagus is within normal limits. Moderate bilateral pleural effusions with adjacent consolidations. Patchy groundglass and nodular opacities throughout both lungs. No pneumothorax. Acute minimally displaced fracture of right ribs 4 anteriorly. Degenerative changes of the spine. ABDOMEN/PELVIS: Well-circumscribed tiny hypodensities of the liver most likely related to hepatic cysts. The liver is otherwise unremarkable. The gallbladder, spleen, stomach, pancreas, and adrenal glands are within normal limits. The kidneys enhance uniformly. 3 mm left renal calculus versus renal vascular calcification. No right-sided urinary tract calculi. No hydronephrosis. Urinary bladder is decompressed around a Brice catheter, findings which are poorly evaluated secondary to streak artifact from left total hip arthroplasty prosthesis. Abdominal aorta is nonaneurysmal and demonstrates atherosclerotic calcification. No retroperitoneal or abdominal/pelvic lymphadenopathy. No small bowel obstruction. Colonic diverticuli are identified, most significantly involving the distal descending colon. No overt colonic mass or pericolonic inflammation. Appendix is within normal limits. No free fluid or free air. Degenerative changes of the spine. Postsurgical changes of lumbar spine fusion with posterior decompression at L3-L5. There is sclerosis of the superior humeral head compatible with avascular necrosis without collapse. No pulmonary embolism. Moderate bilateral pleural effusions with adjacent consolidations. Groundglass and nodular densities throughout both lungs may be infectious/ inflammatory in etiology or due to pulmonary edema. Acute minimally displaced fracture of anterior right ribs 4. All CT scans at this facility use dose modulation, iterative reconstruction, and/or weight based dosing when appropriate to reduce radiation dose to as low as reasonably achievable.      Ct Abdomen Pelvis W Iv Contrast Additional Contrast? None    Result Date: descending colon. No overt colonic mass or pericolonic inflammation. Appendix is within normal limits. No free fluid or free air. Degenerative changes of the spine. Postsurgical changes of lumbar spine fusion with posterior decompression at L3-L5. There is sclerosis of the superior humeral head compatible with avascular necrosis without collapse. No pulmonary embolism. Moderate bilateral pleural effusions with adjacent consolidations. Groundglass and nodular densities throughout both lungs may be infectious/ inflammatory in etiology or due to pulmonary edema. Acute minimally displaced fracture of anterior right ribs 4. All CT scans at this facility use dose modulation, iterative reconstruction, and/or weight based dosing when appropriate to reduce radiation dose to as low as reasonably achievable. Xr Chest Portable    Result Date: 3/22/2020  XR CHEST PORTABLE : 3/22/2020 CLINICAL HISTORY:  PNA Effusion. COMPARISON: Portable chest 3/21/2020 and chest CTA 3/17/2020. TECHNIQUE: A portable upright AP radiograph of the chest was obtained. FINDINGS: Moderately severe pulmonary infiltrate/consolidations worsening inferiorly with small to moderate-sized pleural effusions have not significantly changed, worse on the left. The heart remains borderline enlarged. There is no pneumothorax, or other significant changes identified. The nondisplaced anterior right fourth rib fracture noted on the recent chest CTA is not identified. STABLE CHEST FROM YESTERDAY. Xr Chest Portable    Result Date: 3/21/2020  XR CHEST PORTABLE : 3/21/2020 CLINICAL HISTORY:  PNA Effusion . COMPARISON: Portable chest 3/20/2020 and chest CTA 3/17/2020. TECHNIQUE: A portable upright AP radiograph of the chest was obtained. FINDINGS: Moderately severe pulmonary infiltrate/consolidations worsening inferiorly with small to moderate-sized pleural effusions have not significantly changed. The heart remains borderline enlarged.  There is no pneumothorax, or other significant changes identified. The nondisplaced anterior right fourth rib fracture noted on the recent CT is not identified. STABLE CHEST FROM YESTERDAY. Xr Chest Portable    Result Date: 3/20/2020  EXAMINATION: XR CHEST PORTABLE CLINICAL HISTORY: PNEUMONIA. EFFUSION. COMPARISONS: MARCH 19, 2020 FINDINGS: Osseous structures intact. Cardiopericardial silhouette normal. Aorta calcified. Bilateral diffuse airspace disease right greater than left, again identified, and essentially unchanged given differences in technique. Blunting left costophrenic angle. BILATERAL ATELECTASIS/PNEUMONIA VERSUS EDEMA WITH SMALL LEFT EFFUSION, UNCHANGED. Xr Chest Portable    Result Date: 3/19/2020  EXAMINATION: XR CHEST PORTABLE CLINICAL HISTORY: PNEUMONIA, EFFUSION COMPARISONS: CHEST RADIOGRAPH MARCH 18, 2020, CT CHEST, MARCH 17, 2020 FINDINGS: Endotracheal tube, nasogastric tube is been removed. Osseous structures intact. Cardiopericardial silhouette normal. Pulmonary vasculature normal. Ill-defined areas increase opacity bilateral lungs, greatest in lower lung zones. Blunting costophrenic angles. BILATERAL PNEUMONIA/EDEMA WITH SMALL BILATERAL PLEURAL EFFUSIONS    Xr Chest Portable    Result Date: 3/18/2020  XR CHEST PORTABLE : 3/18/2020 CLINICAL HISTORY:  PNA Effusion . Respiratory failure and intubated. COMPARISON: Portable chest and chest CTA 3/17/2020. Two portable upright AP radiographs of the chest were obtained. FINDINGS: Endotracheal and orogastric tubes remain in good position. Small to moderate-sized pleural effusions and moderate infiltrate/consolidation predominantly of the mid to lower lung fields is mildly evolving, greater on the left. There is no pneumothorax or other significant changes identified. The heart remains mildly enlarged, exaggerated by technique. MILDLY EVOLVING PREDOMINANTLY MID TO LOWER LUNG FIELD PULMONARY INFILTRATES. OTHERWISE, STABLE CHEST FROM YESTERDAY. Xr Chest Portable    Result Date: 3/17/2020  Portable chest radiograph History: Intubation. Dyspnea. Technique: AP portable view of the chest obtained. Comparison: Portable chest radiograph from earlier on the same date. Findings: Interval placement of endotracheal tube with its tip terminating at the level of the clavicles. Enteric tube is present and traverses the diaphragm however its distal tip is not visualized as it is outside of the field of view. Atherosclerotic calcification of the thoracic aorta. Heart size is mildly enlarged. Interval worsening of bilateral pulmonary opacities most significant within the lung bases. No pneumothorax. Small bilateral pleural effusions. Bones of the thorax appear intact. Interval placement of endotracheal tube with its tip terminating at the level of the clavicles. Interval development of bilateral lung infiltrates. Xr Chest Portable    Result Date: 3/17/2020  Patient MRN: [id number] : 1955 Age:  71 years Gender: Male Order Date: 3/17/2020 5:15 AM. Exam: XR CHEST PORTABLE Number of Views: 1 Indication:  Shortness of breath, evaluate for infiltrate, dyspnea Comparison: 3/16/2020 Findings: Stable, prominent cardiomediastinal silhouette with underlying mild pulmonary edema and bibasilar airspace disease. Vascular calcifications thoracic aorta. No pneumothorax. Small right pleural effusion. Impression:  1. Stable, prominent cardiomediastinal silhouette with underlying mild pulmonary edema. 2. Small right pleural effusion. 3. Nonspecific bibasilar airspace disease, findings can be seen in infiltrate/pneumonia and/or atelectasis. IMPRESSION AND SUGGESTION:  1. Acute hypoxic and hypercapnic respiratory failure, resolved  2. Nocturnal hypoxia possible sleep apnea  3. Status post cardiac arrest, recovered quickly with intubation  4. Aspiration pneumonia with retained airway secretion, improved  5.  Rib fracture associated with a CPR  6. Anemia  7. Hypertension    He had a chest x-ray done today shows multifocal infiltration/pneumonia right upper middle and lower lung. Patient is afebrile WBC is within normal range could be due to edema. Discussed with Dr. Tyler cortez to discharge with home O2. Follow-up in office 2-week. Sleep study as outpatient.        Electronically signed by Lorena Irene MD, FCCP on 3/24/2020 at 4:02 PM

## 2020-03-24 NOTE — PROGRESS NOTES
[facility] EMERGENCY [facility] AT [place] Respiratory Therapy Evaluation   Current Order:  duoneb tid and albuterol q2prn  Home Regimen: no    Ordering Physician: linda  Re-evaluation Date:  3/27    Diagnosis: anemia   Patient Status: Stable / Unstable + Physician notified    The following MDI Criteria must be met in order to convert aerosol to MDI with spacer. If unable to meet, MDI will be converted to aerosol:  []  Patient able to demonstrate the ability to use MDI effectively  []  Patient alert and cooperative  []  Patient able to take deep breath with 5-10 second hold  []  Medication(s) available in this delivery method   []  Peak flow greater than or equal to 200 ml/min            Current Order Substituted To  (same drug, same frequency)   Aerosol to MDI [] Albuterol Sulfate 0.083% unit dose by aerosol Albuterol Sulfate MDI 2 puffs by inhalation with spacer    [] Levalbuterol 1.25 mg unit dose by aerosol Levalbuterol MDI 2 puffs by inhalation with spacer    [] Levalbuterol 0.63 mg unit dose by aerosol Levalbuterol MDI 2 puffs by inhalation with spacer    [] Ipratropium Bromide 0.02% unit dose by aerosol Ipratropium Bromide MDI 2 puffs by inhalation with spacer    [] Duoneb (Ipratropium + Albuterol) unit dose by aerosol Ipratropium MDI + Albuterol MDI 2 puffs by inhalation w/spacer   MDI to Aerosol [] Albuterol Sulfate MDI Albuterol Sulfate 0.083% unit dose by aerosol    [] Levalbuterol MDI 2 puffs by inhalation Levalbuterol 1.25 mg unit dose by aerosol    [] Ipratropium Bromide MDI by inhalation Ipratropium Bromide 0.02% unit dose by aerosol    [] Combivent (Ipratropium + Albuterol) MDI by inhalation Duoneb (Ipratropium + Albuterol) unit dose by aerosol   Treatment Assessment [Frequency/Schedule]:  Change frequency to: ___DUONEB Q4PRN______________________________________________per Protocol, P&T, MEC      Points 0 1 2 3 4   Pulmonary Status  Non-Smoker  []   Smoking history   < 20 pack years  []   Smoking history  ?  20 pack years  []

## 2020-03-25 ENCOUNTER — CARE COORDINATION (OUTPATIENT)
Dept: CASE MANAGEMENT | Age: 65
End: 2020-03-25

## 2020-03-25 NOTE — CARE COORDINATION
[facility] Trans[facility] Initial Follow Up Call    Call within 2 business days of discharge: Yes    Patient: Renae Donaldson Patient : 1955   MRN: [id number]  Reason for Admission: 3/16-3/24/2020 ED [facility] IP Anemia, GIB, Asp PN, Cardiopulmonary Arrest.  Discharge Date: 3/24/20 RARS: Readmission Risk Score: 16  Risk 3%  PHP Plan: [facility]  PCP: Lorena Mccormick MD  NR    Last Discharge [employer]       Complaint Diagnosis Description Type Department Provider    3/16/20 Shortness of Breath Dyspnea, unspecified type . .. ED to Hosp-Admission (Discharged) (ADMITTED) CARLTON Steinberg DO; Johny Marshall. .. Facility: [place]    PCP VV 3/30 10:45    START taking:  albuterol (PROVENTIL)  amLODIPine (NORVASC)  amoxicillin-clavulanate (AUGMENTIN)  pantoprazole (PROTONIX)  STOP taking:  amitriptyline 25 MG tablet (ELAVIL)    Non-face-to-face services provided:  Care transitions has made initial call attempt to reach for DC/Med review and Covid-19 Screening.       Dread Shelby RN

## 2020-03-26 ENCOUNTER — CARE COORDINATION (OUTPATIENT)
Dept: CASE MANAGEMENT | Age: 65
End: 2020-03-26

## 2020-03-26 NOTE — CARE COORDINATION
[facility] Initial Follow Up Call    Call within 2 business days of discharge: Yes    Patient: Kendrick Martínez Patient : 1955   MRN: [id number]  Reason for Admission: 3/16-3/24/2020 ED [facility] IP Anemia, GIB, Asp PN, Cardiopulmonary Arrest.  Risk 3%  PHP Plan: [facility]  PCP: Priyanka Ovalle MD  NR  Discharge Date: 3/24/20 RARS: Readmission Risk Score: 16      Last Discharge [address] [address]       Complaint Diagnosis Description Type Department Provider    3/16/20 Shortness of Breath Dyspnea, unspecified type . .. ED to Hosp-Admission (Discharged) (ADMITTED) CARLTON Lamar DO; Rowan John. .. Facility: [facility]     PCP  3/30 10:45     START taking:  albuterol (PROVENTIL)  amLODIPine (NORVASC)  amoxicillin-clavulanate (AUGMENTIN)  pantoprazole (PROTONIX)  STOP taking:  amitriptyline 25 MG tablet (ELAVIL)    Non-face-to-face services provided:  Assistance in accessing community resources-Care transitions has made two call attempts for DC/Med review and CV-19 Screening.       Follow Up  Future Appointments   Date Time Provider Department Center   3/30/2020 10:45 AM Jett Bosch MD [facility]   6/3/2020  2:45 PM MD Jamila Callaway   2020 10:15 AM Jett Bosch  [facility]

## 2020-03-30 ENCOUNTER — VIRTUAL VISIT (OUTPATIENT)
Dept: FAMILY MEDICINE CLINIC | Age: 65
End: 2020-03-30
Payer: MEDICARE

## 2020-03-30 PROCEDURE — 99215 OFFICE O/P EST HI 40 MIN: CPT | Performed by: FAMILY MEDICINE

## 2020-03-30 RX ORDER — METOPROLOL SUCCINATE 100 MG/1
100 TABLET, EXTENDED RELEASE ORAL DAILY
Qty: 90 TABLET | Refills: 1 | Status: SHIPPED | OUTPATIENT
Start: 2020-03-30 | End: 2020-12-03

## 2020-03-30 RX ORDER — HYDROCODONE BITARTRATE AND ACETAMINOPHEN 7.5; 325 MG/1; MG/1
1 TABLET ORAL NIGHTLY PRN
Qty: 14 TABLET | Refills: 0 | Status: SHIPPED | OUTPATIENT
Start: 2020-03-30 | End: 2020-04-13

## 2020-03-30 RX ORDER — HYDROCODONE BITARTRATE AND ACETAMINOPHEN 7.5; 325 MG/1; MG/1
1 TABLET ORAL NIGHTLY PRN
Qty: 14 TABLET | Refills: 0 | Status: SHIPPED | OUTPATIENT
Start: 2020-03-30 | End: 2020-03-30 | Stop reason: SDUPTHER

## 2020-03-30 RX ORDER — TRIAMTERENE AND HYDROCHLOROTHIAZIDE 37.5; 25 MG/1; MG/1
1 TABLET ORAL DAILY
Qty: 90 TABLET | Refills: 3 | Status: SHIPPED | OUTPATIENT
Start: 2020-03-30 | End: 2020-05-26 | Stop reason: SDUPTHER

## 2020-03-30 NOTE — PROGRESS NOTES
Patient: Hugo Chavez    YOB: 1955    Date: 3/30/20    Chief Complaint   Patient presents with    Anemia       Patient Active Problem List    Diagnosis Date Noted    Cardiac arrest [facility])     [facility]     Acute respiratory failure with hypoxia and hypercapnia ([facility]     Dyspnea     Anemia 03/16/2020    GI bleed 03/16/2020    Tremors of nervous system 03/09/2020    Dyslipidemia 06/28/2017    Obstructive chronic bronchitis without exacerbation ([facility] 06/27/2017    Chronic back pain     Insomnia     Essential hypertension, benign 09/09/2004    Lumbosacral spondylosis without myelopathy 03/03/2004    Sprain of lumbar region 02/18/2004    Degeneration of lumbar or lumbosacral intervertebral disc 02/18/2004    Displacement of lumbar intervertebral disc without myelopathy 02/18/2004       No Known Allergies    There were no vitals filed for this visit. There is no height or weight on file to calculate BMI. HPI PHE has not smoked or drank alcohol in 2 months, t hospitalized for severe anemia from an unknown GI sour se. EGD neg. Pt feeling well. Reviewed meds and put in refills  Stools negative. Ribs fractured with cardiac arrest resuscitation. Discussion covered recent testing and health maintanence needs, medical reconciliation, review of current or updated symptoms and review of allergies. Discussion took 28 minutes. Review of Systems    Constitutional: Negative for fatigue, fever and sweats. HEENT: Negative for eye discharge and vision loss. Negative for ear drainage, hearing loss and nasal drainage. Respiratory: Negative for cough, dyspnea and wheezing. Cardiovascular:  Negative for chest pain, claudication and irregular heartbeat/palpitations. Gastrointestinal: Negative for abdominal pain, nausea, vomiting, constipation and diarrhea. Genitourinary: No dysuria or penile discharge.   Metabolic/Endocrine: Negative for cold intolerance, days. Intended supply: 7 days. Take lowest dose possible to manage pain 14 tablet 0    albuterol (PROVENTIL) (2.5 MG/3ML) 0.083% nebulizer solution Take 3 mLs by nebulization every 2 hours as needed for Wheezing 120 each 3    amLODIPine (NORVASC) 10 MG tablet Take 1 tablet by mouth daily 30 tablet 0    amoxicillin-clavulanate (AUGMENTIN) 875-125 MG per tablet Take 1 tablet by mouth every 12 hours for 10 days 20 tablet 0    pantoprazole (PROTONIX) 40 MG tablet Take 1 tablet by mouth every morning (before breakfast) 30 tablet 3    traZODone (DESYREL) 50 MG tablet take 1 tablet by mouth NIGHTLY 90 tablet 1    atorvastatin (LIPITOR) 40 MG tablet Take 1 tablet by mouth daily 90 tablet 1     No current facility-administered medications for this visit. Orders Placed This Encounter   Procedures   Devonte Rodriguez MD, Gastroenterology, [facility]     Referral Priority:   Routine     Referral Type:   Eval and Treat     Referral Reason:   Specialty Services Required     Referred to Provider:   Jocelyne Masters MD     Requested Specialty:   Gastroenterology     Number of Visits Requested:   [facility], [place] [facility]     Referral Priority:   Routine     Referral Type:   Eval and Treat     Referral Reason:   Specialty Services Required     Requested Specialty:   Cardiology     Number of Visits Requested:   1       Orders Placed This Encounter   Medications    triamterene-hydrochlorothiazide (MAXZIDE-25) 37.5-25 MG per tablet     Sig: Take 1 tablet by mouth daily     Dispense:  90 tablet     Refill:  3    metoprolol succinate (TOPROL XL) 100 MG extended release tablet     Sig: Take 1 tablet by mouth daily     Dispense:  90 tablet     Refill:  1    HYDROcodone-acetaminophen (NORCO) 7.5-325 MG per tablet     Sig: Take 1 tablet by mouth nightly as needed for Pain for up to 14 days. Intended supply: 7 days.  Take lowest dose possible to manage pain     Dispense:  14 tablet     Refill:  0     Reduce doses taken as

## 2020-03-31 ENCOUNTER — VIRTUAL VISIT (OUTPATIENT)
Dept: GASTROENTEROLOGY | Age: 65
End: 2020-03-31
Payer: MEDICARE

## 2020-03-31 PROCEDURE — G8482 FLU IMMUNIZE ORDER/ADMIN: HCPCS | Performed by: SPECIALIST

## 2020-03-31 PROCEDURE — 1111F DSCHRG MED/CURRENT MED MERGE: CPT | Performed by: SPECIALIST

## 2020-03-31 PROCEDURE — 4004F PT TOBACCO SCREEN RCVD TLK: CPT | Performed by: SPECIALIST

## 2020-03-31 PROCEDURE — 4040F PNEUMOC VAC/ADMIN/RCVD: CPT | Performed by: SPECIALIST

## 2020-03-31 PROCEDURE — 1123F ACP DISCUSS/DSCN MKR DOCD: CPT | Performed by: SPECIALIST

## 2020-03-31 PROCEDURE — G8417 CALC BMI ABV UP PARAM F/U: HCPCS | Performed by: SPECIALIST

## 2020-03-31 PROCEDURE — 3017F COLORECTAL CA SCREEN DOC REV: CPT | Performed by: SPECIALIST

## 2020-03-31 PROCEDURE — 99213 OFFICE O/P EST LOW 20 MIN: CPT | Performed by: SPECIALIST

## 2020-03-31 PROCEDURE — G8427 DOCREV CUR MEDS BY ELIG CLIN: HCPCS | Performed by: SPECIALIST

## 2020-03-31 ASSESSMENT — ENCOUNTER SYMPTOMS
ANAL BLEEDING: 0
SHORTNESS OF BREATH: 1
NAUSEA: 0
ABDOMINAL PAIN: 0
ABDOMINAL DISTENTION: 0
CONSTIPATION: 0
RECTAL PAIN: 0
GASTROINTESTINAL NEGATIVE: 1
BLOOD IN STOOL: 0
VOMITING: 0
EYES NEGATIVE: 1
DIARRHEA: 0

## 2020-04-04 DIAGNOSIS — K92.2 GASTROINTESTINAL HEMORRHAGE, UNSPECIFIED GASTROINTESTINAL HEMORRHAGE TYPE: ICD-10-CM

## 2020-04-04 LAB
ANISOCYTOSIS: ABNORMAL
BASOPHILS ABSOLUTE: 0.1 K/UL (ref 0–0.2)
BASOPHILS RELATIVE PERCENT: 1.5 %
EOSINOPHILS ABSOLUTE: 0 K/UL (ref 0–0.7)
EOSINOPHILS RELATIVE PERCENT: 0.6 %
FOLATE: 6.2 NG/ML (ref 7.3–26.1)
HCT VFR BLD CALC: 30.2 % (ref 42–52)
HEMOGLOBIN: 9.6 G/DL (ref 14–18)
HYPOCHROMIA: ABNORMAL
IRON SATURATION: 26 % (ref 11–46)
IRON: 75 UG/DL (ref 59–158)
LYMPHOCYTES ABSOLUTE: 2.2 K/UL (ref 1–4.8)
LYMPHOCYTES RELATIVE PERCENT: 29.9 %
MCH RBC QN AUTO: 27.9 PG (ref 27–31.3)
MCHC RBC AUTO-ENTMCNC: 31.7 % (ref 33–37)
MCV RBC AUTO: 87.9 FL (ref 80–100)
MONOCYTES ABSOLUTE: 0.8 K/UL (ref 0.2–0.8)
MONOCYTES RELATIVE PERCENT: 10.4 %
NEUTROPHILS ABSOLUTE: 4.2 K/UL (ref 1.4–6.5)
NEUTROPHILS RELATIVE PERCENT: 57.6 %
PDW BLD-RTO: 31 % (ref 11.5–14.5)
PLATELET # BLD: 735 K/UL (ref 130–400)
PLATELET SLIDE REVIEW: ABNORMAL
POIKILOCYTES: ABNORMAL
POLYCHROMASIA: ABNORMAL
RBC # BLD: 3.44 M/UL (ref 4.7–6.1)
SLIDE REVIEW: ABNORMAL
TOTAL IRON BINDING CAPACITY: 292 UG/DL (ref 178–450)
VITAMIN B-12: 503 PG/ML (ref 232–1245)
WBC # BLD: 7.3 K/UL (ref 4.8–10.8)

## 2020-04-07 RX ORDER — FOLIC ACID 1 MG/1
1 TABLET ORAL DAILY
Qty: 90 TABLET | Refills: 1 | Status: SHIPPED | OUTPATIENT
Start: 2020-04-07 | End: 2020-10-08 | Stop reason: SDUPTHER

## 2020-04-13 ENCOUNTER — VIRTUAL VISIT (OUTPATIENT)
Dept: FAMILY MEDICINE CLINIC | Age: 65
End: 2020-04-13
Payer: MEDICARE

## 2020-04-13 PROCEDURE — 1123F ACP DISCUSS/DSCN MKR DOCD: CPT | Performed by: FAMILY MEDICINE

## 2020-04-13 PROCEDURE — 4040F PNEUMOC VAC/ADMIN/RCVD: CPT | Performed by: FAMILY MEDICINE

## 2020-04-13 PROCEDURE — G8428 CUR MEDS NOT DOCUMENT: HCPCS | Performed by: FAMILY MEDICINE

## 2020-04-13 PROCEDURE — 3017F COLORECTAL CA SCREEN DOC REV: CPT | Performed by: FAMILY MEDICINE

## 2020-04-13 PROCEDURE — 99213 OFFICE O/P EST LOW 20 MIN: CPT | Performed by: FAMILY MEDICINE

## 2020-04-13 PROCEDURE — 1111F DSCHRG MED/CURRENT MED MERGE: CPT | Performed by: FAMILY MEDICINE

## 2020-04-13 RX ORDER — FOLIC ACID 1 MG/1
1 TABLET ORAL DAILY
Qty: 90 TABLET | Refills: 1 | Status: SHIPPED | OUTPATIENT
Start: 2020-04-13 | End: 2020-04-22 | Stop reason: SDUPTHER

## 2020-04-14 ENCOUNTER — CARE COORDINATION (OUTPATIENT)
Dept: CASE MANAGEMENT | Age: 65
End: 2020-04-14

## 2020-04-14 NOTE — CARE COORDINATION
COVID-19 Screening Follow-up Note    Patient contacted regarding subsequent COVID-19 risk and screening. Request for call back to P: [phone]. Patient has following risk factors of: Smoker, COPD, h/o Resp Failure.

## 2020-04-20 ENCOUNTER — TELEPHONE (OUTPATIENT)
Dept: ADMINISTRATIVE | Age: 65
End: 2020-04-20

## 2020-04-22 ENCOUNTER — OFFICE VISIT (OUTPATIENT)
Dept: FAMILY MEDICINE CLINIC | Age: 65
End: 2020-04-22
Payer: MEDICARE

## 2020-04-22 VITALS
WEIGHT: 182 LBS | TEMPERATURE: 98.2 F | OXYGEN SATURATION: 96 % | DIASTOLIC BLOOD PRESSURE: 60 MMHG | SYSTOLIC BLOOD PRESSURE: 132 MMHG | BODY MASS INDEX: 26.96 KG/M2 | HEIGHT: 69 IN | RESPIRATION RATE: 16 BRPM | HEART RATE: 88 BPM

## 2020-04-22 PROCEDURE — 4004F PT TOBACCO SCREEN RCVD TLK: CPT | Performed by: FAMILY MEDICINE

## 2020-04-22 PROCEDURE — G8417 CALC BMI ABV UP PARAM F/U: HCPCS | Performed by: FAMILY MEDICINE

## 2020-04-22 PROCEDURE — 1123F ACP DISCUSS/DSCN MKR DOCD: CPT | Performed by: FAMILY MEDICINE

## 2020-04-22 PROCEDURE — 4040F PNEUMOC VAC/ADMIN/RCVD: CPT | Performed by: FAMILY MEDICINE

## 2020-04-22 PROCEDURE — 1111F DSCHRG MED/CURRENT MED MERGE: CPT | Performed by: FAMILY MEDICINE

## 2020-04-22 PROCEDURE — 20610 DRAIN/INJ JOINT/BURSA W/O US: CPT | Performed by: FAMILY MEDICINE

## 2020-04-22 PROCEDURE — 3017F COLORECTAL CA SCREEN DOC REV: CPT | Performed by: FAMILY MEDICINE

## 2020-04-22 PROCEDURE — 99213 OFFICE O/P EST LOW 20 MIN: CPT | Performed by: FAMILY MEDICINE

## 2020-04-22 PROCEDURE — G8427 DOCREV CUR MEDS BY ELIG CLIN: HCPCS | Performed by: FAMILY MEDICINE

## 2020-04-22 RX ORDER — METHYLPREDNISOLONE ACETATE 40 MG/ML
40 INJECTION, SUSPENSION INTRA-ARTICULAR; INTRALESIONAL; INTRAMUSCULAR; SOFT TISSUE ONCE
Status: COMPLETED | OUTPATIENT
Start: 2020-04-22 | End: 2020-04-22

## 2020-04-22 RX ADMIN — METHYLPREDNISOLONE ACETATE 40 MG: 40 INJECTION, SUSPENSION INTRA-ARTICULAR; INTRALESIONAL; INTRAMUSCULAR; SOFT TISSUE at 13:17

## 2020-04-22 ASSESSMENT — PATIENT HEALTH QUESTIONNAIRE - PHQ9
1. LITTLE INTEREST OR PLEASURE IN DOING THINGS: 0
SUM OF ALL RESPONSES TO PHQ QUESTIONS 1-9: 0
SUM OF ALL RESPONSES TO PHQ QUESTIONS 1-9: 0
2. FEELING DOWN, DEPRESSED OR HOPELESS: 0
SUM OF ALL RESPONSES TO PHQ9 QUESTIONS 1 & 2: 0

## 2020-04-22 NOTE — PROGRESS NOTES
Patient: Lisandra Overton    YOB: 1955    Date: 4/22/20    Chief Complaint   Patient presents with    Knee Pain     He complains of having right knee pain, pain worse in the last week. Patient Active Problem List    Diagnosis Date Noted    Cardiac arrest [facility])     [facility]     Acute respiratory failure with hypoxia and hypercapnia ([facility]     Dyspnea     Anemia 03/16/2020    GI bleed 03/16/2020    Tremors of nervous system 03/09/2020    Dyslipidemia 06/28/2017    Obstructive chronic bronchitis without exacerbation (HCC) 06/27/2017    Chronic back pain     Insomnia     Essential hypertension, benign 09/09/2004    Lumbosacral spondylosis without myelopathy 03/03/2004    Sprain of lumbar region 02/18/2004    Degeneration of lumbar or lumbosacral intervertebral disc 02/18/2004    Displacement of lumbar intervertebral disc without myelopathy 02/18/2004       No Known Allergies    Vitals:    04/22/20 1243 04/22/20 1255   BP: (!) 146/60 132/60   Site: Right Upper Arm    Position: Sitting    Cuff Size: Large Adult    Pulse: 88    Resp: 16    Temp: 98.2 °F (36.8 °C)    TempSrc: Oral    SpO2: 96%    Weight: 182 lb (82.6 kg)    Height: 5' 9\" (1.753 m)      Body mass index is 26.88 kg/m². PHQ Scores 4/22/2020 2/11/2019 6/27/2017   PHQ2 Score 0 0 0   PHQ9 Score 0 0 0     Interpretation of Total Score Depression Severity: 1-4 = Minimal depression, 5-9 = Mild depression, 10-14 = Moderate depression, 15-19 = Moderately severe depression, 20-27 = Severe depression    HPI    He comes in with right knee pain is been worsening over the last few months. No swelling or redness it is not giving way or locking and has had no injury P. No previous injury. He has no headache nausea fever chills vomiting or malaise. He did just get out of the hospital and has been following up on that as well.   Normally we would get an x-ray on him and consider some therapy but at this

## 2020-05-13 ENCOUNTER — TELEPHONE (OUTPATIENT)
Dept: CARDIOLOGY CLINIC | Age: 65
End: 2020-05-13

## 2020-05-13 ENCOUNTER — VIRTUAL VISIT (OUTPATIENT)
Dept: CARDIOLOGY CLINIC | Age: 65
End: 2020-05-13
Payer: MEDICARE

## 2020-05-13 PROCEDURE — 3017F COLORECTAL CA SCREEN DOC REV: CPT | Performed by: INTERNAL MEDICINE

## 2020-05-13 PROCEDURE — 1123F ACP DISCUSS/DSCN MKR DOCD: CPT | Performed by: INTERNAL MEDICINE

## 2020-05-13 PROCEDURE — 4040F PNEUMOC VAC/ADMIN/RCVD: CPT | Performed by: INTERNAL MEDICINE

## 2020-05-13 PROCEDURE — G8428 CUR MEDS NOT DOCUMENT: HCPCS | Performed by: INTERNAL MEDICINE

## 2020-05-13 PROCEDURE — 99204 OFFICE O/P NEW MOD 45 MIN: CPT | Performed by: INTERNAL MEDICINE

## 2020-05-13 ASSESSMENT — ENCOUNTER SYMPTOMS
WHEEZING: 0
COUGH: 0
STRIDOR: 0
CHEST TIGHTNESS: 0
RESPIRATORY NEGATIVE: 1
NAUSEA: 0
EYES NEGATIVE: 1
BLOOD IN STOOL: 0
SHORTNESS OF BREATH: 0
GASTROINTESTINAL NEGATIVE: 1

## 2020-05-13 NOTE — PROGRESS NOTES
BMI, Stop Smoking, Low Salt Diet, Take Precautions to Prevent Falls and Walk Daily    Return in about 1 month (around 6/13/2020). Pursuant to the emergency declaration under the [address] [place] [address] waiver authority and the [employer] Act, this Virtual Visit was conducted, with patient's consent, to reduce the patient's risk of exposure to COVID-19 and provide continuity of care for an established patient. Services were provided through a video synchronous discussion virtually to substitute for in-person clinic visit. Visit completed using [employer] me. Patient was located at home and I was located in the clinic.   Electronically signed by Hilda Jones MD on 5/13/2020 at 2:54 PM

## 2020-05-19 ENCOUNTER — TELEPHONE (OUTPATIENT)
Dept: FAMILY MEDICINE CLINIC | Age: 65
End: 2020-05-19

## 2020-05-22 RX ORDER — AMLODIPINE BESYLATE 10 MG/1
10 TABLET ORAL DAILY
Qty: 90 TABLET | Refills: 3 | Status: SHIPPED | OUTPATIENT
Start: 2020-05-22 | End: 2021-05-06 | Stop reason: SDUPTHER

## 2020-05-26 ENCOUNTER — OFFICE VISIT (OUTPATIENT)
Dept: FAMILY MEDICINE CLINIC | Age: 65
End: 2020-05-26
Payer: MEDICARE

## 2020-05-26 VITALS
BODY MASS INDEX: 27.25 KG/M2 | TEMPERATURE: 98.6 F | HEIGHT: 69 IN | HEART RATE: 128 BPM | SYSTOLIC BLOOD PRESSURE: 170 MMHG | DIASTOLIC BLOOD PRESSURE: 78 MMHG | WEIGHT: 184 LBS | RESPIRATION RATE: 16 BRPM | OXYGEN SATURATION: 97 %

## 2020-05-26 PROCEDURE — G8417 CALC BMI ABV UP PARAM F/U: HCPCS | Performed by: FAMILY MEDICINE

## 2020-05-26 PROCEDURE — 99214 OFFICE O/P EST MOD 30 MIN: CPT | Performed by: FAMILY MEDICINE

## 2020-05-26 PROCEDURE — 4004F PT TOBACCO SCREEN RCVD TLK: CPT | Performed by: FAMILY MEDICINE

## 2020-05-26 PROCEDURE — G8427 DOCREV CUR MEDS BY ELIG CLIN: HCPCS | Performed by: FAMILY MEDICINE

## 2020-05-26 PROCEDURE — 4040F PNEUMOC VAC/ADMIN/RCVD: CPT | Performed by: FAMILY MEDICINE

## 2020-05-26 PROCEDURE — 3017F COLORECTAL CA SCREEN DOC REV: CPT | Performed by: FAMILY MEDICINE

## 2020-05-26 PROCEDURE — 1123F ACP DISCUSS/DSCN MKR DOCD: CPT | Performed by: FAMILY MEDICINE

## 2020-05-26 RX ORDER — TRIAMTERENE AND HYDROCHLOROTHIAZIDE 37.5; 25 MG/1; MG/1
1 TABLET ORAL DAILY
Qty: 90 TABLET | Refills: 3 | Status: SHIPPED | OUTPATIENT
Start: 2020-05-26 | End: 2021-04-11

## 2020-05-26 NOTE — PROGRESS NOTES
Patient: Sarah Polanco    YOB: 1955    Date: 5/26/20    Chief Complaint   Patient presents with    Hypertension     6 month follow up. He is not sure about his medications. I have ask him to bring all medications to his next appointment.  Hyperlipidemia     6 month follow up.  Knee Pain     He complains of having right knee pain.  Hip Pain     He complains of having left hip pain.  Shoulder Pain     He complains of burning in his left shoulder for one month       Patient Active Problem List    Diagnosis Date Noted    Smoker unmotivated to quit     Alcohol abuse     [facility] arrest [facility]     Aspiration pneumonia of both lungs ([facility]     Acute respiratory failure with hypoxia and hypercapnia (HCC)     Dyspnea     Anemia 03/16/2020    GI bleed 03/16/2020    Tremors of nervous system 03/09/2020    Dyslipidemia 06/28/2017    Obstructive chronic bronchitis without exacerbation (HCC) 06/27/2017    Chronic back pain     Insomnia     Essential hypertension, benign 09/09/2004    Lumbosacral spondylosis without myelopathy 03/03/2004    Sprain of lumbar region 02/18/2004    Degeneration of lumbar or lumbosacral intervertebral disc 02/18/2004    Displacement of lumbar intervertebral disc without myelopathy 02/18/2004       No Known Allergies    Vitals:    05/26/20 1531 05/26/20 1537   BP: (!) 178/78 (!) 170/78   Site: Right Upper Arm Right Upper Arm   Position: Sitting Sitting   Cuff Size: Large Adult Large Adult   Pulse: 128    Resp: 16    Temp: 98.6 °F (37 °C)    TempSrc: Oral    SpO2: 97%    Weight: 184 lb (83.5 kg)    Height: 5' 9\" (1.753 m)      Body mass index is 27.17 kg/m².   PHQ Scores 4/22/2020 2/11/2019 6/27/2017   PHQ2 Score 0 0 0   PHQ9 Score 0 0 0     Interpretation of Total Score Depression Severity: 1-4 = Minimal depression, 5-9 = Mild depression, 10-14 = Moderate depression, 15-19 = Moderately severe depression, 20-27 = Severe depression    HPI    He comes in mouth daily 90 tablet 1    albuterol (PROVENTIL) (2.5 MG/3ML) 0.083% nebulizer solution Take 3 mLs by nebulization every 2 hours as needed for Wheezing 120 each 3    traZODone (DESYREL) 50 MG tablet take 1 tablet by mouth NIGHTLY 90 tablet 1    folic acid (FOLVITE) 1 MG tablet Take 1 tablet by mouth daily (Patient not taking: Reported on 5/26/2020) 90 tablet 1     No current facility-administered medications for this visit. Orders Placed This Encounter   Procedures    XR KNEE RIGHT (3 VIEWS)     Standing Status:   Future     Standing Expiration Date:   5/26/2021       Orders Placed This Encounter   Medications    triamterene-hydroCHLOROthiazide (MAXZIDE-25) 37.5-25 MG per tablet     Sig: Take 1 tablet by mouth daily     Dispense:  90 tablet     Refill:  3              Return in about 4 weeks (around 6/23/2020) for fu BP.     Dr. Rudi Rucker      5/26/20  4:01 PM

## 2020-05-27 ENCOUNTER — HOSPITAL ENCOUNTER (OUTPATIENT)
Dept: GENERAL RADIOLOGY | Age: 65
Discharge: HOME | End: 2020-05-29
Payer: MEDICARE

## 2020-05-27 PROCEDURE — 73562 X-RAY EXAM OF KNEE 3: CPT

## 2020-06-25 ENCOUNTER — VIRTUAL VISIT (OUTPATIENT)
Dept: CARDIOLOGY CLINIC | Age: 65
End: 2020-06-25
Payer: MEDICARE

## 2020-06-25 PROCEDURE — 99214 OFFICE O/P EST MOD 30 MIN: CPT | Performed by: INTERNAL MEDICINE

## 2020-06-25 PROCEDURE — 1123F ACP DISCUSS/DSCN MKR DOCD: CPT | Performed by: INTERNAL MEDICINE

## 2020-06-25 PROCEDURE — 4040F PNEUMOC VAC/ADMIN/RCVD: CPT | Performed by: INTERNAL MEDICINE

## 2020-06-25 PROCEDURE — G8428 CUR MEDS NOT DOCUMENT: HCPCS | Performed by: INTERNAL MEDICINE

## 2020-06-25 PROCEDURE — 3017F COLORECTAL CA SCREEN DOC REV: CPT | Performed by: INTERNAL MEDICINE

## 2020-06-25 ASSESSMENT — ENCOUNTER SYMPTOMS
EYES NEGATIVE: 1
RESPIRATORY NEGATIVE: 1
WHEEZING: 0
GASTROINTESTINAL NEGATIVE: 1
COUGH: 0
CHEST TIGHTNESS: 0
BLOOD IN STOOL: 0
STRIDOR: 0
NAUSEA: 0
SHORTNESS OF BREATH: 0

## 2020-06-25 NOTE — PROGRESS NOTES
OFFICE VISIT        Patient: Cata Client  YOB: 1955  MRN: [id number]    Chief Complaint: CPA Anemia GIB  Chief Complaint   Patient presents with    Shortness of Breath       CV Data:  3/2020 echo ef 60  3/17/2020 EGD negative    Subjective/HPI TELEHEALTH EVALUATION -- Audio/Visual (During OTBXO-62 public health emergency)    March at [address] for GIB. Following EGD pt apparently aspirated and had CPA requiring emergent intubation. Had Bronch at that time as well. EGD ws negative. He is doing fine now. No cp syncope. Gait is wobbly. He uses zuleta. Minimally active. Has DANIELSON    6/25/2020 TELEHEALTH EVALUATION -- Audio/Visual (During GRVLZ-43 public health emergency)    No CP no further bleed. Did not schedule colonoscopy yet. Still has danielson. Retired- [employer]  Quit smoking cigs 2016.  Still smokes 2 cigars daily     EKG:    Past Medical History:   Diagnosis Date    Alcohol abuse     Allergic rhinitis     Aortic regurgitation     Cervical radiculopathy at C8 10/13/2013    Chronic back pain     COPD (chronic obstructive pulmonary disease) (HCC)     Erectile dysfunction     GERD (gastroesophageal reflux disease)     Hyperlipidemia     Hypertension     Insomnia     Osteoarthritis     PUD (peptic ulcer disease)     Smoker unmotivated to quit     Vitamin D deficiency        Past Surgical History:   Procedure Laterality Date    ELBOW SURGERY  12/2013    Dr. Sterling Gaines REPLACEMENT  2008    Left hip replacement    SPINE SURGERY  2006    fusion L4-L5       Family History   Problem Relation Age of Onset    Cancer Mother 67        brain       Social History     Socioeconomic History    Marital status:      Spouse name: Not on file    Number of children: Not on file    Years of education: Not on file    Highest education level: Not on file   Occupational History    Not on file   Social Needs    Financial resource strain: Not on file    Food insecurity     Worry: Not on file Gastrointestinal hemorrhage, unspecified gastrointestinal hemorrhage type  Will need Colon and possibly Capsule Endo - please call and schedule promptly    6. Other specified nutritional anemias         Counseling:  Heart Healthy Lifestyle, Improve BMI, Stop Smoking, Low Salt Diet, Take Precautions to Prevent Falls and Walk Daily    Return for AFTER TESTS. Pursuant to the emergency declaration under the [address] [place] [address], [facility] waiver authority and the [employer] Act, this Virtual Visit was conducted, with patient's consent, to reduce the patient's risk of exposure to COVID-19 and provide continuity of care for an established patient. Services were provided through a video synchronous discussion virtually to substitute for in-person clinic visit. Visit completed using [employer] me. Patient was located at home and I was located in the clinic.   Electronically signed by Kumar Conner MD on 6/25/2020 at 2:56 PM

## 2020-06-30 ENCOUNTER — OFFICE VISIT (OUTPATIENT)
Dept: FAMILY MEDICINE CLINIC | Age: 65
End: 2020-06-30
Payer: MEDICARE

## 2020-06-30 VITALS
WEIGHT: 172 LBS | DIASTOLIC BLOOD PRESSURE: 60 MMHG | SYSTOLIC BLOOD PRESSURE: 134 MMHG | HEART RATE: 98 BPM | BODY MASS INDEX: 25.48 KG/M2 | OXYGEN SATURATION: 98 % | RESPIRATION RATE: 16 BRPM | HEIGHT: 69 IN | TEMPERATURE: 98.4 F

## 2020-06-30 PROCEDURE — 1123F ACP DISCUSS/DSCN MKR DOCD: CPT | Performed by: FAMILY MEDICINE

## 2020-06-30 PROCEDURE — 99214 OFFICE O/P EST MOD 30 MIN: CPT | Performed by: FAMILY MEDICINE

## 2020-06-30 PROCEDURE — 4004F PT TOBACCO SCREEN RCVD TLK: CPT | Performed by: FAMILY MEDICINE

## 2020-06-30 PROCEDURE — G8427 DOCREV CUR MEDS BY ELIG CLIN: HCPCS | Performed by: FAMILY MEDICINE

## 2020-06-30 PROCEDURE — 4040F PNEUMOC VAC/ADMIN/RCVD: CPT | Performed by: FAMILY MEDICINE

## 2020-06-30 PROCEDURE — 3017F COLORECTAL CA SCREEN DOC REV: CPT | Performed by: FAMILY MEDICINE

## 2020-06-30 PROCEDURE — G8417 CALC BMI ABV UP PARAM F/U: HCPCS | Performed by: FAMILY MEDICINE

## 2020-06-30 RX ORDER — PANTOPRAZOLE SODIUM 40 MG/1
1 TABLET, DELAYED RELEASE ORAL DAILY
COMMUNITY
Start: 2020-06-18 | End: 2020-08-17 | Stop reason: SDUPTHER

## 2020-07-15 ENCOUNTER — TELEPHONE (OUTPATIENT)
Dept: ENDOSCOPY | Age: 65
End: 2020-07-15

## 2020-07-15 NOTE — TELEPHONE ENCOUNTER
called in prescription for Trilyte bowel prep kit to [facility] on [address] in [place] 7/15/2020 at 3:00

## 2020-07-21 ENCOUNTER — TELEPHONE (OUTPATIENT)
Dept: ADMINISTRATIVE | Age: 65
End: 2020-07-21

## 2020-07-21 NOTE — TELEPHONE ENCOUNTER
Mr Wells called and he is trying to reach [facility]. Please return his call, he is not sure why the office Dr. Jose M Macdonald is calling. Thank you.     Have a nice day [facility]

## 2020-07-23 ENCOUNTER — TELEPHONE (OUTPATIENT)
Dept: FAMILY MEDICINE CLINIC | Age: 65
End: 2020-07-23

## 2020-07-23 NOTE — TELEPHONE ENCOUNTER
Pt's wife, Payton Burris (on [facility]) called asking Dr Molina Osorio if pt could have med for back pain. He tells wife his back is hurting him real bad. Last ov 6/30/2020  Would pt require vv or ov to discuss? Local [facility] on [address].

## 2020-07-24 NOTE — TELEPHONE ENCOUNTER
The current state law prohibits giving pain medication in the circumstance. I can refer him to a pain clinic to be seen and treated if he wants.

## 2020-07-24 NOTE — TELEPHONE ENCOUNTER
LM on VM to return call to Dr Dot Crane office at option #3 for provider's response to yesterday's question.

## 2020-07-28 ENCOUNTER — NURSE ONLY (OUTPATIENT)
Dept: PRIMARY CARE CLINIC | Age: 65
End: 2020-07-28

## 2020-07-28 ENCOUNTER — HOSPITAL ENCOUNTER (OUTPATIENT)
Age: 65
Setting detail: SPECIMEN
Discharge: HOME | End: 2020-07-28
Payer: MEDICARE

## 2020-07-28 DIAGNOSIS — Z01.818 ENCOUNTER FOR PREADMISSION TESTING: ICD-10-CM

## 2020-07-28 PROCEDURE — U0003 INFECTIOUS AGENT DETECTION BY NUCLEIC ACID (DNA OR RNA); SEVERE ACUTE RESPIRATORY SYNDROME CORONAVIRUS 2 (SARS-COV-2) (CORONAVIRUS DISEASE [COVID-19]), AMPLIFIED PROBE TECHNIQUE, MAKING USE OF HIGH THROUGHPUT TECHNOLOGIES AS DESCRIBED BY CMS-2020-01-R: HCPCS

## 2020-07-30 LAB
SARS-COV-2: NOT DETECTED
SOURCE: NORMAL

## 2020-08-03 ENCOUNTER — ANESTHESIA EVENT (OUTPATIENT)
Dept: ENDOSCOPY | Age: 65
End: 2020-08-03
Payer: MEDICARE

## 2020-08-03 ENCOUNTER — HOSPITAL ENCOUNTER (OUTPATIENT)
Age: 65
Setting detail: OUTPATIENT SURGERY
Discharge: HOME | End: 2020-08-03
Attending: SPECIALIST | Admitting: SPECIALIST
Payer: MEDICARE

## 2020-08-03 ENCOUNTER — ANCILLARY PROCEDURE (OUTPATIENT)
Dept: ENDOSCOPY | Age: 65
End: 2020-08-03
Attending: SPECIALIST
Payer: MEDICARE

## 2020-08-03 ENCOUNTER — ANESTHESIA (OUTPATIENT)
Dept: ENDOSCOPY | Age: 65
End: 2020-08-03
Payer: MEDICARE

## 2020-08-03 VITALS
DIASTOLIC BLOOD PRESSURE: 65 MMHG | OXYGEN SATURATION: 97 % | RESPIRATION RATE: 16 BRPM | HEIGHT: 69 IN | SYSTOLIC BLOOD PRESSURE: 150 MMHG | TEMPERATURE: 97.9 F | HEART RATE: 68 BPM | WEIGHT: 185 LBS | BODY MASS INDEX: 27.4 KG/M2

## 2020-08-03 VITALS
SYSTOLIC BLOOD PRESSURE: 102 MMHG | OXYGEN SATURATION: 99 % | RESPIRATION RATE: 18 BRPM | DIASTOLIC BLOOD PRESSURE: 59 MMHG

## 2020-08-03 PROCEDURE — 45385 COLONOSCOPY W/LESION REMOVAL: CPT | Performed by: SPECIALIST

## 2020-08-03 PROCEDURE — 7100000010 HC PHASE II RECOVERY - FIRST 15 MIN: Performed by: SPECIALIST

## 2020-08-03 PROCEDURE — 3609027000 HC COLONOSCOPY: Performed by: SPECIALIST

## 2020-08-03 PROCEDURE — 3700000001 HC ADD 15 MINUTES (ANESTHESIA): Performed by: SPECIALIST

## 2020-08-03 PROCEDURE — 2580000003 HC RX 258

## 2020-08-03 PROCEDURE — 6360000002 HC RX W HCPCS

## 2020-08-03 PROCEDURE — 6370000000 HC RX 637 (ALT 250 FOR IP): Performed by: SPECIALIST

## 2020-08-03 PROCEDURE — 2709999900 HC NON-CHARGEABLE SUPPLY: Performed by: SPECIALIST

## 2020-08-03 PROCEDURE — 2580000003 HC RX 258: Performed by: SPECIALIST

## 2020-08-03 PROCEDURE — 88305 TISSUE EXAM BY PATHOLOGIST: CPT

## 2020-08-03 PROCEDURE — 2500000003 HC RX 250 WO HCPCS

## 2020-08-03 PROCEDURE — 3700000000 HC ANESTHESIA ATTENDED CARE: Performed by: SPECIALIST

## 2020-08-03 RX ORDER — SODIUM CHLORIDE 9 MG/ML
INJECTION, SOLUTION INTRAVENOUS
Status: COMPLETED
Start: 2020-08-03 | End: 2020-08-03

## 2020-08-03 RX ORDER — MAGNESIUM HYDROXIDE 1200 MG/15ML
LIQUID ORAL PRN
Status: DISCONTINUED | OUTPATIENT
Start: 2020-08-03 | End: 2020-08-03 | Stop reason: ALTCHOICE

## 2020-08-03 RX ORDER — LIDOCAINE HYDROCHLORIDE 10 MG/ML
1 INJECTION, SOLUTION EPIDURAL; INFILTRATION; INTRACAUDAL; PERINEURAL
Status: DISCONTINUED | OUTPATIENT
Start: 2020-08-03 | End: 2020-08-03 | Stop reason: HOSPADM

## 2020-08-03 RX ORDER — SODIUM CHLORIDE 9 MG/ML
INJECTION, SOLUTION INTRAVENOUS CONTINUOUS PRN
Status: DISCONTINUED | OUTPATIENT
Start: 2020-08-03 | End: 2020-08-03 | Stop reason: SDUPTHER

## 2020-08-03 RX ORDER — SODIUM CHLORIDE 9 MG/ML
INJECTION, SOLUTION INTRAVENOUS CONTINUOUS
Status: DISCONTINUED | OUTPATIENT
Start: 2020-08-03 | End: 2020-08-03 | Stop reason: HOSPADM

## 2020-08-03 RX ORDER — SODIUM CHLORIDE 0.9 % (FLUSH) 0.9 %
10 SYRINGE (ML) INJECTION EVERY 12 HOURS SCHEDULED
Status: DISCONTINUED | OUTPATIENT
Start: 2020-08-03 | End: 2020-08-03 | Stop reason: HOSPADM

## 2020-08-03 RX ORDER — SIMETHICONE 20 MG/.3ML
EMULSION ORAL PRN
Status: DISCONTINUED | OUTPATIENT
Start: 2020-08-03 | End: 2020-08-03 | Stop reason: ALTCHOICE

## 2020-08-03 RX ORDER — PROPOFOL 10 MG/ML
INJECTION, EMULSION INTRAVENOUS PRN
Status: DISCONTINUED | OUTPATIENT
Start: 2020-08-03 | End: 2020-08-03 | Stop reason: SDUPTHER

## 2020-08-03 RX ORDER — TRAZODONE HYDROCHLORIDE 50 MG/1
TABLET ORAL
Qty: 90 TABLET | Refills: 2 | Status: SHIPPED | OUTPATIENT
Start: 2020-08-03 | End: 2021-04-11

## 2020-08-03 RX ORDER — SODIUM CHLORIDE 0.9 % (FLUSH) 0.9 %
10 SYRINGE (ML) INJECTION PRN
Status: DISCONTINUED | OUTPATIENT
Start: 2020-08-03 | End: 2020-08-03 | Stop reason: HOSPADM

## 2020-08-03 RX ORDER — ONDANSETRON 2 MG/ML
4 INJECTION INTRAMUSCULAR; INTRAVENOUS
Status: DISCONTINUED | OUTPATIENT
Start: 2020-08-03 | End: 2020-08-03 | Stop reason: HOSPADM

## 2020-08-03 RX ORDER — LIDOCAINE HYDROCHLORIDE 20 MG/ML
INJECTION, SOLUTION INFILTRATION; PERINEURAL PRN
Status: DISCONTINUED | OUTPATIENT
Start: 2020-08-03 | End: 2020-08-03 | Stop reason: SDUPTHER

## 2020-08-03 RX ADMIN — PROPOFOL 300 MG: 10 INJECTION, EMULSION INTRAVENOUS at 09:36

## 2020-08-03 RX ADMIN — SODIUM CHLORIDE 500 ML: 9 INJECTION, SOLUTION INTRAVENOUS at 09:20

## 2020-08-03 RX ADMIN — SODIUM CHLORIDE: 9 INJECTION, SOLUTION INTRAVENOUS at 09:36

## 2020-08-03 RX ADMIN — LIDOCAINE HYDROCHLORIDE 40 MG: 20 INJECTION, SOLUTION INFILTRATION; PERINEURAL at 09:36

## 2020-08-03 ASSESSMENT — PAIN SCALES - GENERAL
PAINLEVEL_OUTOF10: 0
PAINLEVEL_OUTOF10: 0

## 2020-08-03 ASSESSMENT — LIFESTYLE VARIABLES: SMOKING_STATUS: 1

## 2020-08-03 NOTE — ANESTHESIA PRE PROCEDURE
albuterol (PROVENTIL) (2.5 MG/3ML) 0.083% nebulizer solution Take 3 mLs by nebulization every 2 hours as needed for Wheezing 120 each 3    traZODone (DESYREL) 50 MG tablet take 1 tablet by mouth NIGHTLY 90 tablet 1     No current facility-administered medications for this visit. Allergies:  No Known Allergies    Problem List:    Patient Active Problem List   Diagnosis Code    Chronic back pain M54.9, G89.29    Insomnia G47.00    Essential hypertension, benign I10    Sprain of lumbar region S33. 5XXA    Obstructive chronic bronchitis without exacerbation ([facility] J44.9    Dyslipidemia E78.5    Degeneration of lumbar or lumbosacral intervertebral disc M51.37    Displacement of lumbar intervertebral disc without myelopathy M51.26    Lumbosacral spondylosis without myelopathy M47.817    Tremors of nervous system R25.1    Anemia D64.9    Gastrointestinal hemorrhage K92.2    DANIELSON (dyspnea on exertion) R06.09    Cardiac arrest ([facility] I46.9    Aspiration pneumonia of both lungs ([facility]) J69.0    Acute respiratory failure with hypoxia and hypercapnia ([facility]) J96.01, J96.02    Smoker unmotivated to quit F17.200    Alcohol abuse F10.10       Past Medical History:        Diagnosis Date    Alcohol abuse     Alcohol abuse     Allergic rhinitis     Aortic regurgitation     Cervical radiculopathy at C8 10/13/2013    Chronic back pain     COPD (chronic obstructive pulmonary disease) ([facility])     Erectile dysfunction     GERD (gastroesophageal reflux disease)     Hyperlipidemia     Hypertension     Insomnia     Osteoarthritis     PUD (peptic ulcer disease)     Smoker unmotivated to quit     Vitamin D deficiency        Past Surgical History:        Procedure Laterality Date    ELBOW SURGERY  12/2013    Dr. Mic Edgar REPLACEMENT  2008    Left hip replacement    SPINE SURGERY  2006    fusion L4-L5       Social History:    Social History     Tobacco Use    Smoking status: Current Every Day Smoker Packs/day: 0.25     Years: 30.00     Pack years: 7.50     Types: Cigars    Smokeless tobacco: Never Used    Tobacco comment: smokes at least 3 cigars a day   Substance Use Topics    Alcohol use: Yes     Comment: has one can of beer a day                                Ready to quit: Not Answered  Counseling given: Not Answered  Comment: smokes at least 3 cigars a day      Vital Signs (Current): There were no vitals filed for this visit. BP Readings from Last 3 Encounters:   06/30/20 134/60   05/26/20 (!) 170/78   04/22/20 132/60       NPO Status:                                                                                 BMI:   Wt Readings from Last 3 Encounters:   06/30/20 172 lb (78 kg)   05/26/20 184 lb (83.5 kg)   04/22/20 182 lb (82.6 kg)     There is no height or weight on file to calculate BMI.    CBC:   Lab Results   Component Value Date    WBC 7.3 04/04/2020    RBC 3.44 04/04/2020    RBC 3.78 04/01/2012    HGB 9.6 04/04/2020    HCT 30.2 04/04/2020    MCV 87.9 04/04/2020    RDW 31.0 04/04/2020     04/04/2020       CMP:   Lab Results   Component Value Date     03/24/2020    K 3.5 03/24/2020    K 3.6 03/17/2020    CL 94 03/24/2020    CO2 26 03/24/2020    BUN 7 03/24/2020    CREATININE 0.85 03/24/2020    GFRAA >60.0 03/24/2020    LABGLOM >60.0 03/24/2020    GLUCOSE 94 03/24/2020    GLUCOSE 86 04/01/2012    PROT 6.2 03/20/2020    CALCIUM 9.2 03/24/2020    BILITOT 1.6 03/20/2020    ALKPHOS 63 03/20/2020    AST 20 03/20/2020    ALT 8 03/20/2020       POC Tests: No results for input(s): POCGLU, POCNA, POCK, POCCL, POCBUN, POCHEMO, POCHCT in the last 72 hours.     Coags:   Lab Results   Component Value Date    PROTIME 14.1 03/16/2020    PROTIME 10.5 04/01/2012    INR 1.0 03/16/2020    APTT 29.2 02/22/2014       HCG (If Applicable): No results found for: PREGTESTUR, PREGSERUM, HCG, HCGQUANT     ABGs:   Lab Results   Component Value Date    PHART 7.166 03/17/2020    PO2ART 327 03/17/2020    SCY7CNU 64 03/17/2020    TIR1QZS 23.2 03/17/2020    BEART -5 03/17/2020    Y8LWIADR 100 03/17/2020        Type & Screen (If Applicable):  No results found for: LABABO, [address]    Anesthesia Evaluation  Patient summary reviewed and Nursing notes reviewed no history of anesthetic complications:   Airway: Mallampati: II  TM distance: >3 FB   Neck ROM: full  Mouth opening: > = 3 FB Dental:    (+) edentulous      Pulmonary:normal exam    (+) pneumonia:  COPD:  current smoker          Patient did not smoke on day of surgery. Cardiovascular:  Exercise tolerance: good (>4 METS),   (+) hypertension:, DANIELSON:, hyperlipidemia      ECG reviewed               Beta Blocker:  Not on Beta Blocker      ROS comment: Undetermined rhythm  Left axis deviation  Abnormal ECG  No previous ECGs available     Neuro/Psych:   (+) neuromuscular disease:, psychiatric history:            GI/Hepatic/Renal:   (+) GERD:, PUD, bowel prep,           Endo/Other:    (+) blood dyscrasia: anemia, arthritis: OA., .                 Abdominal:           Vascular: negative vascular ROS. Anesthesia Plan      MAC     ASA 3       Induction: intravenous. Anesthetic plan and risks discussed with patient. Plan discussed with attending.                   Shilpa Alberts, NOAH - CRNA   8/3/2020

## 2020-08-03 NOTE — H&P
Patient Name: Tommy Garsia  : 1955  MRN: [id number]  DATE: 20      ENDOSCOPY  History and Physical    Procedure:    [] Diagnostic Colonoscopy       [] Screening Colonoscopy  [] EGD      [] ERCP      [] EUS       [] Other    [x] Previous office notes/History and Physical reviewed from the patients chart. Please see EMR for further details of HPI. I have examined the patient's status immediately prior to the procedure and:      Indications/HPI:    []Abdominal Pain  []Cancer- GI/Lung  []Fhx of colon CA/polyps  [x]History of Polyps  []Woos   []Melena  []Abnormal Imaging  []Dysphagia    []Persistent Pneumonia  []Anemia  []Food Impaction  []History of Polyps  []GI Bleed  []Pulmonary nodule/Mass  []Change in bowel habits []Heartburn/Reflux  []Rectal Bleed (BRBPR)  []Chest Pain - Non Cardiac []Heme (+) Stoo  l[]Ulcers  []Constipation  []Hemoptysis   []Varices  []Diarrhea  []Hypoxemia  []Nausea/Vomiting  []Screening   []Crohns/Colitis  []Other:   Anesthesia:   [x] MAC [] Moderate Sedation   [] General   [] None     ROS: 12 pt Review of Symptoms was negative unless mentioned above    Medications:   Prior to Admission medications    Medication Sig Start Date End Date Taking?  Authorizing Provider   pantoprazole (PROTONIX) 40 MG tablet Take 1 tablet by mouth daily 20  Yes Historical Provider, MD   triamterene-hydroCHLOROthiazide (MAXZIDE-25) 37.5-25 MG per tablet Take 1 tablet by mouth daily 20  Yes April Gusman MD   amLODIPine (NORVASC) 10 MG tablet Take 1 tablet by mouth daily 20  Yes April Gusman MD   atorvastatin (LIPITOR) 40 MG tablet take 1 tablet by mouth once daily 20  Yes April Gusman MD   folic acid (FOLVITE) 1 MG tablet Take 1 tablet by mouth daily 20  Yes He Elias MD   metoprolol succinate (TOPROL XL) 100 MG extended release tablet Take 1 tablet by mouth daily 3/30/20  Yes April Gusman MD   traZODone (DESYREL) 50 MG tablet take 1 tablet by mouth NIGHTLY 2/7/20  Yes Adryan Hirsch MD   albuterol (PROVENTIL) (2.5 MG/3ML) 0.083% nebulizer solution Take 3 mLs by nebulization every 2 hours as needed for Wheezing 3/23/20   Marge Martins DO       Allergies: No Known Allergies     History of allergic reaction to anesthesia:  No    Past Medical History:  Past Medical History:   Diagnosis Date    Alcohol abuse     Alcohol abuse     Allergic rhinitis     Aortic regurgitation     Cervical radiculopathy at C8 10/13/2013    Chronic back pain     COPD (chronic obstructive pulmonary disease) (HCC)     Erectile dysfunction     GERD (gastroesophageal reflux disease)     Hyperlipidemia     Hypertension     Insomnia     Osteoarthritis     PUD (peptic ulcer disease)     Smoker unmotivated to quit     Vitamin D deficiency        Past Surgical History:  Past Surgical History:   Procedure Laterality Date    ELBOW SURGERY  12/2013    Dr. Salmeron [place] REPLACEMENT  2008    Left hip replacement    SPINE SURGERY  2006    fusion L4-L5       Social History:  Social History     Tobacco Use    Smoking status: Current Every Day Smoker     Packs/day: 0.25     Years: 30.00     Pack years: 7.50     Types: Cigars    Smokeless tobacco: Never Used    Tobacco comment: smokes at least 1 cigar a day   Substance Use Topics    Alcohol use: Yes     Comment: has one can of beer couple times a week    Drug use: Yes     Frequency: 1.0 times per week     Types: Marijuana     Comment: once weekly       Vital Signs:   Vitals:    08/03/20 0917   BP: (!) 149/71   Pulse: 92   Resp: 18   Temp: 97.9 °F (36.6 °C)   SpO2: 97%        Physical Exam:  Cardiac:  [x]WNL  []Comments:  Pulmonary:  [x]WNL   []Comments:   Neuro/Mental Status:  [x]WNL  []Comments:  Abdominal:  [x]WNL    []Comments:  Other:   []WNL  []Comments:    Informed Consent:  The risks and benefits of the procedure have been discussed with either the patient or if they cannot consent, their representative.     Assessment:  Patient examined and appropriate for planned sedation and procedure. Plan:  Proceed with planned sedation and procedure as above.     Elsa Lyons MD  9:35 AM

## 2020-08-14 ENCOUNTER — APPOINTMENT (OUTPATIENT)
Dept: GENERAL RADIOLOGY | Age: 65
End: 2020-08-14
Payer: MEDICARE

## 2020-08-14 ENCOUNTER — TELEPHONE (OUTPATIENT)
Dept: FAMILY MEDICINE CLINIC | Age: 65
End: 2020-08-14

## 2020-08-14 ENCOUNTER — HOSPITAL ENCOUNTER (EMERGENCY)
Age: 65
Discharge: HOME | End: 2020-08-14
Payer: MEDICARE

## 2020-08-14 VITALS
SYSTOLIC BLOOD PRESSURE: 145 MMHG | OXYGEN SATURATION: 95 % | RESPIRATION RATE: 18 BRPM | HEART RATE: 82 BPM | HEIGHT: 68 IN | DIASTOLIC BLOOD PRESSURE: 65 MMHG | WEIGHT: 197 LBS | BODY MASS INDEX: 29.86 KG/M2 | TEMPERATURE: 98.5 F

## 2020-08-14 LAB
ALBUMIN SERPL-MCNC: 4.2 G/DL (ref 3.5–4.6)
ALP BLD-CCNC: 120 U/L (ref 35–104)
ALT SERPL-CCNC: 10 U/L (ref 0–41)
ANION GAP SERPL CALCULATED.3IONS-SCNC: 14 MEQ/L (ref 9–15)
ANISOCYTOSIS: ABNORMAL
APTT: 34.2 SEC (ref 24.4–36.8)
AST SERPL-CCNC: 26 U/L (ref 0–40)
BANDED NEUTROPHILS RELATIVE PERCENT: 2 %
BASOPHILS ABSOLUTE: 0 K/UL (ref 0–0.2)
BASOPHILS RELATIVE PERCENT: 0.7 %
BILIRUB SERPL-MCNC: 0.6 MG/DL (ref 0.2–0.7)
BUN BLDV-MCNC: 7 MG/DL (ref 8–23)
CALCIUM SERPL-MCNC: 9 MG/DL (ref 8.5–9.9)
CHLORIDE BLD-SCNC: 98 MEQ/L (ref 95–107)
CO2: 31 MEQ/L (ref 20–31)
CREAT SERPL-MCNC: 0.73 MG/DL (ref 0.7–1.2)
EKG ATRIAL RATE: 300 BPM
EKG P AXIS: 50 DEGREES
EKG P-R INTERVAL: 190 MS
EKG Q-T INTERVAL: 408 MS
EKG QRS DURATION: 88 MS
EKG QTC CALCULATION (BAZETT): 420 MS
EKG R AXIS: -55 DEGREES
EKG T AXIS: 7 DEGREES
EKG VENTRICULAR RATE: 64 BPM
EOSINOPHILS ABSOLUTE: 0.1 K/UL (ref 0–0.7)
EOSINOPHILS RELATIVE PERCENT: 1 %
GFR AFRICAN AMERICAN: >60
GFR NON-AFRICAN AMERICAN: >60
GLOBULIN: 3 G/DL (ref 2.3–3.5)
GLUCOSE BLD-MCNC: 92 MG/DL (ref 70–99)
HCT VFR BLD CALC: 28.9 % (ref 42–52)
HEMOGLOBIN: 9.1 G/DL (ref 14–18)
HYPOCHROMIA: ABNORMAL
INR BLD: 0.9
LACTIC ACID: 3.6 MMOL/L (ref 0.5–2.2)
LYMPHOCYTES ABSOLUTE: 1.3 K/UL (ref 1–4.8)
LYMPHOCYTES RELATIVE PERCENT: 20 %
MACROCYTES: ABNORMAL
MCH RBC QN AUTO: 27.4 PG (ref 27–31.3)
MCHC RBC AUTO-ENTMCNC: 31.6 % (ref 33–37)
MCV RBC AUTO: 86.8 FL (ref 80–100)
MONOCYTES ABSOLUTE: 0.7 K/UL (ref 0.2–0.8)
MONOCYTES RELATIVE PERCENT: 9.6 %
NEUTROPHILS ABSOLUTE: 4.6 K/UL (ref 1.4–6.5)
NEUTROPHILS RELATIVE PERCENT: 67 %
OVALOCYTES: ABNORMAL
PDW BLD-RTO: 26 % (ref 11.5–14.5)
PLATELET # BLD: 182 K/UL (ref 130–400)
PLATELET SLIDE REVIEW: NORMAL
POIKILOCYTES: ABNORMAL
POLYCHROMASIA: ABNORMAL
POTASSIUM SERPL-SCNC: 3.3 MEQ/L (ref 3.4–4.9)
PRO-BNP: 149 PG/ML
PROCALCITONIN: 0.08 NG/ML (ref 0–0.15)
PROTHROMBIN TIME: 12.4 SEC (ref 12.3–14.9)
RBC # BLD: 3.33 M/UL (ref 4.7–6.1)
SODIUM BLD-SCNC: 143 MEQ/L (ref 135–144)
TARGET CELLS: ABNORMAL
TOTAL CK: 113 U/L (ref 0–190)
TOTAL PROTEIN: 7.2 G/DL (ref 6.3–8)
TROPONIN: <0.01 NG/ML (ref 0–0.01)
WBC # BLD: 6.7 K/UL (ref 4.8–10.8)

## 2020-08-14 PROCEDURE — 85610 PROTHROMBIN TIME: CPT

## 2020-08-14 PROCEDURE — 36415 COLL VENOUS BLD VENIPUNCTURE: CPT

## 2020-08-14 PROCEDURE — 85730 THROMBOPLASTIN TIME PARTIAL: CPT

## 2020-08-14 PROCEDURE — 80053 COMPREHEN METABOLIC PANEL: CPT

## 2020-08-14 PROCEDURE — 96372 THER/PROPH/DIAG INJ SC/IM: CPT

## 2020-08-14 PROCEDURE — 87040 BLOOD CULTURE FOR BACTERIA: CPT

## 2020-08-14 PROCEDURE — 94761 N-INVAS EAR/PLS OXIMETRY MLT: CPT

## 2020-08-14 PROCEDURE — 84145 PROCALCITONIN (PCT): CPT

## 2020-08-14 PROCEDURE — 83605 ASSAY OF LACTIC ACID: CPT

## 2020-08-14 PROCEDURE — 94640 AIRWAY INHALATION TREATMENT: CPT

## 2020-08-14 PROCEDURE — 6370000000 HC RX 637 (ALT 250 FOR IP): Performed by: PHYSICIAN ASSISTANT

## 2020-08-14 PROCEDURE — 6360000002 HC RX W HCPCS: Performed by: PHYSICIAN ASSISTANT

## 2020-08-14 PROCEDURE — 93005 ELECTROCARDIOGRAM TRACING: CPT

## 2020-08-14 PROCEDURE — 82550 ASSAY OF CK (CPK): CPT

## 2020-08-14 PROCEDURE — 84484 ASSAY OF TROPONIN QUANT: CPT

## 2020-08-14 PROCEDURE — 83880 ASSAY OF NATRIURETIC PEPTIDE: CPT

## 2020-08-14 PROCEDURE — 71045 X-RAY EXAM CHEST 1 VIEW: CPT

## 2020-08-14 PROCEDURE — 85025 COMPLETE CBC W/AUTO DIFF WBC: CPT

## 2020-08-14 PROCEDURE — 99285 EMERGENCY DEPT VISIT HI MDM: CPT

## 2020-08-14 RX ORDER — ALBUTEROL SULFATE 2.5 MG/3ML
SOLUTION RESPIRATORY (INHALATION)
Status: DISCONTINUED
Start: 2020-08-14 | End: 2020-08-14 | Stop reason: HOSPADM

## 2020-08-14 RX ORDER — AZITHROMYCIN 500 MG/1
500 TABLET, FILM COATED ORAL ONCE
Status: COMPLETED | OUTPATIENT
Start: 2020-08-14 | End: 2020-08-14

## 2020-08-14 RX ORDER — METHYLPREDNISOLONE SODIUM SUCCINATE 125 MG/2ML
125 INJECTION, POWDER, LYOPHILIZED, FOR SOLUTION INTRAMUSCULAR; INTRAVENOUS ONCE
Status: DISCONTINUED | OUTPATIENT
Start: 2020-08-14 | End: 2020-08-14 | Stop reason: HOSPADM

## 2020-08-14 RX ORDER — AZITHROMYCIN 250 MG/1
TABLET, FILM COATED ORAL
Qty: 1 PACKET | Refills: 0 | Status: SHIPPED | OUTPATIENT
Start: 2020-08-14 | End: 2020-08-17 | Stop reason: ALTCHOICE

## 2020-08-14 RX ORDER — IPRATROPIUM BROMIDE AND ALBUTEROL SULFATE 2.5; .5 MG/3ML; MG/3ML
1 SOLUTION RESPIRATORY (INHALATION)
Status: DISCONTINUED | OUTPATIENT
Start: 2020-08-14 | End: 2020-08-14 | Stop reason: HOSPADM

## 2020-08-14 RX ORDER — METHYLPREDNISOLONE SODIUM SUCCINATE 125 MG/2ML
125 INJECTION, POWDER, LYOPHILIZED, FOR SOLUTION INTRAMUSCULAR; INTRAVENOUS ONCE
Status: COMPLETED | OUTPATIENT
Start: 2020-08-14 | End: 2020-08-14

## 2020-08-14 RX ORDER — ALBUTEROL SULFATE 2.5 MG/3ML
2.5 SOLUTION RESPIRATORY (INHALATION) EVERY 6 HOURS PRN
Qty: 120 EACH | Refills: 1 | Status: SHIPPED | OUTPATIENT
Start: 2020-08-14 | End: 2020-08-17 | Stop reason: SDUPTHER

## 2020-08-14 RX ORDER — ALBUTEROL SULFATE 2.5 MG/3ML
5 SOLUTION RESPIRATORY (INHALATION) ONCE
Status: DISCONTINUED | OUTPATIENT
Start: 2020-08-14 | End: 2020-08-14 | Stop reason: HOSPADM

## 2020-08-14 RX ORDER — PREDNISONE 10 MG/1
60 TABLET ORAL DAILY
Qty: 30 TABLET | Refills: 0 | Status: SHIPPED | OUTPATIENT
Start: 2020-08-14 | End: 2020-08-19

## 2020-08-14 RX ADMIN — METHYLPREDNISOLONE SODIUM SUCCINATE 125 MG: 125 INJECTION, POWDER, FOR SOLUTION INTRAMUSCULAR; INTRAVENOUS at 20:55

## 2020-08-14 RX ADMIN — IPRATROPIUM BROMIDE AND ALBUTEROL SULFATE 1 AMPULE: .5; 3 SOLUTION RESPIRATORY (INHALATION) at 19:15

## 2020-08-14 RX ADMIN — AZITHROMYCIN 500 MG: 500 TABLET, FILM COATED ORAL at 20:55

## 2020-08-14 RX ADMIN — IPRATROPIUM BROMIDE AND ALBUTEROL SULFATE 1 AMPULE: .5; 3 SOLUTION RESPIRATORY (INHALATION) at 19:08

## 2020-08-14 ASSESSMENT — ENCOUNTER SYMPTOMS
ALLERGIC/IMMUNOLOGIC NEGATIVE: 1
EYE PAIN: 0
WHEEZING: 1
TROUBLE SWALLOWING: 0
COLOR CHANGE: 0
COUGH: 1
SHORTNESS OF BREATH: 1
APNEA: 0
ABDOMINAL PAIN: 0

## 2020-08-14 NOTE — ED NOTES
Patient resting quietly on cart watching TV. No acute distress observed.      Ishmael White RN  08/14/20 1044

## 2020-08-14 NOTE — ED PROVIDER NOTES
other systems reviewed and are negative. Except as noted above the remainder of the review of systems was reviewed and negative. PAST MEDICAL HISTORY     Past Medical History:   Diagnosis Date    Alcohol abuse     Alcohol abuse     Allergic rhinitis     Aortic regurgitation     Cervical radiculopathy at C8 10/13/2013    Chronic back pain     COPD (chronic obstructive pulmonary disease) (HCC)     Erectile dysfunction     GERD (gastroesophageal reflux disease)     Hyperlipidemia     Hypertension     Insomnia     Osteoarthritis     PUD (peptic ulcer disease)     Smoker unmotivated to quit     Vitamin D deficiency          SURGICAL HISTORY       Past Surgical History:   Procedure Laterality Date    COLONOSCOPY N/A 8/3/2020    COLONOSCOPY WITH POLYPECTOMY performed by Austin Chavez MD at [address]  12/2013    Dr. Ansley Blue  2008    Left hip replacement    SPINE SURGERY  2006    fusion L4-L5         CURRENT MEDICATIONS       Previous Medications    ALBUTEROL (PROVENTIL) (2.5 MG/3ML) 0.083% NEBULIZER SOLUTION    Take 3 mLs by nebulization every 2 hours as needed for Wheezing    AMLODIPINE (NORVASC) 10 MG TABLET    Take 1 tablet by mouth daily    ATORVASTATIN (LIPITOR) 40 MG TABLET    take 1 tablet by mouth once daily    FOLIC ACID (FOLVITE) 1 MG TABLET    Take 1 tablet by mouth daily    METOPROLOL SUCCINATE (TOPROL XL) 100 MG EXTENDED RELEASE TABLET    Take 1 tablet by mouth daily    PANTOPRAZOLE (PROTONIX) 40 MG TABLET    Take 1 tablet by mouth daily    TRAZODONE (DESYREL) 50 MG TABLET    take 1 tablet by mouth at bedtime nightly    TRIAMTERENE-HYDROCHLOROTHIAZIDE (MAXZIDE-25) 37.5-25 MG PER TABLET    Take 1 tablet by mouth daily       ALLERGIES     Patient has no known allergies.     FAMILY HISTORY       Family History   Problem Relation Age of Onset    Cancer Mother 67        brain          SOCIAL HISTORY       Social History     Socioeconomic History    Marital status:      Spouse name: None    Number of children: None    Years of education: None    Highest education level: None   Occupational History    None   Social Needs    Financial resource strain: None    Food insecurity     Worry: None     Inability: None    Transportation needs     Medical: None     Non-medical: None   Tobacco Use    Smoking status: Current Every Day Smoker     Packs/day: 0.25     Years: 30.00     Pack years: 7.50     Types: Cigars    Smokeless tobacco: Never Used    Tobacco comment: smokes at least 1 cigar a day   Substance and Sexual Activity    Alcohol use: Yes     Comment: has one can of beer couple times a week    Drug use: Yes     Frequency: 1.0 times per week     Types: Marijuana     Comment: once weekly    Sexual activity: Not Currently   Lifestyle    Physical activity     Days per week: None     Minutes per session: None    Stress: None   Relationships    Social connections     Talks on phone: None     Gets together: None     Attends Jainism service: None     Active member of club or organization: None     Attends meetings of clubs or organizations: None     Relationship status: None    Intimate partner violence     Fear of current or ex partner: None     Emotionally abused: None     Physically abused: None     Forced sexual activity: None   Other Topics Concern    None   Social History Narrative    None       SCREENINGS                        PHYSICAL EXAM    (up to 7 for level 4, 8 or more for level 5)     ED Triage Vitals [08/14/20 1730]   BP Temp Temp Source Pulse Resp SpO2 Height Weight   (!) 159/57 98.5 °F (36.9 °C) Oral 85 20 94 % 5' 8\" (1.727 m) 197 lb (89.4 kg)       Physical Exam  Vitals signs and nursing note reviewed. Constitutional:       General: He is not in acute distress. Appearance: He is well-developed. He is not diaphoretic. HENT:      Head: Normocephalic and atraumatic.       Mouth/Throat:      Pharynx: No oropharyngeal exudate. Eyes:      General: No scleral icterus. Conjunctiva/sclera: Conjunctivae normal.      Pupils: Pupils are equal, round, and reactive to light. Neck:      Musculoskeletal: Normal range of motion and neck supple. Trachea: No tracheal deviation. Cardiovascular:      Rate and Rhythm: Normal rate. Heart sounds: Normal heart sounds. Pulmonary:      Effort: Pulmonary effort is normal. No respiratory distress. Breath sounds: Wheezing and rhonchi present. Abdominal:      General: Bowel sounds are normal. There is no distension. Palpations: Abdomen is soft. Musculoskeletal: Normal range of motion. Skin:     General: Skin is warm and dry. Findings: No erythema or rash. Neurological:      Mental Status: He is alert and oriented to person, place, and time. Cranial Nerves: No cranial nerve deficit. Motor: No abnormal muscle tone. Psychiatric:         Behavior: Behavior normal.         Thought Content: Thought content normal.         Judgment: Judgment normal.         DIAGNOSTIC RESULTS     EKG: All EKG's are interpreted by the Emergency Department Physician who either signs or Co-signs this chart in the absence of a cardiologist.      Normal sinus rhythm, rate 64 bpm, left axis deviation    RADIOLOGY:   Non-plain film images such as CT, Ultrasound and MRI are read by the radiologist. Plain radiographic images are visualized and preliminarily interpreted by the emergency physician with the below findings:    cxr- neg  Interpretation per the Radiologist below, if available at the time of this note:    XR CHEST PORTABLE   Final Result   Impression:  Vascular calcifications thoracic aorta. No radiographic evidence of acute cardiopulmonary disease.  Please note this study does not exclude the possibility of underlying CoVid-19 viral infection and/or underlying pulmonary involvement               ED BEDSIDE ULTRASOUND:   Performed by ED Physician - ER and hospital visits. Discussed options for quitting and advised to follow up with their PCP, but recommended that patient try decreasing their tobacco use by 1 cigarette weekly until done. Quit date is thus variable depending on their started tobacco use. Spent 8 minutes talking      CONSULTS:  None    PROCEDURES:  Unless otherwise noted below, none     Procedures        FINAL IMPRESSION      1. Acute bronchitis, unspecified organism    2. COPD exacerbation [facility])          DISPOSITION/PLAN   DISPOSITION Decision To Discharge 08/14/2020 08:30:37 PM      PATIENT REFERRED TO:  Héctor Negrete [place]  [address] [address]    Call in 2 days        DISCHARGE MEDICATIONS:  New Prescriptions    ALBUTEROL (PROVENTIL) (2.5 MG/3ML) 0.083% NEBULIZER SOLUTION    Take 3 mLs by nebulization every 6 hours as needed for Wheezing    AZITHROMYCIN (ZITHROMAX) 250 MG TABLET    Take 2 tablets (500 mg) on Day 1, followed by 1 tablet (250 mg) once daily on Days 2 through 5. PREDNISONE (DELTASONE) 10 MG TABLET    Take 6 tablets by mouth daily for 5 doses     Controlled Substances Monitoring:     RX Monitoring 3/30/2020   Periodic Controlled Substance Monitoring No signs of potential drug abuse or diversion identified.        (Please note that portions of this note were completed with a voice recognition program.  Efforts were made to edit the dictations but occasionally words are mis-transcribed.)    Hanna Sethi PA-C (electronically signed)  Attending Emergency Physician            Hanna Sethi PA-C  08/14/20 2034

## 2020-08-14 NOTE — ED NOTES
Lab called. This nurse able to verify that all needed samples for add-on testing are in lab.      Lacey Santos RN  08/14/20 5938

## 2020-08-14 NOTE — TELEPHONE ENCOUNTER
Pt's wife called stating that her  is in a lot of pain and has had 3 falls in the last 24 hours. I did set up a VV for Monday but wanted to update you on his status.

## 2020-08-15 ENCOUNTER — CARE COORDINATION (OUTPATIENT)
Dept: CARE COORDINATION | Age: 65
End: 2020-08-15

## 2020-08-15 NOTE — ED NOTES
Discharge instructions to patient who voices understanding. Patient is ambulatory from ED with no distress observed. Respirations are even and non-labored.      Christy Jarvis RN  08/14/20 [id number]

## 2020-08-15 NOTE — CARE COORDINATION
Patient contacted regarding recent discharge and COVID-19 risk. Discussed COVID-19 related testing which was not done at this time. Test results were not done. Patient informed of results, if available? Yes     Care Transition Nurse/ Ambulatory Care Manager contacted the patient by telephone to perform post discharge assessment. Verified name and  with patient as identifiers. Patient has following risk factors of: COPD. CTN/ACM reviewed discharge instructions, medical action plan and red flags related to discharge diagnosis. Reviewed and educated them on any new and changed medications related to discharge diagnosis. Advised obtaining a 90-day supply of all daily and as-needed medications. Education provided regarding infection prevention, and signs and symptoms of COVID-19 and when to seek medical attention with patient who verbalized understanding. Discussed exposure protocols and quarantine from [address] you at higher risk for severe illness  and given an opportunity for questions and concerns. The patient agrees to contact the COVID-19 hotline [phone] or PCP office for questions related to their healthcare. CTN/ACM provided contact information for future reference. From CDC: Are you at higher risk for severe illness?  Wash your hands often.  Avoid close contact (6 feet, which is about two arm lengths) with people who are sick.  Put distance between yourself and other people if COVID-19 is spreading in your community.  Clean and disinfect frequently touched surfaces.  Avoid all cruise travel and non-essential air travel.  Call your healthcare professional if you have concerns about COVID-19 and your underlying condition or if you are sick. For more information on steps you can take to protect yourself, see CDC's How to [address] [place] [address] for follow-up call in 7-14 days based on severity of symptoms and risk factors.   ACM REVIEWED ED AND AVS  PATIENT OBTAINED MEDS  PATIENT HAS FU Monday WITH PCP  DISCUSSED COVID EDU/RISK AND S/S  PATIENT REPORTS BREATHING IS GOOD TODAY. ACM ADDRESSED RECENT NOTE OF 3 FALLS IN 24 HRS REPORTED TO PCP FROM WIFE. PER PATIENT THAT IS NOT TRUE. MAYBE OVER 3 MONTHS NOT 24 HRS. ACM ADVISED PATIENT TO DISCUSS WITH PCP Monday FOR REFERRAL AND OR SOME SORT OF FOLLOW UP. PATIENT ENCOURAGED TO USE HIS CANE AT ALL TIMES . PATIENT AGREED.

## 2020-08-17 ENCOUNTER — VIRTUAL VISIT (OUTPATIENT)
Dept: FAMILY MEDICINE CLINIC | Age: 65
End: 2020-08-17
Payer: MEDICARE

## 2020-08-17 PROCEDURE — 99214 OFFICE O/P EST MOD 30 MIN: CPT | Performed by: FAMILY MEDICINE

## 2020-08-17 PROCEDURE — G8427 DOCREV CUR MEDS BY ELIG CLIN: HCPCS | Performed by: FAMILY MEDICINE

## 2020-08-17 PROCEDURE — 1123F ACP DISCUSS/DSCN MKR DOCD: CPT | Performed by: FAMILY MEDICINE

## 2020-08-17 PROCEDURE — 3017F COLORECTAL CA SCREEN DOC REV: CPT | Performed by: FAMILY MEDICINE

## 2020-08-17 PROCEDURE — 4040F PNEUMOC VAC/ADMIN/RCVD: CPT | Performed by: FAMILY MEDICINE

## 2020-08-17 RX ORDER — PANTOPRAZOLE SODIUM 40 MG/1
40 TABLET, DELAYED RELEASE ORAL DAILY
Qty: 90 TABLET | Refills: 1 | Status: SHIPPED | OUTPATIENT
Start: 2020-08-17 | End: 2021-02-14

## 2020-08-17 RX ORDER — DULOXETIN HYDROCHLORIDE 60 MG/1
60 CAPSULE, DELAYED RELEASE ORAL DAILY
Qty: 90 CAPSULE | Refills: 1 | Status: SHIPPED | OUTPATIENT
Start: 2020-08-17 | End: 2021-08-18 | Stop reason: ALTCHOICE

## 2020-08-17 NOTE — PROGRESS NOTES
Patient: Anne Marie Magaña    YOB: 1955    Date: 8/17/20    Chief Complaint   Patient presents with    Depression    Hip Pain    Back Pain    Weight Loss    URI     er FU       Patient Active Problem List    Diagnosis Date Noted    Adenomatous polyp of sigmoid colon     Adenomatous polyp of descending colon     Smoker unmotivated to quit     Alcohol abuse     Cardiac arrest ([address]     Aspiration pneumonia of both lungs ([address]     Acute respiratory failure with hypoxia and hypercapnia ([address]     DANIELSON (dyspnea on exertion)     Anemia 03/16/2020    Gastrointestinal hemorrhage 03/16/2020    Tremors of nervous system 03/09/2020    Dyslipidemia 06/28/2017    Obstructive chronic bronchitis without exacerbation ([address] 06/27/2017    Chronic back pain     Insomnia     Essential hypertension, benign 09/09/2004    Lumbosacral spondylosis without myelopathy 03/03/2004    Sprain of lumbar region 02/18/2004    Degeneration of lumbar or lumbosacral intervertebral disc 02/18/2004    Displacement of lumbar intervertebral disc without myelopathy 02/18/2004       No Known Allergies    There were no vitals filed for this visit. There is no height or weight on file to calculate BMI. HPI    TELEHEALTH EVALUATION -- Audio/Visual (During YGUAE-79 public health emergency        Due to COVID 19 outbreak, patient's office visit was converted to a virtual visit. The patient was identified at the start of the visit. Patient was contacted and agreed to proceed with a virtual visit via Doxy. me Time spent in visit with management in direct patient care 30 minutes. The risks and benefits of converting to a virtual visit were discussed in light of the current infectious disease epidemic. Patient also understood that insurance coverage and co-pays are up to their individual insurance plans and this is a billable visit.     Pursuant to the emergency declaration under the [address] Emergencies Act, 1135 waiver authority and the Coronavirus Preparedness and Response Supplemental Appropriations Act, this Virtual  Visit was conducted, with patient's consent, to reduce the patient's risk of exposure to COVID-19 and provide continuity of care for an established patient. Services were provided through a phone discussion virtually to substitute for in-person clinic visit. The patient was located home and myself, the provider is located home. Patient Ho Stiles (:  [birth year] ) has requested an audio/video evaluation for the following concern(s):     HPI:    Pt still battling pain. Has fallen several times because his left hip gives out. Now using his wife's cane. To er a few days ago for bronchitis. I reviewed testing. Still anemic. Had colonoscopy will need EGDAppetite down. No abd pain. Not drinking as much. Not completely stopped. Has not heard form Pain Clinic. Requesting [facility] for left hip. Spent time discussing that pain medication is not the answer. He needs multiple therapeutics done to keep his pain under control. Including injections, possible surgery, PT and learning to live with it. Review of Systems    Constitutional: Negative for fatigue, fever and sweats. HEENT: Negative for eye discharge and vision loss. Negative for ear drainage, hearing loss and nasal drainage. Respiratory: Negative for cough, dyspnea and wheezing. Cardiovascular:  Negative for chest pain, claudication and irregular heartbeat/palpitations. Gastrointestinal: Negative for abdominal pain, nausea, vomiting, constipation and diarrhea. Genitourinary: No dysuria or penile discharge. Metabolic/Endocrine: Negative for cold intolerance, heat intolerance, polydipsia and polyphagia. No unintended weight loss or gain. Neuro/Psychiatric: Negative for gait disturbance. Negative for psychiatric symptoms. Dermatologic: Negative for pruritus and rash. Musculoskeletal: pos for bone/joint symptoms.   No numbness  External Referral to Orthopedic Surgery     Referral Priority:   Routine     Referral Type:   Eval and Treat     Referral Reason:   Specialty Services Required     Requested Specialty:   Orthopedic Surgery     Number of Visits Requested:   1       Orders Placed This Encounter   Medications    pantoprazole (PROTONIX) 40 MG tablet     Sig: Take 1 tablet by mouth daily     Dispense:  90 tablet     Refill:  1    DULoxetine (CYMBALTA) 60 MG extended release capsule     Sig: Take 1 capsule by mouth daily     Dispense:  90 capsule     Refill:  1             Return in about 3 weeks (around 9/7/2020).     Dr. Aayush Baig      8/17/20  4:03 PM

## 2020-08-19 LAB
BLOOD CULTURE, ROUTINE: NORMAL
CULTURE, BLOOD 2: NORMAL

## 2020-08-20 ENCOUNTER — TELEPHONE (OUTPATIENT)
Dept: FAMILY MEDICINE CLINIC | Age: 65
End: 2020-08-20

## 2020-08-27 ENCOUNTER — CARE COORDINATION (OUTPATIENT)
Dept: CARE COORDINATION | Age: 65
End: 2020-08-27

## 2020-08-27 NOTE — CARE COORDINATION
You Patient resolved from the Care Transitions episode on 8/27/2020  Discussed COVID-19 related testing which was not done at this time. Test results were not done. Patient informed of results, if available? Yes    Patient/family has been provided the following resources and education related to COVID-19:                         Signs, symptoms and red flags related to COVID-19            [facility] exposure and quarantine guidelines            Conduit exposure contact - [phone]            Contact for their local Department of Health                 Patient currently reports that the following symptoms have improved:  no new/worsening symptoms     No further outreach scheduled with this CTN/ACM. Episode of Care resolved. Patient has this CTN/ACM contact information if future needs arise.   PER PATIENT NO VIRAL S/S  HAD PCP FU  COVID EDU/RISKS AND S/S REVIEWED

## 2020-10-08 RX ORDER — FOLIC ACID 1 MG/1
1 TABLET ORAL DAILY
Qty: 90 TABLET | Refills: 1 | Status: SHIPPED | OUTPATIENT
Start: 2020-10-08 | End: 2021-04-12

## 2020-12-03 RX ORDER — METOPROLOL SUCCINATE 100 MG/1
TABLET, EXTENDED RELEASE ORAL
Qty: 90 TABLET | Refills: 1 | Status: SHIPPED | OUTPATIENT
Start: 2020-12-03 | End: 2021-05-05 | Stop reason: SDUPTHER

## 2020-12-08 ENCOUNTER — TELEPHONE (OUTPATIENT)
Dept: FAMILY MEDICINE CLINIC | Age: 65
End: 2020-12-08

## 2020-12-09 RX ORDER — NEOMYCIN SULFATE, POLYMYXIN B SULFATE AND DEXAMETHASONE 3.5; 10000; 1 MG/ML; [USP'U]/ML; MG/ML
1 SUSPENSION/ DROPS OPHTHALMIC 2 TIMES DAILY
Qty: 1 BOTTLE | Refills: 0 | Status: SHIPPED | OUTPATIENT
Start: 2020-12-09 | End: 2020-12-16

## 2020-12-11 ENCOUNTER — VIRTUAL VISIT (OUTPATIENT)
Dept: FAMILY MEDICINE CLINIC | Age: 65
End: 2020-12-11
Payer: MEDICARE

## 2020-12-11 PROCEDURE — 3017F COLORECTAL CA SCREEN DOC REV: CPT | Performed by: NURSE PRACTITIONER

## 2020-12-11 PROCEDURE — 99213 OFFICE O/P EST LOW 20 MIN: CPT | Performed by: NURSE PRACTITIONER

## 2020-12-11 PROCEDURE — 4040F PNEUMOC VAC/ADMIN/RCVD: CPT | Performed by: NURSE PRACTITIONER

## 2020-12-11 PROCEDURE — 1123F ACP DISCUSS/DSCN MKR DOCD: CPT | Performed by: NURSE PRACTITIONER

## 2020-12-11 PROCEDURE — G8428 CUR MEDS NOT DOCUMENT: HCPCS | Performed by: NURSE PRACTITIONER

## 2020-12-11 ASSESSMENT — ENCOUNTER SYMPTOMS
EYE DISCHARGE: 1
EYE PAIN: 0
SHORTNESS OF BREATH: 0
SWOLLEN GLANDS: 0
NAUSEA: 0
DOUBLE VISION: 0
EYE ITCHING: 1
RHINORRHEA: 0
STRIDOR: 0
COUGH: 0
PHOTOPHOBIA: 0
SORE THROAT: 0
EYE REDNESS: 1
WHEEZING: 0

## 2020-12-11 NOTE — PROGRESS NOTES
Subjective:     Patient ID: Kathleen Cochran is a 76 y.o. male who presentstoday for:  Chief Complaint   Patient presents with    Conjunctivitis         TELEHEALTH EVALUATION -- Audio/Visual (During NJHAF-13 public health emergency)    -   Kathleen Cochran is a 76 y.o. male being evaluated by a Virtual Visit (video visit) encounter to address concerns as mentioned above. A caregiver was present when appropriate. Due to this being a TeleHealth encounter (During YNDMX-88 public health emergency), evaluation of the following organ systems was limited: Vitals/Constitutional/EENT/Resp/CV/GI//MS/Neuro/Skin/Heme-Lymph-Imm. Pursuant to the emergency declaration under the [address] [place] [address], [address] [place] [address] authority and the [employer] Act, this Virtual Visit was conducted with patient's (and/or legal guardian's) consent, to reduce the patient's risk of exposure to COVID-19 and provide necessary medical care. The patient (and/or legal guardian) has also been advised to contact this office for worsening conditions or problems, and seek emergency medical treatment and/or call 911 if deemed necessary. Services were provided through a video synchronous discussion virtually to substitute for in-person clinic visit. Type of encounter was _x_ Doxy __ MyChart ___Facetime    Patient was located at their home. Provider was located at their __x_ home or        ____ office. --NOAH Reilly - CNP on 12/11/2020 at 1:40 PM    An electronic signature was used to authenticate this note. Conjunctivitis    The current episode started 5 to 7 days ago. The onset was gradual. The problem is mild. Associated symptoms include eye itching, eye discharge and eye redness.  Pertinent negatives include no fever, no decreased vision, no double vision, no photophobia, no nausea, no congestion, no ear discharge, no ear pain, no headaches, no hearing loss, no mouth sores, no rhinorrhea, no sore throat, no stridor, no swollen glands, no cough, no wheezing, no rash and no eye pain. The eye pain is not associated with movement. The eyelid exhibits no abnormality. There were no sick contacts. Past Medical History:   Diagnosis Date    Alcohol abuse     Alcohol abuse     Allergic rhinitis     Aortic regurgitation     Cervical radiculopathy at  10/13/2013    Chronic back pain     COPD (chronic obstructive pulmonary disease) (HCC)     Erectile dysfunction     GERD (gastroesophageal reflux disease)     Hyperlipidemia     Hypertension     Insomnia     Osteoarthritis     PUD (peptic ulcer disease)     Smoker unmotivated to quit     Vitamin D deficiency      Current Outpatient Medications on File Prior to Visit   Medication Sig Dispense Refill    metoprolol succinate (TOPROL XL) 100 MG extended release tablet take 1 tablet by mouth once daily 90 tablet 1    folic acid (FOLVITE) 1 MG tablet Take 1 tablet by mouth daily 90 tablet 1    pantoprazole (PROTONIX) 40 MG tablet Take 1 tablet by mouth daily 90 tablet 1    DULoxetine (CYMBALTA) 60 MG extended release capsule Take 1 capsule by mouth daily 90 capsule 1    traZODone (DESYREL) 50 MG tablet take 1 tablet by mouth at bedtime nightly 90 tablet 2    triamterene-hydroCHLOROthiazide (MAXZIDE-25) 37.5-25 MG per tablet Take 1 tablet by mouth daily 90 tablet 3    amLODIPine (NORVASC) 10 MG tablet Take 1 tablet by mouth daily 90 tablet 3    atorvastatin (LIPITOR) 40 MG tablet take 1 tablet by mouth once daily 90 tablet 3    albuterol (PROVENTIL) (2.5 MG/3ML) 0.083% nebulizer solution Take 3 mLs by nebulization every 2 hours as needed for Wheezing 120 each 3     No current facility-administered medications on file prior to visit.       Past Surgical History:   Procedure Laterality Date    COLONOSCOPY N/A 8/3/2020    COLONOSCOPY WITH POLYPECTOMY performed by Theresa Brock MD at [address] [facility] [facility] ELBOW SURGERY  12/2013    Dr. Cochran [place] REPLACEMENT  2008    Left hip replacement    SPINE SURGERY  2006    fusion L4-L5        Family History   Problem Relation Age of Onset    Cancer Mother 67        brain     Social History     Socioeconomic History    Marital status:      Spouse name: Not on file    Number of children: Not on file    Years of education: Not on file    Highest education level: Not on file   Occupational History    Not on file   Social Needs    Financial resource strain: Not on file    Food insecurity     Worry: Not on file     Inability: Not on file    Transportation needs     Medical: Not on file     Non-medical: Not on file   Tobacco Use    Smoking status: Current Every Day Smoker     Packs/day: 0.25     Years: 30.00     Pack years: 7.50     Types: Cigars    Smokeless tobacco: Never Used    Tobacco comment: smokes at least 1 cigar a day   Substance and Sexual Activity    Alcohol use: Yes     Comment: has one can of beer couple times a week    Drug use: Yes     Frequency: 1.0 times per week     Types: Marijuana     Comment: once weekly    Sexual activity: Not Currently   Lifestyle    Physical activity     Days per week: Not on file     Minutes per session: Not on file    Stress: Not on file   Relationships    Social connections     Talks on phone: Not on file     Gets together: Not on file     Attends Buddhism service: Not on file     Active member of club or organization: Not on file     Attends meetings of clubs or organizations: Not on file     Relationship status: Not on file    Intimate partner violence     Fear of current or ex partner: Not on file     Emotionally abused: Not on file     Physically abused: Not on file     Forced sexual activity: Not on file   Other Topics Concern    Not on file   Social History Narrative    Not on file     Allergies:  Patient has no known allergies.     Review of Systems   Constitutional: Negative for chills, diaphoresis and fever. HENT: Negative. Negative for congestion, ear discharge, ear pain, hearing loss, mouth sores, rhinorrhea and sore throat. Eyes: Positive for discharge, redness and itching. Negative for double vision, photophobia, pain and visual disturbance. Respiratory: Negative for cough, shortness of breath, wheezing and stridor. Gastrointestinal: Negative for nausea. Skin: Negative for rash. Neurological: Negative for dizziness, light-headedness and headaches. Objective: There were no vitals taken for this visit. Physical Exam  Neurological:      Mental Status: He is alert and oriented to person, place, and time. Psychiatric:         Mood and Affect: Mood normal.         Behavior: Behavior normal.         Assessment & Plan:       Diagnosis Orders   1. Conjunctivitis of both eyes, unspecified conjunctivitis type       No orders of the defined types were placed in this encounter. Orders Placed This Encounter   Medications    neomycin-polymyxin-dexameth (MAXITROL) 3.5-41654-1.1 ophthalmic suspension     Sig: Place 1 drop into both eyes 2 times daily for 7 days     Dispense:  1 Bottle     Refill:  0     Medications Discontinued During This Encounter   Medication Reason    azithromycin (ZITHROMAX) 250 MG tablet LIST CLEANUP     Return if symptoms worsen or fail to improve. Reviewed with the patient: currentclinical status, medications, activities and diet. Side effects, adverse effects of the medicationprescribed today, as well as treatment plan/ rationale and result expectations havebeen discussed with the patient who expresses understanding and desires to proceed. Pt instructions reviewed and given to patient.     Close follow up to evaluate treatment resultsand for coordination of care. I have reviewed the patient's medical historyin detail and updated the computerized patient record.     Miah Adames, APRN - CNP

## 2021-01-12 ENCOUNTER — OFFICE VISIT (OUTPATIENT)
Dept: FAMILY MEDICINE CLINIC | Age: 66
End: 2021-01-12
Payer: MEDICARE

## 2021-01-12 VITALS
HEART RATE: 78 BPM | SYSTOLIC BLOOD PRESSURE: 136 MMHG | OXYGEN SATURATION: 97 % | BODY MASS INDEX: 26.07 KG/M2 | TEMPERATURE: 98.7 F | RESPIRATION RATE: 16 BRPM | HEIGHT: 69 IN | WEIGHT: 176 LBS | DIASTOLIC BLOOD PRESSURE: 60 MMHG

## 2021-01-12 DIAGNOSIS — M25.552 LEFT HIP PAIN: ICD-10-CM

## 2021-01-12 DIAGNOSIS — J90 PLEURAL EFFUSION: ICD-10-CM

## 2021-01-12 DIAGNOSIS — I10 ESSENTIAL HYPERTENSION, BENIGN: Primary | ICD-10-CM

## 2021-01-12 DIAGNOSIS — Z91.81 AT HIGH RISK FOR FALLS: ICD-10-CM

## 2021-01-12 DIAGNOSIS — D64.9 ANEMIA, UNSPECIFIED TYPE: ICD-10-CM

## 2021-01-12 DIAGNOSIS — E78.5 DYSLIPIDEMIA: ICD-10-CM

## 2021-01-12 PROCEDURE — 4004F PT TOBACCO SCREEN RCVD TLK: CPT | Performed by: FAMILY MEDICINE

## 2021-01-12 PROCEDURE — 3288F FALL RISK ASSESSMENT DOCD: CPT | Performed by: FAMILY MEDICINE

## 2021-01-12 PROCEDURE — G8510 SCR DEP NEG, NO PLAN REQD: HCPCS | Performed by: FAMILY MEDICINE

## 2021-01-12 PROCEDURE — G8417 CALC BMI ABV UP PARAM F/U: HCPCS | Performed by: FAMILY MEDICINE

## 2021-01-12 PROCEDURE — G8484 FLU IMMUNIZE NO ADMIN: HCPCS | Performed by: FAMILY MEDICINE

## 2021-01-12 PROCEDURE — 1123F ACP DISCUSS/DSCN MKR DOCD: CPT | Performed by: FAMILY MEDICINE

## 2021-01-12 PROCEDURE — 99214 OFFICE O/P EST MOD 30 MIN: CPT | Performed by: FAMILY MEDICINE

## 2021-01-12 PROCEDURE — G8427 DOCREV CUR MEDS BY ELIG CLIN: HCPCS | Performed by: FAMILY MEDICINE

## 2021-01-12 PROCEDURE — 3017F COLORECTAL CA SCREEN DOC REV: CPT | Performed by: FAMILY MEDICINE

## 2021-01-12 PROCEDURE — 4040F PNEUMOC VAC/ADMIN/RCVD: CPT | Performed by: FAMILY MEDICINE

## 2021-01-12 ASSESSMENT — PATIENT HEALTH QUESTIONNAIRE - PHQ9
SUM OF ALL RESPONSES TO PHQ QUESTIONS 1-9: 0
1. LITTLE INTEREST OR PLEASURE IN DOING THINGS: 0
SUM OF ALL RESPONSES TO PHQ9 QUESTIONS 1 & 2: 0

## 2021-01-12 NOTE — PROGRESS NOTES
Patient: Judee Cheadle (: 1955) is a 76 y.o. male,Established patient, here for evaluation of the following chief complaint(s):  Chief Complaint   Patient presents with    Hypertension     6 month follow up. He is due for labs    Hip Pain     He complains of having left side hip pain.  Back Pain     He complains of having back pain. Date: 21    No Known Allergies    Vitals:    21 1553   BP: 136/60   Site: Left Upper Arm   Position: Sitting   Cuff Size: Large Adult   Pulse: 78   Resp: 16   Temp: 98.7 °F (37.1 °C)   TempSrc: Oral   SpO2: 97%   Weight: 176 lb (79.8 kg)   Height: 5' 9\" (1.753 m)     Body mass index is 25.99 kg/m². PHQ Scores 2021   PHQ2 Score 0 0 0 0   PHQ9 Score 0 0 0 0     Interpretation of Total Score Depression Severity: 1-4 = Minimal depression, 5-9 = Mild depression, 10-14 = Moderate depression, 15-19 = Moderately severe depression, 20-27 = Severe depression    HPI    He is following up on his anemia his cholesterol is arthritis in his general health. He feels good without any cough chest pressure nausea vomiting diarrhea or malaise. His biggest issue is that he has tremendous back back pain and left hip pain his hip feels like it pops in and out and he has had it replaced. He has no numbness or tingling or weakness and because of his anemia and his history of gastric problems we have been avoiding nonsteroidal medications. Unfortunately he has a lot of pain and he also missed knee pain management appointment that was made for him. Review of Systems    Constitutional: Negative for fatigue, fever and sweats. HEENT: Negative for eye discharge and vision loss. Negative for ear drainage, hearing loss and nasal drainage. Respiratory: Negative for cough, dyspnea and wheezing. Cardiovascular:  Negative for chest pain, claudication and irregular heartbeat/palpitations.   Gastrointestinal: Negative for abdominal pain, Medication Sig Dispense Refill    metoprolol succinate (TOPROL XL) 100 MG extended release tablet take 1 tablet by mouth once daily 90 tablet 1    folic acid (FOLVITE) 1 MG tablet Take 1 tablet by mouth daily 90 tablet 1    pantoprazole (PROTONIX) 40 MG tablet Take 1 tablet by mouth daily 90 tablet 1    DULoxetine (CYMBALTA) 60 MG extended release capsule Take 1 capsule by mouth daily 90 capsule 1    traZODone (DESYREL) 50 MG tablet take 1 tablet by mouth at bedtime nightly 90 tablet 2    triamterene-hydroCHLOROthiazide (MAXZIDE-25) 37.5-25 MG per tablet Take 1 tablet by mouth daily 90 tablet 3    amLODIPine (NORVASC) 10 MG tablet Take 1 tablet by mouth daily 90 tablet 3    atorvastatin (LIPITOR) 40 MG tablet take 1 tablet by mouth once daily 90 tablet 3    albuterol (PROVENTIL) (2.5 MG/3ML) 0.083% nebulizer solution Take 3 mLs by nebulization every 2 hours as needed for Wheezing 120 each 3     No current facility-administered medications for this visit. Orders Placed This Encounter   Procedures    CT CHEST W WO CONTRAST     Standing Status:   Future     Standing Expiration Date:   1/12/2022    Lipid, Fasting     Standing Status:   Future     Standing Expiration Date:   1/12/2022    Comprehensive Metabolic Panel     Standing Status:   Future     Standing Expiration Date:   1/12/2022    CBC Auto Differential     Standing Status:   Future     Standing Expiration Date:   1/12/2022    External Referral to Orthopedic Surgery     Referral Priority:   Routine     Referral Type:   Eval and Treat     Referral Reason:   Specialty Services Required     Requested Specialty:   Orthopedic Surgery     Number of Visits Requested:   1       No orders of the defined types were placed in this encounter. Return in about 6 months (around 7/12/2021). An electronic signature was used to authenticate this note.   Dr. Lucio Martin      1/12/21  4:23 PM

## 2021-01-12 NOTE — PROGRESS NOTES
On the basis of positive falls risk screening, assessment and plan is as follows: [facility] Exercise Intervention resources provided, Patient has significant arthritis in his back and hip. He has had surgeries and physical therapy. He uses a cane. He has been referred to orthopedics and then will continue to work with him plus he has been given the home exercises as noted. Carolina Guerrero

## 2021-01-15 ENCOUNTER — TELEPHONE (OUTPATIENT)
Dept: FAMILY MEDICINE CLINIC | Age: 66
End: 2021-01-15

## 2021-01-15 NOTE — TELEPHONE ENCOUNTER
Kobe Kennedy and [facility] [place] called office asking Dr Gato Baron the reason pt has an order for chest CT   Amaya Delon was not at [facility] on 1/12/2021 and Kobe Kennedy forgot why the reason was for ordering CT    Pt is scheduled 1/21/2021

## 2021-02-01 ENCOUNTER — OFFICE VISIT (OUTPATIENT)
Dept: ORTHOPEDIC SURGERY | Age: 66
End: 2021-02-01
Payer: MEDICARE

## 2021-02-01 ENCOUNTER — TELEPHONE (OUTPATIENT)
Dept: FAMILY MEDICINE CLINIC | Age: 66
End: 2021-02-01

## 2021-02-01 VITALS
OXYGEN SATURATION: 99 % | BODY MASS INDEX: 26.07 KG/M2 | HEIGHT: 69 IN | WEIGHT: 176 LBS | HEART RATE: 68 BPM | TEMPERATURE: 97.4 F

## 2021-02-01 DIAGNOSIS — M25.552 HIP PAIN, LEFT: Primary | ICD-10-CM

## 2021-02-01 PROCEDURE — G8417 CALC BMI ABV UP PARAM F/U: HCPCS | Performed by: ORTHOPAEDIC SURGERY

## 2021-02-01 PROCEDURE — G8427 DOCREV CUR MEDS BY ELIG CLIN: HCPCS | Performed by: ORTHOPAEDIC SURGERY

## 2021-02-01 PROCEDURE — 99204 OFFICE O/P NEW MOD 45 MIN: CPT | Performed by: ORTHOPAEDIC SURGERY

## 2021-02-01 PROCEDURE — 1123F ACP DISCUSS/DSCN MKR DOCD: CPT | Performed by: ORTHOPAEDIC SURGERY

## 2021-02-01 PROCEDURE — G8484 FLU IMMUNIZE NO ADMIN: HCPCS | Performed by: ORTHOPAEDIC SURGERY

## 2021-02-01 PROCEDURE — 3017F COLORECTAL CA SCREEN DOC REV: CPT | Performed by: ORTHOPAEDIC SURGERY

## 2021-02-01 PROCEDURE — 4004F PT TOBACCO SCREEN RCVD TLK: CPT | Performed by: ORTHOPAEDIC SURGERY

## 2021-02-01 PROCEDURE — 4040F PNEUMOC VAC/ADMIN/RCVD: CPT | Performed by: ORTHOPAEDIC SURGERY

## 2021-02-01 NOTE — PROGRESS NOTES
Subjective:      Patient ID: Addie Kearns is a 76 y.o. male who presents today for:  Chief Complaint   Patient presents with    Hip Pain     pt here with c/o of left hip pain, pt states he had a hip replacement years ago and his pain started about 2years or so ago, pt states when walking he sometimes falls its like the hip is giving out.        HPI  Pleasant 69-year-old comes in here today with his wife  History of left hip replacement 15 years ago done at [facility]  Also history of spinal surgery  Over the past couple of years he has been having increasing left hip pain and having the left leg to give out on him  Also notes some squeaking or clicking in the left hip which is not particularly associated with any pain  He has had a few falls over the last year    Past Medical History:   Diagnosis Date    Alcohol abuse     Alcohol abuse     Allergic rhinitis     Aortic regurgitation     Cervical radiculopathy at C8 10/13/2013    Chronic back pain     COPD (chronic obstructive pulmonary disease) ([facility] [address]     Erectile dysfunction     GERD (gastroesophageal reflux disease)     Hyperlipidemia     Hypertension     Insomnia     Osteoarthritis     PUD (peptic ulcer disease)     Smoker unmotivated to quit     Vitamin D deficiency      Past Surgical History:   Procedure Laterality Date    COLONOSCOPY N/A 8/3/2020    COLONOSCOPY WITH POLYPECTOMY performed by Maritza Pennington MD at [address]  12/2013    Dr. Sorensen Reason REPLACEMENT  2008    Left hip replacement    SPINE SURGERY  2006    fusion L4-L5     Social History     Socioeconomic History    Marital status:      Spouse name: Not on file    Number of children: Not on file    Years of education: Not on file    Highest education level: Not on file   Occupational History    Not on file   Social Needs    Financial resource strain: Not on file    Food insecurity     Worry: Not on file     Inability: Not on file  Transportation needs     Medical: Not on file     Non-medical: Not on file   Tobacco Use    Smoking status: Current Every Day Smoker     Packs/day: 0.25     Years: 30.00     Pack years: 7.50     Types: Cigars    Smokeless tobacco: Never Used    Tobacco comment: smokes at least 1 cigar a day   Substance and Sexual Activity    Alcohol use: Yes     Comment: has one can of beer couple times a week    Drug use: Yes     Frequency: 1.0 times per week     Types: Marijuana     Comment: once weekly    Sexual activity: Not Currently   Lifestyle    Physical activity     Days per week: Not on file     Minutes per session: Not on file    Stress: Not on file   Relationships    Social connections     Talks on phone: Not on file     Gets together: Not on file     Attends Zoroastrianism service: Not on file     Active member of club or organization: Not on file     Attends meetings of clubs or organizations: Not on file     Relationship status: Not on file    Intimate partner violence     Fear of current or ex partner: Not on file     Emotionally abused: Not on file     Physically abused: Not on file     Forced sexual activity: Not on file   Other Topics Concern    Not on file   Social History Narrative    Not on file     Family History   Problem Relation Age of Onset    Cancer Mother 67        brain     No Known Allergies  Current Outpatient Medications on File Prior to Visit   Medication Sig Dispense Refill    metoprolol succinate (TOPROL XL) 100 MG extended release tablet take 1 tablet by mouth once daily 90 tablet 1    folic acid (FOLVITE) 1 MG tablet Take 1 tablet by mouth daily 90 tablet 1    pantoprazole (PROTONIX) 40 MG tablet Take 1 tablet by mouth daily 90 tablet 1    DULoxetine (CYMBALTA) 60 MG extended release capsule Take 1 capsule by mouth daily 90 capsule 1    traZODone (DESYREL) 50 MG tablet take 1 tablet by mouth at bedtime nightly 90 tablet 2    triamterene-hydroCHLOROthiazide (MAXZIDE-25) 37.5-25 MG per tablet Take 1 tablet by mouth daily 90 tablet 3    amLODIPine (NORVASC) 10 MG tablet Take 1 tablet by mouth daily 90 tablet 3    atorvastatin (LIPITOR) 40 MG tablet take 1 tablet by mouth once daily 90 tablet 3    albuterol (PROVENTIL) (2.5 MG/3ML) 0.083% nebulizer solution Take 3 mLs by nebulization every 2 hours as needed for Wheezing 120 each 3     No current facility-administered medications on file prior to visit. Review of Systems   Constitutional: Negative for chills and fever. Cardiovascular: Negative for chest pain. Musculoskeletal: Arthralgias: l hip. Neurological: Negative for dizziness. Weakness: LLE. Objective:   Pulse 68   Temp 97.4 °F (36.3 °C) (Temporal)   Ht 5' 9\" (1.753 m)   Wt 176 lb (79.8 kg)   SpO2 99%   BMI 25.99 kg/m²     Ortho Exam  Left lower extremity is weaker compared with the right  Absent patellar and Achilles reflexes on the left  Logroll causes minimal discomfort  Overall he has good range of motion with very limited discomfort with hyperflexion    Radiographs and Laboratory Studies:     Diagnostic Imaging Studies:    He had x-rays taken approximately 6 months ago of the left hip which show no evidence of any significant loosening or asymmetric wear of left total hip arthroplasty evidence of lower lumbar fusion noted with pedicle screws and rods    Assessment:       Diagnosis Orders   1.  Hip pain, left  NM BONE SCAN 3 PHASE         Plan:   Recommend a three-phase bone scan to assess for any loosening not apparent on the plain x-rays  Recommend referral to Dr. Jim Marroquin to evaluate for potential spinal contribution as his left lower extremity is weak and certainly can be contributing to his left leg giving out on him    Orders Placed This Encounter   Procedures    NM BONE SCAN 3 PHASE     Assess for loosening     Standing Status:   Future     Standing Expiration Date:   2/1/2022     No orders of the defined types were placed in this encounter. Return if symptoms worsen or fail to improve.       Vikram Rice, DO

## 2021-02-12 ENCOUNTER — HOSPITAL ENCOUNTER (OUTPATIENT)
Dept: CT IMAGING | Age: 66
Discharge: HOME | End: 2021-02-14
Payer: MEDICARE

## 2021-02-12 DIAGNOSIS — J90 PLEURAL EFFUSION: ICD-10-CM

## 2021-02-12 PROCEDURE — 71260 CT THORAX DX C+: CPT

## 2021-02-12 PROCEDURE — 6360000004 HC RX CONTRAST MEDICATION: Performed by: FAMILY MEDICINE

## 2021-02-12 RX ORDER — SODIUM CHLORIDE 0.9 % (FLUSH) 0.9 %
10 SYRINGE (ML) INJECTION ONCE
Status: DISCONTINUED | OUTPATIENT
Start: 2021-02-12 | End: 2021-02-15 | Stop reason: HOSPADM

## 2021-02-12 RX ADMIN — IOPAMIDOL 100 ML: 612 INJECTION, SOLUTION INTRAVENOUS at 09:09

## 2021-02-13 LAB
GFR AFRICAN AMERICAN: >60
GFR NON-AFRICAN AMERICAN: >60
PERFORMED ON: ABNORMAL
POC CREATININE: 0.7 MG/DL (ref 0.8–1.3)
POC SAMPLE TYPE: ABNORMAL

## 2021-02-14 DIAGNOSIS — K21.9 GASTROESOPHAGEAL REFLUX DISEASE: ICD-10-CM

## 2021-02-14 RX ORDER — PANTOPRAZOLE SODIUM 40 MG/1
TABLET, DELAYED RELEASE ORAL
Qty: 90 TABLET | Refills: 1 | Status: SHIPPED | OUTPATIENT
Start: 2021-02-14 | End: 2021-05-05 | Stop reason: SDUPTHER

## 2021-02-16 ENCOUNTER — TELEPHONE (OUTPATIENT)
Dept: ORTHOPEDIC SURGERY | Age: 66
End: 2021-02-16

## 2021-02-26 ENCOUNTER — HOSPITAL ENCOUNTER (OUTPATIENT)
Dept: NUCLEAR MEDICINE | Age: 66
Discharge: HOME | End: 2021-02-28
Payer: MEDICARE

## 2021-02-26 DIAGNOSIS — M25.552 HIP PAIN, LEFT: ICD-10-CM

## 2021-02-26 PROCEDURE — 78315 BONE IMAGING 3 PHASE: CPT

## 2021-02-26 PROCEDURE — 3430000000 HC RX DIAGNOSTIC RADIOPHARMACEUTICAL: Performed by: ORTHOPAEDIC SURGERY

## 2021-02-26 PROCEDURE — A9503 TC99M MEDRONATE: HCPCS | Performed by: ORTHOPAEDIC SURGERY

## 2021-02-26 RX ORDER — TC 99M MEDRONATE 20 MG/10ML
25 INJECTION, POWDER, LYOPHILIZED, FOR SOLUTION INTRAVENOUS
Status: COMPLETED | OUTPATIENT
Start: 2021-02-26 | End: 2021-02-26

## 2021-02-26 RX ADMIN — TC 99M MEDRONATE 27.1 MILLICURIE: 20 INJECTION, POWDER, LYOPHILIZED, FOR SOLUTION INTRAVENOUS at 10:17

## 2021-03-05 ENCOUNTER — HOSPITAL ENCOUNTER (OUTPATIENT)
Dept: ORTHOPEDIC SURGERY | Age: 66
Discharge: HOME | End: 2021-03-07
Payer: MEDICARE

## 2021-03-05 ENCOUNTER — OFFICE VISIT (OUTPATIENT)
Dept: ORTHOPEDIC SURGERY | Age: 66
End: 2021-03-05
Payer: MEDICARE

## 2021-03-05 VITALS
TEMPERATURE: 96.8 F | OXYGEN SATURATION: 99 % | HEART RATE: 79 BPM | BODY MASS INDEX: 26.07 KG/M2 | HEIGHT: 69 IN | WEIGHT: 176 LBS

## 2021-03-05 DIAGNOSIS — M51.36 DISC DEGENERATION, LUMBAR: Primary | ICD-10-CM

## 2021-03-05 DIAGNOSIS — M51.36 DISC DEGENERATION, LUMBAR: ICD-10-CM

## 2021-03-05 DIAGNOSIS — M48.062 LUMBAR STENOSIS WITH NEUROGENIC CLAUDICATION: ICD-10-CM

## 2021-03-05 PROCEDURE — G8427 DOCREV CUR MEDS BY ELIG CLIN: HCPCS | Performed by: ORTHOPAEDIC SURGERY

## 2021-03-05 PROCEDURE — 1123F ACP DISCUSS/DSCN MKR DOCD: CPT | Performed by: ORTHOPAEDIC SURGERY

## 2021-03-05 PROCEDURE — 72100 X-RAY EXAM L-S SPINE 2/3 VWS: CPT

## 2021-03-05 PROCEDURE — G8417 CALC BMI ABV UP PARAM F/U: HCPCS | Performed by: ORTHOPAEDIC SURGERY

## 2021-03-05 PROCEDURE — 4040F PNEUMOC VAC/ADMIN/RCVD: CPT | Performed by: ORTHOPAEDIC SURGERY

## 2021-03-05 PROCEDURE — 72100 X-RAY EXAM L-S SPINE 2/3 VWS: CPT | Performed by: ORTHOPAEDIC SURGERY

## 2021-03-05 PROCEDURE — G8484 FLU IMMUNIZE NO ADMIN: HCPCS | Performed by: ORTHOPAEDIC SURGERY

## 2021-03-05 PROCEDURE — 4004F PT TOBACCO SCREEN RCVD TLK: CPT | Performed by: ORTHOPAEDIC SURGERY

## 2021-03-05 PROCEDURE — 99214 OFFICE O/P EST MOD 30 MIN: CPT | Performed by: ORTHOPAEDIC SURGERY

## 2021-03-05 PROCEDURE — 3017F COLORECTAL CA SCREEN DOC REV: CPT | Performed by: ORTHOPAEDIC SURGERY

## 2021-03-05 RX ORDER — LORAZEPAM 1 MG/1
1 TABLET ORAL ONCE
Qty: 1 TABLET | Refills: 0 | Status: SHIPPED | OUTPATIENT
Start: 2021-03-05 | End: 2021-03-05

## 2021-03-05 RX ORDER — MELOXICAM 15 MG/1
15 TABLET ORAL DAILY
Qty: 30 TABLET | Refills: 1 | Status: SHIPPED | OUTPATIENT
Start: 2021-03-05 | End: 2022-03-25 | Stop reason: ALTCHOICE

## 2021-03-05 ASSESSMENT — ENCOUNTER SYMPTOMS
BACK PAIN: 1
SHORTNESS OF BREATH: 0

## 2021-03-05 NOTE — PROGRESS NOTES
12/2013    Dr. Carson Ink REPLACEMENT  2008    Left hip replacement    SPINE SURGERY  2006    fusion L4-L5     Social History     Socioeconomic History    Marital status:      Spouse name: Not on file    Number of children: Not on file    Years of education: Not on file    Highest education level: Not on file   Occupational History    Not on file   Social Needs    Financial resource strain: Not on file    Food insecurity     Worry: Not on file     Inability: Not on file    Transportation needs     Medical: Not on file     Non-medical: Not on file   Tobacco Use    Smoking status: Current Every Day Smoker     Packs/day: 0.25     Years: 30.00     Pack years: 7.50     Types: Cigars    Smokeless tobacco: Never Used    Tobacco comment: smokes at least 1 cigar a day   Substance and Sexual Activity    Alcohol use: Yes     Comment: has one can of beer couple times a week    Drug use: Yes     Frequency: 1.0 times per week     Types: Marijuana     Comment: once weekly    Sexual activity: Not Currently   Lifestyle    Physical activity     Days per week: Not on file     Minutes per session: Not on file    Stress: Not on file   Relationships    Social connections     Talks on phone: Not on file     Gets together: Not on file     Attends Spiritism service: Not on file     Active member of club or organization: Not on file     Attends meetings of clubs or organizations: Not on file     Relationship status: Not on file    Intimate partner violence     Fear of current or ex partner: Not on file     Emotionally abused: Not on file     Physically abused: Not on file     Forced sexual activity: Not on file   Other Topics Concern    Not on file   Social History Narrative    Not on file     Family History   Problem Relation Age of Onset    Cancer Mother 67        brain     No Known Allergies      Review of Systems   Constitutional: Negative for activity change and fatigue.    Respiratory: Negative for shortness of breath. Genitourinary: Negative for difficulty urinating, dysuria, hematuria and urgency. Musculoskeletal: Positive for back pain and gait problem. Negative for arthralgias, joint swelling, myalgias, neck pain and neck stiffness. Neurological: Negative for weakness and numbness. Objective:   Pulse 79   Temp 96.8 °F (36 °C) (Temporal)   Ht 5' 9\" (1.753 m)   Wt 176 lb (79.8 kg)   SpO2 99%   BMI 25.99 kg/m²     Physical Exam :  He has a well-healed lumbar incision. Is nontender to palpation. There is loss of lumbar lordosis. Is able to stand from a seated position. Sitting straight leg raise test was negative. Hips are without pain to roll test.  Manual testing for motor strength reveals left hip flexor at 4/5 right at 5/5 quadriceps, hamstrings, ankle dorsi and plantar flexors were 5/5. Sensation was intact both legs. Radiographs and Laboratory Studies:     Diagnostic Imaging Studies:    Xr Lumbar Spine (2-3 Views)    Result Date: 3/5/2021  AP and lateral x-rays lumbar spine. These x-rays show an instrumented spinal fusion L 4 to S1. There is marked disc degeneration and spondylosis at L L3-4. There is loss of lumbar lordosis. No signs of hardware failure or fracture. Assessment:       Diagnosis Orders   1. Disc degeneration, lumbar  XR LUMBAR SPINE (2-3 VIEWS)    meloxicam (MOBIC) 15 MG tablet    LORazepam (ATIVAN) 1 MG tablet    Ambulatory referral to Physical Therapy    MRI LUMBAR SPINE W WO CONTRAST   2. Lumbar stenosis with neurogenic claudication  MRI LUMBAR SPINE W WO CONTRAST         Plan: This 50-year-old gentleman has adjacent segment degeneration at L3-4 above a prior fusion L4-S1. There are 6 nonrib-bearing lumbar vertebra. He likely has spinal stenosis at this level as well. I discussed physical therapy, anti-inflammatory medication which she is already tried and failed.   We were once again good to try meloxicam for 2 weeks straight if he does not notice significant improvement is just to stop that medicine. Physical therapy for lower back exercises or core strengthening exercises    Smoking cessation was again discussed    MRI of the lumbar spine with gadolinium to assess for spinal stenosis at L3-4. Is concerned about claustrophobia and so I have given him a prescription for Ativan 1 mg tablet to take an hour prior to the MRI. He is not to drive to the MRI. If he is not able to get through a closed high-field MRI with the Ativan then we will have to make arrangements for an MRI with sedation with anesthesia    We will follow-up in 4 weeks  Orders Placed This Encounter   Procedures    XR LUMBAR SPINE (2-3 VIEWS)     Standing Status:   Future     Number of Occurrences:   1     Standing Expiration Date:   3/5/2022     Scheduling Instructions:      AP, lateral, cone down lateral L5-S1     Order Specific Question:   Reason for exam:     Answer:   Low back pain    MRI LUMBAR SPINE W WO CONTRAST     Prior fusion L4-S1, Assess for stenosis L3-4. Has 6 non rib bearing vertrbra     Standing Status:   Future     Standing Expiration Date:   3/5/2022    Ambulatory referral to Physical Therapy     Referral Priority:   Routine     Referral Type:   Eval and Treat     Referral Reason:   Specialty Services Required     Requested Specialty:   Physical Therapy     Number of Visits Requested:   1     Orders Placed This Encounter   Medications    meloxicam (MOBIC) 15 MG tablet     Sig: Take 1 tablet by mouth daily     Dispense:  30 tablet     Refill:  1    LORazepam (ATIVAN) 1 MG tablet     Sig: Take 1 tablet by mouth once for 1 dose. Dispense:  1 tablet     Refill:  0     Take 1 hour prior to  MRI , nOt to drive       No follow-ups on file.       Kishore Garvin MD

## 2021-03-24 ENCOUNTER — HOSPITAL ENCOUNTER (OUTPATIENT)
Dept: PHYSICAL THERAPY | Age: 66
Setting detail: THERAPIES SERIES
Discharge: HOME | End: 2021-03-24
Payer: MEDICARE

## 2021-03-24 PROCEDURE — 97162 PT EVAL MOD COMPLEX 30 MIN: CPT

## 2021-03-24 ASSESSMENT — PAIN DESCRIPTION - ORIENTATION: ORIENTATION: RIGHT

## 2021-03-24 ASSESSMENT — PAIN SCALES - GENERAL: PAINLEVEL_OUTOF10: 6

## 2021-03-24 ASSESSMENT — PAIN DESCRIPTION - LOCATION: LOCATION: BACK;HIP;KNEE

## 2021-03-24 ASSESSMENT — PAIN DESCRIPTION - PAIN TYPE: TYPE: CHRONIC PAIN

## 2021-03-24 ASSESSMENT — PAIN DESCRIPTION - DESCRIPTORS: DESCRIPTORS: THROBBING

## 2021-03-24 NOTE — PROGRESS NOTES
[address]     Ph: [phone]  Fax: [phone]    [x] Certification  [] Recertification [x]  Plan of Care  [] Progress Note [] Discharge      To:  Henrry Ibarra MD      From:  Tay Brush, PT, DPT  Patient: Lucia Kay     : 1955  Diagnosis: Disc Degeneration, lumbar     Date: 3/24/2021  Treatment Diagnosis: low back pain, R hip and knee pain, LE weakness, impaired gait, hx of falls    Plan of Care/Certification Expiration Date: 21  Progress Report Period from:  3/24/2021  to 3/24/2021    Total # of Visits to Date: 1   No Show: 0    Canceled Appointment: 0     OBJECTIVE:   Short Term Goals - Time Frame for Short term goals: 3 weeks    Goals Current/Discharge status  Met   Short term goal 1: Good understanding of aquatic exercises to perform independently or with spouse at community pool  Aquatics to be initiated for pain mgmt and to decrease fall risk [] yes  [x] no     Long Term Goals - Time Frame for Long term goals : 6 weeks  Goals Current/ Discharge status Met   Long term goal 1: Independent with HEP for daily symptom mgmt HEP education to be provided [] yes  [x] no   Long term goal 2: 50% decrease in low back pain to improve daily mobility   Pain Location: Back, Hip, Knee    Pain Level: 6(Best: 1-2/10 when lying down; Worst: 8-9/10 after 10 min standing)    Pain Descriptors:  Throbbing       [] yes  [x] no   Long term goal 3: 15 min standing activity tolerance to improve daily activity 10 min standing tolerance; LBP increases to 8-9/10 [] yes  [x] no   Long term goal 4: ANGELINA improvement by at least 8 points to demo functional improvement Exam: ANGELINA: 27/50; self reports   [] yes  [x] no       Body structures, Functions, Activity limitations: Increased pain, Decreased posture, Decreased balance, Decreased functional mobility , Decreased ROM, Decreased strength, Decreased high-level IADLs, Decreased ADL status  Assessment: Pt is a 77 yo male referred for PT eval of chronic LBP secondary to DDD. Pt displays flexed posture, decreased standing tolerance, LE weakness, and reports pain that limits standing mobility tolerance. Pt will benefit from PT services to improve ROM, strength, and HEP knowledge to manage daily symptoms. Pt will trial aquatics and land based exercise as tolerate. Prognosis: Fair  Discharge Recommendations: Continue to assess pending progress      PT Education: PT Role;Plan of Care;Goals  Patient Education: Aquatics sign-off sheet reviewed and signed; placed in hard chart    PLAN: [x] Evaluate and Treat  Frequency/Duration:  Plan  Times per week: 1-2  Plan weeks: 6  Current Treatment Recommendations: ROM, Strengthening, Manual Therapy - Soft Tissue Mobilization, Home Exercise Program, Patient/Caregiver Education & Training, Aquatics, Modalities, Balance Training  Plan Comment: Financial barriers, will schedule 1x/wk; financial assist papers provided     Precautions:  Fall Risk                          Patient Status:[x] Continue/ Initiate plan of Care    [] Discharge PT. Recommend pt continue with HEP. [] Additional visits requested, Please re-certify for additional visits:          Signature: Electronically signed by Luis Randall PT on 3/24/21 at 5:41 PM EDT      If you have any questions or concerns, please don't hesitate to call. Thank you for your referral.    I have reviewed this plan of care and certify a need for medically necessary rehabilitation services.     Physician Signature:__________________________________________________________  Date:  Please sign and return

## 2021-03-24 NOTE — PROGRESS NOTES
[address]  PHYSICAL THERAPY EVALUATION    Date: 3/24/2021  Patient Name: Alex Kirby       MRN: [id number]   Account: [de-identified]   : 1955  (78 y.o.)   Gender: male   Referring Practitioner: Pilar Lei MD                 Diagnosis: Disc Degeneration, lumbar  Treatment Diagnosis: low back pain, R hip and knee pain, LE weakness, impaired gait, hx of falls  Additional Pertinent Hx: Lumbar fusion L3-4-5, L Total Hip         Past Medical History:  has a past medical history of Alcohol abuse, Alcohol abuse, Allergic rhinitis, Aortic regurgitation, Cervical radiculopathy at C8, Chronic back pain, COPD (chronic obstructive pulmonary disease) ([facility] [address] Erectile dysfunction, GERD (gastroesophageal reflux disease), Hyperlipidemia, Hypertension, Insomnia, Osteoarthritis, PUD (peptic ulcer disease), Smoker unmotivated to quit, and Vitamin D deficiency. Past Surgical History:   has a past surgical history that includes Spine surgery (); Elbow surgery (2013); joint replacement (); and Colonoscopy (N/A, 8/3/2020). Vital Signs  Patient Currently in Pain: Yes   Pain Screening  Patient Currently in Pain: Yes  Pain Assessment  Pain Assessment: 0-10  Pain Level: 6(Best: 1-2/10 when lying down; Worst: 8-9/10 after 10 min standing)  Pain Type: Chronic pain  Pain Location: Back;Hip;Knee  Pain Orientation: Right  Pain Descriptors: Throbbing  Pain Frequency: Continuous     Lives With: Spouse  Type of Home: House  Home Layout: Two level; Able to Live on Main level with bedroom/bathroom  Home Access: Stairs to enter with rails  Entrance Stairs - Number of Steps: 4  Entrance Stairs - Rails: Right  Bathroom Shower/Tub: Tub/Shower unit  Bathroom Equipment: Shower chair  Home Equipment: [employer]  ADL Assistance: Independent  Homemaking Assistance: Independent  Ambulation Assistance: Independent  Transfer Assistance: Independent  Occupation: Retired  Type of occupation:  Leisure & Hobbies: Watch TV        Subjective:  Subjective: Pt reports chronic hx of low back, R hip, and R knee pain. Declines N/T into LEs or groin. No changes in bowel/bladder. Pt using cane for the past couple years. Pt has a hx of several falls over the last 6 months, noting his LLE feels like it gives out. Objective:   Transfers  Sit to Stand: Modified independent  Stand to sit: Modified independent  Comment: UE assist required    Strength RLE  Comment: 3+ to 4-/5 throughout RLE  Strength LLE  Comment: 3- to 3+/5 throughout LLE    PROM RLE (degrees)  RLE PROM: WFL  AROM RLE (degrees)  RLE General AROM: SLR: 60 deg  PROM LLE (degrees)  LLE PROM: WFL  AROM LLE (degrees)  LLE General AROM: SLR: 10 deg    Spine  Lumbar: Flex 75% WNL (seated), Rot 25-50%    Observation/Palpation  Posture: Fair  Palpation: tenderness through upper lumbar paraspinals  Observation: flexed sitting and standing posture; labored gait w/ and w/o SPC  Bed mobility  Supine to Sit: Independent  Sit to Supine: Independent  Comment: increased effort and time      Exercises:   Exercises  Exercise 1: *aquatics  Modalities:  Modalities  Moist heat: *  Cryotherapy (Minutes\Location): *  Manual:  Manual therapy  Soft Tissue Mobalization: *lumbar  *Indicates exercise,modality, or manual techniques to be initiated when appropriate    Assessment: Body structures, Functions, Activity limitations: Increased pain, Decreased posture, Decreased balance, Decreased functional mobility , Decreased ROM, Decreased strength, Decreased high-level IADLs, Decreased ADL status  Assessment: Pt is a 77 yo male referred for PT eval of chronic LBP secondary to DDD. Pt displays flexed posture, decreased standing tolerance, LE weakness, and reports pain that limits standing mobility tolerance. Pt will benefit from PT services to improve ROM, strength, and HEP knowledge to manage daily symptoms. Pt will trial aquatics and land based exercise as tolerate.   Prognosis: Fair  Discharge Recommendations: Continue to assess pending progress        Decision Making: Medium Complexity  History: chronic pain, lumbar fusion L3-4-5, L THR 2008, falls, sedentary lifestyle, HTN, arthritis  Exam: ANGELINA: 27/50; self reports  Clinical Presentation: evolving        Plan  Frequency/Duration:  Plan  Times per week: 1-2  Plan weeks: 6  Current Treatment Recommendations: ROM, Strengthening, Manual Therapy - Soft Tissue Mobilization, Home Exercise Program, Patient/Caregiver Education & Training, Aquatics, Modalities, Balance Training  Plan Comment: Financial barriers, will schedule 1x/wk; financial assist papers provided         Patient Education  New Education Provided: PT Education: PT Role;Plan of Care;Goals  Patient Education: Aquatics sign-off sheet reviewed and signed; placed in hard chart    POST-PAIN     Pain Rating (0-10 pain scale): No change  Location and pain description same as pre-treatment unless indicated. Action: [] NA  [] Call Physician  [] Perform HEP  [x] Meds as prescribed    Evaluation and patient rights have been reviewed and patient agrees with plan of care. Yes  [x]  No  []   Explain:       Abbi Fall Risk Assessment  Risk Factor Scale  Score   History of Falls [x] Yes  [] No 25  0 25   Secondary Diagnosis [] Yes  [x] No 15  0    Ambulatory Aid [] Furniture  [x] Crutches/cane/walker  [] None/bedrest/wheelchair/nurse 30  15  0 15   IV/Heparin Lock [] Yes  [x] No 20  0    Gait/Transferring [] Impaired  [x] Weak  [] Normal/bedrest/immobile 20  10  0 10   Mental Status [] Forgets limitations  [x] Oriented to own ability 15  0       Total: 50     Based on the Assessment score: check the appropriate box.   []  No intervention needed   Low =   Score of 0-24  []  Use standard prevention interventions Moderate =  Score of 24-44   [] Discuss fall prevention strategies   [] Indicate moderate falls risk on eval  [x]  Use high risk prevention interventions High = Score of 45 and higher [x] Discuss fall prevention strategies   [x] Provide supervision during treatment time    Goals  Short term goals  Time Frame for Short term goals: 3 weeks  Short term goal 1: Good understanding of aquatic exercises to perform independently or with spouse at community pool  Long term goals  Time Frame for Long term goals : 6 weeks  Long term goal 1: Independent with HEP for daily symptom mgmt  Long term goal 2: 50% decrease in low back pain to improve daily mobility  Long term goal 3: 15 min standing activity tolerance to improve daily activity  Long term goal 4: ANGELINA improvement by at least 8 points to demo functional improvement         PT Individual Minutes  Time In: 1630  Time Out: 1725  Minutes: 55     Procedure Minutes: 55 min eval       Electronically signed by Ibis Marie PT on 3/24/21 at 5:39 PM EDT

## 2021-04-12 RX ORDER — FOLIC ACID 1 MG/1
TABLET ORAL
Qty: 90 TABLET | Refills: 1 | Status: SHIPPED | OUTPATIENT
Start: 2021-04-12 | End: 2021-10-01

## 2021-04-16 ENCOUNTER — OFFICE VISIT (OUTPATIENT)
Dept: ORTHOPEDIC SURGERY | Age: 66
End: 2021-04-16
Payer: MEDICARE

## 2021-04-16 VITALS
TEMPERATURE: 95.7 F | HEART RATE: 71 BPM | WEIGHT: 176 LBS | OXYGEN SATURATION: 99 % | BODY MASS INDEX: 26.07 KG/M2 | HEIGHT: 69 IN

## 2021-04-16 DIAGNOSIS — M51.26 DISPLACEMENT OF LUMBAR INTERVERTEBRAL DISC WITHOUT MYELOPATHY: Primary | ICD-10-CM

## 2021-04-16 DIAGNOSIS — M48.062 LUMBAR STENOSIS WITH NEUROGENIC CLAUDICATION: ICD-10-CM

## 2021-04-16 PROCEDURE — 4040F PNEUMOC VAC/ADMIN/RCVD: CPT | Performed by: ORTHOPAEDIC SURGERY

## 2021-04-16 PROCEDURE — 1123F ACP DISCUSS/DSCN MKR DOCD: CPT | Performed by: ORTHOPAEDIC SURGERY

## 2021-04-16 PROCEDURE — 4004F PT TOBACCO SCREEN RCVD TLK: CPT | Performed by: ORTHOPAEDIC SURGERY

## 2021-04-16 PROCEDURE — G8417 CALC BMI ABV UP PARAM F/U: HCPCS | Performed by: ORTHOPAEDIC SURGERY

## 2021-04-16 PROCEDURE — 3017F COLORECTAL CA SCREEN DOC REV: CPT | Performed by: ORTHOPAEDIC SURGERY

## 2021-04-16 PROCEDURE — 99213 OFFICE O/P EST LOW 20 MIN: CPT | Performed by: ORTHOPAEDIC SURGERY

## 2021-04-16 PROCEDURE — G8427 DOCREV CUR MEDS BY ELIG CLIN: HCPCS | Performed by: ORTHOPAEDIC SURGERY

## 2021-04-16 NOTE — PROGRESS NOTES
Subjective:      Patient ID: Jose Kinney is a 78 y.o. male who presents today for:  Chief Complaint   Patient presents with    Follow-up     pt here for f/u for disc degeneration, lumbar, pt states he feels about the same not really any changes, pt did not get mri done. HPI  Patient returns today to review MRI. However he did not get the MRI he was to anxious about getting the MRI and concern due to claustrophobia. He thinks he had one in the last year and asked if we could just use that instead. However looking through the imaging there are no MRIs except for 2014.     Past Medical History:   Diagnosis Date    Alcohol abuse     Alcohol abuse     Allergic rhinitis     Aortic regurgitation     Cervical radiculopathy at C8 10/13/2013    Chronic back pain     COPD (chronic obstructive pulmonary disease) (HCC)     Erectile dysfunction     GERD (gastroesophageal reflux disease)     Hyperlipidemia     Hypertension     Insomnia     Osteoarthritis     PUD (peptic ulcer disease)     Smoker unmotivated to quit     Vitamin D deficiency      Past Surgical History:   Procedure Laterality Date    COLONOSCOPY N/A 8/3/2020    COLONOSCOPY WITH POLYPECTOMY performed by Diane Howard MD at [address] [facility] [address]  12/2013    Dr. Josi Gallagher  2008    Left hip replacement    SPINE SURGERY  2006    fusion L4-L5     Social History     Socioeconomic History    Marital status:      Spouse name: Not on file    Number of children: Not on file    Years of education: Not on file    Highest education level: Not on file   Occupational History    Not on file   Social Needs    Financial resource strain: Not on file    Food insecurity     Worry: Not on file     Inability: Not on file    Transportation needs     Medical: Not on file     Non-medical: Not on file   Tobacco Use    Smoking status: Current Every Day Smoker     Packs/day: 0.25     Years: 30.00     Pack years:

## 2021-04-21 ENCOUNTER — NURSE ONLY (OUTPATIENT)
Dept: PRIMARY CARE CLINIC | Age: 66
End: 2021-04-21

## 2021-04-21 DIAGNOSIS — Z01.818 ENCOUNTER FOR PREADMISSION TESTING: ICD-10-CM

## 2021-04-21 DIAGNOSIS — Z01.818 ENCOUNTER FOR PREADMISSION TESTING: Primary | ICD-10-CM

## 2021-04-22 LAB
SARS-COV-2: NOT DETECTED
SOURCE: NORMAL

## 2021-04-23 ENCOUNTER — PRE-PROCEDURE TELEPHONE (OUTPATIENT)
Dept: INTERVENTIONAL RADIOLOGY/VASCULAR | Age: 66
End: 2021-04-23

## 2021-04-29 ENCOUNTER — ANESTHESIA EVENT (OUTPATIENT)
Dept: MRI IMAGING | Age: 66
End: 2021-04-29

## 2021-04-29 ENCOUNTER — ANESTHESIA (OUTPATIENT)
Dept: MRI IMAGING | Age: 66
End: 2021-04-29

## 2021-04-29 ENCOUNTER — HOSPITAL ENCOUNTER (OUTPATIENT)
Dept: MRI IMAGING | Age: 66
Discharge: HOME | End: 2021-05-01
Payer: MEDICARE

## 2021-04-29 VITALS
SYSTOLIC BLOOD PRESSURE: 138 MMHG | OXYGEN SATURATION: 94 % | DIASTOLIC BLOOD PRESSURE: 62 MMHG | HEART RATE: 69 BPM | RESPIRATION RATE: 12 BRPM

## 2021-04-29 VITALS — OXYGEN SATURATION: 96 % | DIASTOLIC BLOOD PRESSURE: 56 MMHG | SYSTOLIC BLOOD PRESSURE: 110 MMHG

## 2021-04-29 DIAGNOSIS — M48.062 LUMBAR STENOSIS WITH NEUROGENIC CLAUDICATION: ICD-10-CM

## 2021-04-29 DIAGNOSIS — M51.26 DISPLACEMENT OF LUMBAR INTERVERTEBRAL DISC WITHOUT MYELOPATHY: ICD-10-CM

## 2021-04-29 PROCEDURE — 72158 MRI LUMBAR SPINE W/O & W/DYE: CPT

## 2021-04-29 PROCEDURE — A9579 GAD-BASE MR CONTRAST NOS,1ML: HCPCS | Performed by: ORTHOPAEDIC SURGERY

## 2021-04-29 PROCEDURE — 3700000000 HC ANESTHESIA ATTENDED CARE

## 2021-04-29 PROCEDURE — 2580000003 HC RX 258: Performed by: STUDENT IN AN ORGANIZED HEALTH CARE EDUCATION/TRAINING PROGRAM

## 2021-04-29 PROCEDURE — 6360000004 HC RX CONTRAST MEDICATION: Performed by: ORTHOPAEDIC SURGERY

## 2021-04-29 PROCEDURE — 3700000001 HC ADD 15 MINUTES (ANESTHESIA)

## 2021-04-29 RX ORDER — SODIUM CHLORIDE, SODIUM LACTATE, POTASSIUM CHLORIDE, CALCIUM CHLORIDE 600; 310; 30; 20 MG/100ML; MG/100ML; MG/100ML; MG/100ML
INJECTION, SOLUTION INTRAVENOUS
Status: COMPLETED
Start: 2021-04-29 | End: 2021-04-29

## 2021-04-29 RX ORDER — MIDAZOLAM HYDROCHLORIDE 1 MG/ML
INJECTION INTRAMUSCULAR; INTRAVENOUS PRN
Status: DISCONTINUED | OUTPATIENT
Start: 2021-04-29 | End: 2021-04-29 | Stop reason: SDUPTHER

## 2021-04-29 RX ORDER — SODIUM CHLORIDE 0.9 % (FLUSH) 0.9 %
5-40 SYRINGE (ML) INJECTION PRN
Status: CANCELLED | OUTPATIENT
Start: 2021-04-29

## 2021-04-29 RX ORDER — SODIUM CHLORIDE 9 MG/ML
25 INJECTION, SOLUTION INTRAVENOUS PRN
Status: CANCELLED | OUTPATIENT
Start: 2021-04-29

## 2021-04-29 RX ORDER — PROPOFOL 10 MG/ML
INJECTION, EMULSION INTRAVENOUS CONTINUOUS PRN
Status: DISCONTINUED | OUTPATIENT
Start: 2021-04-29 | End: 2021-04-29 | Stop reason: SDUPTHER

## 2021-04-29 RX ORDER — LIDOCAINE HYDROCHLORIDE 10 MG/ML
1 INJECTION, SOLUTION EPIDURAL; INFILTRATION; INTRACAUDAL; PERINEURAL
Status: CANCELLED | OUTPATIENT
Start: 2021-04-29 | End: 2021-04-29

## 2021-04-29 RX ORDER — ONDANSETRON 2 MG/ML
4 INJECTION INTRAMUSCULAR; INTRAVENOUS
Status: ACTIVE | OUTPATIENT
Start: 2021-04-29 | End: 2021-04-29

## 2021-04-29 RX ORDER — SODIUM CHLORIDE 0.9 % (FLUSH) 0.9 %
5-40 SYRINGE (ML) INJECTION EVERY 12 HOURS SCHEDULED
Status: CANCELLED | OUTPATIENT
Start: 2021-04-29

## 2021-04-29 RX ORDER — SODIUM CHLORIDE, SODIUM LACTATE, POTASSIUM CHLORIDE, CALCIUM CHLORIDE 600; 310; 30; 20 MG/100ML; MG/100ML; MG/100ML; MG/100ML
INJECTION, SOLUTION INTRAVENOUS CONTINUOUS PRN
Status: DISCONTINUED | OUTPATIENT
Start: 2021-04-29 | End: 2021-04-29 | Stop reason: SDUPTHER

## 2021-04-29 RX ORDER — SODIUM CHLORIDE 9 MG/ML
INJECTION, SOLUTION INTRAVENOUS CONTINUOUS
Status: CANCELLED | OUTPATIENT
Start: 2021-04-29

## 2021-04-29 RX ADMIN — MIDAZOLAM HYDROCHLORIDE 2 MG: 1 INJECTION INTRAMUSCULAR; INTRAVENOUS at 14:36

## 2021-04-29 RX ADMIN — SODIUM CHLORIDE, POTASSIUM CHLORIDE, SODIUM LACTATE AND CALCIUM CHLORIDE: 600; 310; 30; 20 INJECTION, SOLUTION INTRAVENOUS at 14:30

## 2021-04-29 RX ADMIN — PROPOFOL 50 MCG/KG/MIN: 10 INJECTION, EMULSION INTRAVENOUS at 14:41

## 2021-04-29 RX ADMIN — GADOTERIDOL 15 ML: 279.3 INJECTION, SOLUTION INTRAVENOUS at 15:37

## 2021-04-29 ASSESSMENT — LIFESTYLE VARIABLES: SMOKING_STATUS: 1

## 2021-04-29 ASSESSMENT — ENCOUNTER SYMPTOMS: SHORTNESS OF BREATH: 1

## 2021-04-29 NOTE — ANESTHESIA PRE PROCEDURE
Department of Anesthesiology  Preprocedure Note       Name:  Ubaldo Delgado   Age:  78 y.o.  :  1955                                          MRN:  [id number]         Date:  2021      Surgeon: * Surgery not found *    Procedure: MRI with sedation    Medications prior to admission:   Prior to Admission medications    Medication Sig Start Date End Date Taking?  Authorizing Provider   folic acid (FOLVITE) 1 MG tablet take 1 tablet by mouth once daily 21   David Ashby MD   traZODone (DESYREL) 50 MG tablet take 1 tablet by mouth at bedtime 21   Pinky Zuniga MD   triamterene-hydroCHLOROthiazide [facility]) 37.5-25 MG per tablet take 1 tablet by mouth once daily 21   Pinky Zuniga MD   meloxicam [facility]) 15 MG tablet Take 1 tablet by mouth daily 3/5/21 4/4/21  Sreedhar MD Karoline   pantoprazole (PROTONIX) 40 MG tablet take 1 tablet by mouth once daily 21   Pinky Zuniga MD   metoprolol succinate (TOPROL XL) 100 MG extended release tablet take 1 tablet by mouth once daily 12/3/20   Pinky Zuniga MD   DULoxetine (CYMBALTA) 60 MG extended release capsule Take 1 capsule by mouth daily 20   Pinky Zuniga MD   amLODIPine (NORVASC) 10 MG tablet Take 1 tablet by mouth daily 20   Pinky Zuniga MD   atorvastatin (LIPITOR) 40 MG tablet take 1 tablet by mouth once daily 20   Pinky Zuniga MD   albuterol (PROVENTIL) (2.5 MG/3ML) 0.083% nebulizer solution Take 3 mLs by nebulization every 2 hours as needed for Wheezing 3/23/20   Pillo Trejo DO       Current medications:    Current Outpatient Medications   Medication Sig Dispense Refill    folic acid (FOLVITE) 1 MG tablet take 1 tablet by mouth once daily 90 tablet 1    traZODone (DESYREL) 50 MG tablet take 1 tablet by mouth at bedtime 90 tablet 1    triamterene-hydroCHLOROthiazide (MAXZIDE-25) 37.5-25 MG per tablet take 1 tablet by mouth once daily 90 tablet 1    meloxicam (MOBIC) 15 MG tablet Take 1 tablet by mouth daily 30 tablet 1    pantoprazole (PROTONIX) 40 MG tablet take 1 tablet by mouth once daily 90 tablet 1    metoprolol succinate (TOPROL XL) 100 MG extended release tablet take 1 tablet by mouth once daily 90 tablet 1    DULoxetine (CYMBALTA) 60 MG extended release capsule Take 1 capsule by mouth daily 90 capsule 1    amLODIPine (NORVASC) 10 MG tablet Take 1 tablet by mouth daily 90 tablet 3    atorvastatin (LIPITOR) 40 MG tablet take 1 tablet by mouth once daily 90 tablet 3    albuterol (PROVENTIL) (2.5 MG/3ML) 0.083% nebulizer solution Take 3 mLs by nebulization every 2 hours as needed for Wheezing 120 each 3     No current facility-administered medications for this encounter. Allergies:  No Known Allergies    Problem List:    Patient Active Problem List   Diagnosis Code    Chronic back pain M54.9, G89.29    Insomnia G47.00    Essential hypertension, benign I10    Sprain of lumbar region S33. 5XXA    Obstructive chronic bronchitis without exacerbation ([facility] [address] J44.9    Dyslipidemia E78.5    Disc degeneration, lumbar M51.36    Displacement of lumbar intervertebral disc without myelopathy M51.26    Lumbosacral spondylosis without myelopathy M47.817    Tremors of nervous system R25.1    Anemia D64.9    Gastrointestinal hemorrhage K92.2    DANIELSON (dyspnea on exertion) R06.00    Cardiac arrest (HCC) I46.9    Aspiration pneumonia of both lungs (HCC) J69.0    Acute respiratory failure with hypoxia and hypercapnia (HCC) J96.01, J96.02    Smoker unmotivated to quit F17.200    Alcohol abuse F10.10    Adenomatous polyp of sigmoid colon D12.5    Adenomatous polyp of descending colon D12.4    Hip pain, left M25.552    Lumbar stenosis with neurogenic claudication M48.062       Past Medical History:        Diagnosis Date    Alcohol abuse     Alcohol abuse     Allergic rhinitis     Aortic regurgitation     Cervical radiculopathy at C8 10/13/2013    Chronic back pain     COPD (chronic obstructive pulmonary disease) ([facility] [address]     Erectile dysfunction     GERD (gastroesophageal reflux disease)     Hyperlipidemia     Hypertension     Insomnia     Osteoarthritis     PUD (peptic ulcer disease)     Smoker unmotivated to quit     Vitamin D deficiency        Past Surgical History:        Procedure Laterality Date    COLONOSCOPY N/A 8/3/2020    COLONOSCOPY WITH POLYPECTOMY performed by Isaac Payan MD [address] [facility] [facility] [address]  12/2013    Dr. Dennis Vyas  2008    Left hip replacement    SPINE SURGERY  2006    fusion L4-L5       Social History:    Social History     Tobacco Use    Smoking status: Current Every Day Smoker     Packs/day: 0.25     Years: 30.00     Pack years: 7.50     Types: Cigars    Smokeless tobacco: Never Used    Tobacco comment: smokes at least 1 cigar a day   Substance Use Topics    Alcohol use: Yes     Comment: has one can of beer couple times a week                                Ready to quit: Not Answered  Counseling given: Not Answered  Comment: smokes at least 1 cigar a day      Vital Signs (Current):   Vitals:    04/29/21 1355   BP: (!) 173/77   Pulse: 72   Resp: 16   SpO2: 98%                                              BP Readings from Last 3 Encounters:   04/29/21 (!) 173/77   01/12/21 136/60   08/14/20 (!) 145/65       NPO Status: Time of last liquid consumption: 0000                        Time of last solid consumption: 0000                        Date of last liquid consumption: 04/29/21                        Date of last solid food consumption: 04/29/21    BMI:   Wt Readings from Last 3 Encounters:   04/16/21 176 lb (79.8 kg)   03/05/21 176 lb (79.8 kg)   02/01/21 176 lb (79.8 kg)     There is no height or weight on file to calculate BMI.    CBC:   Lab Results   Component Value Date    WBC 6.7 08/14/2020    RBC 3.33 08/14/2020    RBC 3.78 04/01/2012    HGB 9.1 08/14/2020    HCT 28.9 08/14/2020    MCV 86.8 08/14/2020    RDW 26.0 08/14/2020  08/14/2020       CMP:   Lab Results   Component Value Date     08/14/2020    K 3.3 08/14/2020    K 3.6 03/17/2020    CL 98 08/14/2020    CO2 31 08/14/2020    BUN 7 08/14/2020    CREATININE 0.7 02/12/2021    CREATININE 0.73 08/14/2020    GFRAA >60 02/12/2021    LABGLOM >60 02/12/2021    GLUCOSE 92 08/14/2020    GLUCOSE 86 04/01/2012    PROT 7.2 08/14/2020    CALCIUM 9.0 08/14/2020    BILITOT 0.6 08/14/2020    ALKPHOS 120 08/14/2020    AST 26 08/14/2020    ALT 10 08/14/2020       POC Tests: No results for input(s): POCGLU, POCNA, POCK, POCCL, POCBUN, POCHEMO, POCHCT in the last 72 hours. Coags:   Lab Results   Component Value Date    PROTIME 12.4 08/14/2020    PROTIME 10.5 04/01/2012    INR 0.9 08/14/2020    APTT 34.2 08/14/2020       HCG (If Applicable): No results found for: PREGTESTUR, PREGSERUM, HCG, HCGQUANT     ABGs:   Lab Results   Component Value Date    PHART 7.166 03/17/2020    PO2ART 327 03/17/2020    LRX9AGM 64 03/17/2020    KUT6PNE 23.2 03/17/2020    BEART -5 03/17/2020    U7IMHYED 100 03/17/2020        Type & Screen (If Applicable):  No results found for: LABABO, LABRH    Drug/Infectious Status (If Applicable):  No results found for: HIV, HEPCAB    COVID-19 Screening (If Applicable):   Lab Results   Component Value Date    COVID19 Not Detected 04/21/2021           Anesthesia Evaluation  Patient summary reviewed and Nursing notes reviewed no history of anesthetic complications:   Airway: Mallampati: II  TM distance: >3 FB   Neck ROM: full  Mouth opening: > = 3 FB Dental: normal exam         Pulmonary:Negative Pulmonary ROS and normal exam  breath sounds clear to auscultation  (+) pneumonia:  COPD:  shortness of breath:  current smoker          Patient did not smoke on day of surgery.                  Cardiovascular:  Exercise tolerance: good (>4 METS),   (+) hypertension:, valvular problems/murmurs: AI, DANIELSON:, hyperlipidemia      ECG reviewed  Rhythm: regular  Rate: normal           Beta Blocker:  Not on Beta Blocker      ROS comment: Normal mitral valve structure and function. Trace (1+) mitral regurgitation is present. Aortic valve leaflets are mildly thickened. Mild aortic regurgitation is noted. Left ventricular ejection fraction is visually estimated at 60%. Neuro/Psych:   (+) neuromuscular disease:, psychiatric history:            GI/Hepatic/Renal:   (+) GERD:, PUD,           Endo/Other: Negative Endo/Other ROS             Pt had PAT visit. ROS comment: claustrophobia Abdominal:           Vascular: negative vascular ROS. Anesthesia Plan      MAC     ASA 3       Induction: intravenous. MIPS: Prophylactic antiemetics administered. Anesthetic plan and risks discussed with patient. Plan discussed with CRNA.     Attending anesthesiologist reviewed and agrees with Quinn Kessler DO   4/29/2021

## 2021-04-29 NOTE — ANESTHESIA POSTPROCEDURE EVALUATION
Department of Anesthesiology  Postprocedure Note    Patient: Wero Maya  MRN: [id number]  YOB: 1955  Date of evaluation: 4/29/2021  Time:  3:31 PM     Procedure Summary     Date: 04/29/21 Room / Location: [address] [place] [address]    Anesthesia Start: 1431 Anesthesia Stop: 3665    Procedure: MRI LUMBAR SPINE W WO CONTRAST Diagnosis:       Displacement of lumbar intervertebral disc without myelopathy      Lumbar stenosis with neurogenic claudication      (Low back pain; Chronic LBP duration >= 3 months; hx Spine surgery; No r/o hardware failure; Lower extremity weakness; No known/automatically detected potential contraindications to imaging)    Scheduled Providers:  Responsible Provider: John Arcos DO    Anesthesia Type: MAC ASA Status: 3          Anesthesia Type: MAC    Sabrina Phase I:      Sabrina Phase II:      Last vitals: Reviewed and per EMR flowsheets.        Anesthesia Post Evaluation    Patient location during evaluation: PACU  Patient participation: complete - patient participated  Level of consciousness: awake  Pain score: 0  Airway patency: patent  Nausea & Vomiting: no nausea and no vomiting  Complications: no  Cardiovascular status: hemodynamically stable  Respiratory status: acceptable  Hydration status: euvolemic

## 2021-05-05 DIAGNOSIS — I10 ESSENTIAL HYPERTENSION, BENIGN: ICD-10-CM

## 2021-05-05 DIAGNOSIS — R25.1 TREMORS OF NERVOUS SYSTEM: ICD-10-CM

## 2021-05-05 DIAGNOSIS — E78.5 DYSLIPIDEMIA: ICD-10-CM

## 2021-05-05 DIAGNOSIS — K21.9 GASTROESOPHAGEAL REFLUX DISEASE: ICD-10-CM

## 2021-05-05 DIAGNOSIS — F51.01 PRIMARY INSOMNIA: ICD-10-CM

## 2021-05-06 RX ORDER — TRAZODONE HYDROCHLORIDE 50 MG/1
TABLET ORAL
Qty: 90 TABLET | Refills: 1 | Status: SHIPPED | OUTPATIENT
Start: 2021-05-06 | End: 2021-09-28 | Stop reason: SDUPTHER

## 2021-05-06 RX ORDER — TRIAMTERENE AND HYDROCHLOROTHIAZIDE 37.5; 25 MG/1; MG/1
TABLET ORAL
Qty: 90 TABLET | Refills: 1 | Status: SHIPPED | OUTPATIENT
Start: 2021-05-06 | End: 2022-03-25 | Stop reason: DRUGHIGH

## 2021-05-06 RX ORDER — METOPROLOL SUCCINATE 100 MG/1
TABLET, EXTENDED RELEASE ORAL
Qty: 90 TABLET | Refills: 1 | Status: SHIPPED | OUTPATIENT
Start: 2021-05-06 | End: 2021-05-31

## 2021-05-06 RX ORDER — ATORVASTATIN CALCIUM 40 MG/1
TABLET, FILM COATED ORAL
Qty: 90 TABLET | Refills: 1 | Status: SHIPPED | OUTPATIENT
Start: 2021-05-06 | End: 2021-05-31

## 2021-05-06 RX ORDER — AMLODIPINE BESYLATE 10 MG/1
10 TABLET ORAL DAILY
Qty: 90 TABLET | Refills: 1 | Status: SHIPPED | OUTPATIENT
Start: 2021-05-06 | End: 2021-05-31

## 2021-05-06 RX ORDER — PANTOPRAZOLE SODIUM 40 MG/1
TABLET, DELAYED RELEASE ORAL
Qty: 90 TABLET | Refills: 1 | Status: SHIPPED | OUTPATIENT
Start: 2021-05-06 | End: 2021-08-18 | Stop reason: ALTCHOICE

## 2021-05-14 ENCOUNTER — CLINICAL DOCUMENTATION (OUTPATIENT)
Dept: PHYSICAL THERAPY | Age: 66
End: 2021-05-14

## 2021-05-29 DIAGNOSIS — R25.1 TREMORS OF NERVOUS SYSTEM: ICD-10-CM

## 2021-05-29 DIAGNOSIS — I10 ESSENTIAL HYPERTENSION, BENIGN: ICD-10-CM

## 2021-05-29 DIAGNOSIS — E78.5 DYSLIPIDEMIA: ICD-10-CM

## 2021-05-29 NOTE — TELEPHONE ENCOUNTER
Rx requested:  Requested Prescriptions     Pending Prescriptions Disp Refills    atorvastatin (LIPITOR) 40 MG tablet [Pharmacy Med Name: ATORVASTATIN 40 MG TABLET] 90 tablet 1     Sig: take 1 tablet by mouth once daily    amLODIPine (NORVASC) 10 MG tablet [Pharmacy Med Name: AMLODIPINE BESYLATE 10 MG TAB] 90 tablet 1     Sig: take 1 tablet by mouth once daily    metoprolol succinate (TOPROL XL) 100 MG extended release tablet [Pharmacy Med Name: METOPROLOL SUCC  MG TAB] 90 tablet 1     Sig: take 1 tablet by mouth once daily       Last Office Visit:   1/12/2021        Next Visit Date:  Future Appointments   Date Time Provider Jarrett Irvin   7/12/2021  3:30 PM Sincere Lemus  [facility] [address]

## 2021-05-31 RX ORDER — METOPROLOL SUCCINATE 100 MG/1
TABLET, EXTENDED RELEASE ORAL
Qty: 90 TABLET | Refills: 1 | Status: SHIPPED | OUTPATIENT
Start: 2021-05-31 | End: 2021-12-28 | Stop reason: SDUPTHER

## 2021-05-31 RX ORDER — ATORVASTATIN CALCIUM 40 MG/1
TABLET, FILM COATED ORAL
Qty: 90 TABLET | Refills: 1 | Status: SHIPPED | OUTPATIENT
Start: 2021-05-31 | End: 2021-09-03 | Stop reason: SDUPTHER

## 2021-05-31 RX ORDER — AMLODIPINE BESYLATE 10 MG/1
TABLET ORAL
Qty: 90 TABLET | Refills: 1 | Status: SHIPPED | OUTPATIENT
Start: 2021-05-31 | End: 2021-12-28 | Stop reason: SDUPTHER

## 2021-07-16 ENCOUNTER — OFFICE VISIT (OUTPATIENT)
Dept: ORTHOPEDIC SURGERY | Age: 66
End: 2021-07-16
Payer: MEDICARE

## 2021-07-16 VITALS
TEMPERATURE: 97.4 F | OXYGEN SATURATION: 97 % | HEART RATE: 59 BPM | HEIGHT: 69 IN | WEIGHT: 176 LBS | BODY MASS INDEX: 26.07 KG/M2

## 2021-07-16 DIAGNOSIS — M48.062 LUMBAR STENOSIS WITH NEUROGENIC CLAUDICATION: Primary | ICD-10-CM

## 2021-07-16 PROCEDURE — G8427 DOCREV CUR MEDS BY ELIG CLIN: HCPCS | Performed by: ORTHOPAEDIC SURGERY

## 2021-07-16 PROCEDURE — 3017F COLORECTAL CA SCREEN DOC REV: CPT | Performed by: ORTHOPAEDIC SURGERY

## 2021-07-16 PROCEDURE — 4040F PNEUMOC VAC/ADMIN/RCVD: CPT | Performed by: ORTHOPAEDIC SURGERY

## 2021-07-16 PROCEDURE — 99213 OFFICE O/P EST LOW 20 MIN: CPT | Performed by: ORTHOPAEDIC SURGERY

## 2021-07-16 PROCEDURE — 4004F PT TOBACCO SCREEN RCVD TLK: CPT | Performed by: ORTHOPAEDIC SURGERY

## 2021-07-16 PROCEDURE — G8417 CALC BMI ABV UP PARAM F/U: HCPCS | Performed by: ORTHOPAEDIC SURGERY

## 2021-07-16 PROCEDURE — 1123F ACP DISCUSS/DSCN MKR DOCD: CPT | Performed by: ORTHOPAEDIC SURGERY

## 2021-08-18 ENCOUNTER — OFFICE VISIT (OUTPATIENT)
Dept: FAMILY MEDICINE CLINIC | Age: 66
End: 2021-08-18
Payer: MEDICARE

## 2021-08-18 VITALS
OXYGEN SATURATION: 97 % | SYSTOLIC BLOOD PRESSURE: 128 MMHG | DIASTOLIC BLOOD PRESSURE: 60 MMHG | WEIGHT: 171.8 LBS | HEART RATE: 80 BPM | BODY MASS INDEX: 25.45 KG/M2 | HEIGHT: 69 IN | TEMPERATURE: 97.9 F

## 2021-08-18 DIAGNOSIS — M16.10 PRIMARY LOCALIZED OSTEOARTHRITIS OF PELVIC REGION AND THIGH: ICD-10-CM

## 2021-08-18 DIAGNOSIS — Z76.89 ENCOUNTER TO ESTABLISH CARE WITH NEW DOCTOR: Primary | ICD-10-CM

## 2021-08-18 DIAGNOSIS — L50.9 HIVES: ICD-10-CM

## 2021-08-18 DIAGNOSIS — J44.9 OBSTRUCTIVE CHRONIC BRONCHITIS WITHOUT EXACERBATION (HCC): ICD-10-CM

## 2021-08-18 DIAGNOSIS — I10 BENIGN ESSENTIAL HYPERTENSION: ICD-10-CM

## 2021-08-18 DIAGNOSIS — F17.200 SMOKER: ICD-10-CM

## 2021-08-18 DIAGNOSIS — M17.11 PRIMARY OSTEOARTHRITIS OF RIGHT KNEE: ICD-10-CM

## 2021-08-18 PROBLEM — F33.1 MODERATE EPISODE OF RECURRENT MAJOR DEPRESSIVE DISORDER (HCC): Status: ACTIVE | Noted: 2021-08-18

## 2021-08-18 PROBLEM — M06.9 RHEUMATOID ARTHRITIS (HCC): Status: ACTIVE | Noted: 2021-08-18

## 2021-08-18 PROBLEM — D50.9 IRON DEFICIENCY ANEMIA: Status: ACTIVE | Noted: 2021-08-18

## 2021-08-18 PROBLEM — M48.00 SPINAL STENOSIS: Status: ACTIVE | Noted: 2021-03-05

## 2021-08-18 PROBLEM — M25.559 ARTHRALGIA OF HIP: Status: ACTIVE | Noted: 2021-02-01

## 2021-08-18 PROCEDURE — 99406 BEHAV CHNG SMOKING 3-10 MIN: CPT | Performed by: STUDENT IN AN ORGANIZED HEALTH CARE EDUCATION/TRAINING PROGRAM

## 2021-08-18 PROCEDURE — 3017F COLORECTAL CA SCREEN DOC REV: CPT | Performed by: STUDENT IN AN ORGANIZED HEALTH CARE EDUCATION/TRAINING PROGRAM

## 2021-08-18 PROCEDURE — 1123F ACP DISCUSS/DSCN MKR DOCD: CPT | Performed by: STUDENT IN AN ORGANIZED HEALTH CARE EDUCATION/TRAINING PROGRAM

## 2021-08-18 PROCEDURE — G8926 SPIRO NO PERF OR DOC: HCPCS | Performed by: STUDENT IN AN ORGANIZED HEALTH CARE EDUCATION/TRAINING PROGRAM

## 2021-08-18 PROCEDURE — 4004F PT TOBACCO SCREEN RCVD TLK: CPT | Performed by: STUDENT IN AN ORGANIZED HEALTH CARE EDUCATION/TRAINING PROGRAM

## 2021-08-18 PROCEDURE — G8427 DOCREV CUR MEDS BY ELIG CLIN: HCPCS | Performed by: STUDENT IN AN ORGANIZED HEALTH CARE EDUCATION/TRAINING PROGRAM

## 2021-08-18 PROCEDURE — G8417 CALC BMI ABV UP PARAM F/U: HCPCS | Performed by: STUDENT IN AN ORGANIZED HEALTH CARE EDUCATION/TRAINING PROGRAM

## 2021-08-18 PROCEDURE — 4040F PNEUMOC VAC/ADMIN/RCVD: CPT | Performed by: STUDENT IN AN ORGANIZED HEALTH CARE EDUCATION/TRAINING PROGRAM

## 2021-08-18 PROCEDURE — 99214 OFFICE O/P EST MOD 30 MIN: CPT | Performed by: STUDENT IN AN ORGANIZED HEALTH CARE EDUCATION/TRAINING PROGRAM

## 2021-08-18 PROCEDURE — 3023F SPIROM DOC REV: CPT | Performed by: STUDENT IN AN ORGANIZED HEALTH CARE EDUCATION/TRAINING PROGRAM

## 2021-08-18 RX ORDER — NICOTINE 21 MG/24HR
1 PATCH, TRANSDERMAL 24 HOURS TRANSDERMAL DAILY
Qty: 30 PATCH | Refills: 5 | Status: SHIPPED | OUTPATIENT
Start: 2021-08-18 | End: 2022-03-25 | Stop reason: ALTCHOICE

## 2021-08-18 RX ORDER — ALBUTEROL SULFATE 90 UG/1
2 AEROSOL, METERED RESPIRATORY (INHALATION) EVERY 6 HOURS PRN
Qty: 1 INHALER | Refills: 3 | Status: SHIPPED | OUTPATIENT
Start: 2021-08-18

## 2021-08-18 SDOH — ECONOMIC STABILITY: FOOD INSECURITY: WITHIN THE PAST 12 MONTHS, THE FOOD YOU BOUGHT JUST DIDN'T LAST AND YOU DIDN'T HAVE MONEY TO GET MORE.: NEVER TRUE

## 2021-08-18 SDOH — ECONOMIC STABILITY: TRANSPORTATION INSECURITY
IN THE PAST 12 MONTHS, HAS LACK OF TRANSPORTATION KEPT YOU FROM MEETINGS, WORK, OR FROM GETTING THINGS NEEDED FOR DAILY LIVING?: NO

## 2021-08-18 SDOH — ECONOMIC STABILITY: TRANSPORTATION INSECURITY
IN THE PAST 12 MONTHS, HAS THE LACK OF TRANSPORTATION KEPT YOU FROM MEDICAL APPOINTMENTS OR FROM GETTING MEDICATIONS?: NO

## 2021-08-18 SDOH — ECONOMIC STABILITY: FOOD INSECURITY: WITHIN THE PAST 12 MONTHS, YOU WORRIED THAT YOUR FOOD WOULD RUN OUT BEFORE YOU GOT MONEY TO BUY MORE.: NEVER TRUE

## 2021-08-18 ASSESSMENT — ENCOUNTER SYMPTOMS
COUGH: 1
DIARRHEA: 0
WHEEZING: 0
VOMITING: 0
ABDOMINAL PAIN: 0
EYE DISCHARGE: 0
SHORTNESS OF BREATH: 1
SORE THROAT: 0
BACK PAIN: 1
NAUSEA: 0
RHINORRHEA: 0

## 2021-08-18 ASSESSMENT — SOCIAL DETERMINANTS OF HEALTH (SDOH): HOW HARD IS IT FOR YOU TO PAY FOR THE VERY BASICS LIKE FOOD, HOUSING, MEDICAL CARE, AND HEATING?: SOMEWHAT HARD

## 2021-08-18 NOTE — PATIENT INSTRUCTIONS
Family and Housing Resources    Rodrigo Guzman [address]  Call 211  or - [email or web address]. [facility])  Call - [phone]  [email or web address]    [place] [facility]  [address] [facility] [address]  [place], [facility] [address]  [facility]  [address] [facility] Alexandr, [address] [place] [address]  [phone] /[phone]    Medicaid Application  [email or web address]  [phone]    Home Energy Assistance Programs (HEAP)  [address]. [employer], [address] [facility]  [phone]    [facility] ( skilled nursing)  [address], [address] [facility] (skilled nursing)  [address], [address] [facility]  [address] [facility]  www. [employer]    [employer]  [address] [place] [address], [facility] [address]  [address] [facility]  [address] [facility] [facility] [address] 49[address] [facility] at [address] [facility]    Patient Education        Stopping Smoking: Care Instructions  Your Care Instructions     Cigarette smokers crave the nicotine in cigarettes. Giving it up is much harder than simply changing a habit. Your body has to stop craving the nicotine. It is hard to quit, but you can do it. There are many tools that people use to quit smoking. You may find that combining tools works best for you. There are several steps to quitting. First you get ready to quit. Then you get support to help you. After that, you learn new skills and behaviors to become a nonsmoker. For many people, a necessary step is getting and using medicine. Your doctor will help you set up the plan that best meets your needs. You may want to attend a smoking cessation program to help you quit smoking. When you choose a program, look for one that has proven success. Ask your doctor for ideas.  You will greatly increase your chances of success if you take medicine as well as get counseling or join a cessation program.  Some of the changes you feel when you first quit tobacco are uncomfortable. Your body will miss the nicotine at first, and you may feel short-tempered and grumpy. You may have trouble sleeping or concentrating. Medicine can help you deal with these symptoms. You may struggle with changing your smoking habits and rituals. The last step is the tricky one: Be prepared for the smoking urge to continue for a time. This is a lot to deal with, but keep at it. You will feel better. Follow-up care is a key part of your treatment and safety. Be sure to make and go to all appointments, and call your doctor if you are having problems. It's also a good idea to know your test results and keep a list of the medicines you take. How can you care for yourself at home? · Ask your family, friends, and coworkers for support. You have a better chance of quitting if you have help and support. · Join a support group, such as Nicotine Anonymous, for people who are trying to quit smoking. · Consider signing up for a smoking cessation program, such as the American Lung Association's Freedom from Smoking program.  · Get text messaging support. Go to the website at www.smokefree. gov to sign up for the [facility] program.  · Set a quit date. Pick your date carefully so that it is not right in the middle of a big deadline or stressful time. Once you quit, do not even take a puff. Get rid of all ashtrays and lighters after your last cigarette. Clean your house and your clothes so that they do not smell of smoke. · Learn how to be a nonsmoker. Think about ways you can avoid those things that make you reach for a cigarette. ? Avoid situations that put you at greatest risk for smoking. For some people, it is hard to have a drink with friends without smoking. For others, they might skip a coffee break with coworkers who smoke. ? Change your daily routine.  Take a different route to work or eat a meal in a different place. · Cut down on stress. Calm yourself or release tension by doing an activity you enjoy, such as reading a book, taking a hot bath, or gardening. · Talk to your doctor or pharmacist about nicotine replacement therapy, which replaces the nicotine in your body. You still get nicotine but you do not use tobacco. Nicotine replacement products help you slowly reduce the amount of nicotine you need. These products come in several forms, many of them available over-the-counter:  ? Nicotine patches  ? Nicotine gum and lozenges  ? Nicotine inhaler  · Ask your doctor about bupropion (Wellbutrin) or varenicline (Chantix), which are prescription medicines. They do not contain nicotine. They help you by reducing withdrawal symptoms, such as stress and anxiety. · Some people find hypnosis, acupuncture, and massage helpful for ending the smoking habit. · Eat a healthy diet and get regular exercise. Having healthy habits will help your body move past its craving for nicotine. · Be prepared to keep trying. Most people are not successful the first few times they try to quit. Do not get mad at yourself if you smoke again. Make a list of things you learned and think about when you want to try again, such as next week, next month, or next year. Where can you learn more? Go to https://SmartGrains.Posterous. org and sign in to your [employer] account. Enter M096 in the [facility] box to learn more about \"Stopping Smoking: Care Instructions. \"     If you do not have an account, please click on the \"Sign Up Now\" link. Current as of: February 11, 2021               Content Version: 12.9  © 6774-6431 Healthwise, Incorporated. Care instructions adapted under license by [facility] ([facility]).  If you have questions about a medical condition or this instruction, always ask your healthcare professional. [address] any warranty or liability for your use of this information.

## 2021-08-18 NOTE — PROGRESS NOTES
function. Patient also with history of COPD. He is not currently on any inhalers. He does not remember being on inhalers ever. He does not think he is ever followed with pulmonary. He is on oxygen at night. He does not wish to start any long-term inhalers at this time. He is requesting albuterol inhaler refilled. He states that he is short of breath throughout the day. He does have occasional cough. He is a current smoker. He is somewhat interested in quitting. He is interested in using a patch. He smokes about 3 cigars/day. So has a history of essential hypertension. He is normotensive in the office today. He denies any side effects from the medication. He has no other concerns at this time. Review of Systems   Constitutional: Negative for chills, fatigue, fever and unexpected weight change. HENT: Negative for congestion, rhinorrhea and sore throat. Eyes: Negative for discharge. Respiratory: Positive for cough and shortness of breath. Negative for wheezing. Cardiovascular: Negative for chest pain, palpitations and leg swelling. Gastrointestinal: Negative for abdominal pain, diarrhea, nausea and vomiting. Genitourinary: Negative for difficulty urinating. Musculoskeletal: Positive for arthralgias, back pain and joint swelling. Skin: Negative for rash. Neurological: Negative for weakness, light-headedness, numbness and headaches. Psychiatric/Behavioral: Negative for confusion. The patient is not nervous/anxious.         Past Medical History:   Diagnosis Date    Alcohol abuse     Alcohol abuse     Allergic rhinitis     Aortic regurgitation     Cervical radiculopathy at C8 10/13/2013    Chronic back pain     COPD (chronic obstructive pulmonary disease) (HCC)     Erectile dysfunction     GERD (gastroesophageal reflux disease)     Hyperlipidemia     Hypertension     Insomnia     Moderate episode of recurrent major depressive disorder ([facility] [address] 8/18/2021    Osteoarthritis     PUD (peptic ulcer disease)     Smoker unmotivated to quit     Vitamin D deficiency      Past Surgical History:   Procedure Laterality Date    COLONOSCOPY N/A 8/3/2020    COLONOSCOPY WITH POLYPECTOMY performed by Erick Nava MD at [address] [facility] [address]  12/2013    Dr. Tarik Wilhelm  2008    Left hip replacement    SPINE SURGERY  2006    fusion L4-L5     Current Outpatient Medications   Medication Sig Dispense Refill    albuterol sulfate HFA (PROVENTIL HFA) 108 (90 Base) MCG/ACT inhaler Inhale 2 puffs into the lungs every 6 hours as needed for Wheezing 1 Inhaler 3    hydrocortisone 2.5 % cream Apply topically 2 times daily. 28 g 0    nicotine (NICODERM CQ) 14 MG/24HR Place 1 patch onto the skin daily 30 patch 5    atorvastatin (LIPITOR) 40 MG tablet take 1 tablet by mouth once daily 90 tablet 1    amLODIPine (NORVASC) 10 MG tablet take 1 tablet by mouth once daily 90 tablet 1    metoprolol succinate (TOPROL XL) 100 MG extended release tablet take 1 tablet by mouth once daily 90 tablet 1    traZODone (DESYREL) 50 MG tablet take 1 tablet by mouth at bedtime 90 tablet 1    triamterene-hydroCHLOROthiazide (MAXZIDE-25) 37.5-25 MG per tablet take 1 tablet by mouth once daily 90 tablet 1    folic acid (FOLVITE) 1 MG tablet take 1 tablet by mouth once daily 90 tablet 1    meloxicam (MOBIC) 15 MG tablet Take 1 tablet by mouth daily 30 tablet 1    albuterol (PROVENTIL) (2.5 MG/3ML) 0.083% nebulizer solution Take 3 mLs by nebulization every 2 hours as needed for Wheezing 120 each 3     No current facility-administered medications for this visit. PMH, Surgical Hx, Family Hx, and Social Hx reviewed and updated. Health Maintenance reviewed. Objective    Vitals:    08/18/21 1456   BP: 128/60   Pulse: 80   Temp: 97.9 °F (36.6 °C)   SpO2: 97%   Weight: 171 lb 12.8 oz (77.9 kg)   Height: 5' 9\" (1.753 m)       Physical Exam  Vitals reviewed.    Constitutional: General: He is not in acute distress. HENT:      Head: Normocephalic and atraumatic. Eyes:      Conjunctiva/sclera: Conjunctivae normal.   Neck:      Thyroid: No thyromegaly or thyroid tenderness. Cardiovascular:      Rate and Rhythm: Normal rate and regular rhythm. Heart sounds: No murmur heard. No friction rub. No gallop. Pulmonary:      Effort: Pulmonary effort is normal.      Breath sounds: No rhonchi or rales. Comments: Coarse breath sounds throughout  Abdominal:      General: Bowel sounds are normal. There is no distension. Palpations: Abdomen is soft. Tenderness: There is no abdominal tenderness. Musculoskeletal:         General: Tenderness (Right knee with flexion and extension.) present. No deformity. Cervical back: Normal range of motion and neck supple. Right lower leg: No edema. Left lower leg: No edema. Comments: Patient with tenderness to palpation along lumbar spine with increased muscle hypertonicity. Unable to evaluate hips at this time due to patient mobility. Lymphadenopathy:      Cervical: No cervical adenopathy. Skin:     General: Skin is warm and dry. Findings: Rash (Hives noted on thoracic spine. They are red raised macules. ) present. Neurological:      General: No focal deficit present. Mental Status: He is alert. Psychiatric:         Mood and Affect: Mood normal.         Behavior: Behavior normal.        Assessment & Plan     1. Encounter to establish care with new doctor  Patient here to establish care with new physician. He previously saw Dr. Sangita Miller. 2. Hives  Patient with hives noted to the thoracic spine. Will start hydrocortisone 2.5%. They are instructed to apply it to his back twice daily for the next 7 to 10 days. If rash is not improved, they are to return to care sooner. They voiced understanding. Continue Benadryl as needed.  -Hydrocortisone 2.5%, apply twice daily for 7 to 10 days.     3. Obstructive chronic bronchitis without exacerbation ([facility] [address]  Patient with COPD. He does not follow with pulmonology. He is not on any controller medications. He denies wanting to go on controller medications at this time he does use oxygen at home. We discussed the need for this but he declines at this time. He is interested in using albuterol as needed. We did talk about how this is not the most appropriate treatment at this time. We will plan to continue to discuss this at his follow-up visit. He may benefit from seeing pulmonology. - albuterol sulfate HFA (PROVENTIL HFA) 108 (90 Base) MCG/ACT inhaler; Inhale 2 puffs into the lungs every 6 hours as needed for Wheezing  Dispense: 1 Inhaler; Refill: 3    4. Primary osteoarthritis of right knee  Patient with significant pain related to osteoarthritis of right knee. He is on meloxicam, which helps some. He has not tried Tylenol, they are going to try this next. He would be interested in seeing pain management for this and his other pain issues. Referral provided. - Joe Basilio MD, Pain Management, [place]    5. Primary localized osteoarthritis of pelvic region and thigh  Significant hip and pelvic osteoarthritis. He is interested in seeing pain management for this. - Joe Basilio MD, Pain Management, [place]    6. Benign essential hypertension  Patient with normal blood pressure at today's office visit. Continue current medications. No changes at this time. 7. Smoker  Patient is a current smoker interested in quitting. He is interested in patch. He currently smokes 3 cigars per day. They are instructed on the [facility] quit line and how this can be a good resource, the phone number is [phone]-ZOFMZMB. Going to try to work on this over the next several weeks. We discussed how this is probably going to help his breathing and COPD symptoms as well.  4 minutes of smoking cessation counseling provided.   - nicotine (NICODERM CQ) 14 MG/24HR; Place 1 patch onto the skin daily  Dispense: 30 patch; Refill: 5    Orders Placed This Encounter   Procedures   Isela Barthel, MD, Pain Management, [place]     Referral Priority:   Routine     Referral Type:   Eval and Treat     Referral Reason:   Specialty Services Required     Referred to Provider:   Winston Reynoso MD     Requested Specialty:   Pain Management     Number of Visits Requested:   1     Orders Placed This Encounter   Medications    albuterol sulfate HFA (PROVENTIL HFA) 108 (90 Base) MCG/ACT inhaler     Sig: Inhale 2 puffs into the lungs every 6 hours as needed for Wheezing     Dispense:  1 Inhaler     Refill:  3    hydrocortisone 2.5 % cream     Sig: Apply topically 2 times daily. Dispense:  28 g     Refill:  0    nicotine (NICODERM CQ) 14 MG/24HR     Sig: Place 1 patch onto the skin daily     Dispense:  30 patch     Refill:  5     Medications Discontinued During This Encounter   Medication Reason    DULoxetine (CYMBALTA) 60 MG extended release capsule Therapy completed    pantoprazole (PROTONIX) 40 MG tablet Therapy completed     Return in about 2 months (around 10/18/2021) for follow up. Reviewed with the patient: current clinical status,medications, activities and diet. Side effects, adverse effects of themedication prescribed today, as well as treatment plan/ rationale and result expectations have been discussed with the patient who expresses understanding and desires to proceed. Close follow up to evaluate treatment results and for coordination of care. I have reviewed the patient's medical history in detail and updated the computerized patient record. Please note, this report has been partially produced using speech recognition software and may cause  and /or contain errors related to that system including grammar, punctuation and spelling as well as words and phrases that may seem inappropriate.  If there are questions or concerns please feel free to contact me to clarify.     Lexie Hannah, DO

## 2021-08-20 ENCOUNTER — TELEPHONE (OUTPATIENT)
Dept: FAMILY MEDICINE CLINIC | Age: 66
End: 2021-08-20

## 2021-08-20 NOTE — TELEPHONE ENCOUNTER
----- Message from Fransico Ellis sent at 8/18/2021 11:19 AM EDT -----  Subject: Message to Provider    QUESTIONS  Information for Provider? Pt's wife Jennifer Mckee called stating Pt has welts on   his back , noticed yesterday. Welts are raised and red. Unable to schedule   Pt. Provider not listed . Wife stated he was a PT of Dr. Yolanda Klinefelter. Our call   got disconnected and was unable to reach our disconnection  ---------------------------------------------------------------------------  --------------  CALL BACK INFO  What is the best way for the office to contact you? OK to leave message on   voicemail  Preferred Call Back Phone Number? [phone]  ---------------------------------------------------------------------------  --------------  SCRIPT ANSWERS  Relationship to Patient? Other  Representative Name? Jennifer Mckee  Is the Representative on the appropriate HIPAA document in Epic?  Yes

## 2021-08-26 ENCOUNTER — INITIAL CONSULT (OUTPATIENT)
Dept: PAIN MANAGEMENT | Age: 66
End: 2021-08-26
Payer: MEDICARE

## 2021-08-26 VITALS
WEIGHT: 180 LBS | TEMPERATURE: 97.6 F | DIASTOLIC BLOOD PRESSURE: 68 MMHG | SYSTOLIC BLOOD PRESSURE: 140 MMHG | HEIGHT: 69 IN | BODY MASS INDEX: 26.66 KG/M2

## 2021-08-26 DIAGNOSIS — M51.36 DDD (DEGENERATIVE DISC DISEASE), LUMBAR: ICD-10-CM

## 2021-08-26 DIAGNOSIS — M47.817 LUMBOSACRAL SPONDYLOSIS WITHOUT MYELOPATHY: Primary | ICD-10-CM

## 2021-08-26 PROCEDURE — G8417 CALC BMI ABV UP PARAM F/U: HCPCS | Performed by: ANESTHESIOLOGY

## 2021-08-26 PROCEDURE — 99203 OFFICE O/P NEW LOW 30 MIN: CPT | Performed by: ANESTHESIOLOGY

## 2021-08-26 PROCEDURE — G8427 DOCREV CUR MEDS BY ELIG CLIN: HCPCS | Performed by: ANESTHESIOLOGY

## 2021-08-26 NOTE — PROGRESS NOTES
Patient:  Jack Root  YOB: 1955  Date: 8/26/2021      Subjective:     Jack Root is a 78 y.o. male who presents today with:    Chief Complaint   Patient presents with    Knee Pain       HPI: The patient presents to the clinic with a chief complaint of chronic low back pain started about 5 months ago. The patient had a lumbar laminectomy more than 20 years ago and had done very well until 5 months ago. Denies any injury or trauma. The patient had an MRI on 4/29/2021 and the report indicates the postsurgical and degenerative changes with a severe canal stenosis at L2-3 level. The patient saw Dr. Skyler Cespedes who suggested surgery but the patient is reluctant to undergo surgery at this point. The patient had left total hip arthroplasty 15 years ago and the pain has been recurrent for over a year. Allergies:  Patient has no known allergies.   Past Medical History:   Diagnosis Date    Alcohol abuse     Alcohol abuse     Allergic rhinitis     Aortic regurgitation     Cervical radiculopathy at C8 10/13/2013    Chronic back pain     COPD (chronic obstructive pulmonary disease) (HCC)     Erectile dysfunction     GERD (gastroesophageal reflux disease)     Hyperlipidemia     Hypertension     Insomnia     Moderate episode of recurrent major depressive disorder ([facility] [address] 8/18/2021    Osteoarthritis     PUD (peptic ulcer disease)     Smoker unmotivated to quit     Vitamin D deficiency      Past Surgical History:   Procedure Laterality Date    COLONOSCOPY N/A 8/3/2020    COLONOSCOPY WITH POLYPECTOMY performed by Alger Soulier, MD at [address] [facility]  12/2013    Dr. Vini Torre  2008    Left hip replacement    SPINE SURGERY  2006    fusion L4-L5     Social History     Socioeconomic History    Marital status:      Spouse name: Not on file    Number of children: Not on file    Years of education: Not on file    Highest education level: Not on file   Occupational History    Not on file   Tobacco Use    Smoking status: Current Every Day Smoker     Packs/day: 0.25     Years: 49.00     Pack years: 12.25     Types: Cigars    Smokeless tobacco: Never Used    Tobacco comment: started age 24   Vaping Use    Vaping Use: Never used   Substance and Sexual Activity    Alcohol use: Yes     Comment: has one can of beer couple times a week    Drug use: Yes     Frequency: 1.0 times per week     Types: Marijuana     Comment: once weekly    Sexual activity: Not Currently   Other Topics Concern    Not on file   Social History Narrative    Not on file     Social Determinants of Health     Financial Resource Strain: Medium Risk    Difficulty of Paying Living Expenses: Somewhat hard   Food Insecurity: No Food Insecurity    Worried About Running Out of Food in the Last Year: Never true    Ronda of Food in the Last Year: Never true   Transportation Needs: No Transportation Needs    Lack of Transportation (Medical): No    Lack of Transportation (Non-Medical):  No   Physical Activity:     Days of Exercise per Week:     Minutes of Exercise per Session:    Stress:     Feeling of Stress :    Social Connections:     Frequency of Communication with Friends and Family:     Frequency of Social Gatherings with Friends and Family:     Attends Adventism Services:     Active Member of Clubs or Organizations:     Attends Club or Organization Meetings:     Marital Status:    Intimate Partner Violence:     Fear of Current or Ex-Partner:     Emotionally Abused:     Physically Abused:     Sexually Abused:      Family History   Problem Relation Age of Onset    Cancer Mother 67        brain       Current Outpatient Medications on File Prior to Visit   Medication Sig Dispense Refill    albuterol sulfate HFA (PROVENTIL HFA) 108 (90 Base) MCG/ACT inhaler Inhale 2 puffs into the lungs every 6 hours as needed for Wheezing 1 Inhaler 3    hydrocortisone 2.5 % Negative    Studies Review:  Reviewed MRI report of Lumbar spine dated 4/29/2021. Assessment:           Diagnosis Orders   1. Lumbosacral spondylosis without myelopathy     2. DDD (degenerative disc disease), lumbar           Plan:     No orders of the defined types were placed in this encounter. No orders of the defined types were placed in this encounter. The patient refuses to be enrolled in physical therapy. Discussed with the patient about the lower lumbar facet denervation by radiofrequency. The patient will be scheduled for the lower lumbar medial branch blocks. The patient understands the plan and wishes to proceed. Follow up:  Return for 1-2 weeks AMANDA.     Lemuel Pascual MD

## 2021-08-30 ENCOUNTER — TELEPHONE (OUTPATIENT)
Dept: PAIN MANAGEMENT | Age: 66
End: 2021-08-30

## 2021-08-30 NOTE — TELEPHONE ENCOUNTER
GABRIEL L3,4,5 MBB    NO AUTH REQUIRED    OK to schedule procedure approved as above. Please note sides/levels approved and date range.    (If applicable, sides/levels approved may differ from those ordered)    TO BE SCHEDULED WITH

## 2021-08-30 NOTE — TELEPHONE ENCOUNTER
ORDER PLACED:    Date: 8/26/21  Description: Sean PATEL  Order Number: [id number]  Ordering Provider: Juan Mcdonald  Performing Provider: Juan Ano  CPT Codes: 54974,03512  ICD10 Codes: I27.500

## 2021-09-02 ENCOUNTER — PROCEDURE VISIT (OUTPATIENT)
Dept: PAIN MANAGEMENT | Age: 66
End: 2021-09-02
Payer: MEDICARE

## 2021-09-02 DIAGNOSIS — M47.817 LUMBOSACRAL SPONDYLOSIS WITHOUT MYELOPATHY: Primary | ICD-10-CM

## 2021-09-02 DIAGNOSIS — M51.36 DDD (DEGENERATIVE DISC DISEASE), LUMBAR: ICD-10-CM

## 2021-09-02 PROCEDURE — 64493 INJ PARAVERT F JNT L/S 1 LEV: CPT | Performed by: ANESTHESIOLOGY

## 2021-09-02 PROCEDURE — 64494 INJ PARAVERT F JNT L/S 2 LEV: CPT | Performed by: ANESTHESIOLOGY

## 2021-09-02 RX ORDER — LIDOCAINE HYDROCHLORIDE 10 MG/ML
5 INJECTION, SOLUTION EPIDURAL; INFILTRATION; INTRACAUDAL; PERINEURAL ONCE
Status: COMPLETED | OUTPATIENT
Start: 2021-09-02 | End: 2021-09-02

## 2021-09-02 RX ADMIN — LIDOCAINE HYDROCHLORIDE 5 ML: 10 INJECTION, SOLUTION EPIDURAL; INFILTRATION; INTRACAUDAL; PERINEURAL at 13:49

## 2021-09-02 NOTE — PROGRESS NOTES
[facility]) Physicians  Neurosurgery and Pain [facility]  [address] [facility] [facility] [address], [address] [facility] [address]: [phone]  F: [phone]             Provider: Makenna Singletary MD          Patient Name: Jairo Stephenson : 1955        Date: 2021         PROCEDURE: Bilateral L1, L2, L5 medial branch blocks, diagnostic. Description of Procedure: The procedure and potential complications were explained to the patient and a voluntary informed, signed consent was obtained. The patient was placed prone on the x-ray table, under fluoroscopic guidance a 25-gauge, 3-1/2 inch long curved spinal needle was inserted aiming at the medial branches located at the junction of the superior articular process and transverse process of respective vertebrae except at the L5 level instead of the transverse process the sacral ala was used. After negative aspiration 0.8 mL of 1% Xylocaine was injected at each level. Fifteen minutes later the patient's pain level was assessed. There was marked pain relief of 80%. Summary and Recommendations: The patient will be back for MBB.

## 2021-09-03 DIAGNOSIS — E78.5 DYSLIPIDEMIA: ICD-10-CM

## 2021-09-03 RX ORDER — ATORVASTATIN CALCIUM 40 MG/1
40 TABLET, FILM COATED ORAL DAILY
Qty: 90 TABLET | Refills: 0 | Status: SHIPPED | OUTPATIENT
Start: 2021-09-03 | End: 2021-11-29

## 2021-09-03 RX ORDER — PANTOPRAZOLE SODIUM 40 MG/1
40 TABLET, DELAYED RELEASE ORAL DAILY
Qty: 90 TABLET | Refills: 0 | Status: SHIPPED | OUTPATIENT
Start: 2021-09-03 | End: 2021-11-29

## 2021-09-03 RX ORDER — PANTOPRAZOLE SODIUM 40 MG/1
40 TABLET, DELAYED RELEASE ORAL DAILY
COMMUNITY
End: 2021-09-03 | Stop reason: SDUPTHER

## 2021-09-13 ENCOUNTER — TELEPHONE (OUTPATIENT)
Dept: PAIN MANAGEMENT | Age: 66
End: 2021-09-13

## 2021-09-13 NOTE — TELEPHONE ENCOUNTER
BENEFITS: LAURIE L1,2,5 MBB    Insurance: [facility]  Phone: [phone]  Contact Name: Vadim Quiroz  Effective Date: 1.1.2021     Plan year: YES-CALENDAR  Deductible: N/A      Deductible Met: N/A  Allowed/benefits paid at: 100% AFTER $35.00 COPAY  OOP: 3900.00 MET $670.92  Freq Limits: 37954.& 64494-BASED ON MEDICAL NECESSITY  Prior Auth Requirement: NO    Notes: NO PRE-EX CLAUSE    Call Reference #: [id number]    Time of call: 9:55AM

## 2021-09-27 ENCOUNTER — NURSE TRIAGE (OUTPATIENT)
Dept: OTHER | Facility: CLINIC | Age: 66
End: 2021-09-27

## 2021-09-27 NOTE — TELEPHONE ENCOUNTER
Reason for Disposition   Suicide thoughts, threats, attempts, or questions   Depression is the main symptom and is not threatening suicide   Sometimes has thoughts of suicide    Answer Assessment - Initial Assessment Questions  1. CONCERN: \"What happened that made you call today? \"      Tomas Nation is not eating - he is trying to quit drinking but I am afraid it is getting out of hand\"    2. DEPRESSION SYMPTOM SCREENING: \"How are you feeling overall? \" (e.g., decreased energy, increased sleeping or difficulty sleeping, difficulty concentrating, feelings of sadness, guilt, hopelessness, or worthlessness)      No eating, difficulty sleeping, brain fog, seem like he no longer cares, he will say he is okay when the wife knows he is not    3. RISK OF HARM - SUICIDAL IDEATION:  \"Do you ever have thoughts of hurting or killing yourself? \"  (e.g., yes, no, no but preoccupation with thoughts about death)    - INTENT:  \"Do you have thoughts of hurting or killing yourself right NOW? \" (e.g., yes, no, N/A)    - PLAN: \"Do you have a specific plan for how you would do this? \" (e.g., gun, knife, overdose, no plan, N/A)      Yes had had thoughts of killing himself - no thoughts of killing himself now - no specific plan    4. RISK OF HARM - HOMICIDAL IDEATION:  \"Do you ever have thoughts of hurting or killing someone else? \"  (e.g., yes, no, no but preoccupation with thoughts about death)    - INTENT:  \"Do you have thoughts of hurting or killing someone right NOW? \" (e.g., yes, no, N/A)    - PLAN: \"Do you have a specific plan for how you would do this? \" (e.g., gun, knife, no plan, N/A)       No    5. FUNCTIONAL IMPAIRMENT: \"How have things been going for you overall in your life? Have you had any more difficulties than usual doing your normal daily activities? \"  (e.g., better, same, worse; self-care, school, work, interactions)      Sleeping problems    6. SUPPORT: \"Who is with you now? \" \"Who do you live with?\" \"Do you have family or friends nearby who you can talk to?\"       Wife is with him    7. THERAPIST: \"Do you have a counselor or therapist? Name? \"      No    8. STRESSORS: \"Has there been any new stress or recent changes in your life? \"      He lost his sister and a brother withing 6 weeks of each other    9. DRUG ABUSE/ALCOHOL: \"Do you drink alcohol or use any illegal drugs? \"       Alcohol - 1/2 pint of hard liquor - no illegal drugs    10. OTHER: \"Do you have any other health or medical symptoms right now? \" (e.g., fever)        On oxygen due to previous Covid diagnosis    11. PREGNANCY: \"Is there any chance you are pregnant? \" \"When was your last menstrual period? \"        No    Protocols used: DEPRESSION-ADULT-OH, SUICIDE CONCERNS-ADULT-OH    Received call from German Alvarado at [facility] with [employer]. Brief description of triage: Wife called to report that her  is depressed and drinking too much,  He seems to have no energy and states that while he has no plan, and no immediate intentions he has though about taking his own life. He does not have any homicidal ideation. Triage indicates for patient to be seen today by his provider or to speak with them at the office if there are no appts available at this late hour of the day. Care advice provided, patient verbalizes understanding; denies any other questions or concerns; instructed to call back for any new or worsening symptoms. Writer provided warm transfer to Christiano at [facility] for appointment scheduling. Attention Provider: Thank you for allowing me to participate in the care of your patient. The patient was connected to triage in response to information provided to the ECC/PSC. Please do not respond through this encounter as the response is not directed to a shared pool.

## 2021-09-28 ENCOUNTER — OFFICE VISIT (OUTPATIENT)
Dept: FAMILY MEDICINE CLINIC | Age: 66
End: 2021-09-28
Payer: MEDICARE

## 2021-09-28 VITALS
WEIGHT: 167.6 LBS | OXYGEN SATURATION: 97 % | HEIGHT: 69 IN | SYSTOLIC BLOOD PRESSURE: 120 MMHG | DIASTOLIC BLOOD PRESSURE: 68 MMHG | HEART RATE: 116 BPM | BODY MASS INDEX: 24.82 KG/M2

## 2021-09-28 DIAGNOSIS — F51.01 PRIMARY INSOMNIA: ICD-10-CM

## 2021-09-28 DIAGNOSIS — F10.10 ALCOHOL ABUSE: Primary | ICD-10-CM

## 2021-09-28 DIAGNOSIS — Z23 NEED FOR INFLUENZA VACCINATION: ICD-10-CM

## 2021-09-28 DIAGNOSIS — F32.A DEPRESSION, UNSPECIFIED DEPRESSION TYPE: ICD-10-CM

## 2021-09-28 DIAGNOSIS — Z23 NEED FOR PNEUMOCOCCAL VACCINATION: ICD-10-CM

## 2021-09-28 DIAGNOSIS — R63.4 WEIGHT LOSS: ICD-10-CM

## 2021-09-28 PROCEDURE — 4040F PNEUMOC VAC/ADMIN/RCVD: CPT | Performed by: STUDENT IN AN ORGANIZED HEALTH CARE EDUCATION/TRAINING PROGRAM

## 2021-09-28 PROCEDURE — G0008 ADMIN INFLUENZA VIRUS VAC: HCPCS | Performed by: STUDENT IN AN ORGANIZED HEALTH CARE EDUCATION/TRAINING PROGRAM

## 2021-09-28 PROCEDURE — 99215 OFFICE O/P EST HI 40 MIN: CPT | Performed by: STUDENT IN AN ORGANIZED HEALTH CARE EDUCATION/TRAINING PROGRAM

## 2021-09-28 PROCEDURE — 90732 PPSV23 VACC 2 YRS+ SUBQ/IM: CPT | Performed by: STUDENT IN AN ORGANIZED HEALTH CARE EDUCATION/TRAINING PROGRAM

## 2021-09-28 PROCEDURE — G8427 DOCREV CUR MEDS BY ELIG CLIN: HCPCS | Performed by: STUDENT IN AN ORGANIZED HEALTH CARE EDUCATION/TRAINING PROGRAM

## 2021-09-28 PROCEDURE — G8420 CALC BMI NORM PARAMETERS: HCPCS | Performed by: STUDENT IN AN ORGANIZED HEALTH CARE EDUCATION/TRAINING PROGRAM

## 2021-09-28 PROCEDURE — 4004F PT TOBACCO SCREEN RCVD TLK: CPT | Performed by: STUDENT IN AN ORGANIZED HEALTH CARE EDUCATION/TRAINING PROGRAM

## 2021-09-28 PROCEDURE — 90694 VACC AIIV4 NO PRSRV 0.5ML IM: CPT | Performed by: STUDENT IN AN ORGANIZED HEALTH CARE EDUCATION/TRAINING PROGRAM

## 2021-09-28 PROCEDURE — 3017F COLORECTAL CA SCREEN DOC REV: CPT | Performed by: STUDENT IN AN ORGANIZED HEALTH CARE EDUCATION/TRAINING PROGRAM

## 2021-09-28 PROCEDURE — G0009 ADMIN PNEUMOCOCCAL VACCINE: HCPCS | Performed by: STUDENT IN AN ORGANIZED HEALTH CARE EDUCATION/TRAINING PROGRAM

## 2021-09-28 PROCEDURE — 1123F ACP DISCUSS/DSCN MKR DOCD: CPT | Performed by: STUDENT IN AN ORGANIZED HEALTH CARE EDUCATION/TRAINING PROGRAM

## 2021-09-28 RX ORDER — TRAZODONE HYDROCHLORIDE 50 MG/1
100 TABLET ORAL NIGHTLY
Qty: 60 TABLET | Refills: 1 | Status: SHIPPED | OUTPATIENT
Start: 2021-09-28 | End: 2021-10-20 | Stop reason: SDUPTHER

## 2021-09-28 ASSESSMENT — ENCOUNTER SYMPTOMS
VOMITING: 0
SHORTNESS OF BREATH: 0
DIARRHEA: 0
WHEEZING: 0
ABDOMINAL PAIN: 0
COUGH: 0
RHINORRHEA: 0
SORE THROAT: 0
NAUSEA: 0

## 2021-09-28 ASSESSMENT — PATIENT HEALTH QUESTIONNAIRE - PHQ9
SUM OF ALL RESPONSES TO PHQ QUESTIONS 1-9: 1
SUM OF ALL RESPONSES TO PHQ9 QUESTIONS 1 & 2: 1
1. LITTLE INTEREST OR PLEASURE IN DOING THINGS: 0
2. FEELING DOWN, DEPRESSED OR HOPELESS: 1
SUM OF ALL RESPONSES TO PHQ QUESTIONS 1-9: 1
SUM OF ALL RESPONSES TO PHQ QUESTIONS 1-9: 1

## 2021-09-28 NOTE — PATIENT INSTRUCTIONS
Patient Education        Alcohol Detoxification and Withdrawal: Care Instructions  Your Care Instructions     If you drink alcohol regularly and then suddenly stop, you may go through some physical and emotional problems while the alcohol clears out of your system. Clearing the alcohol from your body is called detoxification, or detox. Physical and emotional problems that may happen during detox are called withdrawal.  Symptoms of withdrawal can be scary and dangerous. Mild symptoms include nausea and vomiting, sweating, shakiness, and intense worry. Severe symptoms include being confused and irritable, feeling things on your body that are not there, seeing or hearing things that are not there, and trembling. You may even have seizures. If your symptoms become severe you must see a doctor. People who drink large amounts of alcohol should not try to detox at home. A person can die of severe alcohol withdrawal.  Symptoms of alcohol withdrawal may begin from 4 to 12 hours after you stop drinking. But they may not start for several days after the last drink. They can last a few days. It is hard to stop drinking. But when you have cleared the alcohol from your system, you will be able to start the next part of your life, free from the burden of being dependent. Follow-up care is a key part of your treatment and safety. Be sure to make and go to all appointments, and call your doctor if you are having problems. It's also a good idea to know your test results and keep a list of the medicines you take. How can you care for yourself at home? · Before you stop drinking, talk to your doctor about how you plan to stop. Be sure to be completely honest with him or her about how much you have been drinking. Your doctor will figure out whether you need to detox in a supervised medical center. · Take your medicines exactly as prescribed. Call your doctor if you think you are having a problem with your medicine.   · Make sure someone you trust is with you the whole time. Have friends and family members take turns staying with you until you are done with detox. · Put a list of emergency numbers near the phone. This should include your doctor, the police, the nearest hospital and emergency room, and neighbors who can help if needed. · Make sure all alcohol is removed from the house before you start. This includes beverages as well as medicines, rubbing alcohol, and certain flavorings like vanilla extract. · Keep \"drinking buddies\" away during the time you are going through detox. · Make your surroundings calm. Soft lights, soft music, and a comfortable place to sit or lie down can help make the process easier. · Drink lots of fluids and eat snacks such as fruit, cheese and crackers, and pretzels. Foods high in carbohydrate may help reduce the craving for alcohol. · Understand that detox is going to be hard. · Keep in mind that the people watching over you during detox are there to help. Explain to them before you start that you may not act like yourself until detox is finished. · Consider joining a support group such as Alcoholics Anonymous. Sharing your experiences with other people who face similar challenges may help you feel less overwhelmed. · Keep the numbers for these national suicide hotlines: 8-360-634-TALK (4-932.110.1053) and 4-593-QKZKVMT (9-694.251.5234). If you or someone you know talks about suicide or feeling hopeless, get help right away. When should you call for help? Call 911 anytime you think you may need emergency care.  For example, call if:    · You feel you cannot stop from hurting yourself or someone else.     · You vomit many times and cannot stop.     · You vomit blood or what looks like coffee grounds.     · You have trouble breathing or are breathing very fast.     · Your heart beats more than 120 times a minute and will not slow down.     · You have chest pain.     · You have a seizure.     · You see or feel things that are not there (hallucinate). If you are caring for someone who is going through detox, call if:    · The person passes out (loses consciousness).     · The person sees or feels things that are not there and sees or hears the same things many times.     · The person is very agitated and will not calm down.     · The person becomes violent or threatens to be violent.     · The person has a seizure. Call your doctor now or seek immediate medical care if:    · You have a high fever.     · You have severe belly pain.     · You are very shaky. Watch closely for changes in your health, and be sure to contact your doctor if:    · You do not get better as expected. Where can you learn more? Go to [email or web address] and sign in to your [employer] account. Enter [phone] in the [employer] box to learn more about \"Alcohol Detoxification and Withdrawal: Care Instructions. \"     If you do not have an account, please click on the \"Sign Up Now\" link. Current as of: February 11, 2021               Content Version: 13.0  © 1875-7984 Healthwise, Incorporated. Care instructions adapted under license by [facility] ([facility]). If you have questions about a medical condition or this instruction, always ask your healthcare professional. [address] any warranty or liability for your use of this information.

## 2021-09-28 NOTE — PROGRESS NOTES
Vaccine Information Sheet, \"Influenza - Inactivated\"  given to Alex Buckner, or parent/legal guardian of  Alex Buckner and verbalized understanding. Patient responses:    Have you ever had a reaction to a flu vaccine? NO    Are you able to eat eggs without adverse effects? YES    Do you have any current illness? NO    Have you ever had Guillian Blanchard Syndrome? NO    Flu vaccine given per order. Please see immunization tab.

## 2021-09-28 NOTE — PROGRESS NOTES
Subjective  Kathleen Cochran, 78 y.o. male presents today with:  Chief Complaint   Patient presents with    Depression     Would like to discuss his mood. States he has been feeling down & depressed for the past couple of months. States he has had thoughts of harming himself, but not currently. Wife states patient is not eating. States he just dont have an appetite. Wife also states that patient is not sleeping. HPI     Patient here for acute issue. He is accompanied by his wife. He states that he has struggled with alcohol use for quite some time. He states that he has gone 3 days without drinking. He does admit to some shakiness. No history of seizures from not drinking. He states that he had been drinking about a half a pint of vodka per day. He states that he has not had a drink in over 72 hours. He states that he is home by himself. He has also lost about 13 pounds in about a month. He states that he just does not have an appetite. He does have some depressive symptoms. His PHQ is essentially negative, however, he does seem to be quite down. He states that he has not had an appetite. He does have difficulty sleeping. He has used trazodone in the past to help him sleep and he states that the current dose is not helping him. He states that he just does not have an appetite for anything. He does feel guilty about his symptoms. He is interested in talking to somebody. He denies any suicidal ideation or homicidal ideation. He has no other concerns at this time. PHQ-2 Over the past 2 weeks, how often have you been bothered by any of the following problems? Little interest or pleasure in doing things: Not at all  Feeling down, depressed, or hopeless: Several days  PHQ-2 Score: 1  PHQ-9 Over the past 2 weeks, how often have you been bothered by any of the following problems?   PHQ-9 Total Score: 1    On the basis of positive PHQ-9 screening (PHQ-9 Total Score: 1), the following plan was implemented: additional evaluation and assessment performed and referral to Behavioral health provided. Patient will follow-up in 1 week(s) with PCP. Review of Systems   Constitutional: Positive for appetite change and unexpected weight change. Negative for chills, fatigue and fever. HENT: Negative for congestion, rhinorrhea and sore throat. Respiratory: Negative for cough, shortness of breath and wheezing. Cardiovascular: Negative for chest pain, palpitations and leg swelling. Gastrointestinal: Negative for abdominal pain, diarrhea, nausea and vomiting. Genitourinary: Negative for difficulty urinating. Musculoskeletal: Negative for arthralgias and joint swelling. Skin: Negative for rash. Neurological: Positive for tremors (Improving). Negative for weakness, light-headedness, numbness and headaches. Psychiatric/Behavioral: Positive for sleep disturbance. Negative for agitation, confusion, self-injury and suicidal ideas. The patient is not nervous/anxious.         Past Medical History:   Diagnosis Date    Alcohol abuse     Alcohol abuse     Allergic rhinitis     Aortic regurgitation     Cervical radiculopathy at  10/13/2013    Chronic back pain     Chronic back pain     [facility] disease) ([facility])     Erectile dysfunction     GERD (gastroesophageal reflux disease)     Hyperlipidemia     Hypertension     Insomnia     Moderate episode of recurrent major depressive disorder ([facility] [address] 8/18/2021    Osteoarthritis     PUD (peptic ulcer disease)     Restless leg syndrome     Smoker unmotivated to quit     Vitamin D deficiency      Past Surgical History:   Procedure Laterality Date    COLONOSCOPY N/A 8/3/2020    COLONOSCOPY WITH POLYPECTOMY performed by Nargsi Masters MD at [address]  12/2013    Dr. Charla Win REPLACEMENT  2008    Left hip replacement    SPINE SURGERY  2006    fusion L4-L5     Current Outpatient Medications Medication Sig Dispense Refill    traZODone (DESYREL) 50 MG tablet Take 2 tablets by mouth nightly take 2 tablets by mouth at bedtime 60 tablet 1    atorvastatin (LIPITOR) 40 MG tablet Take 1 tablet by mouth daily 90 tablet 0    pantoprazole (PROTONIX) 40 MG tablet Take 1 tablet by mouth daily 90 tablet 0    albuterol sulfate HFA (PROVENTIL HFA) 108 (90 Base) MCG/ACT inhaler Inhale 2 puffs into the lungs every 6 hours as needed for Wheezing 1 Inhaler 3    hydrocortisone 2.5 % cream Apply topically 2 times daily. 28 g 0    nicotine (NICODERM CQ) 14 MG/24HR Place 1 patch onto the skin daily 30 patch 5    amLODIPine (NORVASC) 10 MG tablet take 1 tablet by mouth once daily 90 tablet 1    metoprolol succinate (TOPROL XL) 100 MG extended release tablet take 1 tablet by mouth once daily 90 tablet 1    triamterene-hydroCHLOROthiazide (MAXZIDE-25) 37.5-25 MG per tablet take 1 tablet by mouth once daily 90 tablet 1    folic acid (FOLVITE) 1 MG tablet take 1 tablet by mouth once daily 90 tablet 1    meloxicam (MOBIC) 15 MG tablet Take 1 tablet by mouth daily 30 tablet 1    albuterol (PROVENTIL) (2.5 MG/3ML) 0.083% nebulizer solution Take 3 mLs by nebulization every 2 hours as needed for Wheezing 120 each 3     No current facility-administered medications for this visit. PMH, Surgical Hx, Family Hx, and Social Hx reviewed and updated. Health Maintenance reviewed. Objective    Vitals:    09/28/21 1410   BP: 120/68   Pulse: 116   SpO2: 97%   Weight: 167 lb 9.6 oz (76 kg)   Height: 5' 9\" (1.753 m)       Physical Exam  Vitals reviewed. Constitutional:       General: He is not in acute distress. HENT:      Head: Normocephalic and atraumatic. Eyes:      Conjunctiva/sclera: Conjunctivae normal.   Neck:      Thyroid: No thyromegaly or thyroid tenderness. Cardiovascular:      Rate and Rhythm: Normal rate and regular rhythm. Heart sounds: No murmur heard. No friction rub. No gallop.     Pulmonary: Effort: Pulmonary effort is normal.      Breath sounds: Normal breath sounds. No wheezing, rhonchi or rales. Abdominal:      General: Bowel sounds are normal. There is no distension. Palpations: Abdomen is soft. Tenderness: There is no abdominal tenderness. Musculoskeletal:         General: No swelling or deformity. Cervical back: Normal range of motion and neck supple. Right lower leg: No edema. Left lower leg: No edema. Lymphadenopathy:      Cervical: No cervical adenopathy. Skin:     General: Skin is warm and dry. Findings: No rash. Neurological:      General: No focal deficit present. Mental Status: He is alert. Cranial Nerves: No cranial nerve deficit. Sensory: No sensory deficit. Motor: No weakness. Coordination: Coordination normal.      Gait: Gait is intact. Deep Tendon Reflexes: Reflexes normal.   Psychiatric:         Mood and Affect: Mood normal.         Behavior: Behavior normal.        Assessment & Plan     1. Alcohol abuse  Patient with alcohol abuse, currently trying to wean himself off of alcohol. He has not had a drink in 72 hours. His vital signs are stable. He does have some tremulousness on exam.  His neurologic exam is intact. No signs of acute withdrawal at this time. We did discuss what withdrawal would look like and that he should seek care in the emergency room right away if this occurs. We discussed if withdrawal is not treated promptly that this could lead to seizures and possibly death. Patient and wife voiced understanding. We will obtain a metabolic panel today. I also placed a referral to psychology for counseling for this and other reasons. He is motivated to quit drinking. It has affected his marriage. His wife is supportive of him. We will continue to monitor closely. Follow-up in 1 week. - Ambulatory referral to Psychology  - Comprehensive Metabolic Panel; Future    2. Primary insomnia  Worsening. He had been on 50 mg of trazodone nightly. This is not working for him. Will increase dose to 100 mg nightly. We will also make referral to psychology for counseling. We did discuss some techniques to help him sleep at night. We will continue to monitor carefully. All questions answered. Follow-up as scheduled. - traZODone (DESYREL) 50 MG tablet; Take 2 tablets by mouth nightly take 2 tablets by mouth at bedtime  Dispense: 60 tablet; Refill: 1  - Ambulatory referral to Psychology    3. Depression, unspecified depression type  Patient with depression. He did not score high in his PHQ, however, given his symptoms and what he is reporting to me I suspect a higher score. No risk of suicide at this time. He denies a plan or intention. Will make referral to psychology for counseling. He does not wish to start medication for depression at this time. Would possibly consider Remeron for depression if he decides to seek treatment for it as it may also help with his appetite. We will discuss further at appointment next week. - Ambulatory referral to Psychology    4. Need for influenza vaccination  Flu shot given  - INFLUENZA, QUADV, ADJUVANTED, 65 YRS =, IM, PF, PREFILL SYR, 0.5ML (FLUAD)    5. Need for pneumococcal vaccination  Ammonia shot given  - PNEUMOVAX 23 subcutaneous/IM (Pneumococcal polysaccharide vaccine 23-valent >= 1yo)    6. Weight loss  Patient with 13 pound weight loss over the last month. He has not been eating. He has no appetite. I do suspect it is related to his alcohol use and mood. We will continue to monitor carefully. If he continues to have weight loss, will consider further work-up. We will hold off at this time. I did suggest increasing his calories and increasing protein using protein shakes. He is going to try to do this over the next week. We will see where he is at in 1 week. Continue to monitor carefully.     Orders Placed This Encounter   Procedures    INFLUENZA,

## 2021-09-30 ENCOUNTER — PROCEDURE VISIT (OUTPATIENT)
Dept: PAIN MANAGEMENT | Age: 66
End: 2021-09-30
Payer: MEDICARE

## 2021-09-30 DIAGNOSIS — M47.817 LUMBOSACRAL SPONDYLOSIS WITHOUT MYELOPATHY: Primary | ICD-10-CM

## 2021-09-30 DIAGNOSIS — M51.36 DDD (DEGENERATIVE DISC DISEASE), LUMBAR: ICD-10-CM

## 2021-09-30 PROCEDURE — 64494 INJ PARAVERT F JNT L/S 2 LEV: CPT | Performed by: ANESTHESIOLOGY

## 2021-09-30 PROCEDURE — 64493 INJ PARAVERT F JNT L/S 1 LEV: CPT | Performed by: ANESTHESIOLOGY

## 2021-09-30 NOTE — PROGRESS NOTES
[facility]) Physicians  Neurosurgery and Pain [facility]  [address], [address] [facility] [address], [address], [place] [address]: [phone]  F: [phone]             Provider: Annette Colon MD          Patient Name: Bailey Garrido : 1955        Date: 2021         PROCEDURE: Bilateral L1, L2, L5 medial branch blocks, diagnostic. Description of Procedure: The procedure and potential complications were explained to the patient and a voluntary informed, signed consent was obtained. The patient was placed prone on the x-ray table, under fluoroscopic guidance a 25-gauge, 3-1/2 inch long curved spinal needle was inserted aiming at the medial branches located at the junction of the superior articular process and transverse process of respective vertebrae except at the L5 level instead of the transverse process the sacral ala was used. After negative aspiration 0.8 mL of 1% Xylocaine was injected at each level. Fifteen minutes later the patient's pain level was assessed. There was marked pain relief of 80%. Summary and Recommendations: The patient will be back for RFA.

## 2021-10-01 ENCOUNTER — TELEPHONE (OUTPATIENT)
Dept: PAIN MANAGEMENT | Age: 66
End: 2021-10-01

## 2021-10-01 RX ORDER — FOLIC ACID 1 MG/1
TABLET ORAL
Qty: 90 TABLET | Refills: 1 | Status: SHIPPED | OUTPATIENT
Start: 2021-10-01 | End: 2022-03-25 | Stop reason: SDUPTHER

## 2021-10-01 NOTE — TELEPHONE ENCOUNTER
ORDER PLACED:    Date: 9/30/21  Description: Erick Malcolm RFA  Order Number: [id number]  Ordering Provider: Margo Causey  Performing Provider: Margo Causey  CPT Codes: 07555,11736  ICD10 Codes: S17.496    9/2/21 AND 9/30/21 PROCEDURE NOTES DOCUMENT THAT PATIENT HAD 80% PAIN RELIEF FROM [facility]

## 2021-10-05 ENCOUNTER — OFFICE VISIT (OUTPATIENT)
Dept: FAMILY MEDICINE CLINIC | Age: 66
End: 2021-10-05
Payer: MEDICARE

## 2021-10-05 ENCOUNTER — OFFICE VISIT (OUTPATIENT)
Dept: BEHAVIORAL/MENTAL HEALTH CLINIC | Age: 66
End: 2021-10-05
Payer: MEDICARE

## 2021-10-05 VITALS
SYSTOLIC BLOOD PRESSURE: 130 MMHG | OXYGEN SATURATION: 98 % | BODY MASS INDEX: 25.59 KG/M2 | HEART RATE: 56 BPM | TEMPERATURE: 96.8 F | HEIGHT: 69 IN | WEIGHT: 172.8 LBS | DIASTOLIC BLOOD PRESSURE: 56 MMHG

## 2021-10-05 DIAGNOSIS — F51.01 PRIMARY INSOMNIA: ICD-10-CM

## 2021-10-05 DIAGNOSIS — Z13.1 SCREENING FOR DIABETES MELLITUS: ICD-10-CM

## 2021-10-05 DIAGNOSIS — F32.A DEPRESSION, UNSPECIFIED DEPRESSION TYPE: ICD-10-CM

## 2021-10-05 DIAGNOSIS — F10.21 ALCOHOL USE DISORDER, MODERATE, IN EARLY REMISSION (HCC): Primary | ICD-10-CM

## 2021-10-05 DIAGNOSIS — F10.10 ALCOHOL ABUSE: Primary | ICD-10-CM

## 2021-10-05 PROCEDURE — 90791 PSYCH DIAGNOSTIC EVALUATION: CPT | Performed by: PSYCHOLOGIST

## 2021-10-05 PROCEDURE — 4004F PT TOBACCO SCREEN RCVD TLK: CPT | Performed by: PSYCHOLOGIST

## 2021-10-05 PROCEDURE — 3017F COLORECTAL CA SCREEN DOC REV: CPT | Performed by: STUDENT IN AN ORGANIZED HEALTH CARE EDUCATION/TRAINING PROGRAM

## 2021-10-05 PROCEDURE — 99213 OFFICE O/P EST LOW 20 MIN: CPT | Performed by: STUDENT IN AN ORGANIZED HEALTH CARE EDUCATION/TRAINING PROGRAM

## 2021-10-05 PROCEDURE — 4004F PT TOBACCO SCREEN RCVD TLK: CPT | Performed by: STUDENT IN AN ORGANIZED HEALTH CARE EDUCATION/TRAINING PROGRAM

## 2021-10-05 PROCEDURE — G8427 DOCREV CUR MEDS BY ELIG CLIN: HCPCS | Performed by: STUDENT IN AN ORGANIZED HEALTH CARE EDUCATION/TRAINING PROGRAM

## 2021-10-05 PROCEDURE — 4040F PNEUMOC VAC/ADMIN/RCVD: CPT | Performed by: STUDENT IN AN ORGANIZED HEALTH CARE EDUCATION/TRAINING PROGRAM

## 2021-10-05 PROCEDURE — G8417 CALC BMI ABV UP PARAM F/U: HCPCS | Performed by: STUDENT IN AN ORGANIZED HEALTH CARE EDUCATION/TRAINING PROGRAM

## 2021-10-05 PROCEDURE — G8484 FLU IMMUNIZE NO ADMIN: HCPCS | Performed by: STUDENT IN AN ORGANIZED HEALTH CARE EDUCATION/TRAINING PROGRAM

## 2021-10-05 PROCEDURE — 1123F ACP DISCUSS/DSCN MKR DOCD: CPT | Performed by: STUDENT IN AN ORGANIZED HEALTH CARE EDUCATION/TRAINING PROGRAM

## 2021-10-05 ASSESSMENT — ENCOUNTER SYMPTOMS
RHINORRHEA: 0
VOMITING: 0
NAUSEA: 0
DIARRHEA: 0
COUGH: 0
ABDOMINAL PAIN: 0
SORE THROAT: 0
WHEEZING: 0
SHORTNESS OF BREATH: 0

## 2021-10-05 ASSESSMENT — PATIENT HEALTH QUESTIONNAIRE - PHQ9
3. TROUBLE FALLING OR STAYING ASLEEP: 1
6. FEELING BAD ABOUT YOURSELF - OR THAT YOU ARE A FAILURE OR HAVE LET YOURSELF OR YOUR FAMILY DOWN: 0
SUM OF ALL RESPONSES TO PHQ9 QUESTIONS 1 & 2: 3
4. FEELING TIRED OR HAVING LITTLE ENERGY: 1
1. LITTLE INTEREST OR PLEASURE IN DOING THINGS: 3
2. FEELING DOWN, DEPRESSED OR HOPELESS: 0
7. TROUBLE CONCENTRATING ON THINGS, SUCH AS READING THE NEWSPAPER OR WATCHING TELEVISION: 0
SUM OF ALL RESPONSES TO PHQ QUESTIONS 1-9: 5
10. IF YOU CHECKED OFF ANY PROBLEMS, HOW DIFFICULT HAVE THESE PROBLEMS MADE IT FOR YOU TO DO YOUR WORK, TAKE CARE OF THINGS AT HOME, OR GET ALONG WITH OTHER PEOPLE: 0
9. THOUGHTS THAT YOU WOULD BE BETTER OFF DEAD, OR OF HURTING YOURSELF: 0
SUM OF ALL RESPONSES TO PHQ QUESTIONS 1-9: 5
5. POOR APPETITE OR OVEREATING: 0
SUM OF ALL RESPONSES TO PHQ QUESTIONS 1-9: 5
8. MOVING OR SPEAKING SO SLOWLY THAT OTHER PEOPLE COULD HAVE NOTICED. OR THE OPPOSITE, BEING SO FIGETY OR RESTLESS THAT YOU HAVE BEEN MOVING AROUND A LOT MORE THAN USUAL: 0

## 2021-10-05 NOTE — PATIENT INSTRUCTIONS
Patient Education        Learning About Sleeping Well  What does sleeping well mean? Sleeping well means getting enough sleep. How much sleep is enough varies among people. The number of hours you sleep is not as important as how you feel when you wake up. If you do not feel refreshed, you probably need more sleep. Another sign of not getting enough sleep is feeling tired during the day. The average total nightly sleep time is 7½ to 8 hours. Healthy adults may need a little more or a little less than this. Why is getting enough sleep important? Getting enough quality sleep is a basic part of good health. When your sleep suffers, your mood and your thoughts can suffer too. You may find yourself feeling more grumpy or stressed. Not getting enough sleep also can lead to serious problems, including injury, accidents, anxiety, and depression. What might cause poor sleeping? Many things can cause sleep problems, including:  · Stress. Stress can be caused by fear about a single event, such as giving a speech. Or you may have ongoing stress, such as worry about work or school. · Depression, anxiety, and other mental or emotional conditions. · Changes in your sleep habits or surroundings. This includes changes that happen where you sleep, such as noise, light, or sleeping in a different bed. It also includes changes in your sleep pattern, such as having jet lag or working a late shift. · Health problems, such as pain, breathing problems, and restless legs syndrome. · Lack of regular exercise. How can you help yourself? Here are some tips that may help you sleep more soundly and wake up feeling more refreshed. Your sleeping area   · Use your bedroom only for sleeping and sex. A bit of light reading may help you fall asleep. But if it doesn't, do your reading elsewhere in the house. Don't watch TV in bed.   · Be sure your bed is big enough to stretch out comfortably, especially if you have a sleep partner. · Keep your bedroom quiet, dark, and cool. Use curtains, blinds, or a sleep mask to block out light. To block out noise, use earplugs, soothing music, or a \"white noise\" machine. Your evening and bedtime routine   · Create a relaxing bedtime routine. You might want to take a warm shower or bath, listen to soothing music, or drink a cup of noncaffeinated tea. · Go to bed at the same time every night. And get up at the same time every morning, even if you feel tired. What to avoid   · Limit caffeine (coffee, tea, caffeinated sodas) during the day, and don't have any for at least 4 to 6 hours before bedtime. · Don't drink alcohol before bedtime. Alcohol can cause you to wake up more often during the night. · Don't smoke or use tobacco, especially in the evening. Nicotine can keep you awake. · Don't take naps during the day, especially close to bedtime. · Don't lie in bed awake for too long. If you can't fall asleep, or if you wake up in the middle of the night and can't get back to sleep within 15 minutes or so, get out of bed and go to another room until you feel sleepy. · Don't take medicine right before bed that may keep you awake or make you feel hyper or energized. Your doctor can tell you if your medicine may do this and if you can take it earlier in the day. If you can't sleep   · Imagine yourself in a peaceful, pleasant scene. Focus on the details and feelings of being in a place that is relaxing. · Get up and do a quiet or boring activity until you feel sleepy. · Don't drink any liquids after 6 p.m. if you wake up often because you have to go to the bathroom. Where can you learn more? Go to https://[employer] org and sign in to your [employer] account. Enter A716 in the Girl [employer] Dress box to learn more about \"Learning About Sleeping Well. \"     If you do not have an account, please click on the \"Sign Up Now\" link.   Current as of: June 16, 2021               Content Version: 13.0  © 2962-5655 [employer]wise, Incorporated. Care instructions adapted under license by [facility] ([facility]). If you have questions about a medical condition or this instruction, always ask your healthcare professional. [address] any warranty or liability for your use of this information.

## 2021-10-05 NOTE — PROGRESS NOTES
the skin daily 30 patch 5    amLODIPine (NORVASC) 10 MG tablet take 1 tablet by mouth once daily 90 tablet 1    metoprolol succinate (TOPROL XL) 100 MG extended release tablet take 1 tablet by mouth once daily 90 tablet 1    triamterene-hydroCHLOROthiazide (MAXZIDE-25) 37.5-25 MG per tablet take 1 tablet by mouth once daily 90 tablet 1    meloxicam (MOBIC) 15 MG tablet Take 1 tablet by mouth daily 30 tablet 1    albuterol (PROVENTIL) (2.5 MG/3ML) 0.083% nebulizer solution Take 3 mLs by nebulization every 2 hours as needed for Wheezing 120 each 3     No current facility-administered medications for this visit. Social History:   Social History     Socioeconomic History    Marital status:      Spouse name: Not on file    Number of children: Not on file    Years of education: Not on file    Highest education level: Not on file   Occupational History    Not on file   Tobacco Use    Smoking status: Current Every Day Smoker     Packs/day: 0.25     Years: 49.00     Pack years: 12.25     Types: Cigars    Smokeless tobacco: Never Used    Tobacco comment: started age 24   Vaping Use    Vaping Use: Never used   Substance and Sexual Activity    Alcohol use: Yes     Comment: has one can of beer couple times a week    Drug use: Yes     Frequency: 1.0 times per week     Types: Marijuana     Comment: once weekly    Sexual activity: Not Currently   Other Topics Concern    Not on file   Social History Narrative    Not on file     Social Determinants of Health     Financial Resource Strain: Medium Risk    Difficulty of Paying Living Expenses: Somewhat hard   Food Insecurity: No Food Insecurity    Worried About Running Out of Food in the Last Year: Never true    Ronda of Food in the Last Year: Never true   Transportation Needs: No Transportation Needs    Lack of Transportation (Medical): No    Lack of Transportation (Non-Medical):  No   Physical Activity:     Days of Exercise per Week:     Minutes  GERD (gastroesophageal reflux disease)     Hyperlipidemia     Hypertension     Insomnia     Moderate episode of recurrent major depressive disorder ([facility] [address] 8/18/2021    Osteoarthritis     PUD (peptic ulcer disease)     Restless leg syndrome     Smoker unmotivated to quit     Vitamin D deficiency            Plan:  Return if symptoms worsen or fail to improve. Pt interventions:  Established rapport, Conducted functional assessment, [place]-setting to identify pt's primary goals for [facility] visit / overall health, Emphasized self-care as important for managing overall health and Provided Psychoeducation re: [facility] role and primary care model, relapse prevention      Pt Behavioral Change Plan:  Continue to abstain from alcohol and nicotine  Continue follow up with your PCP, as scheduled    Patient is terminated from [facility] services at this time, but may schedule in the future should he require assistance      Please note this report has been partially produced using speech recognition software and may cause contain errors related to that system including grammar, punctuation, and spelling, as well as words and phrases that may seem inappropriate. If there are questions or concerns please feel free to contact me to clarify.

## 2021-10-05 NOTE — PROGRESS NOTES
Subjective  Arpit Beatty, 78 y.o. male presents today with:  Chief Complaint   Patient presents with    Follow-up    Depression     States his mood is good.  Insomnia     States he is sleeping good. Is not having trouble falling or staying asleep       HPI    Patient here for 1 week follow-up for alcohol abuse, depression and insomnia. He states he has not drank since his last appointment. He is also completely cut out cigars for the last 4 days. He states that he is feeling much better. He reports he is sleeping better. He states that his mood seems to be better. He is also eating better. He overall feels much better. No suicidal or homicidal ideation. He is scheduled for counseling later today. He states that at this time he does not even feel like he needs it. He states that he is very happy with how he is doing. He is continuing to use trazodone as needed for sleep. He has not had any tremulousness, seizures or anything else since stopping drinking. He reports that his mood is improved. He is getting along better with his wife. No feelings of guilt. He is overall much better. He has no other concerns at this time. Review of Systems   Constitutional: Negative for chills, fatigue, fever and unexpected weight change. HENT: Negative for congestion, rhinorrhea and sore throat. Respiratory: Negative for cough, shortness of breath and wheezing. Cardiovascular: Negative for chest pain, palpitations and leg swelling. Gastrointestinal: Negative for abdominal pain, diarrhea, nausea and vomiting. Musculoskeletal: Negative for arthralgias and joint swelling. Neurological: Negative for weakness, light-headedness, numbness and headaches. Psychiatric/Behavioral: Negative for agitation, confusion, hallucinations, sleep disturbance and suicidal ideas. The patient is not nervous/anxious.         Past Medical History:   Diagnosis Date    Alcohol abuse     Alcohol abuse     Allergic rhinitis     Aortic regurgitation     Cervical radiculopathy at C8 10/13/2013    Chronic back pain     Chronic back pain     COPD (chronic obstructive pulmonary disease) (HCC)     Erectile dysfunction     GERD (gastroesophageal reflux disease)     Hyperlipidemia     Hypertension     Insomnia     Moderate episode of recurrent major depressive disorder ([facility] [address] 8/18/2021    Osteoarthritis     PUD (peptic ulcer disease)     Restless leg syndrome     Smoker unmotivated to quit     Vitamin D deficiency      Past Surgical History:   Procedure Laterality Date    COLONOSCOPY N/A 8/3/2020    COLONOSCOPY WITH POLYPECTOMY performed by Maisha Spence MD at [address]  12/2013    Dr. Raphael Damico REPLACEMENT  2008    Left hip replacement    SPINE SURGERY  2006    fusion L4-L5     Current Outpatient Medications   Medication Sig Dispense Refill    folic acid (FOLVITE) 1 MG tablet take 1 tablet by mouth once daily 90 tablet 1    traZODone (DESYREL) 50 MG tablet Take 2 tablets by mouth nightly take 2 tablets by mouth at bedtime 60 tablet 1    atorvastatin (LIPITOR) 40 MG tablet Take 1 tablet by mouth daily 90 tablet 0    pantoprazole (PROTONIX) 40 MG tablet Take 1 tablet by mouth daily 90 tablet 0    albuterol sulfate HFA (PROVENTIL HFA) 108 (90 Base) MCG/ACT inhaler Inhale 2 puffs into the lungs every 6 hours as needed for Wheezing 1 Inhaler 3    hydrocortisone 2.5 % cream Apply topically 2 times daily.  28 g 0    nicotine (NICODERM CQ) 14 MG/24HR Place 1 patch onto the skin daily 30 patch 5    amLODIPine (NORVASC) 10 MG tablet take 1 tablet by mouth once daily 90 tablet 1    metoprolol succinate (TOPROL XL) 100 MG extended release tablet take 1 tablet by mouth once daily 90 tablet 1    triamterene-hydroCHLOROthiazide (MAXZIDE-25) 37.5-25 MG per tablet take 1 tablet by mouth once daily 90 tablet 1    meloxicam (MOBIC) 15 MG tablet Take 1 tablet by mouth daily 30 tablet 1    albuterol (PROVENTIL) (2.5 MG/3ML) 0.083% nebulizer solution Take 3 mLs by nebulization every 2 hours as needed for Wheezing 120 each 3     No current facility-administered medications for this visit. PMH, Surgical Hx, Family Hx, and Social Hx reviewed and updated. Health Maintenance reviewed. Objective    Vitals:    10/05/21 1437   BP: (!) 130/56   Pulse: 56   Temp: 96.8 °F (36 °C)   SpO2: 98%   Weight: 172 lb 12.8 oz (78.4 kg)   Height: 5' 9\" (1.753 m)       Physical Exam  Vitals reviewed. Constitutional:       General: He is not in acute distress. HENT:      Head: Normocephalic and atraumatic. Eyes:      Conjunctiva/sclera: Conjunctivae normal.   Neck:      Thyroid: No thyromegaly or thyroid tenderness. Cardiovascular:      Rate and Rhythm: Normal rate and regular rhythm. Heart sounds: No murmur heard. No friction rub. No gallop. Pulmonary:      Effort: Pulmonary effort is normal.      Breath sounds: Normal breath sounds. No wheezing, rhonchi or rales. Abdominal:      General: Bowel sounds are normal. There is no distension. Palpations: Abdomen is soft. Tenderness: There is no abdominal tenderness. Musculoskeletal:         General: No swelling or deformity. Cervical back: Normal range of motion and neck supple. Right lower leg: No edema. Left lower leg: No edema. Lymphadenopathy:      Cervical: No cervical adenopathy. Skin:     General: Skin is warm and dry. Findings: No rash. Neurological:      General: No focal deficit present. Mental Status: He is alert. Gait: Gait is intact. Psychiatric:         Mood and Affect: Mood normal.         Behavior: Behavior normal.        Assessment & Plan     1. Alcohol abuse  Patient abstaining from alcohol for the last 10 days or so. No side effects. He does not feel the urge to drink either. He states that he was she had done this years ago.   He is feeling confident in his ability to maintain sobriety. He does not wish to starting medicines to help with sobriety at this time. All questions answered. Follow-up as scheduled. 2. Depression, unspecified depression type  Mood improved. Not currently on medication. He is scheduled to see counselor later today. He states that he is overall much better. We will continue to monitor. No homicidal or suicidal ideation. Follow-up as scheduled. 3. Primary insomnia  Sleep is improved. He is continuing to use trazodone. He is seeing counselor today. Recommend sleep hygiene. All questions answered. Follow-up as scheduled. 4. Screening for diabetes mellitus  Patient due for fasting glucose. He will get this done when he is fasting.    - Glucose, Fasting; Future    Orders Placed This Encounter   Procedures    Glucose, Fasting     Standing Status:   Future     Standing Expiration Date:   10/5/2022     No orders of the defined types were placed in this encounter. There are no discontinued medications. Return in about 4 weeks (around 11/2/2021) for wellness visit- 2months, 1 month follow up . Reviewed with the patient: current clinical status,medications, activities and diet. Side effects, adverse effects of themedication prescribed today, as well as treatment plan/ rationale and result expectations have been discussed with the patient who expresses understanding and desires to proceed. Close follow up to evaluate treatment results and for coordination of care. I have reviewed the patient's medical history in detail and updated the computerized patient record. Please note, this report has been partially produced using speech recognition software and may cause  and /or contain errors related to that system including grammar, punctuation and spelling as well as words and phrases that may seem inappropriate. If there are questions or concerns please feel free to contact me to clarify.     Oliver Dancer, DO

## 2021-10-13 ENCOUNTER — TELEPHONE (OUTPATIENT)
Dept: PAIN MANAGEMENT | Age: 66
End: 2021-10-13

## 2021-10-14 ENCOUNTER — OFFICE VISIT (OUTPATIENT)
Dept: PAIN MANAGEMENT | Age: 66
End: 2021-10-14
Payer: MEDICARE

## 2021-10-14 DIAGNOSIS — M47.817 LUMBOSACRAL SPONDYLOSIS WITHOUT MYELOPATHY: Primary | ICD-10-CM

## 2021-10-14 DIAGNOSIS — M51.36 DDD (DEGENERATIVE DISC DISEASE), LUMBAR: ICD-10-CM

## 2021-10-14 PROCEDURE — 64635 DESTROY LUMB/SAC FACET JNT: CPT | Performed by: ANESTHESIOLOGY

## 2021-10-14 PROCEDURE — 64636 DESTROY L/S FACET JNT ADDL: CPT | Performed by: ANESTHESIOLOGY

## 2021-10-14 RX ORDER — BETAMETHASONE SODIUM PHOSPHATE AND BETAMETHASONE ACETATE 3; 3 MG/ML; MG/ML
3 INJECTION, SUSPENSION INTRA-ARTICULAR; INTRALESIONAL; INTRAMUSCULAR; SOFT TISSUE ONCE
Status: COMPLETED | OUTPATIENT
Start: 2021-10-14 | End: 2021-10-14

## 2021-10-14 RX ORDER — LIDOCAINE HYDROCHLORIDE 10 MG/ML
15 INJECTION, SOLUTION EPIDURAL; INFILTRATION; INTRACAUDAL; PERINEURAL ONCE
Status: COMPLETED | OUTPATIENT
Start: 2021-10-14 | End: 2021-10-14

## 2021-10-14 RX ADMIN — BETAMETHASONE SODIUM PHOSPHATE AND BETAMETHASONE ACETATE 3 MG: 3; 3 INJECTION, SUSPENSION INTRA-ARTICULAR; INTRALESIONAL; INTRAMUSCULAR; SOFT TISSUE at 15:51

## 2021-10-14 RX ADMIN — Medication 2 MEQ: at 15:52

## 2021-10-14 RX ADMIN — LIDOCAINE HYDROCHLORIDE 15 ML: 10 INJECTION, SOLUTION EPIDURAL; INFILTRATION; INTRACAUDAL; PERINEURAL at 15:51

## 2021-10-20 DIAGNOSIS — F51.01 PRIMARY INSOMNIA: ICD-10-CM

## 2021-10-20 RX ORDER — TRAZODONE HYDROCHLORIDE 50 MG/1
100 TABLET ORAL NIGHTLY
Qty: 60 TABLET | Refills: 2 | Status: SHIPPED | OUTPATIENT
Start: 2021-10-20 | End: 2021-10-25 | Stop reason: SDUPTHER

## 2021-10-25 DIAGNOSIS — F51.01 PRIMARY INSOMNIA: ICD-10-CM

## 2021-10-25 RX ORDER — TRAZODONE HYDROCHLORIDE 50 MG/1
100 TABLET ORAL NIGHTLY
Qty: 180 TABLET | Refills: 1 | Status: SHIPPED | OUTPATIENT
Start: 2021-10-25 | End: 2022-01-10

## 2021-11-12 ENCOUNTER — OFFICE VISIT (OUTPATIENT)
Dept: PAIN MANAGEMENT | Age: 66
End: 2021-11-12
Payer: MEDICARE

## 2021-11-12 VITALS — BODY MASS INDEX: 27.4 KG/M2 | HEIGHT: 69 IN | WEIGHT: 185 LBS | TEMPERATURE: 97.1 F

## 2021-11-12 DIAGNOSIS — M47.817 LUMBOSACRAL SPONDYLOSIS WITHOUT MYELOPATHY: Primary | ICD-10-CM

## 2021-11-12 DIAGNOSIS — M51.36 DDD (DEGENERATIVE DISC DISEASE), LUMBAR: ICD-10-CM

## 2021-11-12 PROCEDURE — 4040F PNEUMOC VAC/ADMIN/RCVD: CPT | Performed by: ANESTHESIOLOGY

## 2021-11-12 PROCEDURE — 3017F COLORECTAL CA SCREEN DOC REV: CPT | Performed by: ANESTHESIOLOGY

## 2021-11-12 PROCEDURE — 99212 OFFICE O/P EST SF 10 MIN: CPT | Performed by: ANESTHESIOLOGY

## 2021-11-12 PROCEDURE — 1123F ACP DISCUSS/DSCN MKR DOCD: CPT | Performed by: ANESTHESIOLOGY

## 2021-11-12 PROCEDURE — G8417 CALC BMI ABV UP PARAM F/U: HCPCS | Performed by: ANESTHESIOLOGY

## 2021-11-12 PROCEDURE — 4004F PT TOBACCO SCREEN RCVD TLK: CPT | Performed by: ANESTHESIOLOGY

## 2021-11-12 PROCEDURE — G8484 FLU IMMUNIZE NO ADMIN: HCPCS | Performed by: ANESTHESIOLOGY

## 2021-11-12 PROCEDURE — G8427 DOCREV CUR MEDS BY ELIG CLIN: HCPCS | Performed by: ANESTHESIOLOGY

## 2021-11-12 ASSESSMENT — ENCOUNTER SYMPTOMS
DIARRHEA: 0
SHORTNESS OF BREATH: 0
BACK PAIN: 1
NAUSEA: 0
CONSTIPATION: 0

## 2021-11-12 NOTE — PROGRESS NOTES
Patient:  Juan Coleman  YOB: 1955  Date: 11/12/2021      Subjective:     Juan Coleman is a 78 y.o. male who presents today with:    Chief Complaint   Patient presents with    Back Pain       HPI: The patient presents with a history of a low back pain most likely mechanical in origin started about 8 months ago and he underwent lower lumbar facet denervation by radiofrequency on 10/14 and comes in today to report that he has been feeling great. There has been functional improvement as well as subjective pain improvement. He is not on any medications for pain. Advised him to contact us when the pain returns. He is allowed to be active. Allergies:  Patient has no known allergies.   Past Medical History:   Diagnosis Date    Alcohol abuse     Alcohol abuse     Allergic rhinitis     Aortic regurgitation     Cervical radiculopathy at C8 10/13/2013    Chronic back pain     Chronic back pain     COPD [facility]chronic obstructive [facility] disease) ([facility]     Erectile dysfunction     GERD (gastroesophageal reflux disease)     Hyperlipidemia     Hypertension     Insomnia     Moderate episode of recurrent major depressive disorder ([address] 8/18/2021    Osteoarthritis     PUD (peptic ulcer disease)     Restless leg syndrome     Smoker unmotivated to quit     Vitamin D deficiency      Past Surgical History:   Procedure Laterality Date    COLONOSCOPY N/A 8/3/2020    COLONOSCOPY WITH POLYPECTOMY performed by Maisha Spence MD at [address]  12/2013    Dr. Raphael Damico REPLACEMENT  2008    Left hip replacement    SPINE SURGERY  2006    fusion L4-L5     Social History     Socioeconomic History    Marital status:      Spouse name: Not on file    Number of children: Not on file    Years of education: Not on file    Highest education level: Not on file   Occupational History    Not on file   Tobacco Use    Smoking status: Current Every Day Smoker Packs/day: 0.25     Years: 49.00     Pack years: 12.25     Types: Cigars    Smokeless tobacco: Never Used    Tobacco comment: started age 24   Vaping Use    Vaping Use: Never used   Substance and Sexual Activity    Alcohol use: Yes     Comment: has one can of beer couple times a week    Drug use: Yes     Frequency: 1.0 times per week     Types: Marijuana Marylin Postin)     Comment: once weekly    Sexual activity: Not Currently   Other Topics Concern    Not on file   Social History Narrative    Not on file     Social Determinants of Health     Financial Resource Strain: Medium Risk    Difficulty of Paying Living Expenses: Somewhat hard   Food Insecurity: No Food Insecurity    Worried About Running Out of Food in the Last Year: Never true    Ronda of Food in the Last Year: Never true   Transportation Needs: No Transportation Needs    Lack of Transportation (Medical): No    Lack of Transportation (Non-Medical):  No   Physical Activity:     Days of Exercise per Week: Not on file    Minutes of Exercise per Session: Not on file   Stress:     Feeling of Stress : Not on file   Social Connections:     Frequency of Communication with Friends and Family: Not on file    Frequency of Social Gatherings with Friends and Family: Not on file    Attends Gnosticism Services: Not on file    Active Member of [address] [place] [address] Between [employer] or Organizations: Not on file    Attends Club or Organization Meetings: Not on file    Marital Status: Not on file   Intimate Partner Violence:     Fear of Current or Ex-Partner: Not on file    Emotionally Abused: Not on file    Physically Abused: Not on file    Sexually Abused: Not on file   Housing Stability:     Unable to Pay for Housing in the Last Year: Not on file    Number of [place] in the Last Year: Not on file    Unstable Housing in the Last Year: Not on file     Family History   Problem Relation Age of Onset    Cancer Mother 67        brain    Arthritis Mother     High Blood Pressure Mother        Current Outpatient Medications on File Prior to Visit   Medication Sig Dispense Refill    traZODone (DESYREL) 50 MG tablet Take 2 tablets by mouth nightly take 2 tablets by mouth at bedtime 637 tablet 1    folic acid (FOLVITE) 1 MG tablet take 1 tablet by mouth once daily 90 tablet 1    atorvastatin (LIPITOR) 40 MG tablet Take 1 tablet by mouth daily 90 tablet 0    pantoprazole (PROTONIX) 40 MG tablet Take 1 tablet by mouth daily 90 tablet 0    albuterol sulfate HFA (PROVENTIL HFA) 108 (90 Base) MCG/ACT inhaler Inhale 2 puffs into the lungs every 6 hours as needed for Wheezing 1 Inhaler 3    hydrocortisone 2.5 % cream Apply topically 2 times daily. 28 g 0    nicotine (NICODERM CQ) 14 MG/24HR Place 1 patch onto the skin daily 30 patch 5    amLODIPine (NORVASC) 10 MG tablet take 1 tablet by mouth once daily 90 tablet 1    metoprolol succinate (TOPROL XL) 100 MG extended release tablet take 1 tablet by mouth once daily 90 tablet 1    triamterene-hydroCHLOROthiazide (MAXZIDE-25) 37.5-25 MG per tablet take 1 tablet by mouth once daily 90 tablet 1    meloxicam (MOBIC) 15 MG tablet Take 1 tablet by mouth daily 30 tablet 1    albuterol (PROVENTIL) (2.5 MG/3ML) 0.083% nebulizer solution Take 3 mLs by nebulization every 2 hours as needed for Wheezing 120 each 3     No current facility-administered medications on file prior to visit. He has not tried physical therapy. Recent Procedures:  Lower lumbar facet denervation by radiofrequency on 10/14 provided 80 % pain relief for Present days    Review of Systems   Constitutional: Negative for fever. HENT: Negative for hearing loss. Respiratory: Negative for shortness of breath. Gastrointestinal: Negative for constipation, diarrhea and nausea. Genitourinary: Negative for difficulty urinating. Musculoskeletal: Positive for back pain. Negative for neck pain. Skin: Negative for rash. Neurological: Negative for headaches. Hematological: Does not bruise/bleed easily. Psychiatric/Behavioral: Negative for sleep disturbance. Objective:     Vitals:  Temp 97.1 °F (36.2 °C)   Ht 5' 9\" (1.753 m)   Wt 185 lb (83.9 kg)   BMI 27.32 kg/m² Pain Score:   6    PHYSICAL EXAM:    Patient alert and oriented times three, recent and remote memory intact, mood and affect normal, judgement and insight normal. Strength functional for ambulation. Balance and coordinational functional. Visualized skin intact. No visualized cyanosis, pulses intact. Cranial nerves 2-12 grossly intact. No obvious upper motor neuron signs seen. Sensation intact to light touch. Improve the range of motion of the lumbosacral spine with decreased axial pain. Radiculopathy:  Negative    Studies Review:  Reviewed None. Assessment:           Diagnosis Orders   1. Lumbosacral spondylosis without myelopathy     2. DDD (degenerative disc disease), lumbar           Plan:     No orders of the defined types were placed in this encounter. No orders of the defined types were placed in this encounter. The patient has been advised to contact us when the pain returns. Follow up:  Return if symptoms worsen or fail to improve.     Luther Keller MD

## 2021-11-25 DIAGNOSIS — E78.5 DYSLIPIDEMIA: ICD-10-CM

## 2021-11-29 RX ORDER — ATORVASTATIN CALCIUM 40 MG/1
40 TABLET, FILM COATED ORAL DAILY
Qty: 90 TABLET | Refills: 3 | Status: SHIPPED | OUTPATIENT
Start: 2021-11-29 | End: 2022-03-25 | Stop reason: SDUPTHER

## 2021-11-29 RX ORDER — PANTOPRAZOLE SODIUM 40 MG/1
40 TABLET, DELAYED RELEASE ORAL DAILY
Qty: 90 TABLET | Refills: 3 | Status: SHIPPED | OUTPATIENT
Start: 2021-11-29

## 2021-12-28 DIAGNOSIS — R25.1 TREMORS OF NERVOUS SYSTEM: ICD-10-CM

## 2021-12-28 DIAGNOSIS — I10 ESSENTIAL HYPERTENSION, BENIGN: ICD-10-CM

## 2021-12-28 RX ORDER — AMLODIPINE BESYLATE 10 MG/1
10 TABLET ORAL DAILY
Qty: 90 TABLET | Refills: 1 | Status: SHIPPED | OUTPATIENT
Start: 2021-12-28

## 2021-12-28 RX ORDER — METOPROLOL SUCCINATE 100 MG/1
100 TABLET, EXTENDED RELEASE ORAL DAILY
Qty: 90 TABLET | Refills: 1 | Status: SHIPPED | OUTPATIENT
Start: 2021-12-28

## 2022-01-10 DIAGNOSIS — F51.01 PRIMARY INSOMNIA: ICD-10-CM

## 2022-01-10 RX ORDER — TRAZODONE HYDROCHLORIDE 50 MG/1
TABLET ORAL
Qty: 60 TABLET | Refills: 0 | Status: SHIPPED | OUTPATIENT
Start: 2022-01-10 | End: 2022-02-07

## 2022-03-25 ENCOUNTER — OFFICE VISIT (OUTPATIENT)
Dept: FAMILY MEDICINE CLINIC | Age: 67
End: 2022-03-25
Payer: MEDICARE

## 2022-03-25 VITALS
DIASTOLIC BLOOD PRESSURE: 58 MMHG | WEIGHT: 213.8 LBS | HEIGHT: 69 IN | HEART RATE: 82 BPM | BODY MASS INDEX: 31.67 KG/M2 | TEMPERATURE: 97.3 F | OXYGEN SATURATION: 96 % | SYSTOLIC BLOOD PRESSURE: 154 MMHG

## 2022-03-25 DIAGNOSIS — Z13.1 SCREENING FOR DIABETES MELLITUS: ICD-10-CM

## 2022-03-25 DIAGNOSIS — F51.01 PRIMARY INSOMNIA: ICD-10-CM

## 2022-03-25 DIAGNOSIS — E78.5 DYSLIPIDEMIA: ICD-10-CM

## 2022-03-25 DIAGNOSIS — F10.10 ALCOHOL ABUSE: ICD-10-CM

## 2022-03-25 DIAGNOSIS — I10 BENIGN ESSENTIAL HYPERTENSION: Primary | ICD-10-CM

## 2022-03-25 DIAGNOSIS — M51.36 DISC DEGENERATION, LUMBAR: ICD-10-CM

## 2022-03-25 PROCEDURE — 99214 OFFICE O/P EST MOD 30 MIN: CPT | Performed by: STUDENT IN AN ORGANIZED HEALTH CARE EDUCATION/TRAINING PROGRAM

## 2022-03-25 RX ORDER — TRIAMTERENE AND HYDROCHLOROTHIAZIDE 75; 50 MG/1; MG/1
1 TABLET ORAL DAILY
Qty: 90 TABLET | Refills: 1 | Status: SHIPPED | OUTPATIENT
Start: 2022-03-25 | End: 2022-09-27

## 2022-03-25 RX ORDER — ATORVASTATIN CALCIUM 40 MG/1
40 TABLET, FILM COATED ORAL DAILY
Qty: 90 TABLET | Refills: 3 | Status: SHIPPED | OUTPATIENT
Start: 2022-03-25 | End: 2022-10-05

## 2022-03-25 RX ORDER — FOLIC ACID 1 MG/1
1 TABLET ORAL DAILY
Qty: 90 TABLET | Refills: 1 | Status: SHIPPED | OUTPATIENT
Start: 2022-03-25 | End: 2022-03-29

## 2022-03-25 RX ORDER — MELOXICAM 15 MG/1
15 TABLET ORAL DAILY
Qty: 90 TABLET | Refills: 1 | Status: SHIPPED | OUTPATIENT
Start: 2022-03-25 | End: 2022-09-27

## 2022-03-25 ASSESSMENT — PATIENT HEALTH QUESTIONNAIRE - PHQ9
10. IF YOU CHECKED OFF ANY PROBLEMS, HOW DIFFICULT HAVE THESE PROBLEMS MADE IT FOR YOU TO DO YOUR WORK, TAKE CARE OF THINGS AT HOME, OR GET ALONG WITH OTHER PEOPLE: 0
4. FEELING TIRED OR HAVING LITTLE ENERGY: 0
9. THOUGHTS THAT YOU WOULD BE BETTER OFF DEAD, OR OF HURTING YOURSELF: 0
8. MOVING OR SPEAKING SO SLOWLY THAT OTHER PEOPLE COULD HAVE NOTICED. OR THE OPPOSITE, BEING SO FIGETY OR RESTLESS THAT YOU HAVE BEEN MOVING AROUND A LOT MORE THAN USUAL: 0
3. TROUBLE FALLING OR STAYING ASLEEP: 0
7. TROUBLE CONCENTRATING ON THINGS, SUCH AS READING THE NEWSPAPER OR WATCHING TELEVISION: 0
1. LITTLE INTEREST OR PLEASURE IN DOING THINGS: 0
5. POOR APPETITE OR OVEREATING: 0
6. FEELING BAD ABOUT YOURSELF - OR THAT YOU ARE A FAILURE OR HAVE LET YOURSELF OR YOUR FAMILY DOWN: 0
SUM OF ALL RESPONSES TO PHQ QUESTIONS 1-9: 0
SUM OF ALL RESPONSES TO PHQ QUESTIONS 1-9: 0
SUM OF ALL RESPONSES TO PHQ9 QUESTIONS 1 & 2: 0
SUM OF ALL RESPONSES TO PHQ QUESTIONS 1-9: 0
SUM OF ALL RESPONSES TO PHQ QUESTIONS 1-9: 0
2. FEELING DOWN, DEPRESSED OR HOPELESS: 0

## 2022-03-25 ASSESSMENT — ENCOUNTER SYMPTOMS
NAUSEA: 0
RHINORRHEA: 0
VOMITING: 0
ABDOMINAL PAIN: 0
COUGH: 0
SHORTNESS OF BREATH: 0
SORE THROAT: 0
DIARRHEA: 0
EYE DISCHARGE: 0
WHEEZING: 0

## 2022-03-25 NOTE — PROGRESS NOTES
month mostly. He has no other concerns at this time. Review of Systems   Constitutional: Negative for chills, fatigue, fever and unexpected weight change. HENT: Negative for congestion, rhinorrhea and sore throat. Eyes: Negative for discharge. Respiratory: Negative for cough, shortness of breath and wheezing. Cardiovascular: Negative for chest pain, palpitations and leg swelling. Gastrointestinal: Negative for abdominal pain, diarrhea, nausea and vomiting. Genitourinary: Negative for difficulty urinating. Musculoskeletal: Positive for joint swelling. Negative for arthralgias. Skin: Negative for rash. Neurological: Negative for weakness, light-headedness, numbness and headaches. Psychiatric/Behavioral: Negative for confusion and suicidal ideas. The patient is not nervous/anxious.         Past Medical History:   Diagnosis Date    Alcohol abuse     Alcohol abuse     Allergic rhinitis     Aortic regurgitation     Cervical radiculopathy at  10/13/2013    Chronic back pain     Chronic back pain     COPD (chronic obstructive pulmonary disease) (HCC)     Erectile dysfunction     GERD (gastroesophageal reflux disease)     Hyperlipidemia     Hypertension     Insomnia     Moderate episode of recurrent major depressive disorder ([facility] [address] 8/18/2021    Osteoarthritis     PUD (peptic ulcer disease)     Restless leg syndrome     Smoker unmotivated to quit     Vitamin D deficiency      Past Surgical History:   Procedure Laterality Date    COLONOSCOPY N/A 8/3/2020    COLONOSCOPY WITH POLYPECTOMY performed by Arlet Prakash MD at [address]  12/2013    Dr. Zepeda Alon REPLACEMENT  2008    Left hip replacement    SPINE SURGERY  2006    fusion L4-L5     Current Outpatient Medications   Medication Sig Dispense Refill    folic acid (FOLVITE) 1 MG tablet Take 1 tablet by mouth daily 90 tablet 1    atorvastatin (LIPITOR) 40 MG tablet Take 1 tablet by mouth daily 90 tablet 3    meloxicam (MOBIC) 15 MG tablet Take 1 tablet by mouth daily 90 tablet 1    triamterene-hydroCHLOROthiazide (MAXZIDE) 75-50 MG per tablet Take 1 tablet by mouth daily 90 tablet 1    traZODone (DESYREL) 50 MG tablet take 2 tablets by mouth at bedtime 60 tablet 3    metoprolol succinate (TOPROL XL) 100 MG extended release tablet Take 1 tablet by mouth daily 90 tablet 1    amLODIPine (NORVASC) 10 MG tablet Take 1 tablet by mouth daily 90 tablet 1    pantoprazole (PROTONIX) 40 MG tablet TAKE 1 TABLET BY MOUTH  DAILY 90 tablet 3    albuterol sulfate HFA (PROVENTIL HFA) 108 (90 Base) MCG/ACT inhaler Inhale 2 puffs into the lungs every 6 hours as needed for Wheezing 1 Inhaler 3    albuterol (PROVENTIL) (2.5 MG/3ML) 0.083% nebulizer solution Take 3 mLs by nebulization every 2 hours as needed for Wheezing 120 each 3     No current facility-administered medications for this visit. PMH, Surgical Hx, Family Hx, and Social Hx reviewed and updated. Health Maintenance reviewed. Objective    Vitals:    03/25/22 1414 03/25/22 1453   BP: (!) 176/60 (!) 154/58   Pulse: 82    Temp: 97.3 °F (36.3 °C)    SpO2: 96%    Weight: 213 lb 12.8 oz (97 kg)    Height: 5' 9\" (1.753 m)        Physical Exam  Vitals reviewed. Constitutional:       General: He is not in acute distress. HENT:      Head: Normocephalic and atraumatic. Eyes:      Conjunctiva/sclera: Conjunctivae normal.   Neck:      Thyroid: No thyromegaly or thyroid tenderness. Cardiovascular:      Rate and Rhythm: Normal rate and regular rhythm. Heart sounds: Murmur heard. No friction rub. No gallop. Pulmonary:      Effort: Pulmonary effort is normal.      Breath sounds: Normal breath sounds. No wheezing, rhonchi or rales. Musculoskeletal:         General: No swelling or deformity. Cervical back: Normal range of motion and neck supple. Right lower leg: No edema. Left lower leg: No edema.    Lymphadenopathy:      Cervical: No cervical adenopathy. Skin:     General: Skin is warm and dry. Findings: No rash. Neurological:      General: No focal deficit present. Mental Status: He is alert. Psychiatric:         Mood and Affect: Mood normal.         Behavior: Behavior normal.        Assessment & Plan     1. Benign essential hypertension  Essentially pretension. He is hypertensive in the office today. There is a discrepancy between systolic and diastolic. This is likely from the aortic regurg. This was noted on previous echocardiogram done 2 years ago. Does have a murmur on exam.  Will increase dose of triamterene hydrochlorothiazide. This was sent to the pharmacy. We will also obtain metabolic panel. Checkup in 6 weeks. If symptoms worsen or change, return to care sooner. Patient voiced understand. All questions answered. Follow-up scheduled. - Comprehensive Metabolic Panel, Fasting; Future  - triamterene-hydroCHLOROthiazide (MAXZIDE) 75-50 MG per tablet; Take 1 tablet by mouth daily  Dispense: 90 tablet; Refill: 1    2. Dyslipidemia  With dyslipidemia. Atorvastatin sent to the pharmacy. Continue 40 mg daily. He is due for lipid panel. Follow-up as scheduled. Call with results when return  - atorvastatin (LIPITOR) 40 MG tablet; Take 1 tablet by mouth daily  Dispense: 90 tablet; Refill: 3  - Lipid, Fasting; Future    3. Primary insomnia  Stable, chronic. Continue trazodone 50 mg nightly. He does not need refills at this time. Continue to monitor. Follow-up scheduled. 4. Alcohol abuse  Patient with history of alcohol abuse. Was previously on folic acid. Refill sent to the pharmacy  - folic acid (FOLVITE) 1 MG tablet; Take 1 tablet by mouth daily  Dispense: 90 tablet; Refill: 1    5. Screening for diabetes mellitus  Fasting glucose ordered. Call with results when return  - Glucose, Fasting; Future    6. Disc degeneration, lumbar  Patient with lumbar disc degeneration.   Will restart meloxicam 15 mg daily.  Follow-up as scheduled. - meloxicam (MOBIC) 15 MG tablet; Take 1 tablet by mouth daily  Dispense: 90 tablet; Refill: 1    Orders Placed This Encounter   Procedures    Lipid, Fasting     Standing Status:   Future     Standing Expiration Date:   3/25/2023    Glucose, Fasting     Standing Status:   Future     Standing Expiration Date:   3/25/2023    Comprehensive Metabolic Panel, Fasting     Standing Status:   Future     Standing Expiration Date:   3/25/2023     Orders Placed This Encounter   Medications    folic acid (FOLVITE) 1 MG tablet     Sig: Take 1 tablet by mouth daily     Dispense:  90 tablet     Refill:  1    atorvastatin (LIPITOR) 40 MG tablet     Sig: Take 1 tablet by mouth daily     Dispense:  90 tablet     Refill:  3     Requesting 1 year supply    meloxicam (MOBIC) 15 MG tablet     Sig: Take 1 tablet by mouth daily     Dispense:  90 tablet     Refill:  1    triamterene-hydroCHLOROthiazide (MAXZIDE) 75-50 MG per tablet     Sig: Take 1 tablet by mouth daily     Dispense:  90 tablet     Refill:  1     Medications Discontinued During This Encounter   Medication Reason    hydrocortisone 2.5 % cream Therapy completed    meloxicam (MOBIC) 15 MG tablet Therapy completed    nicotine (NICODERM CQ) 14 MG/24HR Therapy completed    folic acid (FOLVITE) 1 MG tablet REORDER    atorvastatin (LIPITOR) 40 MG tablet REORDER    triamterene-hydroCHLOROthiazide (MAXZIDE-25) 37.5-25 MG per tablet DOSE ADJUSTMENT     Return in about 6 weeks (around 5/6/2022) for follow up. Reviewed with the patient: current clinical status,medications, activities and diet. Side effects, adverse effects of themedication prescribed today, as well as treatment plan/ rationale and result expectations have been discussed with the patient who expresses understanding and desires to proceed. Close follow up to evaluate treatment results and for coordination of care.   I have reviewed the patient's medical history in detail and updated the computerized patient record. Please note, this report has been partially produced using speech recognition software and may cause  and /or contain errors related to that system including grammar, punctuation and spelling as well as words and phrases that may seem inappropriate. If there are questions or concerns please feel free to contact me to clarify.     Ariadna Guzman, DO

## 2022-03-28 DIAGNOSIS — E78.5 DYSLIPIDEMIA: ICD-10-CM

## 2022-03-28 DIAGNOSIS — Z13.1 SCREENING FOR DIABETES MELLITUS: ICD-10-CM

## 2022-03-28 DIAGNOSIS — I10 BENIGN ESSENTIAL HYPERTENSION: ICD-10-CM

## 2022-03-28 LAB
ALBUMIN SERPL-MCNC: 4.2 G/DL (ref 3.5–4.6)
ALP BLD-CCNC: 115 U/L (ref 35–104)
ALT SERPL-CCNC: 14 U/L (ref 0–41)
ANION GAP SERPL CALCULATED.3IONS-SCNC: 13 MEQ/L (ref 9–15)
AST SERPL-CCNC: 26 U/L (ref 0–40)
BILIRUB SERPL-MCNC: 0.3 MG/DL (ref 0.2–0.7)
BUN BLDV-MCNC: 15 MG/DL (ref 8–23)
CALCIUM SERPL-MCNC: 9.5 MG/DL (ref 8.5–9.9)
CHLORIDE BLD-SCNC: 103 MEQ/L (ref 95–107)
CHOLESTEROL, FASTING: 175 MG/DL (ref 0–199)
CO2: 27 MEQ/L (ref 20–31)
CREAT SERPL-MCNC: 0.93 MG/DL (ref 0.7–1.2)
GFR AFRICAN AMERICAN: >60
GFR NON-AFRICAN AMERICAN: >60
GLOBULIN: 2.8 G/DL (ref 2.3–3.5)
GLUCOSE FASTING: 88 MG/DL (ref 70–99)
HDLC SERPL-MCNC: 63 MG/DL (ref 40–59)
LDL CHOLESTEROL CALCULATED: 87 MG/DL (ref 0–129)
POTASSIUM SERPL-SCNC: 3.7 MEQ/L (ref 3.4–4.9)
SODIUM BLD-SCNC: 143 MEQ/L (ref 135–144)
TOTAL PROTEIN: 7 G/DL (ref 6.3–8)
TRIGLYCERIDE, FASTING: 123 MG/DL (ref 0–150)

## 2022-03-28 NOTE — RESULT ENCOUNTER NOTE
Please inform patient that lipid panel was within normal limits. His metabolic panel was normal except for mildly elevated alk phos which has been elevated for the past year or so. It is unchanged. We can continue to monitor this. All other liver enzymes normal.  Follow-up as scheduled.   Thanks

## 2022-03-29 DIAGNOSIS — F10.10 ALCOHOL ABUSE: ICD-10-CM

## 2022-03-29 RX ORDER — FOLIC ACID 1 MG/1
TABLET ORAL
Qty: 90 TABLET | Refills: 1 | Status: SHIPPED | OUTPATIENT
Start: 2022-03-29

## 2022-04-07 DIAGNOSIS — F51.01 PRIMARY INSOMNIA: ICD-10-CM

## 2022-04-07 NOTE — TELEPHONE ENCOUNTER
Patient is requesting a 90 day supply of this medication to be sent to [facility] Mail order pharmacy.       Please approve or deny this request.    Rx requested:  Requested Prescriptions     Pending Prescriptions Disp Refills    traZODone (DESYREL) 50 MG tablet 60 tablet 3         Last Office Visit:   3/25/2022      Next Visit Date:  Future Appointments   Date Time Provider Jarrett Irvin   5/6/2022  3:00 PM Di [facility]  [facility]

## 2022-04-08 RX ORDER — TRAZODONE HYDROCHLORIDE 50 MG/1
100 TABLET ORAL NIGHTLY
Qty: 180 TABLET | Refills: 0 | Status: SHIPPED | OUTPATIENT
Start: 2022-04-08 | End: 2022-07-01

## 2022-05-06 ENCOUNTER — COMMUNITY OUTREACH (OUTPATIENT)
Dept: INTERNAL MEDICINE | Age: 67
End: 2022-05-06

## 2022-05-06 ENCOUNTER — OFFICE VISIT (OUTPATIENT)
Dept: FAMILY MEDICINE CLINIC | Age: 67
End: 2022-05-06
Payer: MEDICARE

## 2022-05-06 VITALS
DIASTOLIC BLOOD PRESSURE: 65 MMHG | SYSTOLIC BLOOD PRESSURE: 154 MMHG | WEIGHT: 208.8 LBS | TEMPERATURE: 97.7 F | BODY MASS INDEX: 30.93 KG/M2 | HEART RATE: 99 BPM | HEIGHT: 69 IN | OXYGEN SATURATION: 95 %

## 2022-05-06 DIAGNOSIS — M51.36 DISC DEGENERATION, LUMBAR: ICD-10-CM

## 2022-05-06 DIAGNOSIS — F51.01 PRIMARY INSOMNIA: ICD-10-CM

## 2022-05-06 DIAGNOSIS — I10 BENIGN ESSENTIAL HYPERTENSION: Primary | ICD-10-CM

## 2022-05-06 DIAGNOSIS — J44.9 OBSTRUCTIVE CHRONIC BRONCHITIS WITHOUT EXACERBATION (HCC): ICD-10-CM

## 2022-05-06 DIAGNOSIS — F10.21 ALCOHOL USE DISORDER, MODERATE, IN EARLY REMISSION (HCC): ICD-10-CM

## 2022-05-06 DIAGNOSIS — I35.1 AORTIC VALVE INSUFFICIENCY, ETIOLOGY OF CARDIAC VALVE DISEASE UNSPECIFIED: ICD-10-CM

## 2022-05-06 PROCEDURE — 99214 OFFICE O/P EST MOD 30 MIN: CPT | Performed by: STUDENT IN AN ORGANIZED HEALTH CARE EDUCATION/TRAINING PROGRAM

## 2022-05-06 NOTE — PROGRESS NOTES
Subjective  Rayna Quispe, 75 y.o. male presents today with:  Chief Complaint   Patient presents with    Follow-up    Hypertension     Does not monitor BP at home. Denies any chest pain or heart palpitations.  Insomnia     States he is sleeping good. Is not having any issues falling asleep or staying asleep.  Back Pain     States when hes sitting his back does not bother him. States if hes up moving around & walking he will have pain. Is not able to walk a long distance. Does not feel like the Mobic is helping. HPI    Patient with follow-up appointment for essential hypertension. He is slightly hypertensive in the office today. He does not monitor his blood pressures at home. He also has a history of aortic regurgitation. His last echo was a couple years ago. He denies any chest pain, shortness of breath or palpitations. He does not follow with cardiology regularly. No syncopal episodes. He is on amlodipine 10 mg daily. He is also on triamterene hydrochlorothiazide 75 mg, 50 mg and 100 mg of metoprolol succinate daily. No side effects from medications. Patient also with insomnia. He is currently using trazodone 100 mg nightly. He states it is working well for him. No issues falling or staying asleep. No side effects from medication. He does wake up feeling refreshed. Patient with COPD. Doing well. No shortness of breath. He does have rescue inhaler. Patient also with some low back pain. He states it is worse with movement and better with rest.  He states if he is able to sit he does not have the pain. Walking and standing make the pain worse. He is taking meloxicam as needed for this. States it does not seem to be working as well lately. Does not wish to start any other medications at this time. Patient also with history of alcohol use disorder. He is in remission currently. Denies any alcoholic beverages. No withdrawal symptoms reported.   He has no other concerns at this time. Review of Systems   Constitutional: Negative for chills, fatigue, fever and unexpected weight change. HENT: Negative for congestion, rhinorrhea and sore throat. Eyes: Negative for discharge. Respiratory: Negative for cough, shortness of breath and wheezing. Cardiovascular: Negative for chest pain, palpitations and leg swelling. Gastrointestinal: Negative for abdominal pain, diarrhea, nausea and vomiting. Genitourinary: Negative for difficulty urinating. Musculoskeletal: Positive for back pain. Negative for arthralgias and joint swelling. Skin: Negative for rash. Neurological: Negative for weakness, light-headedness, numbness and headaches. Psychiatric/Behavioral: Negative for confusion and suicidal ideas. The patient is not nervous/anxious.         Past Medical History:   Diagnosis Date    Alcohol abuse     Alcohol abuse     Allergic rhinitis     Aortic regurgitation     Cervical radiculopathy at  10/13/2013    Chronic back pain     Chronic back pain     COPD (chronic obstructive pulmonary disease) (HCC)     Erectile dysfunction     GERD (gastroesophageal reflux disease)     Hyperlipidemia     Hypertension     Insomnia     Moderate episode of recurrent major depressive disorder ([facility] [address] 8/18/2021    Osteoarthritis     PUD (peptic ulcer disease)     Restless leg syndrome     Smoker unmotivated to quit     Vitamin D deficiency      Past Surgical History:   Procedure Laterality Date    COLONOSCOPY N/A 8/3/2020    COLONOSCOPY WITH POLYPECTOMY performed by Erick Nava MD at [address]  12/2013    Dr. Shan Yoo REPLACEMENT  2008    Left hip replacement    SPINE SURGERY  2006    fusion L4-L5     Current Outpatient Medications   Medication Sig Dispense Refill    traZODone (DESYREL) 50 MG tablet Take 2 tablets by mouth nightly 990 tablet 0    folic acid (FOLVITE) 1 MG tablet take 1 tablet by mouth once daily 90 tablet 1  atorvastatin (LIPITOR) 40 MG tablet Take 1 tablet by mouth daily 90 tablet 3    meloxicam (MOBIC) 15 MG tablet Take 1 tablet by mouth daily 90 tablet 1    triamterene-hydroCHLOROthiazide (MAXZIDE) 75-50 MG per tablet Take 1 tablet by mouth daily 90 tablet 1    metoprolol succinate (TOPROL XL) 100 MG extended release tablet Take 1 tablet by mouth daily 90 tablet 1    amLODIPine (NORVASC) 10 MG tablet Take 1 tablet by mouth daily 90 tablet 1    pantoprazole (PROTONIX) 40 MG tablet TAKE 1 TABLET BY MOUTH  DAILY 90 tablet 3    albuterol sulfate HFA (PROVENTIL HFA) 108 (90 Base) MCG/ACT inhaler Inhale 2 puffs into the lungs every 6 hours as needed for Wheezing 1 Inhaler 3    albuterol (PROVENTIL) (2.5 MG/3ML) 0.083% nebulizer solution Take 3 mLs by nebulization every 2 hours as needed for Wheezing 120 each 3     No current facility-administered medications for this visit. PMH, Surgical Hx, Family Hx, and Social Hx reviewed and updated. Health Maintenance reviewed. Objective    Vitals:    05/06/22 1451 05/06/22 1500 05/06/22 1516   BP: (!) 166/70 (!) 153/65 (!) 154/65   Pulse: 99     Temp: 97.7 °F (36.5 °C)     SpO2: 95%     Weight: 208 lb 12.8 oz (94.7 kg)     Height: 5' 9\" (1.753 m)         Physical Exam  Vitals reviewed. Constitutional:       General: He is not in acute distress. HENT:      Head: Normocephalic and atraumatic. Eyes:      Conjunctiva/sclera: Conjunctivae normal.   Neck:      Thyroid: No thyromegaly or thyroid tenderness. Cardiovascular:      Rate and Rhythm: Normal rate and regular rhythm. Heart sounds: Murmur heard. No friction rub. No gallop. Pulmonary:      Effort: Pulmonary effort is normal.      Breath sounds: Normal breath sounds. No wheezing, rhonchi or rales. Musculoskeletal:         General: No swelling or deformity. Cervical back: Normal range of motion and neck supple. Right lower leg: No edema. Left lower leg: No edema.    Lymphadenopathy: Cervical: No cervical adenopathy. Skin:     General: Skin is warm and dry. Findings: No rash. Neurological:      General: No focal deficit present. Mental Status: He is alert. Psychiatric:         Mood and Affect: Mood normal.         Behavior: Behavior normal.        Assessment & Plan       1. Benign essential hypertension  Patient with essential hypertension. He is on amlodipine 10 mg daily, metoprolol 100 mg extended release daily, triamterene hydrochlorothiazide 75, 50 mg daily. He is hypertensive in the office today, however, he does have a significant discrepancy between systolic and diastolic, indicating aortic regurgitation. He does have a known history of this from echo in 2020. We will plan to recheck echo and refer to cardiology. We will hold off on adjusting medications at this time. Patient voiced understanding. We will continue to monitor this carefully. Follow-up as scheduled. If symptoms worsen or change, return to care sooner. - Sudha Rice MD, Cardiology, [place]    2. Aortic valve insufficiency, etiology of cardiac valve disease unspecified  As above  - Echocardiogram complete; Future  - Sudha Rice MD, Cardiology, [place]    3. Primary insomnia  Stable, chronic. Continue trazodone 100 mg nightly. No refills needed at this time. Continue to monitor. 4. Obstructive chronic bronchitis without exacerbation (HCC)  Stable, chronic. Continue rescue inhalers. Not currently on daily inhaler. Continue to monitor. Follow-up as scheduled. 5. Alcohol use disorder, moderate, in early remission ([address]  Currently in remission. Continue to monitor. He is on vitamins. Denies alcohol use. Follow-up as scheduled. 6. Disc degeneration, lumbar  Stable, chronic. Slight worsening of back pain. Continue meloxicam as needed. Physical therapy offered and patient declined. Continue to monitor. Follow-up as scheduled.     Orders Placed This Encounter Elsa Ovalle MD, Cardiology, [facility]     Referral Priority:   Routine     Referral Type:   Eval and Treat     Referral Reason:   Specialty Services Required     Referred to Provider:   Laurent Gupta MD     Requested Specialty:   Cardiology     Number of Visits Requested:   1    Echocardiogram complete     Standing Status:   Future     Standing Expiration Date:   7/5/2022     Order Specific Question:   Reason for exam:     Answer:   aortic regurg, sob     No orders of the defined types were placed in this encounter. There are no discontinued medications. Return in about 2 months (around 7/6/2022) for follow up. Reviewed with the patient: current clinical status,medications, activities and diet. Side effects, adverse effects of themedication prescribed today, as well as treatment plan/ rationale and result expectations have been discussed with the patient who expresses understanding and desires to proceed. Close follow up to evaluate treatment results and for coordination of care. I have reviewed the patient's medical history in detail and updated the computerized patient record. Please note, this report has been partially produced using speech recognition software and may cause  and /or contain errors related to that system including grammar, punctuation and spelling as well as words and phrases that may seem inappropriate. If there are questions or concerns please feel free to contact me to clarify.     Thomas Hope, DO

## 2022-05-09 ASSESSMENT — ENCOUNTER SYMPTOMS
VOMITING: 0
EYE DISCHARGE: 0
DIARRHEA: 0
SORE THROAT: 0
COUGH: 0
SHORTNESS OF BREATH: 0
RHINORRHEA: 0
BACK PAIN: 1
ABDOMINAL PAIN: 0
WHEEZING: 0
NAUSEA: 0

## 2022-06-07 NOTE — PROGRESS NOTES
Nutrition Assessment (Enteral Nutrition)    Type and Reason for Visit: Initial, Consult(TF order and manage)    Nutrition Recommendations: Continue NPO, Start Tube Feeding:    Start Low Calorie, High Protein TF (Vital HP) @ 20 ml/hr x 24 hrs. Add 1 protein modular to water flush TID  50 ml water flush Q 6 hrs while on IVF or as per physician    Nutrition Assessment: Pt at nutrition risk due to intubation with inability to take po. Per discussion in rounds, OK to start tube feeding. Will start TF at trophic rate and monitor tolerance    Malnutrition Assessment:  · Malnutrition Status: Insufficient data  · Context: Acute illness or injury  · Findings of the 6 clinical characteristics of malnutrition (Minimum of 2 out of 6 clinical characteristics is required to make the diagnosis of moderate or severe Protein Calorie Malnutrition based on AND/ASPEN Guidelines):  1. Energy Intake-Unable to assess, Unable to assess    2. Weight Loss-Unable to assess,    3. Fat Loss-No significant subcutaneous fat loss,    4. Muscle Loss-No significant muscle mass loss,    5. Fluid Accumulation-Mild fluid accumulation, Generalized  6.   Strength-Not measured    Nutrition Risk Level: High    Nutrition Needs:  · Estimated Daily Total Kcal: 6391-7677 (kg x 11-14)  · Estimated Daily Protein (g): 145 g (kg IBW x 2.0)  · Estimated Daily Fluid (ml/day): ~1200 ml (1 ml/kcal) or as per physician    Nutrition Diagnosis:   · Problem: Inadequate oral intake  · Etiology: related to Impaired respiratory function-inability to consume food     Signs and symptoms:  as evidenced by NPO status due to medical condition, Intubation    Objective Information:  · Nutrition-Focused Physical Findings: peripheral line, IVF: LR @ 50 ml/hr, BM 3/16, trace generalized edema noted, OGT in place, Hx: COPD, GERD, AL, HTN, PUD, propofol @ 15.5 ml/hr (409 kcal)  · Wound Type:  None  · Current Nutrition Therapies:  · Oral Diet Orders: NPO   · Tube Feeding Providers


Date of admission: 


05/31/22 11:07





Expected date of discharge: 06/06/22


Attending physician: 


Rafael Portillo





Consults: 





                                        





06/01/22 12:54


Consult Physician Urgent 


   Consulting Provider: Michael Tse


   Consult Reason/Comments: Anxiety, panic attacks, chronic pain


   Do you want consulting provider notified?: Yes











Primary care physician: 


Rafael Portillo





Hospital Course: 


Final Diagnoses:


Acute severe back pain, intractable, with multiple thoracolumbar compression 

fractures


History of recent fall with multiple right upper extremity injuries status post 

surgical repair, approximately 5-6 weeks ago.


Recent pulmonary embolism, anticoagulated with Eliquis


Anxiety, severe with panic attacks


Hyperlipidemia


Gastroesophageal reflux disease


Chronic anemia











Hospital course: This is a 67-year-old female admitted with intractable severe 

back pain, multiple thoracolumbar compression fractures, recent fall, panic 

attacks and multiple other medical issues.  Evaluated and treated by psychiatry 

and orthopedic spine.  Conservative treatment recommended including LSO brace.  

Medications adjusted as recommended per psychiatry , with further follow-up 

outpatient.Significant clinical improvement.  Patient reports she is unable to 

take Norvasc secondary to prior experience with significant edema.  Upon 

discharge vital signs stable, no further antihypertensive added to med regimen 

this time; continued close monitoring outpatient.  Patient also reported she was

paying high amounts for eliquis that [place] for [place]; 

discussed with case management.  Patient will be sent home on a free month of 

Eliquis from [facility], MIN contacted [facility] and notifed of Eliquis copay. Pt is in 

coverge gap and last fill 05-21 and cost was $464.84. PCP, Dr Portillo office will 

be attempting to obtaining free samples.  Patient will be discharged home in a 

stable condition with guarded prognosis.

















The impression and plan of care has been dictated as directed.





:


I performed a history and examination of this patient,  discussed the same with 

the dictator.  I agree with the dictator's note ,documented as a scribe.  Any 

additional findings or plans will be noted.


Patient Condition at Discharge: Stable





Plan - Discharge Summary


New Discharge Prescriptions: 


New


   predniSONE 10 mg PO AS DIRECTED #30 tab


   busPIRone HCl [Buspar] 20 mg PO TID #180 tab


   DULoxetine HCL [Cymbalta] 30 mg PO BID #60 cap


   Melatonin 6 mg PO HS #60 tab


   hydrOXYzine pamoate [Vistaril] 50 mg PO Q8HR PRN #42 cap


     PRN Reason: Anxiety


   oxyCODONE-APAP 7.5-325MG [Percocet 7.5-325 mg] 1 tab PO Q6HR PRN 3 Days #12 

tab


     PRN Reason: Pain





Continue


   Ferrous Sulfate [Iron (65 MG Elemental)] 325 mg PO W/BRKFST


   Metoprolol Tartrate [Lopressor] 25 mg PO BID


   polyethylene glycoL 3350 [Miralax] 17 gm PO DAILY


   Acetaminophen Tab [Tylenol] 325 - 650 mg PO Q6H PRN


     PRN Reason: Fever And/ Or Pain


   Buprenorphine [Butrans 5 MCG/HR] 1 patch TRANSDERM TU


   methocarbamoL [Robaxin] 1,000 mg PO TID@0900,1600,2100


   Atorvastatin Calcium [Lipitor] 10 mg PO Q48H


   Apixaban [Eliquis] 5 mg PO BID #60 tab





Discontinued


   oxyCODONE-APAP 7.5-325MG [Percocet 7.5-325 mg] 1 tab PO Q8H PRN


     PRN Reason: Pain


   ALPRAZolam [Xanax] 0.25 mg PO DAILY PRN


     PRN Reason: Anxiety


   Escitalopram [Lexapro] 20 mg PO DAILY


   busPIRone HCl [Buspar] 10 mg PO TID@0900,1600,2100


Discharge Medication List





Ferrous Sulfate [Iron (65 MG Elemental)] 325 mg PO W/BRKFST 10/14/19 [History]


Metoprolol Tartrate [Lopressor] 25 mg PO BID 10/14/19 [History]


Acetaminophen Tab [Tylenol] 325 - 650 mg PO Q6H PRN 05/29/22 [History]


Atorvastatin Calcium [Lipitor] 10 mg PO Q48H 05/29/22 [History]


Buprenorphine [Butrans 5 MCG/HR] 1 patch TRANSDERM TU 05/29/22 [History]


methocarbamoL [Robaxin] 1,000 mg PO TID@0900,1600,2100 05/29/22 [History]


polyethylene glycoL 3350 [Miralax] 17 gm PO DAILY 05/29/22 [History]


Apixaban [Eliquis] 5 mg PO BID #60 tab 06/06/22 [Rx]


DULoxetine HCL [Cymbalta] 30 mg PO BID #60 cap 06/06/22 [Rx]


Melatonin 6 mg PO HS #60 tab 06/06/22 [Rx]


busPIRone HCl [Buspar] 20 mg PO TID #180 tab 06/06/22 [Rx]


hydrOXYzine pamoate [Vistaril] 50 mg PO Q8HR PRN #42 cap 06/06/22 [Rx]


oxyCODONE-APAP 7.5-325MG [Percocet 7.5-325 mg] 1 tab PO Q6HR PRN 3 Days #12 tab 

06/06/22 [Rx]


predniSONE 10 mg PO AS DIRECTED #30 tab 06/06/22 [Rx]








Follow up Appointment(s)/Referral(s): 


ROBERT,  Psychiatry [Other] - 1 Week


Tamar Henderson DO [Doctor of Osteopathic Medicine] - 2 Weeks


Rafael Portillo DO [Primary Care Provider] - 1 Week


[facility] [NON-STAFF] - As Needed (Supplier of Hospital Bed)


Trenton Chavez PAC [PHYSICIAN ASSISTANT] - 2 Weeks


(Patient may follow-up with Trenton Chavez PA-C or Dr. Gama Ingram at [facility] in 2-3 weeks following discharge.


)


[employer] [NON-STAFF] - As Needed (Contact to obtain a wheelchair.)


[facility], [NON-STAFF] - 1-2 Days


[employer]uda Foreman [NON-STAFF] - As Needed (Supplier of TLSO brace)


Activity/Diet/Wound Care/Special Instructions: 


1.  Patient may wear Spinomed TLSO brace for comfort and support while sitting 

upright at greater than 45, while working with therapy, and while ambulating; 

patient does not have to wear the brace while lying in bed or bathing


2.  Patient should avoid excessive bending, twisting, and lifting; no lifting 

greater than 10 pounds.


Discharge/Stand Alone Forms:  Community Resources, Outpatient Counseling, 

Personal Care Manager


Discharge Disposition: HOME SELF-CARE (TF) Orders:   · Feeding Route: Orogastric  · Formula: Low Calorie, High Protein  · Rate (ml/hr):20 ml/hr x 24 hrs    · Volume (ml/day): 480 ml  · Duration: Continuous  · Additives/Modulars: Protein(x 3)  · Water Flushes: 50 ml Q 6 hrs  · Current TF & Flush Orders Provides: 480 kcal (+ propofol & proteinex), 42 g protein (+ proteinex), ~ 600 ml free water(with goal rate of 20 ml/hr x 24 hrs, proteinex TID and current rate of lyvgsou=0227 kcal, 120 g protein, ~600 ml free water)  · Goal TF & Flush Orders Provides: 480 kcal (+ propofol & proteinex), 42 g protein (+ proteinex), ~ 600 ml free water(with goal rate of 20 ml/hr x 24 hrs, proteinex TID and current rate of rljjedr=1777 kcal, 120 g protein, ~600 ml free water)  · Additional Calories: 409 kcal from propofol  · Anthropometric Measures:  · Ht: 5' 9\" (175.3 cm)   · Current Body Wt: 203 lb (92.1 kg)(3/18 bedscale)  · Admission Body Wt: 190 lb (86.2 kg)(stated)  · Usual Body Wt: 179 lb (81.2 kg)((12/4/19-? source), 185 lb (11/6/19-? source))  · Weight Change: increased, if  weights are accurate   · Ideal Body Wt: 160 lb (72.6 kg), % Ideal Body > 100%  · BMI Classification: BMI 30.0 - 34.9 Obese Class I    Nutrition Interventions:   Continue NPO, Start Tube Feeding(Start Low Calorie, High Protein TF @ 20 ml/hr x 24 hrs.   Add 1 protein modular to water flush TID, 50 ml water flush Q 6 hrs while on IVF or as per physician)  Continued Inpatient Monitoring, Education Not Indicated    Nutrition Evaluation:   · Evaluation: Goals set   · Goals: initiation and tolerance to TF   · Monitoring: TF Intake, TF Tolerance, Weight, Pertinent Labs, Monitor Bowel Function      Electronically signed by Renard Oliveros RD, LD on 3/18/20 at 2:15 PM EDT

## 2022-06-25 ENCOUNTER — HOSPITAL ENCOUNTER (OUTPATIENT)
Dept: NON INVASIVE DIAGNOSTICS | Age: 67
Discharge: HOME | End: 2022-06-25
Payer: MEDICARE

## 2022-06-25 DIAGNOSIS — I35.1 AORTIC VALVE INSUFFICIENCY, ETIOLOGY OF CARDIAC VALVE DISEASE UNSPECIFIED: ICD-10-CM

## 2022-06-25 LAB
LV EF: 55 %
LVEF MODALITY: NORMAL

## 2022-06-25 PROCEDURE — 93306 TTE W/DOPPLER COMPLETE: CPT

## 2022-06-30 DIAGNOSIS — F51.01 PRIMARY INSOMNIA: ICD-10-CM

## 2022-07-01 RX ORDER — TRAZODONE HYDROCHLORIDE 50 MG/1
TABLET ORAL
Qty: 180 TABLET | Refills: 1 | Status: SHIPPED | OUTPATIENT
Start: 2022-07-01

## 2022-07-07 ENCOUNTER — IMMUNIZATION (OUTPATIENT)
Dept: FAMILY MEDICINE CLINIC | Age: 67
End: 2022-07-07
Payer: MEDICARE

## 2022-07-07 PROCEDURE — 0064A COVID-19, MODERNA BOOSTER BLUE BORDER, (AGE 18Y+), IM, 50MCG/0.25ML: CPT | Performed by: FAMILY MEDICINE

## 2022-07-07 PROCEDURE — 91306 COVID-19, MODERNA BOOSTER BLUE BORDER, (AGE 18Y+), IM, 50MCG/0.25ML: CPT | Performed by: FAMILY MEDICINE

## 2022-07-13 ENCOUNTER — OFFICE VISIT (OUTPATIENT)
Dept: FAMILY MEDICINE CLINIC | Age: 67
End: 2022-07-13
Payer: MEDICARE

## 2022-07-13 VITALS
WEIGHT: 218 LBS | HEART RATE: 112 BPM | HEIGHT: 69 IN | DIASTOLIC BLOOD PRESSURE: 62 MMHG | BODY MASS INDEX: 32.29 KG/M2 | OXYGEN SATURATION: 94 % | TEMPERATURE: 97.6 F | SYSTOLIC BLOOD PRESSURE: 160 MMHG

## 2022-07-13 DIAGNOSIS — Z00.00 INITIAL MEDICARE ANNUAL WELLNESS VISIT: Primary | ICD-10-CM

## 2022-07-13 PROCEDURE — G0438 PPPS, INITIAL VISIT: HCPCS | Performed by: STUDENT IN AN ORGANIZED HEALTH CARE EDUCATION/TRAINING PROGRAM

## 2022-07-13 PROCEDURE — 1123F ACP DISCUSS/DSCN MKR DOCD: CPT | Performed by: STUDENT IN AN ORGANIZED HEALTH CARE EDUCATION/TRAINING PROGRAM

## 2022-07-13 ASSESSMENT — PATIENT HEALTH QUESTIONNAIRE - PHQ9
SUM OF ALL RESPONSES TO PHQ QUESTIONS 1-9: 0
8. MOVING OR SPEAKING SO SLOWLY THAT OTHER PEOPLE COULD HAVE NOTICED. OR THE OPPOSITE, BEING SO FIGETY OR RESTLESS THAT YOU HAVE BEEN MOVING AROUND A LOT MORE THAN USUAL: 0
SUM OF ALL RESPONSES TO PHQ QUESTIONS 1-9: 0
7. TROUBLE CONCENTRATING ON THINGS, SUCH AS READING THE NEWSPAPER OR WATCHING TELEVISION: 0
6. FEELING BAD ABOUT YOURSELF - OR THAT YOU ARE A FAILURE OR HAVE LET YOURSELF OR YOUR FAMILY DOWN: 0
5. POOR APPETITE OR OVEREATING: 0
1. LITTLE INTEREST OR PLEASURE IN DOING THINGS: 0
SUM OF ALL RESPONSES TO PHQ9 QUESTIONS 1 & 2: 0
SUM OF ALL RESPONSES TO PHQ QUESTIONS 1-9: 0
SUM OF ALL RESPONSES TO PHQ QUESTIONS 1-9: 0
10. IF YOU CHECKED OFF ANY PROBLEMS, HOW DIFFICULT HAVE THESE PROBLEMS MADE IT FOR YOU TO DO YOUR WORK, TAKE CARE OF THINGS AT HOME, OR GET ALONG WITH OTHER PEOPLE: 0
3. TROUBLE FALLING OR STAYING ASLEEP: 0
4. FEELING TIRED OR HAVING LITTLE ENERGY: 0
9. THOUGHTS THAT YOU WOULD BE BETTER OFF DEAD, OR OF HURTING YOURSELF: 0
2. FEELING DOWN, DEPRESSED OR HOPELESS: 0

## 2022-07-13 ASSESSMENT — LIFESTYLE VARIABLES
HOW OFTEN DO YOU HAVE A DRINK CONTAINING ALCOHOL: 2-4 TIMES A MONTH
HOW MANY STANDARD DRINKS CONTAINING ALCOHOL DO YOU HAVE ON A TYPICAL DAY: 1 OR 2

## 2022-07-13 NOTE — PROGRESS NOTES
Medicare Annual Wellness Visit    Anuradha Paez is here for Medicare AWV    Assessment & Plan   Initial Medicare annual wellness visit      Recommendations for Preventive Services Due: see orders and patient instructions/AVS.  Recommended screening schedule for the next 5-10 years is provided to the patient in written form: see Patient Instructions/AVS.     Return in 4 weeks (on 8/10/2022) for Medicare Annual Wellness Visit in 1 year, follow up. Subjective       Patient's complete Health Risk Assessment and screening values have been reviewed and are found in Flowsheets. The following problems were reviewed today and where indicated follow up appointments were made and/or referrals ordered.     Positive Risk Factor Screenings with Interventions:    Fall Risk:  Do you feel unsteady or are you worried about falling? : (!) yes  2 or more falls in past year?: (!) yes  Fall with injury in past year?: no     Fall Risk Interventions:    · Home safety tips provided        Drug Use Screening:    DAST-10 Score Interpretation:  1-2: Low level - Monitor, re-assess at a later date; 3-5: Moderate level - Further Investigation; 6-8: Substantial level - Intensive Assessment; 9-10: Severe level - Intensive Assessment    Substance Use - Drug Use Interventions:  patient agrees to remove recreational drugs from his/her home        Health Habits/Nutrition:     Physical Activity: Inactive    Days of Exercise per Week: 0 days    Minutes of Exercise per Session: 0 min     Have you lost any weight without trying in the past 3 months?: No  Body mass index: (!) 32.19  Have you seen the dentist within the past year?: N/A - wear dentures    Health Habits/Nutrition Interventions:  · Inadequate physical activity:  patient is not ready to increase his/her physical activity level at this time    Hearing/Vision:  Do you or your family notice any trouble with your hearing that hasn't been managed with hearing aids?: No  Do you have difficulty driving, watching TV, or doing any of your daily activities because of your eyesight?: No  Have you had an eye exam within the past year?: (!) No  No exam data present    Hearing/Vision Interventions:  · Vision concerns:  patient encouraged to make appointment with his/her eye specialist    Safety:  Do you have working smoke detectors?: (!) No  Do you have any tripping hazards - loose or unsecured carpets or rugs?: No  Do you have any tripping hazards - clutter in doorways, halls, or stairs?: No  Do you have either shower bars, grab bars, non-slip mats or non-slip surfaces in your shower or bathtub?: Yes  Do all of your stairways have a railing or banister?: (!) No  Do you always fasten your seatbelt when you are in a car?: Yes    Safety Interventions:  · Home safety tips provided           Objective   Vitals:    07/13/22 1457 07/13/22 1543   BP: (!) 176/70 (!) 160/62   Pulse: (!) 112    Temp: 97.6 °F (36.4 °C)    SpO2: 94%    Weight: 218 lb (98.9 kg)    Height: 5' 9\" (1.753 m)       Body mass index is 32.19 kg/m². No Known Allergies  Prior to Visit Medications    Medication Sig Taking?  Authorizing Provider   traZODone (DESYREL) 50 MG tablet TAKE 2 TABLETS NIGHTLY Yes Ted Grams, DO   folic acid (FOLVITE) 1 MG tablet take 1 tablet by mouth once daily Yes Felix Gilbert MD   atorvastatin (LIPITOR) 40 MG tablet Take 1 tablet by mouth daily Yes Ted Grams, DO   meloxicam (MOBIC) 15 MG tablet Take 1 tablet by mouth daily Yes Ted Grams, DO   triamterene-hydroCHLOROthiazide (MAXZIDE) 75-50 MG per tablet Take 1 tablet by mouth daily Yes Ted Grams, DO   metoprolol succinate (TOPROL XL) 100 MG extended release tablet Take 1 tablet by mouth daily Yes Coleen Dukes, DO   amLODIPine (NORVASC) 10 MG tablet Take 1 tablet by mouth daily Yes Rachna Dukes, DO   pantoprazole (PROTONIX) 40 MG tablet TAKE 1 TABLET BY MOUTH  DAILY Yes Coleen Dukes, DO   albuterol sulfate HFA (PROVENTIL HFA) 108 (90 Base) MCG/ACT inhaler Inhale 2 puffs into the lungs every 6 hours as needed for Wheezing Yes Rachna Dukes DO   albuterol (PROVENTIL) (2.5 MG/3ML) 0.083% nebulizer solution Take 3 mLs by nebulization every 2 hours as needed for Wheezing Yes Eloisa Augustine DO       CareTeam (Including outside providers/suppliers regularly involved in providing care):   Patient Care Team:  [facility]air[facility] Cabrera [facility] Empaneled Provider     Reviewed and updated this visit:  Tobacco  Allergies  Meds  Problems  Med Hx  Surg Hx  Soc Hx  Fam Hx

## 2022-07-13 NOTE — PATIENT INSTRUCTIONS
Personalized Preventive Plan for Luther Calloway - 7/13/2022  Medicare offers a range of preventive health benefits. Some of the tests and screenings are paid in full while other may be subject to a deductible, co-insurance, and/or copay. Some of these benefits include a comprehensive review of your medical history including lifestyle, illnesses that may run in your family, and various assessments and screenings as appropriate. After reviewing your medical record and screening and assessments performed today your provider may have ordered immunizations, labs, imaging, and/or referrals for you. A list of these orders (if applicable) as well as your Preventive Care list are included within your After Visit Summary for your review. Other Preventive Recommendations:    · A preventive eye exam performed by an eye specialist is recommended every 1-2 years to screen for glaucoma; cataracts, macular degeneration, and other eye disorders. · A preventive dental visit is recommended every 6 months. · Try to get at least 150 minutes of exercise per week or 10,000 steps per day on a pedometer . · Order or download the FREE \"Exercise & Physical Activity: Your Everyday Guide\" from [employer]. Call 1-136.470.6005 or search [employer] online. · You need 6985-4707 mg of calcium and 1894-4010 IU of vitamin D per day. It is possible to meet your calcium requirement with diet alone, but a vitamin D supplement is usually necessary to meet this goal.  · When exposed to the sun, use a sunscreen that protects against both UVA and UVB radiation with an SPF of 30 or greater. Reapply every 2 to 3 hours or after sweating, drying off with a towel, or swimming. · Always wear a seat belt when traveling in a car. Always wear a helmet when riding a bicycle or motorcycle.

## 2022-08-08 ENCOUNTER — TELEPHONE (OUTPATIENT)
Dept: PHARMACY | Facility: CLINIC | Age: 67
End: 2022-08-08

## 2022-08-08 NOTE — TELEPHONE ENCOUNTER
POPULATION HEALTH CLINICAL PHARMACY: ADHERENCE REVIEW  Identified care gap per [employer]: fills at [facility] [employer]: Statin adherence    Last Visit: 7/13/22    [facility] (Prior Year Jonathon Dudley =  n/a%; YTD [place] =  78%; Potential Fail Date: 8/19/22 ):   Atorvastatin 40 mg  last filled on 3/26/22 for 90 day supply. Next refill due: 6/24/22    Lab Results   Component Value Date    CHOL 150 12/14/2017    TRIG 51 12/14/2017    HDL 63 (H) 03/28/2022    LDLCALC 87 03/28/2022     ALT   Date Value Ref Range Status   03/28/2022 14 0 - 41 U/L Final     AST   Date Value Ref Range Status   03/28/2022 26 0 - 40 U/L Final     The 10-year ASCVD risk score (Rosendo Cunha, et al., 2013) is: 23.6%    Values used to calculate the score:      Age: 75 years      Sex: Male      Is Non- : Yes      Diabetic: No      Tobacco smoker: No      Systolic Blood Pressure: 433 mmHg      Is BP treated: Yes      HDL Cholesterol: 63 mg/dL      Total Cholesterol: 175 mg/dL     PLAN  The following are interventions that have been identified:   - Patient overdue refilling Atorvastatin 40 mg  and active on home medication list.   - Patient needs refills for Atorvastatin 40 mg     Attempting to reach patient to review. Left message asking for return call. Attempted to speak with patient, was not able to reach him, left a voicemail with call back info.     Future Appointments   Date Time Provider Jarrett Irvin   8/10/2022  3:00 PM Oliver Dancer, DO [address] [facility] MEDICAL [place] AT [place] [id number]

## 2022-08-09 NOTE — TELEPHONE ENCOUNTER
Reached out to the patient. Conversation was brief. He said he is still taking atorvastatin 40 mg everyday and he still has some left.

## 2022-08-09 NOTE — TELEPHONE ENCOUNTER
Reviewed with PharmD candidate. [employer] message sent.     Gurwinder Espinoza, Nicky, [facility], [place].   Department, toll free: [phone]    For Pharmacy Admin Tracking Only    CPA in place:  No  Gap Closed?: No   Time Spent (min): 10

## 2022-09-26 DIAGNOSIS — I10 BENIGN ESSENTIAL HYPERTENSION: ICD-10-CM

## 2022-09-26 DIAGNOSIS — M51.36 DISC DEGENERATION, LUMBAR: ICD-10-CM

## 2022-09-27 RX ORDER — TRIAMTERENE AND HYDROCHLOROTHIAZIDE 75; 50 MG/1; MG/1
TABLET ORAL
Qty: 90 TABLET | Refills: 1 | Status: SHIPPED | OUTPATIENT
Start: 2022-09-27

## 2022-09-27 RX ORDER — MELOXICAM 15 MG/1
TABLET ORAL
Qty: 90 TABLET | Refills: 1 | Status: SHIPPED | OUTPATIENT
Start: 2022-09-27

## 2022-09-27 NOTE — TELEPHONE ENCOUNTER
Comments: This request is coming from the pharmacy. Last Office Visit (last PCP visit):   7/13/2022    Next Visit Date:  Future Appointments   Date Time Provider Jarrett Irvin   10/5/2022  3:00 PM Michelle Chi DO [address] [place] [address] [place]       If hasn't been seen in over a year OR hasn't followed up according to last diabetes/ADHD visit, make appointment for patient before sending refill to provider.     Rx requested:  Requested Prescriptions     Pending Prescriptions Disp Refills    triamterene-hydroCHLOROthiazide (MAXZIDE) 75-50 MG per tablet [Pharmacy Med Name: TRIAMT/HCTZ  TAB 75-50MG] 90 tablet 1     Sig: TAKE 1 TABLET DAILY    meloxicam (MOBIC) 15 MG tablet [Pharmacy Med Name: MELOXICAM TAB 15MG] 90 tablet 1     Sig: TAKE 1 TABLET DAILY

## 2022-10-05 ENCOUNTER — OFFICE VISIT (OUTPATIENT)
Dept: FAMILY MEDICINE CLINIC | Age: 67
End: 2022-10-05
Payer: MEDICARE

## 2022-10-05 VITALS
HEART RATE: 87 BPM | BODY MASS INDEX: 32.47 KG/M2 | SYSTOLIC BLOOD PRESSURE: 139 MMHG | WEIGHT: 219.2 LBS | OXYGEN SATURATION: 95 % | DIASTOLIC BLOOD PRESSURE: 60 MMHG | HEIGHT: 69 IN | TEMPERATURE: 97.8 F

## 2022-10-05 DIAGNOSIS — Z23 NEED FOR INFLUENZA VACCINATION: ICD-10-CM

## 2022-10-05 DIAGNOSIS — M25.561 CHRONIC PAIN OF BOTH KNEES: ICD-10-CM

## 2022-10-05 DIAGNOSIS — M25.562 CHRONIC PAIN OF BOTH KNEES: ICD-10-CM

## 2022-10-05 DIAGNOSIS — F51.01 PRIMARY INSOMNIA: ICD-10-CM

## 2022-10-05 DIAGNOSIS — G89.29 CHRONIC PAIN OF BOTH KNEES: ICD-10-CM

## 2022-10-05 DIAGNOSIS — J44.9 OBSTRUCTIVE CHRONIC BRONCHITIS WITHOUT EXACERBATION (HCC): ICD-10-CM

## 2022-10-05 DIAGNOSIS — I10 BENIGN ESSENTIAL HYPERTENSION: Primary | ICD-10-CM

## 2022-10-05 PROCEDURE — G0008 ADMIN INFLUENZA VIRUS VAC: HCPCS | Performed by: STUDENT IN AN ORGANIZED HEALTH CARE EDUCATION/TRAINING PROGRAM

## 2022-10-05 PROCEDURE — 90694 VACC AIIV4 NO PRSRV 0.5ML IM: CPT | Performed by: STUDENT IN AN ORGANIZED HEALTH CARE EDUCATION/TRAINING PROGRAM

## 2022-10-05 PROCEDURE — 1123F ACP DISCUSS/DSCN MKR DOCD: CPT | Performed by: STUDENT IN AN ORGANIZED HEALTH CARE EDUCATION/TRAINING PROGRAM

## 2022-10-05 PROCEDURE — 99214 OFFICE O/P EST MOD 30 MIN: CPT | Performed by: STUDENT IN AN ORGANIZED HEALTH CARE EDUCATION/TRAINING PROGRAM

## 2022-10-05 SDOH — ECONOMIC STABILITY: FOOD INSECURITY: WITHIN THE PAST 12 MONTHS, THE FOOD YOU BOUGHT JUST DIDN'T LAST AND YOU DIDN'T HAVE MONEY TO GET MORE.: NEVER TRUE

## 2022-10-05 SDOH — ECONOMIC STABILITY: FOOD INSECURITY: WITHIN THE PAST 12 MONTHS, YOU WORRIED THAT YOUR FOOD WOULD RUN OUT BEFORE YOU GOT MONEY TO BUY MORE.: NEVER TRUE

## 2022-10-05 ASSESSMENT — ENCOUNTER SYMPTOMS
EYE DISCHARGE: 0
DIARRHEA: 0
NAUSEA: 0
VOMITING: 0
SORE THROAT: 0
WHEEZING: 0
SHORTNESS OF BREATH: 0
RHINORRHEA: 0
COUGH: 0
ABDOMINAL PAIN: 0

## 2022-10-05 ASSESSMENT — SOCIAL DETERMINANTS OF HEALTH (SDOH): HOW HARD IS IT FOR YOU TO PAY FOR THE VERY BASICS LIKE FOOD, HOUSING, MEDICAL CARE, AND HEATING?: NOT HARD AT ALL

## 2022-10-05 NOTE — PROGRESS NOTES
Subjective  Josiane Stanton, 75 y.o. male presents today with:  Chief Complaint   Patient presents with    Follow-up    Hypertension     Does not check BP at home. Denies any chest pain, heart palpitations, dizziness or headaches. Insomnia     Is sleeping good at this time. COPD     Feels controlled at this time. Knee Pain     Has been having bilat knee pain for the past 6 mo. States it seems to be getting worse. States the pain is mostly when standing/walking. Denies any swelling. Has not used any heat or ice & has not taken any OTC pain relievers. HPI    Patient with 3-month follow-up appointment for essentially pretension, insomnia, COPD and knee pain. Patient with essential hypertension. Initially hypertensive in the office, however, blood pressure did come down. He is currently on amlodipine 10 mg daily, metoprolol succinate 100 mg daily, Maxide 75-50 mg daily. Tolerating well. He does not check blood pressures at home. He denies any chest pain, shortness of breath or palpitations. Patient also with COPD. He is on albuterol as needed. He is not on any other inhalers. He feels like his breathing is well after quitting smoking about a year ago. Patient also with insomnia. He is on trazodone 50 mg daily. Tolerating well. He is sleeping well. He takes 1 to 2 tablets at night as needed for sleep. Patient also with chronic pain of both knees. He has been having bilateral knee pain for the last 6 months or so and lately it feels like it is getting worse. He states that he mostly has it with standing and walking. No injuries or trauma but he did work in a foundry for a number of years and had significant arthritis noted previously. He has not tried any over-the-counter pain relievers. He is on meloxicam for joint pain. He has not used heat or ice. He has no other concerns at this time.       Review of Systems   Constitutional:  Negative for chills, fatigue, fever and unexpected weight change. HENT:  Negative for congestion, rhinorrhea and sore throat. Eyes:  Negative for discharge. Respiratory:  Negative for cough, shortness of breath and wheezing. Cardiovascular:  Negative for chest pain, palpitations and leg swelling. Gastrointestinal:  Negative for abdominal pain, diarrhea, nausea and vomiting. Genitourinary:  Negative for difficulty urinating. Musculoskeletal:  Positive for arthralgias. Negative for joint swelling. Skin:  Negative for rash. Neurological:  Negative for weakness, light-headedness, numbness and headaches. Psychiatric/Behavioral:  Negative for confusion. The patient is not nervous/anxious.       Past Medical History:   Diagnosis Date    Alcohol abuse     Alcohol abuse     Allergic rhinitis     Aortic regurgitation     Cervical radiculopathy at  10/13/2013    Chronic back pain     Chronic back pain     COPD (chronic obstructive pulmonary disease) ([facility])     Erectile dysfunction     GERD (gastroesophageal reflux disease)     Hyperlipidemia     Hypertension     Insomnia     Moderate episode of recurrent major depressive disorder ([facility] [address] 8/18/2021    Osteoarthritis     PUD (peptic ulcer disease)     Restless leg syndrome     Smoker unmotivated to quit     Vitamin D deficiency      Past Surgical History:   Procedure Laterality Date    COLONOSCOPY N/A 8/3/2020    COLONOSCOPY WITH POLYPECTOMY performed by Sierra Sierra MD at [address] [place] [address]  12/2013    Dr. Suki Crowell  2008    Left hip replacement    SPINE SURGERY  2006    fusion L4-L5     Current Outpatient Medications   Medication Sig Dispense Refill    triamterene-hydroCHLOROthiazide (MAXZIDE) 75-50 MG per tablet TAKE 1 TABLET DAILY 90 tablet 1    meloxicam (MOBIC) 15 MG tablet TAKE 1 TABLET DAILY 90 tablet 1    traZODone (DESYREL) 50 MG tablet TAKE 2 TABLETS NIGHTLY 844 tablet 1    folic acid (FOLVITE) 1 MG tablet take 1 tablet by mouth once daily 90 tablet 1 atorvastatin (LIPITOR) 40 MG tablet Take 1 tablet by mouth daily 90 tablet 3    metoprolol succinate (TOPROL XL) 100 MG extended release tablet Take 1 tablet by mouth daily 90 tablet 1    amLODIPine (NORVASC) 10 MG tablet Take 1 tablet by mouth daily 90 tablet 1    pantoprazole (PROTONIX) 40 MG tablet TAKE 1 TABLET BY MOUTH  DAILY 90 tablet 3    albuterol sulfate HFA (PROVENTIL HFA) 108 (90 Base) MCG/ACT inhaler Inhale 2 puffs into the lungs every 6 hours as needed for Wheezing 1 Inhaler 3    albuterol (PROVENTIL) (2.5 MG/3ML) 0.083% nebulizer solution Take 3 mLs by nebulization every 2 hours as needed for Wheezing 120 each 3     No current facility-administered medications for this visit. PMH, Surgical Hx, Family Hx, and Social Hx reviewed and updated. Health Maintenance reviewed. Objective    Vitals:    10/05/22 1456 10/05/22 1517   BP: (!) 149/65 139/60   Pulse: 87    Temp: 97.8 °F (36.6 °C)    SpO2: 95%    Weight: 219 lb 3.2 oz (99.4 kg)    Height: 5' 9\" (1.753 m)        Physical Exam  Vitals reviewed. Constitutional:       General: He is not in acute distress. HENT:      Head: Normocephalic and atraumatic. Eyes:      Conjunctiva/sclera: Conjunctivae normal.   Neck:      Thyroid: No thyromegaly or thyroid tenderness. Cardiovascular:      Rate and Rhythm: Normal rate and regular rhythm. Heart sounds: No murmur heard. No friction rub. No gallop. Pulmonary:      Effort: Pulmonary effort is normal.      Breath sounds: Normal breath sounds. No wheezing, rhonchi or rales. Musculoskeletal:         General: No swelling or deformity. Cervical back: Normal range of motion and neck supple. Right lower leg: No edema. Left lower leg: No edema. Comments: Decreased range of motion with flexion extension of bilateral knees. No swelling noted   Lymphadenopathy:      Cervical: No cervical adenopathy. Skin:     General: Skin is warm and dry. Findings: No rash. Neurological:      General: No focal deficit present. Mental Status: He is alert. Gait: Gait is intact. Psychiatric:         Mood and Affect: Mood normal.         Behavior: Behavior normal.          Assessment & Plan       1. Benign essential hypertension  Stable, chronic. Continue amlodipine 10 mg, metoprolol succinate 100 mg, Maxide 75-50 mg. No refills needed at this time. Continue to monitor. Follow-up in 6 months. 2. Obstructive chronic bronchitis without exacerbation (HCC)  Stable, chronic. Continue to use albuterol as needed. He did quit smoking which is helped his breathing considerably. Continue to monitor. Follow-up as scheduled. 3. Primary insomnia  Stable, chronic. Continue trazodone 50 to 100 mg nightly as needed for sleep. 4. Chronic pain of both knees  Patient with chronic pain of both knees. Will obtain imaging as he has not had imaging in a while. Call with results when return. If significant arthritis we will plan for joint injection. Patient voiced understanding. Call with results when return  - XR KNEE BILATERAL STANDING; Future  - XR KNEE RIGHT (MIN 4 VIEWS); Future  - XR KNEE LEFT (MIN 4 VIEWS); Future    5. Need for influenza vaccination  Shot given  - Influenza, FLUAD, (age 72 y+), IM, Preservative Free, 0.5 mL  Orders Placed This Encounter   Procedures    XR KNEE BILATERAL STANDING     Standing Status:   Future     Standing Expiration Date:   10/5/2023    XR KNEE RIGHT (MIN 4 VIEWS)     Standing Status:   Future     Number of Occurrences:   1     Standing Expiration Date:   10/5/2023    XR KNEE LEFT (MIN 4 VIEWS)     Standing Status:   Future     Number of Occurrences:   1     Standing Expiration Date:   10/5/2023    Influenza, FLUAD, (age 72 y+), IM, Preservative Free, 0.5 mL     No orders of the defined types were placed in this encounter. There are no discontinued medications. Return in about 6 months (around 4/5/2023) for follow up.       Reviewed with the patient: current clinical status,medications, activities and diet. Side effects, adverse effects of themedication prescribed today, as well as treatment plan/ rationale and result expectations have been discussed with the patient who expresses understanding and desires to proceed. Close follow up to evaluate treatment results and for coordination of care. I have reviewed the patient's medical history in detail and updated the computerized patient record. Please note, this report has been partially produced using speech recognition software and may cause  and /or contain errors related to that system including grammar, punctuation and spelling as well as words and phrases that may seem inappropriate. If there are questions or concerns please feel free to contact me to clarify.     Krysta Pearson, DO

## 2022-10-05 NOTE — PROGRESS NOTES
Vaccine Information Sheet, \"Influenza - Inactivated\"  given to Laurie Brooke, or parent/legal guardian of  Laurie Brooke and verbalized understanding. Patient responses:    Have you ever had a reaction to a flu vaccine? No  Are you able to eat eggs without adverse effects? Yes  Do you have any current illness? No  Have you ever had [place] Syndrome? No    Flu vaccine given per order. Please see immunization tab.

## 2022-11-02 ENCOUNTER — TELEPHONE (OUTPATIENT)
Dept: FAMILY MEDICINE CLINIC | Age: 67
End: 2022-11-02

## 2022-11-08 ENCOUNTER — OFFICE VISIT (OUTPATIENT)
Dept: FAMILY MEDICINE CLINIC | Age: 67
End: 2022-11-08
Payer: MEDICARE

## 2022-11-08 VITALS
TEMPERATURE: 97.5 F | BODY MASS INDEX: 31.4 KG/M2 | SYSTOLIC BLOOD PRESSURE: 130 MMHG | OXYGEN SATURATION: 97 % | DIASTOLIC BLOOD PRESSURE: 70 MMHG | HEIGHT: 69 IN | WEIGHT: 212 LBS | HEART RATE: 75 BPM

## 2022-11-08 DIAGNOSIS — M25.562 CHRONIC PAIN OF BOTH KNEES: ICD-10-CM

## 2022-11-08 DIAGNOSIS — G89.29 CHRONIC PAIN OF BOTH KNEES: ICD-10-CM

## 2022-11-08 DIAGNOSIS — M25.561 CHRONIC PAIN OF BOTH KNEES: ICD-10-CM

## 2022-11-08 DIAGNOSIS — M17.0 BILATERAL PRIMARY OSTEOARTHRITIS OF KNEE: Primary | ICD-10-CM

## 2022-11-08 PROCEDURE — 20610 DRAIN/INJ JOINT/BURSA W/O US: CPT | Performed by: STUDENT IN AN ORGANIZED HEALTH CARE EDUCATION/TRAINING PROGRAM

## 2022-11-08 RX ORDER — TRIAMCINOLONE ACETONIDE 40 MG/ML
40 INJECTION, SUSPENSION INTRA-ARTICULAR; INTRAMUSCULAR ONCE
Status: COMPLETED | OUTPATIENT
Start: 2022-11-08 | End: 2022-11-08

## 2022-11-08 RX ADMIN — TRIAMCINOLONE ACETONIDE 40 MG: 40 INJECTION, SUSPENSION INTRA-ARTICULAR; INTRAMUSCULAR at 15:15

## 2022-11-08 RX ADMIN — TRIAMCINOLONE ACETONIDE 40 MG: 40 INJECTION, SUSPENSION INTRA-ARTICULAR; INTRAMUSCULAR at 15:14

## 2022-11-17 ENCOUNTER — IMMUNIZATION (OUTPATIENT)
Dept: FAMILY MEDICINE CLINIC | Age: 67
End: 2022-11-17
Payer: MEDICARE

## 2022-11-17 PROCEDURE — 91313 COVID-19, MODERNA BIVALENT BOOSTER, (AGE 12Y+), IM, 50 MCG/0.5 ML: CPT | Performed by: FAMILY MEDICINE

## 2022-11-17 PROCEDURE — 0134A COVID-19, MODERNA BIVALENT BOOSTER, (AGE 12Y+), IM, 50 MCG/0.5 ML: CPT | Performed by: FAMILY MEDICINE

## 2022-12-20 DIAGNOSIS — F51.01 PRIMARY INSOMNIA: ICD-10-CM

## 2022-12-21 RX ORDER — TRAZODONE HYDROCHLORIDE 50 MG/1
TABLET ORAL
Qty: 180 TABLET | Refills: 1 | Status: SHIPPED | OUTPATIENT
Start: 2022-12-21

## 2023-01-31 DIAGNOSIS — R25.1 TREMORS OF NERVOUS SYSTEM: ICD-10-CM

## 2023-01-31 DIAGNOSIS — I10 ESSENTIAL HYPERTENSION, BENIGN: ICD-10-CM

## 2023-01-31 RX ORDER — AMLODIPINE BESYLATE 10 MG/1
10 TABLET ORAL DAILY
Qty: 90 TABLET | Refills: 0 | Status: SHIPPED | OUTPATIENT
Start: 2023-01-31

## 2023-01-31 RX ORDER — METOPROLOL SUCCINATE 100 MG/1
100 TABLET, EXTENDED RELEASE ORAL DAILY
Qty: 90 TABLET | Refills: 0 | Status: SHIPPED | OUTPATIENT
Start: 2023-01-31

## 2023-02-28 DIAGNOSIS — F51.01 PRIMARY INSOMNIA: ICD-10-CM

## 2023-02-28 RX ORDER — TRAZODONE HYDROCHLORIDE 50 MG/1
TABLET ORAL
Qty: 180 TABLET | Refills: 0 | Status: SHIPPED | OUTPATIENT
Start: 2023-02-28 | End: 2023-03-03 | Stop reason: SDUPTHER

## 2023-03-03 DIAGNOSIS — F51.01 PRIMARY INSOMNIA: ICD-10-CM

## 2023-03-03 RX ORDER — TRAZODONE HYDROCHLORIDE 50 MG/1
TABLET ORAL
Qty: 180 TABLET | Refills: 0 | Status: SHIPPED | OUTPATIENT
Start: 2023-03-03

## 2023-04-05 ENCOUNTER — OFFICE VISIT (OUTPATIENT)
Dept: FAMILY MEDICINE CLINIC | Age: 68
End: 2023-04-05
Payer: COMMERCIAL

## 2023-04-05 VITALS
SYSTOLIC BLOOD PRESSURE: 138 MMHG | HEART RATE: 83 BPM | HEIGHT: 69 IN | BODY MASS INDEX: 31.4 KG/M2 | TEMPERATURE: 98 F | DIASTOLIC BLOOD PRESSURE: 60 MMHG | RESPIRATION RATE: 14 BRPM | OXYGEN SATURATION: 94 % | WEIGHT: 212 LBS

## 2023-04-05 DIAGNOSIS — F33.2 SEVERE EPISODE OF RECURRENT MAJOR DEPRESSIVE DISORDER, WITHOUT PSYCHOTIC FEATURES (HCC): ICD-10-CM

## 2023-04-05 DIAGNOSIS — M25.552 CHRONIC LEFT HIP PAIN: Primary | ICD-10-CM

## 2023-04-05 DIAGNOSIS — G89.29 CHRONIC LEFT HIP PAIN: Primary | ICD-10-CM

## 2023-04-05 DIAGNOSIS — F10.24 ALCOHOL DEPENDENCE WITH ALCOHOL-INDUCED MOOD DISORDER (HCC): ICD-10-CM

## 2023-04-05 DIAGNOSIS — Z13.31 POSITIVE DEPRESSION SCREENING: ICD-10-CM

## 2023-04-05 PROCEDURE — 1123F ACP DISCUSS/DSCN MKR DOCD: CPT | Performed by: NURSE PRACTITIONER

## 2023-04-05 PROCEDURE — 99214 OFFICE O/P EST MOD 30 MIN: CPT | Performed by: NURSE PRACTITIONER

## 2023-04-05 PROCEDURE — 3078F DIAST BP <80 MM HG: CPT | Performed by: NURSE PRACTITIONER

## 2023-04-05 PROCEDURE — 3074F SYST BP LT 130 MM HG: CPT | Performed by: NURSE PRACTITIONER

## 2023-04-05 RX ORDER — GABAPENTIN 100 MG/1
100 CAPSULE ORAL 2 TIMES DAILY
Qty: 60 CAPSULE | Refills: 5 | Status: SHIPPED | OUTPATIENT
Start: 2023-04-05 | End: 2023-10-02

## 2023-04-05 RX ORDER — SERTRALINE HYDROCHLORIDE 25 MG/1
25 TABLET, FILM COATED ORAL DAILY
Qty: 30 TABLET | Refills: 3 | Status: SHIPPED | OUTPATIENT
Start: 2023-04-05 | End: 2023-04-06

## 2023-04-05 SDOH — ECONOMIC STABILITY: FOOD INSECURITY: WITHIN THE PAST 12 MONTHS, YOU WORRIED THAT YOUR FOOD WOULD RUN OUT BEFORE YOU GOT MONEY TO BUY MORE.: NEVER TRUE

## 2023-04-05 SDOH — ECONOMIC STABILITY: INCOME INSECURITY: HOW HARD IS IT FOR YOU TO PAY FOR THE VERY BASICS LIKE FOOD, HOUSING, MEDICAL CARE, AND HEATING?: NOT HARD AT ALL

## 2023-04-05 SDOH — ECONOMIC STABILITY: FOOD INSECURITY: WITHIN THE PAST 12 MONTHS, THE FOOD YOU BOUGHT JUST DIDN'T LAST AND YOU DIDN'T HAVE MONEY TO GET MORE.: NEVER TRUE

## 2023-04-05 SDOH — ECONOMIC STABILITY: HOUSING INSECURITY
IN THE LAST 12 MONTHS, WAS THERE A TIME WHEN YOU DID NOT HAVE A STEADY PLACE TO SLEEP OR SLEPT IN A SHELTER (INCLUDING NOW)?: NO

## 2023-04-05 ASSESSMENT — PATIENT HEALTH QUESTIONNAIRE - PHQ9
10. IF YOU CHECKED OFF ANY PROBLEMS, HOW DIFFICULT HAVE THESE PROBLEMS MADE IT FOR YOU TO DO YOUR WORK, TAKE CARE OF THINGS AT HOME, OR GET ALONG WITH OTHER PEOPLE: 0
8. MOVING OR SPEAKING SO SLOWLY THAT OTHER PEOPLE COULD HAVE NOTICED. OR THE OPPOSITE, BEING SO FIGETY OR RESTLESS THAT YOU HAVE BEEN MOVING AROUND A LOT MORE THAN USUAL: 0
3. TROUBLE FALLING OR STAYING ASLEEP: 3
SUM OF ALL RESPONSES TO PHQ QUESTIONS 1-9: 0
2. FEELING DOWN, DEPRESSED OR HOPELESS: 3
SUM OF ALL RESPONSES TO PHQ9 QUESTIONS 1 & 2: 0
SUM OF ALL RESPONSES TO PHQ QUESTIONS 1-9: 18
SUM OF ALL RESPONSES TO PHQ QUESTIONS 1-9: 0
8. MOVING OR SPEAKING SO SLOWLY THAT OTHER PEOPLE COULD HAVE NOTICED. OR THE OPPOSITE, BEING SO FIGETY OR RESTLESS THAT YOU HAVE BEEN MOVING AROUND A LOT MORE THAN USUAL: 0
5. POOR APPETITE OR OVEREATING: 0
SUM OF ALL RESPONSES TO PHQ9 QUESTIONS 1 & 2: 6
SUM OF ALL RESPONSES TO PHQ QUESTIONS 1-9: 18
SUM OF ALL RESPONSES TO PHQ QUESTIONS 1-9: 0
7. TROUBLE CONCENTRATING ON THINGS, SUCH AS READING THE NEWSPAPER OR WATCHING TELEVISION: 0
7. TROUBLE CONCENTRATING ON THINGS, SUCH AS READING THE NEWSPAPER OR WATCHING TELEVISION: 0
1. LITTLE INTEREST OR PLEASURE IN DOING THINGS: 0
1. LITTLE INTEREST OR PLEASURE IN DOING THINGS: 3
4. FEELING TIRED OR HAVING LITTLE ENERGY: 0
5. POOR APPETITE OR OVEREATING: 3
SUM OF ALL RESPONSES TO PHQ QUESTIONS 1-9: 18
SUM OF ALL RESPONSES TO PHQ QUESTIONS 1-9: 0
4. FEELING TIRED OR HAVING LITTLE ENERGY: 3
2. FEELING DOWN, DEPRESSED OR HOPELESS: 0
6. FEELING BAD ABOUT YOURSELF - OR THAT YOU ARE A FAILURE OR HAVE LET YOURSELF OR YOUR FAMILY DOWN: 0
3. TROUBLE FALLING OR STAYING ASLEEP: 0
SUM OF ALL RESPONSES TO PHQ QUESTIONS 1-9: 18
10. IF YOU CHECKED OFF ANY PROBLEMS, HOW DIFFICULT HAVE THESE PROBLEMS MADE IT FOR YOU TO DO YOUR WORK, TAKE CARE OF THINGS AT HOME, OR GET ALONG WITH OTHER PEOPLE: 1
6. FEELING BAD ABOUT YOURSELF - OR THAT YOU ARE A FAILURE OR HAVE LET YOURSELF OR YOUR FAMILY DOWN: 3
9. THOUGHTS THAT YOU WOULD BE BETTER OFF DEAD, OR OF HURTING YOURSELF: 0
9. THOUGHTS THAT YOU WOULD BE BETTER OFF DEAD, OR OF HURTING YOURSELF: 0

## 2023-04-05 NOTE — PROGRESS NOTES
Subjective:      Patient ID: Guevara Madden is a 70 y.o. male who presents today for:  Chief Complaint   Patient presents with    Extremity Weakness     Patient presents today with his left leg given out and he has been falling for 2 years. He is a heavy drinker and has been depressed per his wife. HPI SUBJECTIVE:  Guevara Madden is a 70 y.o. male who complains left hip weakness and numbness. Patient reports left total hip replacement 20 year ago. He states he cannot ambulate well. His Left hip/leg gives out on him causing repeat falls. He recently fell causing contusion to forehead over a week ago. Denies LOC. This has been an ongoing problem for two years. Wife thinks it is due to his alcohol abuse. He denies dizziness, lightheadedness, back pain or sciatica pain. OBJECTIVE:  He appears well, vital signs are normal. There is no swelling, contusion or trauma noted to hips or lower back. Tenderness on palpation left trochanter. X-ray:   A left total hip arthroplasty is seen. Alignment is normal.  There is no   periprosthetic fracture or abnormal lucency    The rest of the foot, ankle and leg exam is normal.    ASSESSMENT:  Hip weakness: underlying chronic DJD likely  Major depressive disorder    PLAN:  rest the injured area as much as practical, apply ice packs, X-Ray ordered, referral to Orthopedics for this injury, prescription for NSAID given  See orders for this visit as documented in the electronic medical record. On the basis of positive PHQ-9 screening (PHQ-9 Total Score: 18), the following plan was implemented: additional evaluation and assessment performed, follow-up plan includes but not limited to: Medication management and Referral to /Specialist for evaluation and management, referral to Behavioral health provided, and medication prescribed - patient will call for any significant medication side effects or worsening symptoms of depression.   Patient will follow-up in 1

## 2023-04-06 ENCOUNTER — OFFICE VISIT (OUTPATIENT)
Dept: FAMILY MEDICINE CLINIC | Age: 68
End: 2023-04-06
Payer: COMMERCIAL

## 2023-04-06 VITALS
WEIGHT: 205 LBS | SYSTOLIC BLOOD PRESSURE: 162 MMHG | DIASTOLIC BLOOD PRESSURE: 70 MMHG | HEIGHT: 69 IN | HEART RATE: 88 BPM | BODY MASS INDEX: 30.36 KG/M2 | OXYGEN SATURATION: 94 % | TEMPERATURE: 97.7 F

## 2023-04-06 DIAGNOSIS — Z12.5 SCREENING FOR PROSTATE CANCER: ICD-10-CM

## 2023-04-06 DIAGNOSIS — F33.1 MODERATE EPISODE OF RECURRENT MAJOR DEPRESSIVE DISORDER (HCC): ICD-10-CM

## 2023-04-06 DIAGNOSIS — E78.2 MIXED HYPERLIPIDEMIA: Primary | ICD-10-CM

## 2023-04-06 DIAGNOSIS — I10 BENIGN ESSENTIAL HYPERTENSION: ICD-10-CM

## 2023-04-06 DIAGNOSIS — Z12.11 SCREENING FOR COLON CANCER: ICD-10-CM

## 2023-04-06 PROCEDURE — 3077F SYST BP >= 140 MM HG: CPT | Performed by: NURSE PRACTITIONER

## 2023-04-06 PROCEDURE — 99214 OFFICE O/P EST MOD 30 MIN: CPT | Performed by: NURSE PRACTITIONER

## 2023-04-06 PROCEDURE — 1123F ACP DISCUSS/DSCN MKR DOCD: CPT | Performed by: NURSE PRACTITIONER

## 2023-04-06 PROCEDURE — 3078F DIAST BP <80 MM HG: CPT | Performed by: NURSE PRACTITIONER

## 2023-04-06 RX ORDER — SERTRALINE HYDROCHLORIDE 25 MG/1
50 TABLET, FILM COATED ORAL DAILY
Qty: 30 TABLET | Refills: 3
Start: 2023-04-06

## 2023-04-06 ASSESSMENT — ENCOUNTER SYMPTOMS
CHEST TIGHTNESS: 0
SHORTNESS OF BREATH: 0

## 2023-04-06 NOTE — PROGRESS NOTES
Financial Resource Strain: Low Risk     Difficulty of Paying Living Expenses: Not hard at all   Food Insecurity: No Food Insecurity    Worried About Running Out of Food in the Last Year: Never true    Ran Out of Food in the Last Year: Never true   Transportation Needs: No Transportation Needs    Lack of Transportation (Medical): No    Lack of Transportation (Non-Medical): No   Physical Activity: Inactive    Days of Exercise per Week: 0 days    Minutes of Exercise per Session: 0 min   Stress: Not on file   Social Connections: Not on file   Intimate Partner Violence: Not on file   Housing Stability: Unknown    Unable to Pay for Housing in the Last Year: Not on file    Number of Places Lived in the Last Year: Not on file    Unstable Housing in the Last Year: No     Family History   Problem Relation Age of Onset    Cancer Mother 67        brain    Arthritis Mother     High Blood Pressure Mother       No Known Allergies  Prior to Admission medications    Medication Sig Start Date End Date Taking? Authorizing Provider   sertraline (ZOLOFT) 25 MG tablet Take 2 tablets by mouth daily 4/6/23  Yes NOAH Drew CNP   Handicap Placard MISC by Does not apply route Good for 5 years 4/6/23  Yes NOAH Lange - CNP   gabapentin (NEURONTIN) 100 MG capsule Take 1 capsule by mouth 2 times daily for 180 days.  Intended supply: 90 days 4/5/23 10/2/23 Yes NOAH Spears CNP   traZODone (DESYREL) 50 MG tablet TAKE 2 TABLETS NIGHTLY 3/3/23  Yes Jasmine [place] SUSAN LouisN - CNP   metoprolol succinate (TOPROL XL) 100 MG extended release tablet Take 1 tablet by mouth daily 1/31/23  Yes NOAH Craven CNP   amLODIPine (NORVASC) 10 MG tablet Take 1 tablet by mouth daily 1/31/23  Yes Jasmine [place] Sic APRN - JACKIE   triamterene-hydroCHLOROthiazide (MAXZIDE) 75-50 MG per tablet TAKE 1 TABLET DAILY 9/27/22  Yes Mayra Cooper DO   meloxicam (MOBIC) 15 MG tablet TAKE 1 TABLET DAILY 9/27/22  Yes Alea ALEX

## 2023-04-07 ASSESSMENT — ENCOUNTER SYMPTOMS
EYES NEGATIVE: 1
RESPIRATORY NEGATIVE: 1
GASTROINTESTINAL NEGATIVE: 1
ALLERGIC/IMMUNOLOGIC NEGATIVE: 1

## 2023-04-13 PROBLEM — R74.8 ELEVATED LIVER ENZYMES: Status: ACTIVE | Noted: 2023-04-13

## 2023-04-13 PROBLEM — N18.31 STAGE 3A CHRONIC KIDNEY DISEASE (HCC): Status: ACTIVE | Noted: 2023-04-13

## 2023-04-13 PROBLEM — R97.20 ELEVATED PSA: Status: ACTIVE | Noted: 2023-04-13

## 2023-04-22 ENCOUNTER — HOSPITAL ENCOUNTER (INPATIENT)
Age: 68
LOS: 5 days | Discharge: HOME | DRG: 554 | End: 2023-04-27
Attending: INTERNAL MEDICINE | Admitting: INTERNAL MEDICINE
Payer: COMMERCIAL

## 2023-04-22 ENCOUNTER — APPOINTMENT (OUTPATIENT)
Dept: GENERAL RADIOLOGY | Age: 68
DRG: 554 | End: 2023-04-22
Payer: COMMERCIAL

## 2023-04-22 DIAGNOSIS — M79.671 BILATERAL FOOT PAIN: Primary | ICD-10-CM

## 2023-04-22 DIAGNOSIS — R26.2 UNABLE TO AMBULATE: ICD-10-CM

## 2023-04-22 DIAGNOSIS — M79.672 BILATERAL FOOT PAIN: Primary | ICD-10-CM

## 2023-04-22 LAB
ACANTHOCYTES BLD QL SMEAR: ABNORMAL
ALBUMIN SERPL-MCNC: 4 G/DL (ref 3.5–4.6)
ALP SERPL-CCNC: 82 U/L (ref 35–104)
ALT SERPL-CCNC: 23 U/L (ref 0–41)
ANION GAP SERPL CALCULATED.3IONS-SCNC: 14 MEQ/L (ref 9–15)
ANISOCYTOSIS BLD QL SMEAR: ABNORMAL
AST SERPL-CCNC: 42 U/L (ref 0–40)
BASOPHILS # BLD: 0.2 K/UL (ref 0–0.2)
BASOPHILS NFR BLD: 2 %
BILIRUB SERPL-MCNC: 0.5 MG/DL (ref 0.2–0.7)
BNP BLD-MCNC: 263 PG/ML
BUN SERPL-MCNC: 32 MG/DL (ref 8–23)
CALCIUM SERPL-MCNC: 8.4 MG/DL (ref 8.5–9.9)
CHLORIDE SERPL-SCNC: 96 MEQ/L (ref 95–107)
CO2 SERPL-SCNC: 28 MEQ/L (ref 20–31)
CREAT SERPL-MCNC: 1.94 MG/DL (ref 0.7–1.2)
CRP SERPL HS-MCNC: 65.8 MG/L (ref 0–5)
EOSINOPHIL # BLD: 0.1 K/UL (ref 0–0.7)
EOSINOPHIL NFR BLD: 1 %
ERYTHROCYTE [DISTWIDTH] IN BLOOD BY AUTOMATED COUNT: 24.1 % (ref 11.5–14.5)
ERYTHROCYTE [SEDIMENTATION RATE] IN BLOOD BY WESTERGREN METHOD: 48 MM (ref 0–20)
GLOBULIN SER CALC-MCNC: 3.8 G/DL (ref 2.3–3.5)
GLUCOSE SERPL-MCNC: 115 MG/DL (ref 70–99)
HCT VFR BLD AUTO: 37.6 % (ref 42–52)
HGB BLD-MCNC: 12 G/DL (ref 14–18)
LYMPHOCYTES # BLD: 1.2 K/UL (ref 1–4.8)
LYMPHOCYTES NFR BLD: 11 %
MCH RBC QN AUTO: 28.8 PG (ref 27–31.3)
MCHC RBC AUTO-ENTMCNC: 32 % (ref 33–37)
MCV RBC AUTO: 90 FL (ref 79–92.2)
MONOCYTES # BLD: 1 K/UL (ref 0.2–0.8)
MONOCYTES NFR BLD: 9.5 %
NEUTROPHILS # BLD: 7.6 K/UL (ref 1.4–6.5)
NEUTS SEG NFR BLD: 76 %
PLATELET # BLD AUTO: 462 K/UL (ref 130–400)
PLATELET BLD QL SMEAR: ABNORMAL
POIKILOCYTOSIS BLD QL SMEAR: ABNORMAL
POTASSIUM SERPL-SCNC: 3.8 MEQ/L (ref 3.4–4.9)
PROT SERPL-MCNC: 7.8 G/DL (ref 6.3–8)
RBC # BLD AUTO: 4.18 M/UL (ref 4.7–6.1)
SODIUM SERPL-SCNC: 138 MEQ/L (ref 135–144)
VARIANT LYMPHS NFR BLD: 1 %
WBC # BLD AUTO: 10 K/UL (ref 4.8–10.8)

## 2023-04-22 PROCEDURE — 2580000003 HC RX 258: Performed by: NURSE PRACTITIONER

## 2023-04-22 PROCEDURE — 96374 THER/PROPH/DIAG INJ IV PUSH: CPT

## 2023-04-22 PROCEDURE — 73630 X-RAY EXAM OF FOOT: CPT

## 2023-04-22 PROCEDURE — 85652 RBC SED RATE AUTOMATED: CPT

## 2023-04-22 PROCEDURE — 36415 COLL VENOUS BLD VENIPUNCTURE: CPT

## 2023-04-22 PROCEDURE — 80053 COMPREHEN METABOLIC PANEL: CPT

## 2023-04-22 PROCEDURE — 1210000000 HC MED SURG R&B

## 2023-04-22 PROCEDURE — 86140 C-REACTIVE PROTEIN: CPT

## 2023-04-22 PROCEDURE — 99285 EMERGENCY DEPT VISIT HI MDM: CPT

## 2023-04-22 PROCEDURE — 83880 ASSAY OF NATRIURETIC PEPTIDE: CPT

## 2023-04-22 PROCEDURE — 96375 TX/PRO/DX INJ NEW DRUG ADDON: CPT

## 2023-04-22 PROCEDURE — 6360000002 HC RX W HCPCS: Performed by: STUDENT IN AN ORGANIZED HEALTH CARE EDUCATION/TRAINING PROGRAM

## 2023-04-22 PROCEDURE — 85025 COMPLETE CBC W/AUTO DIFF WBC: CPT

## 2023-04-22 RX ORDER — ACETAMINOPHEN 650 MG/1
650 SUPPOSITORY RECTAL EVERY 6 HOURS PRN
Status: DISCONTINUED | OUTPATIENT
Start: 2023-04-22 | End: 2023-04-27 | Stop reason: HOSPADM

## 2023-04-22 RX ORDER — SODIUM CHLORIDE 9 MG/ML
INJECTION, SOLUTION INTRAVENOUS PRN
Status: DISCONTINUED | OUTPATIENT
Start: 2023-04-22 | End: 2023-04-27 | Stop reason: HOSPADM

## 2023-04-22 RX ORDER — KETOROLAC TROMETHAMINE 15 MG/ML
15 INJECTION, SOLUTION INTRAMUSCULAR; INTRAVENOUS ONCE
Status: COMPLETED | OUTPATIENT
Start: 2023-04-22 | End: 2023-04-22

## 2023-04-22 RX ORDER — 0.9 % SODIUM CHLORIDE 0.9 %
1000 INTRAVENOUS SOLUTION INTRAVENOUS ONCE
Status: COMPLETED | OUTPATIENT
Start: 2023-04-22 | End: 2023-04-23

## 2023-04-22 RX ORDER — TRAMADOL HYDROCHLORIDE 50 MG/1
50 TABLET ORAL EVERY 4 HOURS PRN
Qty: 18 TABLET | Refills: 0 | Status: SHIPPED | OUTPATIENT
Start: 2023-04-22 | End: 2023-04-27 | Stop reason: HOSPADM

## 2023-04-22 RX ORDER — SODIUM CHLORIDE 0.9 % (FLUSH) 0.9 %
5-40 SYRINGE (ML) INJECTION EVERY 12 HOURS SCHEDULED
Status: DISCONTINUED | OUTPATIENT
Start: 2023-04-22 | End: 2023-04-27 | Stop reason: HOSPADM

## 2023-04-22 RX ORDER — METHYLPREDNISOLONE SODIUM SUCCINATE 125 MG/2ML
125 INJECTION, POWDER, LYOPHILIZED, FOR SOLUTION INTRAMUSCULAR; INTRAVENOUS ONCE
Status: COMPLETED | OUTPATIENT
Start: 2023-04-22 | End: 2023-04-22

## 2023-04-22 RX ORDER — ONDANSETRON 4 MG/1
4 TABLET, ORALLY DISINTEGRATING ORAL EVERY 8 HOURS PRN
Status: DISCONTINUED | OUTPATIENT
Start: 2023-04-22 | End: 2023-04-27 | Stop reason: HOSPADM

## 2023-04-22 RX ORDER — ONDANSETRON 2 MG/ML
4 INJECTION INTRAMUSCULAR; INTRAVENOUS EVERY 6 HOURS PRN
Status: DISCONTINUED | OUTPATIENT
Start: 2023-04-22 | End: 2023-04-27 | Stop reason: HOSPADM

## 2023-04-22 RX ORDER — SODIUM CHLORIDE 9 MG/ML
INJECTION, SOLUTION INTRAVENOUS CONTINUOUS
Status: DISPENSED | OUTPATIENT
Start: 2023-04-23 | End: 2023-04-23

## 2023-04-22 RX ORDER — ACETAMINOPHEN 325 MG/1
650 TABLET ORAL EVERY 6 HOURS PRN
Status: DISCONTINUED | OUTPATIENT
Start: 2023-04-22 | End: 2023-04-27 | Stop reason: HOSPADM

## 2023-04-22 RX ORDER — ENOXAPARIN SODIUM 100 MG/ML
40 INJECTION SUBCUTANEOUS DAILY
Status: DISCONTINUED | OUTPATIENT
Start: 2023-04-23 | End: 2023-04-27 | Stop reason: HOSPADM

## 2023-04-22 RX ORDER — SODIUM CHLORIDE 0.9 % (FLUSH) 0.9 %
5-40 SYRINGE (ML) INJECTION PRN
Status: DISCONTINUED | OUTPATIENT
Start: 2023-04-22 | End: 2023-04-27 | Stop reason: HOSPADM

## 2023-04-22 RX ORDER — PREDNISONE 10 MG/1
TABLET ORAL
Qty: 30 TABLET | Refills: 0 | Status: SHIPPED | OUTPATIENT
Start: 2023-04-22 | End: 2023-04-27 | Stop reason: SDUPTHER

## 2023-04-22 RX ORDER — POLYETHYLENE GLYCOL 3350 17 G/17G
17 POWDER, FOR SOLUTION ORAL DAILY PRN
Status: DISCONTINUED | OUTPATIENT
Start: 2023-04-22 | End: 2023-04-27 | Stop reason: HOSPADM

## 2023-04-22 RX ADMIN — SODIUM CHLORIDE 1000 ML: 9 INJECTION, SOLUTION INTRAVENOUS at 23:02

## 2023-04-22 RX ADMIN — METHYLPREDNISOLONE SODIUM SUCCINATE 125 MG: 125 INJECTION, POWDER, FOR SOLUTION INTRAMUSCULAR; INTRAVENOUS at 19:07

## 2023-04-22 RX ADMIN — KETOROLAC TROMETHAMINE 15 MG: 15 INJECTION, SOLUTION INTRAMUSCULAR; INTRAVENOUS at 19:07

## 2023-04-22 ASSESSMENT — ENCOUNTER SYMPTOMS
BACK PAIN: 0
VOMITING: 0
EYE PAIN: 0
ABDOMINAL PAIN: 0
PHOTOPHOBIA: 0
WHEEZING: 0
SORE THROAT: 0
NAUSEA: 0
COUGH: 0
RHINORRHEA: 0
CHEST TIGHTNESS: 0
DIARRHEA: 0
SHORTNESS OF BREATH: 0

## 2023-04-22 ASSESSMENT — PAIN - FUNCTIONAL ASSESSMENT: PAIN_FUNCTIONAL_ASSESSMENT: 0-10

## 2023-04-22 ASSESSMENT — PAIN DESCRIPTION - ORIENTATION
ORIENTATION: RIGHT;LEFT
ORIENTATION: RIGHT;LEFT

## 2023-04-22 ASSESSMENT — PAIN DESCRIPTION - DESCRIPTORS: DESCRIPTORS: ACHING

## 2023-04-22 ASSESSMENT — PAIN DESCRIPTION - LOCATION
LOCATION: FOOT
LOCATION: FOOT

## 2023-04-22 ASSESSMENT — LIFESTYLE VARIABLES
HOW OFTEN DO YOU HAVE A DRINK CONTAINING ALCOHOL: NEVER
HOW MANY STANDARD DRINKS CONTAINING ALCOHOL DO YOU HAVE ON A TYPICAL DAY: PATIENT DOES NOT DRINK

## 2023-04-22 ASSESSMENT — PAIN SCALES - GENERAL
PAINLEVEL_OUTOF10: 9
PAINLEVEL_OUTOF10: 9

## 2023-04-23 LAB
ALBUMIN SERPL-MCNC: 3.4 G/DL (ref 3.5–4.6)
ALP SERPL-CCNC: 67 U/L (ref 35–104)
ALT SERPL-CCNC: 19 U/L (ref 0–41)
ANION GAP SERPL CALCULATED.3IONS-SCNC: 15 MEQ/L (ref 9–15)
AST SERPL-CCNC: 31 U/L (ref 0–40)
BASOPHILS # BLD: 0 K/UL (ref 0–0.2)
BASOPHILS NFR BLD: 0.3 %
BILIRUB SERPL-MCNC: 0.3 MG/DL (ref 0.2–0.7)
BUN SERPL-MCNC: 34 MG/DL (ref 8–23)
CALCIUM SERPL-MCNC: 7.7 MG/DL (ref 8.5–9.9)
CHLORIDE SERPL-SCNC: 100 MEQ/L (ref 95–107)
CO2 SERPL-SCNC: 22 MEQ/L (ref 20–31)
CREAT SERPL-MCNC: 1.84 MG/DL (ref 0.7–1.2)
EOSINOPHIL # BLD: 0 K/UL (ref 0–0.7)
EOSINOPHIL NFR BLD: 0 %
ERYTHROCYTE [DISTWIDTH] IN BLOOD BY AUTOMATED COUNT: 24.1 % (ref 11.5–14.5)
GLOBULIN SER CALC-MCNC: 3.2 G/DL (ref 2.3–3.5)
GLUCOSE SERPL-MCNC: 181 MG/DL (ref 70–99)
HCT VFR BLD AUTO: 34.5 % (ref 42–52)
HGB BLD-MCNC: 11 G/DL (ref 14–18)
LYMPHOCYTES # BLD: 0.7 K/UL (ref 1–4.8)
LYMPHOCYTES NFR BLD: 8.9 %
MAGNESIUM SERPL-MCNC: 0.5 MG/DL (ref 1.7–2.4)
MAGNESIUM SERPL-MCNC: 2.1 MG/DL (ref 1.7–2.4)
MCH RBC QN AUTO: 28.3 PG (ref 27–31.3)
MCHC RBC AUTO-ENTMCNC: 31.8 % (ref 33–37)
MCV RBC AUTO: 88.8 FL (ref 79–92.2)
MONOCYTES # BLD: 0.4 K/UL (ref 0.2–0.8)
MONOCYTES NFR BLD: 4.6 %
NEUTROPHILS # BLD: 6.6 K/UL (ref 1.4–6.5)
NEUTS SEG NFR BLD: 86.2 %
PLATELET # BLD AUTO: 447 K/UL (ref 130–400)
POTASSIUM SERPL-SCNC: 3.7 MEQ/L (ref 3.4–4.9)
PROT SERPL-MCNC: 6.6 G/DL (ref 6.3–8)
RBC # BLD AUTO: 3.89 M/UL (ref 4.7–6.1)
SODIUM SERPL-SCNC: 137 MEQ/L (ref 135–144)
WBC # BLD AUTO: 7.7 K/UL (ref 4.8–10.8)

## 2023-04-23 PROCEDURE — 6360000002 HC RX W HCPCS: Performed by: INTERNAL MEDICINE

## 2023-04-23 PROCEDURE — 36415 COLL VENOUS BLD VENIPUNCTURE: CPT

## 2023-04-23 PROCEDURE — 85025 COMPLETE CBC W/AUTO DIFF WBC: CPT

## 2023-04-23 PROCEDURE — 1210000000 HC MED SURG R&B

## 2023-04-23 PROCEDURE — 6370000000 HC RX 637 (ALT 250 FOR IP): Performed by: NURSE PRACTITIONER

## 2023-04-23 PROCEDURE — 83735 ASSAY OF MAGNESIUM: CPT

## 2023-04-23 PROCEDURE — 6360000002 HC RX W HCPCS: Performed by: NURSE PRACTITIONER

## 2023-04-23 PROCEDURE — 82746 ASSAY OF FOLIC ACID SERUM: CPT

## 2023-04-23 PROCEDURE — 97162 PT EVAL MOD COMPLEX 30 MIN: CPT

## 2023-04-23 PROCEDURE — 2580000003 HC RX 258: Performed by: INTERNAL MEDICINE

## 2023-04-23 PROCEDURE — 97166 OT EVAL MOD COMPLEX 45 MIN: CPT

## 2023-04-23 PROCEDURE — 82607 VITAMIN B-12: CPT

## 2023-04-23 PROCEDURE — 80053 COMPREHEN METABOLIC PANEL: CPT

## 2023-04-23 PROCEDURE — 2580000003 HC RX 258: Performed by: NURSE PRACTITIONER

## 2023-04-23 RX ORDER — AMLODIPINE BESYLATE 10 MG/1
10 TABLET ORAL DAILY
Status: DISCONTINUED | OUTPATIENT
Start: 2023-04-23 | End: 2023-04-27 | Stop reason: HOSPADM

## 2023-04-23 RX ORDER — METOPROLOL SUCCINATE 100 MG/1
100 TABLET, EXTENDED RELEASE ORAL DAILY
Status: DISCONTINUED | OUTPATIENT
Start: 2023-04-23 | End: 2023-04-27 | Stop reason: HOSPADM

## 2023-04-23 RX ORDER — TRIAMTERENE AND HYDROCHLOROTHIAZIDE 75; 50 MG/1; MG/1
1 TABLET ORAL DAILY
Status: DISCONTINUED | OUTPATIENT
Start: 2023-04-23 | End: 2023-04-27

## 2023-04-23 RX ORDER — FOLIC ACID 1 MG/1
1000 TABLET ORAL DAILY
Status: DISCONTINUED | OUTPATIENT
Start: 2023-04-23 | End: 2023-04-27 | Stop reason: HOSPADM

## 2023-04-23 RX ORDER — GABAPENTIN 100 MG/1
100 CAPSULE ORAL 2 TIMES DAILY
Status: DISCONTINUED | OUTPATIENT
Start: 2023-04-23 | End: 2023-04-27 | Stop reason: HOSPADM

## 2023-04-23 RX ORDER — TRAZODONE HYDROCHLORIDE 50 MG/1
100 TABLET ORAL NIGHTLY
Status: DISCONTINUED | OUTPATIENT
Start: 2023-04-23 | End: 2023-04-27 | Stop reason: HOSPADM

## 2023-04-23 RX ADMIN — FOLIC ACID 1000 MCG: 1 TABLET ORAL at 09:02

## 2023-04-23 RX ADMIN — ENOXAPARIN SODIUM 40 MG: 100 INJECTION SUBCUTANEOUS at 09:04

## 2023-04-23 RX ADMIN — Medication 10 ML: at 21:06

## 2023-04-23 RX ADMIN — GABAPENTIN 100 MG: 100 CAPSULE ORAL at 21:05

## 2023-04-23 RX ADMIN — TRAZODONE HYDROCHLORIDE 100 MG: 100 TABLET ORAL at 00:30

## 2023-04-23 RX ADMIN — MAGNESIUM SULFATE 6000 MG: 500 INJECTION, SOLUTION INTRAMUSCULAR; INTRAVENOUS at 09:30

## 2023-04-23 RX ADMIN — GABAPENTIN 100 MG: 100 CAPSULE ORAL at 09:03

## 2023-04-23 RX ADMIN — GABAPENTIN 100 MG: 100 CAPSULE ORAL at 00:30

## 2023-04-23 RX ADMIN — SERTRALINE HYDROCHLORIDE 50 MG: 50 TABLET ORAL at 09:04

## 2023-04-23 RX ADMIN — ACETAMINOPHEN 650 MG: 325 TABLET ORAL at 09:02

## 2023-04-23 RX ADMIN — TRAZODONE HYDROCHLORIDE 100 MG: 100 TABLET ORAL at 21:08

## 2023-04-23 RX ADMIN — SODIUM CHLORIDE: 9 INJECTION, SOLUTION INTRAVENOUS at 01:09

## 2023-04-23 RX ADMIN — METOPROLOL SUCCINATE 100 MG: 100 TABLET, EXTENDED RELEASE ORAL at 09:03

## 2023-04-23 RX ADMIN — AMLODIPINE BESYLATE 10 MG: 10 TABLET ORAL at 09:03

## 2023-04-23 ASSESSMENT — PAIN DESCRIPTION - LOCATION: LOCATION: FOOT

## 2023-04-23 ASSESSMENT — ENCOUNTER SYMPTOMS
VOMITING: 0
SHORTNESS OF BREATH: 0
NAUSEA: 0
COUGH: 0
DIARRHEA: 0

## 2023-04-23 ASSESSMENT — PAIN DESCRIPTION - ORIENTATION: ORIENTATION: RIGHT;LEFT

## 2023-04-23 ASSESSMENT — PAIN SCALES - GENERAL: PAINLEVEL_OUTOF10: 7

## 2023-04-23 ASSESSMENT — PAIN DESCRIPTION - DESCRIPTORS: DESCRIPTORS: ACHING;DISCOMFORT

## 2023-04-24 ENCOUNTER — APPOINTMENT (OUTPATIENT)
Dept: ULTRASOUND IMAGING | Age: 68
DRG: 554 | End: 2023-04-24
Payer: COMMERCIAL

## 2023-04-24 LAB
ALBUMIN SERPL-MCNC: 3.4 G/DL (ref 3.5–4.6)
ALP SERPL-CCNC: 68 U/L (ref 35–104)
ALT SERPL-CCNC: 19 U/L (ref 0–41)
ANION GAP SERPL CALCULATED.3IONS-SCNC: 15 MEQ/L (ref 9–15)
AST SERPL-CCNC: 31 U/L (ref 0–40)
BASOPHILS # BLD: 0.1 K/UL (ref 0–0.2)
BASOPHILS NFR BLD: 0.9 %
BILIRUB SERPL-MCNC: 0.3 MG/DL (ref 0.2–0.7)
BUN SERPL-MCNC: 29 MG/DL (ref 8–23)
CALCIUM SERPL-MCNC: 8.1 MG/DL (ref 8.5–9.9)
CHLORIDE SERPL-SCNC: 99 MEQ/L (ref 95–107)
CO2 SERPL-SCNC: 23 MEQ/L (ref 20–31)
CREAT SERPL-MCNC: 1.35 MG/DL (ref 0.7–1.2)
EOSINOPHIL # BLD: 0 K/UL (ref 0–0.7)
EOSINOPHIL NFR BLD: 0 %
ERYTHROCYTE [DISTWIDTH] IN BLOOD BY AUTOMATED COUNT: 23.9 % (ref 11.5–14.5)
FOLATE: 14.9 NG/ML
GLOBULIN SER CALC-MCNC: 3 G/DL (ref 2.3–3.5)
GLUCOSE SERPL-MCNC: 101 MG/DL (ref 70–99)
HCT VFR BLD AUTO: 33.1 % (ref 42–52)
HGB BLD-MCNC: 10.8 G/DL (ref 14–18)
LYMPHOCYTES # BLD: 1.6 K/UL (ref 1–4.8)
LYMPHOCYTES NFR BLD: 14.5 %
MAGNESIUM SERPL-MCNC: 1.8 MG/DL (ref 1.7–2.4)
MCH RBC QN AUTO: 29 PG (ref 27–31.3)
MCHC RBC AUTO-ENTMCNC: 32.6 % (ref 33–37)
MCV RBC AUTO: 89 FL (ref 79–92.2)
MONOCYTES # BLD: 1.1 K/UL (ref 0.2–0.8)
MONOCYTES NFR BLD: 9.7 %
NEUTROPHILS # BLD: 8.2 K/UL (ref 1.4–6.5)
NEUTS SEG NFR BLD: 74.9 %
PLATELET # BLD AUTO: 463 K/UL (ref 130–400)
POTASSIUM SERPL-SCNC: 3.4 MEQ/L (ref 3.4–4.9)
PROT SERPL-MCNC: 6.4 G/DL (ref 6.3–8)
RBC # BLD AUTO: 3.72 M/UL (ref 4.7–6.1)
SODIUM SERPL-SCNC: 137 MEQ/L (ref 135–144)
VITAMIN B-12: 379 PG/ML (ref 232–1245)
WBC # BLD AUTO: 11 K/UL (ref 4.8–10.8)

## 2023-04-24 PROCEDURE — 6360000002 HC RX W HCPCS: Performed by: NURSE PRACTITIONER

## 2023-04-24 PROCEDURE — 36415 COLL VENOUS BLD VENIPUNCTURE: CPT

## 2023-04-24 PROCEDURE — 6370000000 HC RX 637 (ALT 250 FOR IP): Performed by: INTERNAL MEDICINE

## 2023-04-24 PROCEDURE — 85025 COMPLETE CBC W/AUTO DIFF WBC: CPT

## 2023-04-24 PROCEDURE — 6370000000 HC RX 637 (ALT 250 FOR IP): Performed by: NURSE PRACTITIONER

## 2023-04-24 PROCEDURE — 83735 ASSAY OF MAGNESIUM: CPT

## 2023-04-24 PROCEDURE — 1210000000 HC MED SURG R&B

## 2023-04-24 PROCEDURE — 97535 SELF CARE MNGMENT TRAINING: CPT

## 2023-04-24 PROCEDURE — 94664 DEMO&/EVAL PT USE INHALER: CPT

## 2023-04-24 PROCEDURE — 2580000003 HC RX 258: Performed by: NURSE PRACTITIONER

## 2023-04-24 PROCEDURE — 80053 COMPREHEN METABOLIC PANEL: CPT

## 2023-04-24 PROCEDURE — 93925 LOWER EXTREMITY STUDY: CPT

## 2023-04-24 RX ORDER — ALBUTEROL SULFATE 2.5 MG/3ML
2.5 SOLUTION RESPIRATORY (INHALATION) EVERY 6 HOURS PRN
Status: DISCONTINUED | OUTPATIENT
Start: 2023-04-24 | End: 2023-04-27 | Stop reason: HOSPADM

## 2023-04-24 RX ORDER — PANTOPRAZOLE SODIUM 40 MG/1
40 TABLET, DELAYED RELEASE ORAL
Status: DISCONTINUED | OUTPATIENT
Start: 2023-04-25 | End: 2023-04-27 | Stop reason: HOSPADM

## 2023-04-24 RX ORDER — OXYCODONE HYDROCHLORIDE AND ACETAMINOPHEN 5; 325 MG/1; MG/1
1 TABLET ORAL EVERY 4 HOURS PRN
Status: DISCONTINUED | OUTPATIENT
Start: 2023-04-24 | End: 2023-04-27 | Stop reason: HOSPADM

## 2023-04-24 RX ORDER — TRAMADOL HYDROCHLORIDE 50 MG/1
50 TABLET ORAL EVERY 6 HOURS PRN
Status: DISCONTINUED | OUTPATIENT
Start: 2023-04-24 | End: 2023-04-24

## 2023-04-24 RX ORDER — ASPIRIN 81 MG/1
81 TABLET ORAL DAILY
Status: DISCONTINUED | OUTPATIENT
Start: 2023-04-24 | End: 2023-04-27 | Stop reason: HOSPADM

## 2023-04-24 RX ORDER — ATORVASTATIN CALCIUM 40 MG/1
40 TABLET, FILM COATED ORAL DAILY
Status: DISCONTINUED | OUTPATIENT
Start: 2023-04-24 | End: 2023-04-27 | Stop reason: HOSPADM

## 2023-04-24 RX ADMIN — AMLODIPINE BESYLATE 10 MG: 10 TABLET ORAL at 09:25

## 2023-04-24 RX ADMIN — Medication 10 ML: at 20:17

## 2023-04-24 RX ADMIN — ENOXAPARIN SODIUM 40 MG: 100 INJECTION SUBCUTANEOUS at 09:25

## 2023-04-24 RX ADMIN — TRAMADOL HYDROCHLORIDE 50 MG: 50 TABLET, FILM COATED ORAL at 14:15

## 2023-04-24 RX ADMIN — SERTRALINE HYDROCHLORIDE 50 MG: 50 TABLET ORAL at 09:24

## 2023-04-24 RX ADMIN — GABAPENTIN 100 MG: 100 CAPSULE ORAL at 09:25

## 2023-04-24 RX ADMIN — OXYCODONE AND ACETAMINOPHEN 1 TABLET: 5; 325 TABLET ORAL at 20:15

## 2023-04-24 RX ADMIN — FOLIC ACID 1000 MCG: 1 TABLET ORAL at 09:25

## 2023-04-24 RX ADMIN — GABAPENTIN 100 MG: 100 CAPSULE ORAL at 20:15

## 2023-04-24 RX ADMIN — METOPROLOL SUCCINATE 100 MG: 100 TABLET, EXTENDED RELEASE ORAL at 09:24

## 2023-04-24 RX ADMIN — OXYCODONE AND ACETAMINOPHEN 1 TABLET: 5; 325 TABLET ORAL at 16:22

## 2023-04-24 RX ADMIN — ATORVASTATIN CALCIUM 40 MG: 40 TABLET, FILM COATED ORAL at 14:15

## 2023-04-24 RX ADMIN — ASPIRIN 81 MG: 81 TABLET, COATED ORAL at 14:15

## 2023-04-24 RX ADMIN — Medication 10 ML: at 09:26

## 2023-04-24 RX ADMIN — ACETAMINOPHEN 650 MG: 325 TABLET ORAL at 11:58

## 2023-04-24 ASSESSMENT — PAIN SCALES - GENERAL
PAINLEVEL_OUTOF10: 8
PAINLEVEL_OUTOF10: 7
PAINLEVEL_OUTOF10: 7
PAINLEVEL_OUTOF10: 9
PAINLEVEL_OUTOF10: 10
PAINLEVEL_OUTOF10: 3

## 2023-04-24 ASSESSMENT — PAIN DESCRIPTION - LOCATION: LOCATION: FOOT

## 2023-04-24 NOTE — CARE COORDINATION
Met with patient and reviewed IMM with him. I gave him copy and he verbalizes understanding of his Medicare appeal rights.

## 2023-04-24 NOTE — CARE COORDINATION
Met with patient to discuss d/c planning. The patient has a new consult for vascular sx and may possibly need sx. The patient does not really want to go anywhere for therapy other than home but cm did discuss if the patient cannot walk he may need to go somewhere prior to home. The patient is willing to consider all options but needs to discuss with his wife first. CM will continue to follow the patient and follow the surgical plan and therapy evals.

## 2023-04-25 ENCOUNTER — APPOINTMENT (OUTPATIENT)
Dept: ULTRASOUND IMAGING | Age: 68
DRG: 554 | End: 2023-04-25
Payer: COMMERCIAL

## 2023-04-25 LAB
ALBUMIN SERPL-MCNC: 3.3 G/DL (ref 3.5–4.6)
ALP SERPL-CCNC: 69 U/L (ref 35–104)
ALT SERPL-CCNC: 22 U/L (ref 0–41)
ANION GAP SERPL CALCULATED.3IONS-SCNC: 12 MEQ/L (ref 9–15)
ANISOCYTOSIS BLD QL SMEAR: ABNORMAL
AST SERPL-CCNC: 36 U/L (ref 0–40)
BASOPHILS # BLD: 0.1 K/UL (ref 0–0.2)
BASOPHILS NFR BLD: 1 %
BILIRUB SERPL-MCNC: 0.4 MG/DL (ref 0.2–0.7)
BUN SERPL-MCNC: 23 MG/DL (ref 8–23)
CALCIUM SERPL-MCNC: 8.4 MG/DL (ref 8.5–9.9)
CHLORIDE SERPL-SCNC: 97 MEQ/L (ref 95–107)
CO2 SERPL-SCNC: 24 MEQ/L (ref 20–31)
CREAT SERPL-MCNC: 1.11 MG/DL (ref 0.7–1.2)
EOSINOPHIL # BLD: 0.1 K/UL (ref 0–0.7)
EOSINOPHIL NFR BLD: 1 %
ERYTHROCYTE [DISTWIDTH] IN BLOOD BY AUTOMATED COUNT: 23.5 % (ref 11.5–14.5)
GLOBULIN SER CALC-MCNC: 3.1 G/DL (ref 2.3–3.5)
GLUCOSE BLD-MCNC: 88 MG/DL (ref 70–99)
GLUCOSE SERPL-MCNC: 97 MG/DL (ref 70–99)
HBA1C MFR BLD: 5.9 % (ref 4.8–5.9)
HCT VFR BLD AUTO: 32.6 % (ref 42–52)
HGB BLD-MCNC: 10.5 G/DL (ref 14–18)
HYPOCHROMIA BLD QL SMEAR: ABNORMAL
LYMPHOCYTES # BLD: 1 K/UL (ref 1–4.8)
LYMPHOCYTES NFR BLD: 5 %
MAGNESIUM SERPL-MCNC: 1.3 MG/DL (ref 1.7–2.4)
MCH RBC QN AUTO: 28.3 PG (ref 27–31.3)
MCHC RBC AUTO-ENTMCNC: 32.1 % (ref 33–37)
MCV RBC AUTO: 88.2 FL (ref 79–92.2)
MONOCYTES # BLD: 0.9 K/UL (ref 0.2–0.8)
MONOCYTES NFR BLD: 7.6 %
NEUTROPHILS # BLD: 8.9 K/UL (ref 1.4–6.5)
NEUTS SEG NFR BLD: 82 %
OVALOCYTES BLD QL SMEAR: ABNORMAL
PERFORMED ON: NORMAL
PLATELET # BLD AUTO: 468 K/UL (ref 130–400)
PLATELET BLD QL SMEAR: ABNORMAL
POIKILOCYTOSIS BLD QL SMEAR: ABNORMAL
POTASSIUM SERPL-SCNC: 3.6 MEQ/L (ref 3.4–4.9)
PROT SERPL-MCNC: 6.4 G/DL (ref 6.3–8)
RBC # BLD AUTO: 3.69 M/UL (ref 4.7–6.1)
SODIUM SERPL-SCNC: 133 MEQ/L (ref 135–144)
TARGETS BLD QL SMEAR: ABNORMAL
URATE SERPL-MCNC: 9.1 MG/DL (ref 3.4–7)
VARIANT LYMPHS NFR BLD: 4 %
WBC # BLD AUTO: 10.9 K/UL (ref 4.8–10.8)

## 2023-04-25 PROCEDURE — 82728 ASSAY OF FERRITIN: CPT

## 2023-04-25 PROCEDURE — 80053 COMPREHEN METABOLIC PANEL: CPT

## 2023-04-25 PROCEDURE — 97110 THERAPEUTIC EXERCISES: CPT

## 2023-04-25 PROCEDURE — 93923 UPR/LXTR ART STDY 3+ LVLS: CPT

## 2023-04-25 PROCEDURE — 97535 SELF CARE MNGMENT TRAINING: CPT

## 2023-04-25 PROCEDURE — 83735 ASSAY OF MAGNESIUM: CPT

## 2023-04-25 PROCEDURE — 6370000000 HC RX 637 (ALT 250 FOR IP): Performed by: NURSE PRACTITIONER

## 2023-04-25 PROCEDURE — 6360000002 HC RX W HCPCS: Performed by: INTERNAL MEDICINE

## 2023-04-25 PROCEDURE — 84550 ASSAY OF BLOOD/URIC ACID: CPT

## 2023-04-25 PROCEDURE — 2580000003 HC RX 258: Performed by: NURSE PRACTITIONER

## 2023-04-25 PROCEDURE — 6360000002 HC RX W HCPCS: Performed by: NURSE PRACTITIONER

## 2023-04-25 PROCEDURE — 36415 COLL VENOUS BLD VENIPUNCTURE: CPT

## 2023-04-25 PROCEDURE — 83036 HEMOGLOBIN GLYCOSYLATED A1C: CPT

## 2023-04-25 PROCEDURE — 6370000000 HC RX 637 (ALT 250 FOR IP): Performed by: INTERNAL MEDICINE

## 2023-04-25 PROCEDURE — 1210000000 HC MED SURG R&B

## 2023-04-25 PROCEDURE — 83540 ASSAY OF IRON: CPT

## 2023-04-25 PROCEDURE — 83550 IRON BINDING TEST: CPT

## 2023-04-25 PROCEDURE — 85025 COMPLETE CBC W/AUTO DIFF WBC: CPT

## 2023-04-25 PROCEDURE — 99221 1ST HOSP IP/OBS SF/LOW 40: CPT | Performed by: PAIN MEDICINE

## 2023-04-25 RX ORDER — MAGNESIUM SULFATE IN WATER 40 MG/ML
4000 INJECTION, SOLUTION INTRAVENOUS ONCE
Status: COMPLETED | OUTPATIENT
Start: 2023-04-25 | End: 2023-04-25

## 2023-04-25 RX ADMIN — METOPROLOL SUCCINATE 100 MG: 100 TABLET, EXTENDED RELEASE ORAL at 08:58

## 2023-04-25 RX ADMIN — SERTRALINE HYDROCHLORIDE 50 MG: 50 TABLET ORAL at 08:58

## 2023-04-25 RX ADMIN — OXYCODONE AND ACETAMINOPHEN 1 TABLET: 5; 325 TABLET ORAL at 08:57

## 2023-04-25 RX ADMIN — FOLIC ACID 1000 MCG: 1 TABLET ORAL at 08:58

## 2023-04-25 RX ADMIN — OXYCODONE AND ACETAMINOPHEN 1 TABLET: 5; 325 TABLET ORAL at 00:16

## 2023-04-25 RX ADMIN — OXYCODONE AND ACETAMINOPHEN 1 TABLET: 5; 325 TABLET ORAL at 17:03

## 2023-04-25 RX ADMIN — GABAPENTIN 100 MG: 100 CAPSULE ORAL at 08:58

## 2023-04-25 RX ADMIN — GABAPENTIN 100 MG: 100 CAPSULE ORAL at 21:07

## 2023-04-25 RX ADMIN — PANTOPRAZOLE SODIUM 40 MG: 40 TABLET, DELAYED RELEASE ORAL at 07:16

## 2023-04-25 RX ADMIN — Medication 10 ML: at 21:08

## 2023-04-25 RX ADMIN — ENOXAPARIN SODIUM 40 MG: 100 INJECTION SUBCUTANEOUS at 08:58

## 2023-04-25 RX ADMIN — ATORVASTATIN CALCIUM 40 MG: 40 TABLET, FILM COATED ORAL at 08:58

## 2023-04-25 RX ADMIN — AMLODIPINE BESYLATE 10 MG: 10 TABLET ORAL at 08:58

## 2023-04-25 RX ADMIN — Medication 10 ML: at 08:58

## 2023-04-25 RX ADMIN — OXYCODONE AND ACETAMINOPHEN 1 TABLET: 5; 325 TABLET ORAL at 13:08

## 2023-04-25 RX ADMIN — ASPIRIN 81 MG: 81 TABLET, COATED ORAL at 08:58

## 2023-04-25 RX ADMIN — OXYCODONE AND ACETAMINOPHEN 1 TABLET: 5; 325 TABLET ORAL at 21:08

## 2023-04-25 RX ADMIN — MAGNESIUM SULFATE HEPTAHYDRATE 4000 MG: 40 INJECTION, SOLUTION INTRAVENOUS at 11:30

## 2023-04-25 RX ADMIN — OXYCODONE AND ACETAMINOPHEN 1 TABLET: 5; 325 TABLET ORAL at 04:10

## 2023-04-25 RX ADMIN — TRAZODONE HYDROCHLORIDE 100 MG: 100 TABLET ORAL at 00:16

## 2023-04-25 ASSESSMENT — PAIN DESCRIPTION - LOCATION
LOCATION: LEG;FOOT
LOCATION: FOOT
LOCATION: FOOT

## 2023-04-25 ASSESSMENT — PAIN SCALES - GENERAL
PAINLEVEL_OUTOF10: 7
PAINLEVEL_OUTOF10: 8
PAINLEVEL_OUTOF10: 9

## 2023-04-25 ASSESSMENT — PAIN DESCRIPTION - DESCRIPTORS: DESCRIPTORS: DISCOMFORT;ACHING

## 2023-04-25 ASSESSMENT — PAIN DESCRIPTION - ORIENTATION
ORIENTATION: RIGHT;LEFT
ORIENTATION: RIGHT;LEFT

## 2023-04-25 NOTE — FLOWSHEET NOTE
Pt received as transfer from [facility]. Oriented to room. VSS. No c/o at present. Pt is watching TV.

## 2023-04-25 NOTE — CONSULTS
Vascular Consult      Name: Dick Cardenas  MRN: [id number]       Physician Requesting Consult:  Ramirez Greer    Reason for Consult:   pain bilateral feet    Chief Complaint:      States sudden pain and swelling in both feet    History of Present Illness:      Dick Cardenas is a 70 y.o.  male who presents with states he got out of bed and had severe pain and swelling in his feet. Denies claudication prior to this   History of CKD and osteoarthritis. Past Medical History:     Past Medical History:   Diagnosis Date    Alcohol abuse     Alcohol abuse     Allergic rhinitis     Aortic regurgitation     Cervical radiculopathy at  10/13/2013    Chronic back pain     Chronic back pain     COPD (chronic obstructive pulmonary disease) ([facility])     Erectile dysfunction     GERD (gastroesophageal reflux disease)     Hyperlipidemia     Hypertension     Insomnia     Moderate episode of recurrent major depressive disorder ([facility] [address] 8/18/2021    Osteoarthritis     PUD (peptic ulcer disease)     Restless leg syndrome     Smoker unmotivated to quit     Stage 3a chronic kidney disease ([facility] [address] 4/13/2023    Vitamin D deficiency         Past Surgical History:     Past Surgical History:   Procedure Laterality Date    COLONOSCOPY N/A 8/3/2020    COLONOSCOPY WITH POLYPECTOMY performed by Sarah Gibbs MD at [address] [place] [address]  12/2013    Dr. Tangela Wells  2008    Left hip replacement    SPINE SURGERY  2006    fusion L4-L5        Medications Prior to Admission:       Prior to Admission medications    Medication Sig Start Date End Date Taking? Authorizing Provider   predniSONE (DELTASONE) 10 MG tablet Take 5 tablets by mouth daily for 2 days, THEN 4 tablets daily for 2 days, THEN 3 tablets daily for 2 days, THEN 2 tablets daily for 2 days, THEN 1 tablet daily for 2 days.  4/22/23 5/2/23 Yes Maritza Segura PA-C   traMADol (ULTRAM) 50 MG tablet Take 1 tablet by mouth every 4 hours as needed for Pain for up

## 2023-04-25 NOTE — FLOWSHEET NOTE
Shift assessment complete. Vitals obtained. A/Ox4. Patient denies chest pain, shortness of breath and nausea at this time. Pain and swelling to both feet, denies numbness or tingling at this time. Call light with in reach.

## 2023-04-25 NOTE — CONSULTS
Department of  Chronic Pain Managment  Attending Progress Note      SUBJECTIVE  Bilateral LE Pain    OBJECTIVE: 70 y.o. male with PMH significant for chronic recurrent, osteoarthritis, CKD presenting with bilateral foot pain X-rays with no acute osseous abnormality. Patient admits to history of smoking, and Cronic Alcohol Abuse, u/s shows Mild to moderate PAD. Medications  Current Facility-Administered Medications: pantoprazole (PROTONIX) tablet 40 mg, 40 mg, Oral, QAM AC  atorvastatin (LIPITOR) tablet 40 mg, 40 mg, Oral, Daily  albuterol (PROVENTIL) nebulizer solution 2.5 mg, 2.5 mg, Nebulization, Q6H PRN  aspirin EC tablet 81 mg, 81 mg, Oral, Daily  oxyCODONE-acetaminophen (PERCOCET) 5-325 MG per tablet 1 tablet, 1 tablet, Oral, Q4H PRN  gabapentin (NEURONTIN) capsule 100 mg, 100 mg, Oral, BID  sertraline (ZOLOFT) tablet 50 mg, 50 mg, Oral, Daily  traZODone (DESYREL) tablet 100 mg, 100 mg, Oral, Nightly  metoprolol succinate (TOPROL XL) extended release tablet 100 mg, 100 mg, Oral, Daily  amLODIPine (NORVASC) tablet 10 mg, 10 mg, Oral, Daily  [Held by provider] triamterene-hydroCHLOROthiazide (MAXZIDE) 75-50 MG per tablet 1 tablet, 1 tablet, Oral, Daily  folic acid (FOLVITE) tablet 1,000 mcg, 1,000 mcg, Oral, Daily  sodium chloride flush 0.9 % injection 5-40 mL, 5-40 mL, IntraVENous, 2 times per day  sodium chloride flush 0.9 % injection 5-40 mL, 5-40 mL, IntraVENous, PRN  0.9 % sodium chloride infusion, , IntraVENous, PRN  enoxaparin (LOVENOX) injection 40 mg, 40 mg, SubCUTAneous, Daily  ondansetron (ZOFRAN-ODT) disintegrating tablet 4 mg, 4 mg, Oral, Q8H PRN **OR** ondansetron (ZOFRAN) injection 4 mg, 4 mg, IntraVENous, Q6H PRN  polyethylene glycol (GLYCOLAX) packet 17 g, 17 g, Oral, Daily PRN  acetaminophen (TYLENOL) tablet 650 mg, 650 mg, Oral, Q6H PRN **OR** acetaminophen (TYLENOL) suppository 650 mg, 650 mg, Rectal, Q6H PRN  ROS:  Constitutional:  Negative for chills, fatigue and fever.    HENT:  Negative

## 2023-04-26 ENCOUNTER — TELEPHONE (OUTPATIENT)
Dept: FAMILY MEDICINE CLINIC | Age: 68
End: 2023-04-26

## 2023-04-26 LAB
ALBUMIN SERPL-MCNC: 3.2 G/DL (ref 3.5–4.6)
ALP SERPL-CCNC: 80 U/L (ref 35–104)
ALT SERPL-CCNC: 19 U/L (ref 0–41)
ANION GAP SERPL CALCULATED.3IONS-SCNC: 13 MEQ/L (ref 9–15)
AST SERPL-CCNC: 32 U/L (ref 0–40)
BASOPHILS # BLD: 0.1 K/UL (ref 0–0.2)
BASOPHILS NFR BLD: 0.7 %
BILIRUB SERPL-MCNC: 0.3 MG/DL (ref 0.2–0.7)
BUN SERPL-MCNC: 20 MG/DL (ref 8–23)
CALCIUM SERPL-MCNC: 9.1 MG/DL (ref 8.5–9.9)
CHLORIDE SERPL-SCNC: 94 MEQ/L (ref 95–107)
CO2 SERPL-SCNC: 24 MEQ/L (ref 20–31)
CREAT SERPL-MCNC: 1.01 MG/DL (ref 0.7–1.2)
EOSINOPHIL # BLD: 0.1 K/UL (ref 0–0.7)
EOSINOPHIL NFR BLD: 0.8 %
ERYTHROCYTE [DISTWIDTH] IN BLOOD BY AUTOMATED COUNT: 23.1 % (ref 11.5–14.5)
FERRITIN: 151 NG/ML (ref 30–400)
GLOBULIN SER CALC-MCNC: 3.5 G/DL (ref 2.3–3.5)
GLUCOSE SERPL-MCNC: 106 MG/DL (ref 70–99)
HCT VFR BLD AUTO: 33.1 % (ref 42–52)
HGB BLD-MCNC: 10.6 G/DL (ref 14–18)
IRON SATURATION: 7 % (ref 20–55)
IRON: 17 UG/DL (ref 59–158)
LYMPHOCYTES # BLD: 1.5 K/UL (ref 1–4.8)
LYMPHOCYTES NFR BLD: 14.7 %
MAGNESIUM SERPL-MCNC: 1.8 MG/DL (ref 1.7–2.4)
MCH RBC QN AUTO: 28.4 PG (ref 27–31.3)
MCHC RBC AUTO-ENTMCNC: 32.1 % (ref 33–37)
MCV RBC AUTO: 88.4 FL (ref 79–92.2)
MONOCYTES # BLD: 1.2 K/UL (ref 0.2–0.8)
MONOCYTES NFR BLD: 11.9 %
NEUTROPHILS # BLD: 7.4 K/UL (ref 1.4–6.5)
NEUTS SEG NFR BLD: 71.9 %
PLATELET # BLD AUTO: 477 K/UL (ref 130–400)
POTASSIUM SERPL-SCNC: 3.8 MEQ/L (ref 3.4–4.9)
PROT SERPL-MCNC: 6.7 G/DL (ref 6.3–8)
RBC # BLD AUTO: 3.74 M/UL (ref 4.7–6.1)
SODIUM SERPL-SCNC: 131 MEQ/L (ref 135–144)
TOTAL IRON BINDING CAPACITY: 244 UG/DL (ref 250–450)
UNSATURATED IRON BINDING CAPACITY: 227 UG/DL (ref 112–347)
WBC # BLD AUTO: 10.4 K/UL (ref 4.8–10.8)

## 2023-04-26 PROCEDURE — 97110 THERAPEUTIC EXERCISES: CPT

## 2023-04-26 PROCEDURE — APPSS45 APP SPLIT SHARED TIME 31-45 MINUTES: Performed by: NURSE PRACTITIONER

## 2023-04-26 PROCEDURE — 83735 ASSAY OF MAGNESIUM: CPT

## 2023-04-26 PROCEDURE — 36415 COLL VENOUS BLD VENIPUNCTURE: CPT

## 2023-04-26 PROCEDURE — 2580000003 HC RX 258: Performed by: NURSE PRACTITIONER

## 2023-04-26 PROCEDURE — 6360000002 HC RX W HCPCS: Performed by: NURSE PRACTITIONER

## 2023-04-26 PROCEDURE — 6370000000 HC RX 637 (ALT 250 FOR IP): Performed by: NURSE PRACTITIONER

## 2023-04-26 PROCEDURE — 80053 COMPREHEN METABOLIC PANEL: CPT

## 2023-04-26 PROCEDURE — 85025 COMPLETE CBC W/AUTO DIFF WBC: CPT

## 2023-04-26 PROCEDURE — 6370000000 HC RX 637 (ALT 250 FOR IP): Performed by: INTERNAL MEDICINE

## 2023-04-26 PROCEDURE — 99222 1ST HOSP IP/OBS MODERATE 55: CPT | Performed by: PSYCHIATRY & NEUROLOGY

## 2023-04-26 PROCEDURE — 1210000000 HC MED SURG R&B

## 2023-04-26 RX ORDER — PREDNISONE 20 MG/1
40 TABLET ORAL DAILY
Status: DISCONTINUED | OUTPATIENT
Start: 2023-04-26 | End: 2023-04-27 | Stop reason: HOSPADM

## 2023-04-26 RX ORDER — COLCHICINE 0.6 MG/1
1.2 TABLET ORAL ONCE
Status: COMPLETED | OUTPATIENT
Start: 2023-04-26 | End: 2023-04-26

## 2023-04-26 RX ADMIN — OXYCODONE AND ACETAMINOPHEN 1 TABLET: 5; 325 TABLET ORAL at 13:18

## 2023-04-26 RX ADMIN — PANTOPRAZOLE SODIUM 40 MG: 40 TABLET, DELAYED RELEASE ORAL at 05:17

## 2023-04-26 RX ADMIN — TRAZODONE HYDROCHLORIDE 100 MG: 100 TABLET ORAL at 00:58

## 2023-04-26 RX ADMIN — Medication 10 ML: at 21:20

## 2023-04-26 RX ADMIN — PREDNISONE 40 MG: 20 TABLET ORAL at 11:02

## 2023-04-26 RX ADMIN — OXYCODONE AND ACETAMINOPHEN 1 TABLET: 5; 325 TABLET ORAL at 00:58

## 2023-04-26 RX ADMIN — ATORVASTATIN CALCIUM 40 MG: 40 TABLET, FILM COATED ORAL at 08:15

## 2023-04-26 RX ADMIN — FOLIC ACID 1000 MCG: 1 TABLET ORAL at 08:16

## 2023-04-26 RX ADMIN — METOPROLOL SUCCINATE 100 MG: 100 TABLET, EXTENDED RELEASE ORAL at 08:16

## 2023-04-26 RX ADMIN — GABAPENTIN 100 MG: 100 CAPSULE ORAL at 21:21

## 2023-04-26 RX ADMIN — OXYCODONE AND ACETAMINOPHEN 1 TABLET: 5; 325 TABLET ORAL at 21:21

## 2023-04-26 RX ADMIN — ENOXAPARIN SODIUM 40 MG: 100 INJECTION SUBCUTANEOUS at 08:18

## 2023-04-26 RX ADMIN — GABAPENTIN 100 MG: 100 CAPSULE ORAL at 08:15

## 2023-04-26 RX ADMIN — OXYCODONE AND ACETAMINOPHEN 1 TABLET: 5; 325 TABLET ORAL at 09:19

## 2023-04-26 RX ADMIN — OXYCODONE AND ACETAMINOPHEN 1 TABLET: 5; 325 TABLET ORAL at 05:17

## 2023-04-26 RX ADMIN — COLCHICINE 1.2 MG: 0.6 TABLET ORAL at 11:02

## 2023-04-26 RX ADMIN — ASPIRIN 81 MG: 81 TABLET, COATED ORAL at 08:15

## 2023-04-26 RX ADMIN — SERTRALINE HYDROCHLORIDE 50 MG: 50 TABLET ORAL at 08:16

## 2023-04-26 RX ADMIN — AMLODIPINE BESYLATE 10 MG: 10 TABLET ORAL at 08:16

## 2023-04-26 RX ADMIN — OXYCODONE AND ACETAMINOPHEN 1 TABLET: 5; 325 TABLET ORAL at 17:14

## 2023-04-26 ASSESSMENT — PAIN SCALES - GENERAL
PAINLEVEL_OUTOF10: 6
PAINLEVEL_OUTOF10: 5
PAINLEVEL_OUTOF10: 5
PAINLEVEL_OUTOF10: 6

## 2023-04-26 ASSESSMENT — PAIN DESCRIPTION - LOCATION
LOCATION: FOOT
LOCATION: FOOT
LOCATION: LEG;FOOT
LOCATION: FOOT
LOCATION: FOOT;ANKLE;LEG
LOCATION: FOOT;LEG
LOCATION: LEG

## 2023-04-26 ASSESSMENT — ENCOUNTER SYMPTOMS
CHEST TIGHTNESS: 0
ABDOMINAL DISTENTION: 0
TROUBLE SWALLOWING: 0
NAUSEA: 0
COLOR CHANGE: 0
ABDOMINAL PAIN: 0
VOMITING: 0
SHORTNESS OF BREATH: 0
BACK PAIN: 0
WHEEZING: 0
COUGH: 0

## 2023-04-26 ASSESSMENT — PAIN DESCRIPTION - ORIENTATION
ORIENTATION: RIGHT;LEFT

## 2023-04-26 ASSESSMENT — PAIN DESCRIPTION - DESCRIPTORS
DESCRIPTORS: ACHING
DESCRIPTORS: ACHING
DESCRIPTORS: ACHING;DISCOMFORT
DESCRIPTORS: DISCOMFORT
DESCRIPTORS: ACHING

## 2023-04-26 NOTE — CARE COORDINATION
MET W/ PATIENT AND FAMILY IN ROOM. PT WOULD LIKE TO RETURN HOME AT D/C. FAMILY AVAILABLE TO HELP. PT AGREEABLE TO [address] IF NEEDED. PT DECLINED SNF. CM/LSW TO FOLLOW FOR NEEDS. AWAIT FURTHER PT EVAL.

## 2023-04-26 NOTE — PLAN OF CARE
Problem: Pain  Goal: Verbalizes/displays adequate comfort level or baseline comfort level  4/26/2023 1128 by Ginette Gutierres RN  Outcome: Progressing  4/25/2023 2247 by David Varela RN  Outcome: Progressing     Problem: Safety - Adult  Goal: Free from fall injury  4/26/2023 1128 by Ginette Gutierres RN  Outcome: Progressing  4/25/2023 2247 by David Varela RN  Outcome: Progressing     Problem: Skin/Tissue Integrity  Goal: Absence of new skin breakdown  Description: 1. Monitor for areas of redness and/or skin breakdown  2. Assess vascular access sites hourly  3. Every 4-6 hours minimum:  Change oxygen saturation probe site  4. Every 4-6 hours:  If on nasal continuous positive airway pressure, respiratory therapy assess nares and determine need for appliance change or resting period.   4/26/2023 1128 by Ginette Gutierres RN  Outcome: Progressing  4/25/2023 2247 by David Varela RN  Outcome: Progressing     Problem: Discharge Planning  Goal: Discharge to home or other facility with appropriate resources  4/26/2023 1128 by Ginette Gutierres RN  Outcome: Progressing  4/25/2023 2247 by David Varela RN  Outcome: Progressing

## 2023-04-26 NOTE — PLAN OF CARE
Problem: Pain  Goal: Verbalizes/displays adequate comfort level or baseline comfort level  Outcome: Progressing     Problem: Safety - Adult  Goal: Free from fall injury  Outcome: Progressing     Problem: Skin/Tissue Integrity  Goal: Absence of new skin breakdown  Description: 1. Monitor for areas of redness and/or skin breakdown  2. Assess vascular access sites hourly  3. Every 4-6 hours minimum:  Change oxygen saturation probe site  4. Every 4-6 hours:  If on nasal continuous positive airway pressure, respiratory therapy assess nares and determine need for appliance change or resting period.   Outcome: Progressing     Problem: Discharge Planning  Goal: Discharge to home or other facility with appropriate resources  Outcome: Progressing

## 2023-04-26 NOTE — CONSULTS
[id number] [facility] Neurology Consult Note  Name: Filippo Tan  Age: 70 y.o. Gender: male  CodeStatus: Full Code  Allergies: No Known Allergies    Chief Complaint:Leg Swelling (Bilateral feet )    Primary Care Provider: Ole Knowles DO  InpatientTreatment Team: Treatment Team: Attending Provider: Miller Seo MD; Consulting Physician: Lesa Milton DPM; Utilization Reviewer: Tremayne Howell RN; Consulting Physician: Amina Gonzalez MD; Consulting Physician: Genoveva Ruffin MD; Consulting Physician: Linda Kovacs MD; Patient Care Tech: Heydijoan White; Registered Nurse: Lawanda Chow RN; : Mike Scales RN; : NAKIA Pearson, W  Admission Date: 4/22/2023      HPI   Consulting provider: Dr Miller Seo for bilateral foot/leg pain, rule out neuropathy  Pt seen and examined for neurology consult. Patient is a 69-year-old -American male with past medical history of chronic kidney disease, daily tobacco use, restless leg syndrome, COPD, alcohol abuse, peptic ulcer disease, depression, hypertension, hyperlipidemia, GERD, aortic regurgitation, lumbar spine surgery, tremors who was admitted to [facility] on 4/22/2023 with bilateral lower extremity swelling and pain with negative x-rays. Testing of the lower extremities revealed moderate to severe PAD. Patient on aspirin and Lipitor. He is noted to have claudication with ambulation. Vascular surgery was consulted and saw patient yesterday and ordered PVRs and made mention of possible neuropathy secondary to chronic alcohol use. Patient has had hypomagnesemia intermittently throughout his stay. Initial magnesium level was extremely low at 0.5. Has now improved to 1.8. Initial ZAIRA has resolved. Initial LFTs were elevated with an ALT of 60 and AST of 125 that have now normalized. Uric acid level elevated at 9.1. B12 on the low end of normal at 379. Folate normal at 14.9. Sed rate elevated at 48. CRP 65.8.     Patient

## 2023-04-27 VITALS
BODY MASS INDEX: 29.62 KG/M2 | RESPIRATION RATE: 16 BRPM | TEMPERATURE: 98 F | HEIGHT: 69 IN | DIASTOLIC BLOOD PRESSURE: 62 MMHG | WEIGHT: 200 LBS | HEART RATE: 65 BPM | OXYGEN SATURATION: 97 % | SYSTOLIC BLOOD PRESSURE: 153 MMHG

## 2023-04-27 LAB
ALBUMIN SERPL-MCNC: 3.2 G/DL (ref 3.5–4.6)
ALP SERPL-CCNC: 76 U/L (ref 35–104)
ALT SERPL-CCNC: 19 U/L (ref 0–41)
ANION GAP SERPL CALCULATED.3IONS-SCNC: 13 MEQ/L (ref 9–15)
AST SERPL-CCNC: 32 U/L (ref 0–40)
BASOPHILS # BLD: 0.1 K/UL (ref 0–0.2)
BASOPHILS NFR BLD: 0.6 %
BILIRUB SERPL-MCNC: 0.3 MG/DL (ref 0.2–0.7)
BUN SERPL-MCNC: 17 MG/DL (ref 8–23)
CALCIUM SERPL-MCNC: 9.8 MG/DL (ref 8.5–9.9)
CHLORIDE SERPL-SCNC: 98 MEQ/L (ref 95–107)
CO2 SERPL-SCNC: 25 MEQ/L (ref 20–31)
CREAT SERPL-MCNC: 0.89 MG/DL (ref 0.7–1.2)
CRP SERPL HS-MCNC: 185.6 MG/L (ref 0–5)
EOSINOPHIL # BLD: 0 K/UL (ref 0–0.7)
EOSINOPHIL NFR BLD: 0.1 %
ERYTHROCYTE [DISTWIDTH] IN BLOOD BY AUTOMATED COUNT: 22.8 % (ref 11.5–14.5)
ERYTHROCYTE [SEDIMENTATION RATE] IN BLOOD BY WESTERGREN METHOD: 91 MM (ref 0–20)
GLOBULIN SER CALC-MCNC: 3.4 G/DL (ref 2.3–3.5)
GLUCOSE SERPL-MCNC: 108 MG/DL (ref 70–99)
HCT VFR BLD AUTO: 30.2 % (ref 42–52)
HGB BLD-MCNC: 10 G/DL (ref 14–18)
LYMPHOCYTES # BLD: 1 K/UL (ref 1–4.8)
LYMPHOCYTES NFR BLD: 9.4 %
MAGNESIUM SERPL-MCNC: 1.6 MG/DL (ref 1.7–2.4)
MCH RBC QN AUTO: 29.3 PG (ref 27–31.3)
MCHC RBC AUTO-ENTMCNC: 33 % (ref 33–37)
MCV RBC AUTO: 88.8 FL (ref 79–92.2)
MONOCYTES # BLD: 1.3 K/UL (ref 0.2–0.8)
MONOCYTES NFR BLD: 11.9 %
NEUTROPHILS # BLD: 8.3 K/UL (ref 1.4–6.5)
NEUTS SEG NFR BLD: 78 %
PLATELET # BLD AUTO: 448 K/UL (ref 130–400)
POTASSIUM SERPL-SCNC: 4.3 MEQ/L (ref 3.4–4.9)
PROT SERPL-MCNC: 6.6 G/DL (ref 6.3–8)
RBC # BLD AUTO: 3.4 M/UL (ref 4.7–6.1)
SODIUM SERPL-SCNC: 136 MEQ/L (ref 135–144)
WBC # BLD AUTO: 10.6 K/UL (ref 4.8–10.8)

## 2023-04-27 PROCEDURE — APPSS15 APP SPLIT SHARED TIME 0-15 MINUTES: Performed by: NURSE PRACTITIONER

## 2023-04-27 PROCEDURE — 6370000000 HC RX 637 (ALT 250 FOR IP): Performed by: NURSE PRACTITIONER

## 2023-04-27 PROCEDURE — 97116 GAIT TRAINING THERAPY: CPT

## 2023-04-27 PROCEDURE — 83735 ASSAY OF MAGNESIUM: CPT

## 2023-04-27 PROCEDURE — 99232 SBSQ HOSP IP/OBS MODERATE 35: CPT | Performed by: PSYCHIATRY & NEUROLOGY

## 2023-04-27 PROCEDURE — 86140 C-REACTIVE PROTEIN: CPT

## 2023-04-27 PROCEDURE — 6370000000 HC RX 637 (ALT 250 FOR IP): Performed by: INTERNAL MEDICINE

## 2023-04-27 PROCEDURE — 80053 COMPREHEN METABOLIC PANEL: CPT

## 2023-04-27 PROCEDURE — 6360000002 HC RX W HCPCS: Performed by: NURSE PRACTITIONER

## 2023-04-27 PROCEDURE — 85025 COMPLETE CBC W/AUTO DIFF WBC: CPT

## 2023-04-27 PROCEDURE — 36415 COLL VENOUS BLD VENIPUNCTURE: CPT

## 2023-04-27 PROCEDURE — 2580000003 HC RX 258: Performed by: NURSE PRACTITIONER

## 2023-04-27 PROCEDURE — 85652 RBC SED RATE AUTOMATED: CPT

## 2023-04-27 RX ORDER — OXYCODONE HYDROCHLORIDE AND ACETAMINOPHEN 5; 325 MG/1; MG/1
1 TABLET ORAL EVERY 4 HOURS PRN
Qty: 10 TABLET | Refills: 0 | Status: SHIPPED | OUTPATIENT
Start: 2023-04-27 | End: 2023-04-30

## 2023-04-27 RX ORDER — PREDNISONE 10 MG/1
TABLET ORAL
Qty: 20 TABLET | Refills: 0 | Status: SHIPPED | OUTPATIENT
Start: 2023-04-27 | End: 2023-05-05

## 2023-04-27 RX ORDER — LANOLIN ALCOHOL/MO/W.PET/CERES
400 CREAM (GRAM) TOPICAL DAILY
Status: DISCONTINUED | OUTPATIENT
Start: 2023-04-27 | End: 2023-04-27 | Stop reason: HOSPADM

## 2023-04-27 RX ADMIN — ATORVASTATIN CALCIUM 40 MG: 40 TABLET, FILM COATED ORAL at 08:32

## 2023-04-27 RX ADMIN — OXYCODONE AND ACETAMINOPHEN 1 TABLET: 5; 325 TABLET ORAL at 05:50

## 2023-04-27 RX ADMIN — TRAZODONE HYDROCHLORIDE 100 MG: 100 TABLET ORAL at 01:25

## 2023-04-27 RX ADMIN — SERTRALINE HYDROCHLORIDE 50 MG: 50 TABLET ORAL at 08:32

## 2023-04-27 RX ADMIN — ASPIRIN 81 MG: 81 TABLET, COATED ORAL at 08:31

## 2023-04-27 RX ADMIN — OXYCODONE AND ACETAMINOPHEN 1 TABLET: 5; 325 TABLET ORAL at 11:06

## 2023-04-27 RX ADMIN — AMLODIPINE BESYLATE 10 MG: 10 TABLET ORAL at 08:32

## 2023-04-27 RX ADMIN — ENOXAPARIN SODIUM 40 MG: 100 INJECTION SUBCUTANEOUS at 08:32

## 2023-04-27 RX ADMIN — OXYCODONE AND ACETAMINOPHEN 1 TABLET: 5; 325 TABLET ORAL at 01:25

## 2023-04-27 RX ADMIN — Medication 400 MG: at 11:07

## 2023-04-27 RX ADMIN — METOPROLOL SUCCINATE 100 MG: 100 TABLET, EXTENDED RELEASE ORAL at 08:32

## 2023-04-27 RX ADMIN — PREDNISONE 40 MG: 20 TABLET ORAL at 08:31

## 2023-04-27 RX ADMIN — PANTOPRAZOLE SODIUM 40 MG: 40 TABLET, DELAYED RELEASE ORAL at 05:50

## 2023-04-27 RX ADMIN — Medication 10 ML: at 08:32

## 2023-04-27 RX ADMIN — FOLIC ACID 1000 MCG: 1 TABLET ORAL at 08:31

## 2023-04-27 RX ADMIN — GABAPENTIN 100 MG: 100 CAPSULE ORAL at 08:32

## 2023-04-27 ASSESSMENT — PAIN SCALES - GENERAL
PAINLEVEL_OUTOF10: 4
PAINLEVEL_OUTOF10: 5
PAINLEVEL_OUTOF10: 4
PAINLEVEL_OUTOF10: 5

## 2023-04-27 ASSESSMENT — PAIN DESCRIPTION - LOCATION
LOCATION: FOOT
LOCATION: ANKLE;FOOT

## 2023-04-27 ASSESSMENT — ENCOUNTER SYMPTOMS
VOMITING: 0
COLOR CHANGE: 0
TROUBLE SWALLOWING: 0
CHEST TIGHTNESS: 0
WHEEZING: 0
NAUSEA: 0

## 2023-04-27 ASSESSMENT — PAIN DESCRIPTION - ORIENTATION: ORIENTATION: RIGHT;LEFT

## 2023-04-27 NOTE — DISCHARGE INSTR - DIET

## 2023-04-27 NOTE — CARE COORDINATION
Met with pt at bedside to review Second Important Message from Medicare, pt verbally acknowledged and copy provided to pt.

## 2023-04-27 NOTE — DISCHARGE SUMMARY
return to ER or call 911 if you develop any significant signs or symptoms. I may not have addressed all of your medical illnesses or the abnormal blood work or imaging therefore please ask your PCP Alyssa Quinteros DO and other out patient specialists and providers  to obtain [facility] record entirely to follow up on all of the abnormal labs, imaging and findings that I have and have not addressed during your hospitalization. Discharging you from the hospital does not mean that your medical care ends here and now. You may still need additional work up, investigation, monitoring, and treatment to be handled from this point on by outside providers including your PCP, Alyssa Quinteros DO , Specialists and other healthcare providers. Please review your list of discharge medications prior to resuming medications you might still have at home, as the medications you need to be taking, dosages or how often you must take them may have changed. For medication questions, contact your retail pharmacy and your PCP, Alyssa Quinteros DO .     ** I STRONGLY RECOMMEND that you follow up with Alyssa Quinteros DO within 3 to 5 days for a post hospitalization evaluation. This specific office visit is covered by your insurance, and is not the same as your annual doctor visit/ check up. This office visit is important, as it may prevent need for repeat and/or future hospitalizations. **    Your medical team at [facility] ([facility]) appreciates the opportunity to work with you to get well!     Sincerely,  Keysha Schwab MD

## 2023-04-27 NOTE — CARE COORDINATION
Met with patient, freedom of choice offered. States plan is to return home, denies home care needs. States he feels better, has walker at home.

## 2023-04-27 NOTE — FLOWSHEET NOTE
Am nursing  assessment completed. Pt :  awake and resting in bed             Alert and oriented. Breakfast: completed  RESP:    even and non labored           Lung sounds:          clear                       Oxygen: room air  Complaints of: denies  Pain: 4/10  IV: SL  TELE: NSR  Dressings:   Precautions:              Falls:    45    Fran: 20  Chart and meds reviewed. Noted Labs:  na 136 k 4.3 bun 17 cr0.89 mag 1.6 w 10.6  Plan for today:    Call light in reach. NOTES:  1106 prn percocet per request and complaints of bilat foot discomfort. Electronically signed by Philipp Brown RN on 4/27/2023 at 11:36 AM    1135 assist ambulate to bathroom. Dr Walterine Babinski by for rounds. Electronically signed by Philipp Brown RN on 4/27/2023 at 11:35 AM    1307 Dr Cynthia Mack by for rounds. Pt for discharge today. Electronically signed by Philipp Brown RN on 4/27/2023 at 1:08 PM    1508 Iv and tele removed for discharge. Care plan completed for discharge. Appropriate education addressed in discharge instructions. Instructions completed and reviewed with patient. Verbalize understanding of instructions and follow up. Personal belongings gathered. No complaints. Transport wheelchair called. Family member present. Pt given additional info about gout prevention and dietary triggers.  Electronically signed by Philipp Brown RN on 4/27/2023 at 3:08 PM

## 2023-04-28 ENCOUNTER — TELEPHONE (OUTPATIENT)
Dept: FAMILY MEDICINE CLINIC | Age: 68
End: 2023-04-28

## 2023-04-28 NOTE — PROGRESS NOTES
[facility] EMERGENCY [facility] AT [place] Respiratory Therapy Evaluation   Current Order:  Q6PRN ALBUTEROL      Home Regimen: PRN      Ordering Physician: Isis Mota  Re-evaluation Date:       Diagnosis: PAIN IN FEET      Patient Status: Stable / Unstable + Physician notified    The following MDI Criteria must be met in order to convert aerosol to MDI with spacer. If unable to meet, MDI will be converted to aerosol:  []  Patient able to demonstrate the ability to use MDI effectively  []  Patient alert and cooperative  []  Patient able to take deep breath with 5-10 second hold  []  Medication(s) available in this delivery method   []  Peak flow greater than or equal to 200 ml/min            Current Order Substituted To  (same drug, same frequency)   Aerosol to MDI [] Albuterol Sulfate 0.083% unit dose by aerosol Albuterol Sulfate MDI 2 puffs by inhalation with spacer    [] Levalbuterol 1.25 mg unit dose by aerosol Levalbuterol MDI 2 puffs by inhalation with spacer    [] Levalbuterol 0.63 mg unit dose by aerosol Levalbuterol MDI 2 puffs by inhalation with spacer    [] Ipratropium Bromide 0.02% unit dose by aerosol Ipratropium Bromide MDI 2 puffs by inhalation with spacer    [] Duoneb (Ipratropium + Albuterol) unit dose by aerosol Ipratropium MDI + Albuterol MDI 2 puffs by inhalation w/spacer   MDI to Aerosol [] Albuterol Sulfate MDI Albuterol Sulfate 0.083% unit dose by aerosol    [] Levalbuterol MDI 2 puffs by inhalation Levalbuterol 1.25 mg unit dose by aerosol    [] Ipratropium Bromide MDI by inhalation Ipratropium Bromide 0.02% unit dose by aerosol    [] Combivent (Ipratropium + Albuterol) MDI by inhalation Duoneb (Ipratropium + Albuterol) unit dose by aerosol       Treatment Assessment [Frequency/Schedule]:  Change frequency to: ______________NO CHANGES____________________________________per Protocol, P&T, MEC      Points 0 1 2 3 4   Pulmonary Status  Non-Smoker  []   Smoking history   < 20 pack years  []   Smoking history  ?  20 pack years  []
Hospitalist Progress Note      PCP: Alyssa Quinteros DO    Date of Admission: 4/22/2023    Chief Complaint:  no acute events, afebrile, stable HD    Medications:  Reviewed    Infusion Medications    sodium chloride       Scheduled Medications    predniSONE  40 mg Oral Daily    pantoprazole  40 mg Oral QAM AC    atorvastatin  40 mg Oral Daily    aspirin  81 mg Oral Daily    gabapentin  100 mg Oral BID    sertraline  50 mg Oral Daily    traZODone  100 mg Oral Nightly    metoprolol succinate  100 mg Oral Daily    amLODIPine  10 mg Oral Daily    [Held by provider] triamterene-hydroCHLOROthiazide  1 tablet Oral Daily    folic acid  7,933 mcg Oral Daily    sodium chloride flush  5-40 mL IntraVENous 2 times per day    enoxaparin  40 mg SubCUTAneous Daily     PRN Meds: albuterol, oxyCODONE-acetaminophen, sodium chloride flush, sodium chloride, ondansetron **OR** ondansetron, polyethylene glycol, acetaminophen **OR** acetaminophen      Intake/Output Summary (Last 24 hours) at 4/26/2023 1258  Last data filed at 4/26/2023 6932  Gross per 24 hour   Intake 93.76 ml   Output 1380 ml   Net -1286.24 ml         Exam:    BP (!) 159/67   Pulse 74   Temp 98.4 °F (36.9 °C) (Oral)   Resp 18   Ht 5' 9\" (1.753 m)   Wt 200 lb (90.7 kg)   SpO2 96%   BMI 29.53 kg/m²     General appearance: appears stated age and cooperative. Respiratory:  clear to auscultation, bilaterally  Cardiovascular: Regular rate and rhythm, S1/S2. Abdomen: Soft, active bowel sounds. Musculoskeletal: severe tenderness with erythema of bilateral  MTP joints, R>L, ankle edema bilaterally.       Labs:   Recent Labs     04/24/23  0556 04/25/23  0505 04/26/23  0428   WBC 11.0* 10.9* 10.4   HGB 10.8* 10.5* 10.6*   HCT 33.1* 32.6* 33.1*   * 468* 477*       Recent Labs     04/24/23  0556 04/25/23  0505 04/26/23  0428    133* 131*   K 3.4 3.6 3.8   CL 99 97 94*   CO2 23 24 24   BUN 29* 23 20   CREATININE 1.35* 1.11 1.01   CALCIUM 8.1* 8.4* 9.1       Recent
Hospitalist Progress Note      PCP: Krysta Pearson DO    Date of Admission: 4/22/2023    Chief Complaint:  no acute events, afebrile, stable HD    Medications:  Reviewed    Infusion Medications    sodium chloride       Scheduled Medications    gabapentin  100 mg Oral BID    sertraline  50 mg Oral Daily    traZODone  100 mg Oral Nightly    metoprolol succinate  100 mg Oral Daily    amLODIPine  10 mg Oral Daily    [Held by provider] triamterene-hydroCHLOROthiazide  1 tablet Oral Daily    folic acid  8,310 mcg Oral Daily    sodium chloride flush  5-40 mL IntraVENous 2 times per day    enoxaparin  40 mg SubCUTAneous Daily     PRN Meds: sodium chloride flush, sodium chloride, ondansetron **OR** ondansetron, polyethylene glycol, acetaminophen **OR** acetaminophen      Intake/Output Summary (Last 24 hours) at 4/24/2023 1312  Last data filed at 4/24/2023 0046  Gross per 24 hour   Intake 2263.1 ml   Output 700 ml   Net 1563.1 ml       Exam:    BP (!) 142/63   Pulse 60   Temp 98.1 °F (36.7 °C) (Oral)   Resp 18   Ht 5' 9\" (1.753 m)   Wt 200 lb (90.7 kg)   SpO2 97%   BMI 29.53 kg/m²     General appearance: appears stated age and cooperative. Respiratory:  clear to auscultation, bilaterally  Cardiovascular: Regular rate and rhythm, S1/S2. Abdomen: Soft, active bowel sounds. Musculoskeletal: No edema bilaterally. Labs:   Recent Labs     04/22/23  1900 04/23/23  0523 04/24/23  0556   WBC 10.0 7.7 11.0*   HGB 12.0* 11.0* 10.8*   HCT 37.6* 34.5* 33.1*   * 447* 463*     Recent Labs     04/22/23  1900 04/23/23  0523 04/24/23  0556    137 137   K 3.8 3.7 3.4   CL 96 100 99   CO2 28 22 23   BUN 32* 34* 29*   CREATININE 1.94* 1.84* 1.35*   CALCIUM 8.4* 7.7* 8.1*     Recent Labs     04/22/23  1900 04/23/23  0523 04/24/23  0556   AST 42* 31 31   ALT 23 19 19   BILITOT 0.5 0.3 0.3   ALKPHOS 82 67 68     No results for input(s): INR in the last 72 hours.   No results for input(s): Oliverio Abbasi in the last
Hospitalist Progress Note      PCP: Mayra Cooper DO    Date of Admission: 4/22/2023    Chief Complaint:  no acute events, afebrile, stable HD    Medications:  Reviewed    Infusion Medications    sodium chloride       Scheduled Medications    magnesium oxide  400 mg Oral Daily    predniSONE  40 mg Oral Daily    pantoprazole  40 mg Oral QAM AC    atorvastatin  40 mg Oral Daily    aspirin  81 mg Oral Daily    gabapentin  100 mg Oral BID    sertraline  50 mg Oral Daily    traZODone  100 mg Oral Nightly    metoprolol succinate  100 mg Oral Daily    amLODIPine  10 mg Oral Daily    folic acid  4,244 mcg Oral Daily    sodium chloride flush  5-40 mL IntraVENous 2 times per day    enoxaparin  40 mg SubCUTAneous Daily     PRN Meds: albuterol, oxyCODONE-acetaminophen, sodium chloride flush, sodium chloride, ondansetron **OR** ondansetron, polyethylene glycol, acetaminophen **OR** acetaminophen      Intake/Output Summary (Last 24 hours) at 4/27/2023 1253  Last data filed at 4/27/2023 0900  Gross per 24 hour   Intake 660 ml   Output 1590 ml   Net -930 ml         Exam:    BP (!) 143/65   Pulse 70   Temp 98 °F (36.7 °C) (Oral)   Resp 16   Ht 5' 9\" (1.753 m)   Wt 200 lb (90.7 kg)   SpO2 97%   BMI 29.53 kg/m²     General appearance: appears stated age and cooperative. Respiratory:  clear to auscultation, bilaterally  Cardiovascular: Regular rate and rhythm, S1/S2. Abdomen: Soft, active bowel sounds. Musculoskeletal: severe tenderness with erythema of bilateral  MTP joints, R>L, ankle edema bilaterally.       Labs:   Recent Labs     04/25/23  0505 04/26/23  0428 04/27/23  0451   WBC 10.9* 10.4 10.6   HGB 10.5* 10.6* 10.0*   HCT 32.6* 33.1* 30.2*   * 477* 448*       Recent Labs     04/25/23  0505 04/26/23  0428 04/27/23  0451   * 131* 136   K 3.6 3.8 4.3   CL 97 94* 98   CO2 24 24 25   BUN 23 20 17   CREATININE 1.11 1.01 0.89   CALCIUM 8.4* 9.1 9.8       Recent Labs     04/25/23  0505 04/26/23  0421
Hospitalist Progress Note      PCP: Riaz Nunez DO    Date of Admission: 4/22/2023    Chief Complaint:  no acute events, afebrile, stable HD    Medications:  Reviewed    Infusion Medications    sodium chloride       Scheduled Medications    magnesium sulfate  4,000 mg IntraVENous Once    pantoprazole  40 mg Oral QAM AC    atorvastatin  40 mg Oral Daily    aspirin  81 mg Oral Daily    gabapentin  100 mg Oral BID    sertraline  50 mg Oral Daily    traZODone  100 mg Oral Nightly    metoprolol succinate  100 mg Oral Daily    amLODIPine  10 mg Oral Daily    [Held by provider] triamterene-hydroCHLOROthiazide  1 tablet Oral Daily    folic acid  6,378 mcg Oral Daily    sodium chloride flush  5-40 mL IntraVENous 2 times per day    enoxaparin  40 mg SubCUTAneous Daily     PRN Meds: albuterol, oxyCODONE-acetaminophen, sodium chloride flush, sodium chloride, ondansetron **OR** ondansetron, polyethylene glycol, acetaminophen **OR** acetaminophen      Intake/Output Summary (Last 24 hours) at 4/25/2023 1319  Last data filed at 4/25/2023 0925  Gross per 24 hour   Intake 1280 ml   Output 850 ml   Net 430 ml         Exam:    /64   Pulse 63   Temp 98.8 °F (37.1 °C) (Oral)   Resp 18   Ht 5' 9\" (1.753 m)   Wt 200 lb (90.7 kg)   SpO2 96%   BMI 29.53 kg/m²     General appearance: appears stated age and cooperative. Respiratory:  clear to auscultation, bilaterally  Cardiovascular: Regular rate and rhythm, S1/S2. Abdomen: Soft, active bowel sounds. Musculoskeletal: ankle edema bilaterally.       Labs:   Recent Labs     04/23/23  0523 04/24/23  0556 04/25/23  0505   WBC 7.7 11.0* 10.9*   HGB 11.0* 10.8* 10.5*   HCT 34.5* 33.1* 32.6*   * 463* 468*       Recent Labs     04/23/23  0523 04/24/23  0556 04/25/23  0505    137 133*   K 3.7 3.4 3.6    99 97   CO2 22 23 24   BUN 34* 29* 23   CREATININE 1.84* 1.35* 1.11   CALCIUM 7.7* 8.1* 8.4*       Recent Labs     04/23/23  0523 04/24/23  0556 04/25/23  0505
[facility] Neurology Daily Progress Note  Name: Madhav Nath  Age: 70 y.o. Gender: male  CodeStatus: Full Code  Allergies: No Known Allergies    Chief Complaint:Leg Swelling (Bilateral feet )    Primary Care Provider: Rajiv Acosta DO  InpatientTreatment Team: Treatment Team: Attending Provider: Dong King MD; Consulting Physician: Cy Rodriguez DPM; Utilization Reviewer: Ferdinand Glynn RN; Consulting Physician: Alejandra Silver MD; Consulting Physician: John Oconnor MD; : Maximino Reardon RN; Registered Nurse: Robby Pack RN  Admission Date: 4/22/2023      HPI   Pt seen and examined for neurology follow-up. Currently alert and oriented x3, no acute distress, cooperative. Patient has shown significant improvement in bilateral foot pain after initiation of steroids. Erythema and edema improved. Was able to stand and ambulate to the bathroom today. Afebrile. As noted above. Patient actually continues to do well and in fact his pain is improved to a point that is ambulatory. Vitals:    04/27/23 1106   BP:    Pulse:    Resp: 16   Temp:    SpO2:         Review of Systems   Constitutional:  Negative for fever. HENT:  Negative for hearing loss and trouble swallowing. Eyes:  Negative for visual disturbance. Respiratory:  Negative for chest tightness and wheezing. Cardiovascular:  Negative for chest pain, palpitations and leg swelling. Gastrointestinal:  Negative for nausea and vomiting. Genitourinary:  Negative for difficulty urinating. Skin:  Negative for color change and rash. Neurological:  Negative for dizziness, tremors, seizures, syncope, facial asymmetry, speech difficulty, weakness, light-headedness, numbness and headaches. Psychiatric/Behavioral:  Negative for agitation, confusion and hallucinations. The patient is not nervous/anxious. Physical Exam  Vitals reviewed. Constitutional:       General: He is not in acute distress.      Appearance: He is
PODIATRIC MEDICINE AND SURGERY  CONSULT HISTORY AND PHYSICAL      ASSESSMENT:  70 y.o. male with PMH significant for chronic recurrent, osteoarthritis, CKD presenting with bilateral foot pain for who podiatry was consulted for bilateral foot and leg pain. X-rays with no acute osseous abnormality. Patient admits to history of smoking, art duplex consistent with moderate to severe occlusive disease. No tissue loss clinically. PLAN AND RECOMMENDATIONS[de-identified]  Patient's case to be discussed with staff, Dr. Eri Vanegas, who will provide final recommendations going forward  WB to bilateral lower extremities   Elevate bilateral lower extremities at or above heart level while resting  Recommend vascular consult given abnormal vascular studies and claudication  Pain management per primary team       Interval Events:  Avss, claudication with few steps.    Art duplex revealed moderate to severe disease  Denies constitutional symptoms  + bilateral foot pain     Past Medical History:   Diagnosis Date    Alcohol abuse     Alcohol abuse     Allergic rhinitis     Aortic regurgitation     Cervical radiculopathy at C8 10/13/2013    Chronic back pain     Chronic back pain     COPD (chronic obstructive pulmonary disease) (HCC)     Erectile dysfunction     GERD (gastroesophageal reflux disease)     Hyperlipidemia     Hypertension     Insomnia     Moderate episode of recurrent major depressive disorder ([address] 8/18/2021    Osteoarthritis     PUD (peptic ulcer disease)     Restless leg syndrome     Smoker unmotivated to quit     Stage 3a chronic kidney disease ([address] 4/13/2023    Vitamin D deficiency        Past Surgical History:   Procedure Laterality Date    COLONOSCOPY N/A 8/3/2020    COLONOSCOPY WITH POLYPECTOMY performed by Zoya Wilson MD at [facility]  12/2013    Dr. Mitchel Grayson  2008    Left hip replacement    SPINE SURGERY  2006    fusion L4-L5       No current facility-administered
PODIATRIC MEDICINE AND SURGERY  CONSULT HISTORY AND PHYSICAL      ASSESSMENT:  70 y.o. male with PMH significant for chronic recurrent, osteoarthritis, CKD presenting with bilateral foot pain for who podiatry was consulted for bilateral foot and leg pain. X-rays with no acute osseous abnormality. Patient admits to history of smoking, art duplex consistent with moderate to severe occlusive disease. No tissue loss clinically. PLAN AND RECOMMENDATIONS[de-identified]  Patient's case to be discussed with staff, Dr. Mayra Glover, who will provide final recommendations going forward  WB to bilateral lower extremities   Vascular surgery consulted. Pain management per primary team       Interval Events:  Avss, claudication with few steps. Art duplex revealed moderate to severe disease  Denies constitutional symptoms  + bilateral foot pain, not improved    Past Medical History:   Diagnosis Date    Alcohol abuse     Alcohol abuse     Allergic rhinitis     Aortic regurgitation     Cervical radiculopathy at C8 10/13/2013    Chronic back pain     Chronic back pain     COPD (chronic obstructive pulmonary disease) (HCC)     Erectile dysfunction     GERD (gastroesophageal reflux disease)     Hyperlipidemia     Hypertension     Insomnia     Moderate episode of recurrent major depressive disorder [address] 8/18/2021    Osteoarthritis     PUD (peptic ulcer disease)     Restless leg syndrome     Smoker unmotivated to quit     Stage 3a chronic kidney disease ([facility] 4/13/2023    Vitamin D deficiency        Past Surgical History:   Procedure Laterality Date    COLONOSCOPY N/A 8/3/2020    COLONOSCOPY WITH POLYPECTOMY performed by Ashley Gasca MD at [facility]  12/2013    Dr. Janusz Haywood  2008    Left hip replacement    SPINE SURGERY  2006    fusion L4-L5       No current facility-administered medications on file prior to encounter.      Current Outpatient Medications on File Prior to Encounter   Medication Sig
PODIATRIC MEDICINE AND SURGERY  CONSULT HISTORY AND PHYSICAL      ASSESSMENT:  70 y.o. male with PMH significant for chronic recurrent, osteoarthritis, CKD presenting with bilateral foot pain for who podiatry was consulted for bilateral foot and leg pain. X-rays with no acute osseous abnormality. Patient admits to history of smoking, art duplex consistent with moderate to severe occlusive disease. No tissue loss clinically. Uric acid levels elevated, gout cannot be ruled out. PLAN AND RECOMMENDATIONS[de-identified]  Patient's case to be discussed with staff, Dr. Grzegorz Shaffer, who will provide final recommendations going forward  WB to bilateral lower extremities   Vascular surgery consulted. Pain management per primary team   Recommend dose of solumedrol to treat acute gout as possible etiology      Interval Events: Avss  Uric acid levels eleveated  Denies constitutional symptoms  + bilateral foot pain, not improved      Past Medical History:   Diagnosis Date    Alcohol abuse     Alcohol abuse     Allergic rhinitis     Aortic regurgitation     Cervical radiculopathy at C8 10/13/2013    Chronic back pain     Chronic back pain     COPD (chronic obstructive pulmonary disease) (HCC)     Erectile dysfunction     GERD (gastroesophageal reflux disease)     Hyperlipidemia     Hypertension     Insomnia     Moderate episode of recurrent major depressive disorder ([address] 8/18/2021    Osteoarthritis     PUD (peptic ulcer disease)     Restless leg syndrome     Smoker unmotivated to quit     Stage 3a chronic kidney disease ([address] 4/13/2023    Vitamin D deficiency        Past Surgical History:   Procedure Laterality Date    COLONOSCOPY N/A 8/3/2020    COLONOSCOPY WITH POLYPECTOMY performed [facility] Ramesh Parker MD [facility]  12/2013    Dr. Lina Holly  2008    Left hip replacement    SPINE SURGERY  2006    fusion L4-L5       No current facility-administered medications on file prior to encounter.
Patient assessment completed. He is A&Ox4. Ambulates with walker with a guarded but steady gait. He denies any pain at this time. NSR on tele. Respirations even and unlabored.
Patient feeling much better. Able to ambulate to restroom with steady gait using walker.      Electronically signed by Husam Lyn RN on 4/26/2023 at 4:09 PM
Patient is alert and oriented. Vital signs stable. Patient complaining of ble pain. Patient medicated with percocet for 7/10. Patient given 1x dose of colcrys and started on prednisone daily. 2+ pitting edema to rle and 1+ to lle. Patient currently resting in bed. Call light in reach.      Electronically signed by Ginette Gutierres RN on 4/26/2023 at 11:27 AM
Physical Therapy  [facility] N420/Y331-26  Physical Therapy Discharge      NAME: Harini Hernandez    : 1955 (39 y.o.)  MRN: [id number]    Account: [de-identified]  Gender: male      Patient has been discharged from acute care hospital. DC patient from current PT program.      Electronically signed by Kathya Núñez PT on 23 at 1:10 PM EDT
Physical Therapy Med Surg Daily Treatment Note  Facility/Department: [facility]  Room: [place] [address]       NAME: Filippo Tan  : 1955 (56 y.o.)  MRN: [id number]  CODE STATUS: Full Code    Date of Service: 2023    Patient Diagnosis(es): Unable to ambulate [R26.2]  Pain in both feet [M79.671, M79.672]  Bilateral foot pain [M79.671, M79.672]   Chief Complaint   Patient presents with    Leg Swelling     Bilateral feet      Patient Active Problem List    Diagnosis Date Noted    Pain in both feet 2023    Elevated PSA 2023    Stage 3a chronic kidney disease ([address] 2023    Elevated liver enzymes 2023    Primary osteoarthritis of right knee 2021    Primary localized osteoarthritis of pelvic region and thigh 2021    Iron deficiency anemia 2021    Moderate episode of recurrent major depressive disorder ([address] 2021    Spinal stenosis 2021    Arthralgia of hip 2021    Adenomatous polyp of sigmoid colon     Adenomatous polyp of descending colon     Alcohol use disorder, moderate, in early remission [facility])     Cardiac arrest ([address]     Aspiration pneumonia ([address]     Acute respiratory failure with hypoxia and hypercapnia ([address]     DANIELSON (dyspnea on exertion)     Anemia 2020    Gastrointestinal hemorrhage 2020    Tremor 2020    Dyslipidemia 2017    Obstructive chronic bronchitis without exacerbation ([address] 2017    Chronic back pain     Insomnia     Benign essential hypertension 2004    Lumbosacral spondylosis without myelopathy 2004    Lumbar sprain 2004    Disc degeneration, lumbar 2004    Displacement of lumbar intervertebral disc without myelopathy 2004        Past Medical History:   Diagnosis Date    Alcohol abuse     Alcohol abuse     Allergic rhinitis     Aortic regurgitation     Cervical radiculopathy at C8 10/13/2013    Chronic back pain     Chronic back pain     COPD (chronic
Physical Therapy Med Surg Daily Treatment Note  Facility/Department: [facility]  Room: [place] [address]       NAME: Seema Roque  : 1955 (83 y.o.)  MRN: [id number]  CODE STATUS: Full Code    Date of Service: 2023    Patient Diagnosis(es): Unable to ambulate [R26.2]  Pain in both feet [M79.671, M79.672]  Bilateral foot pain [M79.671, M79.672]   Chief Complaint   Patient presents with    Leg Swelling     Bilateral feet      Patient Active Problem List    Diagnosis Date Noted    Pain in both feet 2023    Elevated PSA 2023    Stage 3a chronic kidney disease ([address] 2023    Elevated liver enzymes 2023    Primary osteoarthritis of right knee 2021    Primary localized osteoarthritis of pelvic region and thigh 2021    Iron deficiency anemia 2021    Moderate episode of recurrent major depressive disorder ([address] 2021    Spinal stenosis 2021    Arthralgia of hip 2021    Adenomatous polyp of sigmoid colon     Adenomatous polyp of descending colon     Alcohol use disorder, moderate, in early remission [facility])     Cardiac arrest ([address]     Aspiration pneumonia ([address]     Acute respiratory failure with hypoxia and hypercapnia ([address]     DANIELSON (dyspnea on exertion)     Anemia 2020    Gastrointestinal hemorrhage 2020    Tremor 2020    Dyslipidemia 2017    Obstructive chronic bronchitis without exacerbation ([address] 2017    Chronic back pain     Insomnia     Benign essential hypertension 2004    Lumbosacral spondylosis without myelopathy 2004    Lumbar sprain 2004    Disc degeneration, lumbar 2004    Displacement of lumbar intervertebral disc without myelopathy 2004        Past Medical History:   Diagnosis Date    Alcohol abuse     Alcohol abuse     Allergic rhinitis     Aortic regurgitation     Cervical radiculopathy at C8 10/13/2013    Chronic back pain     Chronic back pain     COPD (chronic
Physical Therapy Med Surg Daily Treatment Note  Facility/Department: [facility]  Room: [facility]/Y660-14       NAME: Rosmery Servin  : 1955 (89 y.o.)  MRN: [id number]  CODE STATUS: Full Code    Date of Service: 2023    Patient Diagnosis(es): Unable to ambulate [R26.2]  Pain in both feet [M79.671, M79.672]  Bilateral foot pain [M79.671, M79.672]   Chief Complaint   Patient presents with    Leg Swelling     Bilateral feet      Patient Active Problem List    Diagnosis Date Noted    Pain in both feet 2023    Elevated PSA 2023    Stage 3a chronic kidney disease ([address] 2023    Elevated liver enzymes 2023    Primary osteoarthritis of right knee 2021    Primary localized osteoarthritis of pelvic region and thigh 2021    Iron deficiency anemia 2021    Moderate episode of recurrent major depressive disorder ([address] 2021    Spinal stenosis 2021    Arthralgia of hip 2021    Adenomatous polyp of sigmoid colon     Adenomatous polyp of descending colon     Alcohol use disorder, moderate, in early remission [facility])     Cardiac arrest ([address]     Aspiration pneumonia ([address]     Acute respiratory failure with hypoxia and hypercapnia ([address]     DANIELSON (dyspnea on exertion)     Anemia 2020    Gastrointestinal hemorrhage 2020    Tremor 2020    Dyslipidemia 2017    Obstructive chronic bronchitis without exacerbation ([address] 74.) 2017    Chronic back pain     Insomnia     Benign essential hypertension 2004    Lumbosacral spondylosis without myelopathy 2004    Lumbar sprain 2004    Disc degeneration, lumbar 2004    Displacement of lumbar intervertebral disc without myelopathy 2004        Past Medical History:   Diagnosis Date    Alcohol abuse     Alcohol abuse     Allergic rhinitis     Aortic regurgitation     Cervical radiculopathy at C8 10/13/2013    Chronic back pain     Chronic back pain     COPD (chronic obstructive
Progress Note  Patient: Tori Castillo  Unit/Bed: C572/V283-40  YOB: 1955  MRN: [id number]  Acct: [de-identified]   Admitting Diagnosis: Unable to ambulate [R26.2]  Pain in both feet [M79.671, M79.672]  Bilateral foot pain [M79.671, M79.672]  Date:  4/22/2023  Hospital Day: 4    Chief Complaint:  Ble foot pain    Subjective  No events overnight. Pain in feet unchanged.     Review of Systems:   Review of Systems  A/o x3    Physical Examination:    BP (!) 159/67   Pulse 74   Temp 98.4 °F (36.9 °C) (Oral)   Resp 18   Ht 5' 9\" (1.753 m)   Wt 200 lb (90.7 kg)   SpO2 96%   BMI 29.53 kg/m²    Physical Exam    Ble edema 2+ in feet  Bilateral posterior tibial pulses 2+    LABS:  CBC:   Lab Results   Component Value Date/Time    WBC 10.4 04/26/2023 04:28 AM    RBC 3.74 04/26/2023 04:28 AM    RBC 3.78 04/01/2012 08:40 AM    HGB 10.6 04/26/2023 04:28 AM    HCT 33.1 04/26/2023 04:28 AM    MCV 88.4 04/26/2023 04:28 AM    MCH 28.4 04/26/2023 04:28 AM    MCHC 32.1 04/26/2023 04:28 AM    RDW 23.1 04/26/2023 04:28 AM     04/26/2023 04:28 AM    MPV 7.4 02/22/2014 06:11 AM     CBC with Differential:   Lab Results   Component Value Date/Time    WBC 10.4 04/26/2023 04:28 AM    RBC 3.74 04/26/2023 04:28 AM    RBC 3.78 04/01/2012 08:40 AM    HGB 10.6 04/26/2023 04:28 AM    HCT 33.1 04/26/2023 04:28 AM     04/26/2023 04:28 AM    MCV 88.4 04/26/2023 04:28 AM    MCH 28.4 04/26/2023 04:28 AM    MCHC 32.1 04/26/2023 04:28 AM    RDW 23.1 04/26/2023 04:28 AM    NRBC 2 03/19/2020 03:39 AM    BANDSPCT 2 08/14/2020 05:49 PM    LYMPHOPCT 14.7 04/26/2023 04:28 AM    MONOPCT 11.9 04/26/2023 04:28 AM    EOSPCT 0.6 04/01/2012 08:40 AM    BASOPCT 0.7 04/26/2023 04:28 AM    MONOSABS 1.2 04/26/2023 04:28 AM    LYMPHSABS 1.5 04/26/2023 04:28 AM    EOSABS 0.1 04/26/2023 04:28 AM    BASOSABS 0.1 04/26/2023 04:28 AM     CMP:    Lab Results   Component Value Date/Time     04/26/2023 04:28 AM    K 3.8 04/26/2023 04:28 AM
Pt resting in bed for most of the shift did get oob this morning and again this afternoon but c/o increased foot pain when baring any weight. Dr Adriana Turcios notified of tylenol not helping to relieve pain, and medicated further with ultram, this took pain from 9 to 7 and was requesting something else, Dr Adriana Turcios notified and received a Percocet order later this evening, helping pain more than ultram but pain remains at a 6/10. Pt states he doesn't want anything else for pain at this time.
Shift assessments completed. A&OX 4. Medications given per MAR. Call light within reach. No other needs stated by pt at this time.
obstructive pulmonary disease) (HCC)     Erectile dysfunction     GERD (gastroesophageal reflux disease)     Hyperlipidemia     Hypertension     Insomnia     Moderate episode of recurrent major depressive disorder ([facility] 8/18/2021    Osteoarthritis     PUD (peptic ulcer disease)     Restless leg syndrome     Smoker unmotivated to quit     Stage 3a chronic kidney disease ([facility] 4/13/2023    Vitamin D deficiency      Past Surgical History:   Procedure Laterality Date    COLONOSCOPY N/A 8/3/2020    COLONOSCOPY WITH POLYPECTOMY performed by Gamal Teixeira MD at [address] [place] [address]  12/2013    Dr. Marlee Kinney  2008    Left hip replacement    SPINE SURGERY  2006    fusion L4-L5       Chart Reviewed: Yes  Patient assessed for rehabilitation services?: Yes  Family / Caregiver Present: No    Restrictions:  Restrictions/Precautions: Fall Risk    SUBJECTIVE:   Subjective: I can't stand. My feet hurt too bad. Pain  Pain: 7-8/10 Huber foot pain pre and post session     OBJECTIVE:   Orientation  Overall Orientation Status: Within Functional Limits  Cognition  Overall Cognitive Status: WFL    Bed mobility  Bed Mobility Comments: NT - not the focus of this partial tx. Transfers  Comment: NT - not the focus of this partial tx. PT Exercises  Exercise Treatment: AP/QS/GS/SLR/Hip ABD, heel slide - x10 Written HEP given, reviewed and performed. Educated on frequency          Activity Tolerance  Activity Tolerance: Patient limited by pain          ASSESSMENT   Assessment: Focus of partical treatment to educated on HEP. Pt performed all exercises with great difficulty.   Therapy Prognosis: Good     Discharge Recommendations:  Continue to assess pending progress         Goals  Long Term Goals  Long Term Goal 1: Pt will demosntrate bed mobility indep  Long Term Goal 2: Pt will demonstrate transfers mod indep with safest AD  Long Term Goal 3: Pt will demonstrate amb >/= 50ft mod indep
obstructive pulmonary disease) (HCC)     Erectile dysfunction     GERD (gastroesophageal reflux disease)     Hyperlipidemia     Hypertension     Insomnia     Moderate episode of recurrent major depressive disorder ([facility] [address] 8/18/2021    Osteoarthritis     PUD (peptic ulcer disease)     Restless leg syndrome     Smoker unmotivated to quit     Stage 3a chronic kidney disease ([facility] 4/13/2023    Vitamin D deficiency      Past Surgical History:   Procedure Laterality Date    COLONOSCOPY N/A 8/3/2020    COLONOSCOPY WITH POLYPECTOMY performed by Amira Meza MD at [address] [place] [address]  12/2013    Dr. Lucina Jones  2008    Left hip replacement    SPINE SURGERY  2006    fusion L4-L5       Chart Reviewed: Yes  Family / Caregiver Present: No    Restrictions:  Restrictions/Precautions: Fall Risk    SUBJECTIVE:   Subjective: \"I am feeling better. \"    Pain  Pain: 4/10 Huber foot pain pre and post session     OBJECTIVE:        Bed mobility  Supine to Sit: Supervision  Sit to Supine: Supervision  Bed Mobility Comments: vc's for technique and sequencing throughout but no physical assist required. Transfers  Sit to Stand: Stand by assistance  Stand to Sit: Stand by assistance  Comment: STS x7 throughout tx, improved stability and safety completing this date. Ambulation  Surface: Level tile  Device: Rolling Walker  Assistance: Stand by assistance  Gait Deviations: Slow Makayla; Increased ASH; Decreased step length;Decreased step height  Distance: 30'  Comments: pt with improvement in B foot pain leading to increased gait distance. Activity Tolerance  Activity Tolerance: Patient tolerated treatment well          ASSESSMENT   Assessment: increased gait quality and distance secondary to improved B foot pain. pt would be unsafe to ambulate without WW at this time.      Discharge Recommendations:  Continue to assess pending progress         Goals  Long Term Goals  Long Term Goal 1:

## 2023-04-28 NOTE — TELEPHONE ENCOUNTER
Care Transitions Initial Follow Up Call    Outreach made within 2 business days of discharge: Yes    Patient: Emilie Torres Patient : 1955   MRN: [id number]  Reason for Admission: There are no discharge diagnoses documented for the most recent discharge. Discharge Date: 23       Spoke with: Pt    Discharge department/facility: [facility] Patient Contact:  Was patient able to fill all prescriptions: Yes  Was patient instructed to bring all medications to the follow-up visit: Yes  Is patient taking all medications as directed in the discharge summary?  Yes  Does patient understand their discharge instructions: Yes  Does patient have questions or concerns that need addressed prior to 7-14 day follow up office visit: no    Scheduled appointment with PCP within 7-14 days    Follow Up  Future Appointments   Date Time Provider Jarrett Irvin   2023  2:30 PM Eulalio Grewal [address] [place] [address]

## 2023-05-04 DIAGNOSIS — F51.01 PRIMARY INSOMNIA: ICD-10-CM

## 2023-05-04 DIAGNOSIS — R25.1 TREMORS OF NERVOUS SYSTEM: ICD-10-CM

## 2023-05-04 DIAGNOSIS — I10 ESSENTIAL HYPERTENSION, BENIGN: ICD-10-CM

## 2023-05-05 RX ORDER — AMLODIPINE BESYLATE 10 MG/1
TABLET ORAL
Qty: 90 TABLET | Refills: 0 | Status: SHIPPED | OUTPATIENT
Start: 2023-05-05

## 2023-05-05 RX ORDER — TRAZODONE HYDROCHLORIDE 50 MG/1
TABLET ORAL
Qty: 180 TABLET | Refills: 0 | Status: SHIPPED | OUTPATIENT
Start: 2023-05-05

## 2023-05-05 RX ORDER — METOPROLOL SUCCINATE 100 MG/1
TABLET, EXTENDED RELEASE ORAL
Qty: 90 TABLET | Refills: 0 | Status: SHIPPED | OUTPATIENT
Start: 2023-05-05

## 2023-05-05 NOTE — TELEPHONE ENCOUNTER
Rx requested:  Requested Prescriptions     Pending Prescriptions Disp Refills    traZODone (DESYREL) 50 MG tablet [Pharmacy Med Name: TRAZODONE TAB 50MG] 180 tablet 0     Sig: TAKE 2 TABLETS NIGHTLY         Last Office Visit:   04/13/2023      Next Visit Date:  Future Appointments   Date Time Provider Jarrett Irvin   5/9/2023  2:30 PM [facility]   6/21/2023  2:30 PM Sergey Shah MD [facility]

## 2023-05-05 NOTE — TELEPHONE ENCOUNTER
Rx requested:  Requested Prescriptions     Pending Prescriptions Disp Refills    amLODIPine (NORVASC) 10 MG tablet [Pharmacy Med Name: AMLODIPINE TAB 10MG] 90 tablet 0     Sig: TAKE 1 TABLET DAILY    metoprolol succinate (TOPROL XL) 100 MG extended release tablet [Pharmacy Med Name: Elyse Maillard TAB 100MG ER] 90 tablet 0     Sig: TAKE 1 TABLET DAILY         Last Office Visit:   11/8/2022 4/13/2023 with Kathryn Saul      Next Visit Date:  Future Appointments   Date Time Provider Jarrett Irvin   5/9/2023  2:30 PM DO LORRI Chiu [facility] [place]   6/21/2023  2:30 PM Gema Moreno MD [facility]

## 2023-05-07 ENCOUNTER — HOSPITAL ENCOUNTER (EMERGENCY)
Age: 68
Discharge: HOME | End: 2023-05-07
Attending: EMERGENCY MEDICINE
Payer: COMMERCIAL

## 2023-05-07 VITALS
WEIGHT: 200 LBS | DIASTOLIC BLOOD PRESSURE: 60 MMHG | TEMPERATURE: 99.9 F | OXYGEN SATURATION: 97 % | SYSTOLIC BLOOD PRESSURE: 150 MMHG | BODY MASS INDEX: 29.62 KG/M2 | HEIGHT: 69 IN | RESPIRATION RATE: 20 BRPM | HEART RATE: 71 BPM

## 2023-05-07 DIAGNOSIS — M25.571 BILATERAL ANKLE PAIN, UNSPECIFIED CHRONICITY: Primary | ICD-10-CM

## 2023-05-07 DIAGNOSIS — M25.572 BILATERAL ANKLE PAIN, UNSPECIFIED CHRONICITY: Primary | ICD-10-CM

## 2023-05-07 PROCEDURE — 96372 THER/PROPH/DIAG INJ SC/IM: CPT

## 2023-05-07 PROCEDURE — 99284 EMERGENCY DEPT VISIT MOD MDM: CPT

## 2023-05-07 PROCEDURE — 6360000002 HC RX W HCPCS: Performed by: EMERGENCY MEDICINE

## 2023-05-07 RX ORDER — KETOROLAC TROMETHAMINE 15 MG/ML
15 INJECTION, SOLUTION INTRAMUSCULAR; INTRAVENOUS ONCE
Status: COMPLETED | OUTPATIENT
Start: 2023-05-07 | End: 2023-05-07

## 2023-05-07 RX ORDER — OXYCODONE HYDROCHLORIDE AND ACETAMINOPHEN 5; 325 MG/1; MG/1
1 TABLET ORAL EVERY 8 HOURS PRN
Qty: 6 TABLET | Refills: 0 | Status: SHIPPED | OUTPATIENT
Start: 2023-05-07 | End: 2023-05-09

## 2023-05-07 RX ORDER — MORPHINE SULFATE 4 MG/ML
6 INJECTION, SOLUTION INTRAMUSCULAR; INTRAVENOUS ONCE
Status: COMPLETED | OUTPATIENT
Start: 2023-05-07 | End: 2023-05-07

## 2023-05-07 RX ORDER — PREDNISONE 10 MG/1
10 TABLET ORAL DAILY
Qty: 20 TABLET | Refills: 0 | Status: SHIPPED | OUTPATIENT
Start: 2023-05-07 | End: 2023-05-27

## 2023-05-07 RX ADMIN — KETOROLAC TROMETHAMINE 15 MG: 15 INJECTION, SOLUTION INTRAMUSCULAR; INTRAVENOUS at 20:55

## 2023-05-07 RX ADMIN — MORPHINE SULFATE 6 MG: 4 INJECTION, SOLUTION INTRAMUSCULAR; INTRAVENOUS at 20:03

## 2023-05-07 ASSESSMENT — PAIN DESCRIPTION - ORIENTATION
ORIENTATION: LEFT;RIGHT
ORIENTATION: RIGHT
ORIENTATION: LEFT;RIGHT
ORIENTATION: RIGHT;LEFT
ORIENTATION: RIGHT;LEFT

## 2023-05-07 ASSESSMENT — PAIN SCALES - GENERAL
PAINLEVEL_OUTOF10: 9
PAINLEVEL_OUTOF10: 7
PAINLEVEL_OUTOF10: 9

## 2023-05-07 ASSESSMENT — PAIN DESCRIPTION - DESCRIPTORS
DESCRIPTORS: THROBBING;SHARP
DESCRIPTORS: ACHING

## 2023-05-07 ASSESSMENT — PAIN DESCRIPTION - PAIN TYPE: TYPE: ACUTE PAIN

## 2023-05-07 ASSESSMENT — PAIN - FUNCTIONAL ASSESSMENT
PAIN_FUNCTIONAL_ASSESSMENT: 0-10

## 2023-05-07 ASSESSMENT — PAIN DESCRIPTION - LOCATION
LOCATION: FOOT
LOCATION: FOOT;ANKLE
LOCATION: FOOT

## 2023-05-07 ASSESSMENT — LIFESTYLE VARIABLES: HOW MANY STANDARD DRINKS CONTAINING ALCOHOL DO YOU HAVE ON A TYPICAL DAY: PATIENT DOES NOT DRINK

## 2023-05-07 ASSESSMENT — PAIN DESCRIPTION - FREQUENCY: FREQUENCY: CONTINUOUS

## 2023-05-07 ASSESSMENT — ENCOUNTER SYMPTOMS: SHORTNESS OF BREATH: 0

## 2023-05-08 NOTE — ED NOTES
D/C instructions given. Aware the rx's were electronically sent to [facility] on [address],as well as times the next doses are due. Verbalized understanding. Denies any further complaints. Took patient out via wheelchair. Wife pulling around.       Angelita Da Silva RN  05/07/23 4530

## 2023-05-09 ENCOUNTER — OFFICE VISIT (OUTPATIENT)
Dept: FAMILY MEDICINE CLINIC | Age: 68
End: 2023-05-09
Payer: COMMERCIAL

## 2023-05-09 VITALS
HEIGHT: 69 IN | DIASTOLIC BLOOD PRESSURE: 60 MMHG | SYSTOLIC BLOOD PRESSURE: 140 MMHG | OXYGEN SATURATION: 97 % | HEART RATE: 97 BPM | WEIGHT: 200.6 LBS | TEMPERATURE: 98.2 F | BODY MASS INDEX: 29.71 KG/M2

## 2023-05-09 DIAGNOSIS — K21.9 GASTROESOPHAGEAL REFLUX DISEASE, UNSPECIFIED WHETHER ESOPHAGITIS PRESENT: ICD-10-CM

## 2023-05-09 DIAGNOSIS — M10.9 ACUTE GOUT INVOLVING TOE, UNSPECIFIED CAUSE, UNSPECIFIED LATERALITY: Primary | ICD-10-CM

## 2023-05-09 DIAGNOSIS — M10.9 ACUTE GOUT INVOLVING TOE, UNSPECIFIED CAUSE, UNSPECIFIED LATERALITY: ICD-10-CM

## 2023-05-09 DIAGNOSIS — I10 BENIGN ESSENTIAL HYPERTENSION: ICD-10-CM

## 2023-05-09 DIAGNOSIS — N18.31 STAGE 3A CHRONIC KIDNEY DISEASE (HCC): ICD-10-CM

## 2023-05-09 DIAGNOSIS — R97.20 ELEVATED PSA: ICD-10-CM

## 2023-05-09 DIAGNOSIS — F33.1 MODERATE EPISODE OF RECURRENT MAJOR DEPRESSIVE DISORDER (HCC): ICD-10-CM

## 2023-05-09 DIAGNOSIS — I10 ESSENTIAL HYPERTENSION, BENIGN: ICD-10-CM

## 2023-05-09 DIAGNOSIS — R25.1 TREMORS OF NERVOUS SYSTEM: ICD-10-CM

## 2023-05-09 DIAGNOSIS — F51.01 PRIMARY INSOMNIA: ICD-10-CM

## 2023-05-09 DIAGNOSIS — E78.5 DYSLIPIDEMIA: ICD-10-CM

## 2023-05-09 DIAGNOSIS — F10.21 ALCOHOL USE DISORDER, MODERATE, IN EARLY REMISSION (HCC): ICD-10-CM

## 2023-05-09 PROCEDURE — 99215 OFFICE O/P EST HI 40 MIN: CPT | Performed by: STUDENT IN AN ORGANIZED HEALTH CARE EDUCATION/TRAINING PROGRAM

## 2023-05-09 PROCEDURE — 1123F ACP DISCUSS/DSCN MKR DOCD: CPT | Performed by: STUDENT IN AN ORGANIZED HEALTH CARE EDUCATION/TRAINING PROGRAM

## 2023-05-09 PROCEDURE — 3074F SYST BP LT 130 MM HG: CPT | Performed by: STUDENT IN AN ORGANIZED HEALTH CARE EDUCATION/TRAINING PROGRAM

## 2023-05-09 PROCEDURE — 3078F DIAST BP <80 MM HG: CPT | Performed by: STUDENT IN AN ORGANIZED HEALTH CARE EDUCATION/TRAINING PROGRAM

## 2023-05-09 RX ORDER — AMLODIPINE BESYLATE 10 MG/1
10 TABLET ORAL DAILY
Qty: 90 TABLET | Refills: 1 | Status: SHIPPED | OUTPATIENT
Start: 2023-05-09

## 2023-05-09 RX ORDER — METOPROLOL SUCCINATE 100 MG/1
100 TABLET, EXTENDED RELEASE ORAL DAILY
Qty: 90 TABLET | Refills: 1 | Status: SHIPPED | OUTPATIENT
Start: 2023-05-09

## 2023-05-09 RX ORDER — ALLOPURINOL 300 MG/1
300 TABLET ORAL DAILY
Qty: 30 TABLET | Refills: 0 | Status: SHIPPED | OUTPATIENT
Start: 2023-05-09 | End: 2023-05-09 | Stop reason: SDUPTHER

## 2023-05-09 RX ORDER — TRAZODONE HYDROCHLORIDE 50 MG/1
TABLET ORAL
Qty: 180 TABLET | Refills: 1 | Status: SHIPPED | OUTPATIENT
Start: 2023-05-09

## 2023-05-09 RX ORDER — ALLOPURINOL 300 MG/1
300 TABLET ORAL DAILY
Qty: 90 TABLET | Refills: 1 | Status: SHIPPED | OUTPATIENT
Start: 2023-05-09

## 2023-05-09 RX ORDER — PANTOPRAZOLE SODIUM 40 MG/1
40 TABLET, DELAYED RELEASE ORAL DAILY
Qty: 30 TABLET | Refills: 0 | Status: SHIPPED | OUTPATIENT
Start: 2023-05-09 | End: 2023-05-09 | Stop reason: SDUPTHER

## 2023-05-09 RX ORDER — PANTOPRAZOLE SODIUM 40 MG/1
40 TABLET, DELAYED RELEASE ORAL DAILY
Qty: 90 TABLET | Refills: 1 | Status: SHIPPED | OUTPATIENT
Start: 2023-05-09

## 2023-05-09 RX ORDER — ATORVASTATIN CALCIUM 40 MG/1
40 TABLET, FILM COATED ORAL DAILY
Qty: 90 TABLET | Refills: 1 | Status: SHIPPED | OUTPATIENT
Start: 2023-05-09

## 2023-05-09 RX ORDER — GABAPENTIN 100 MG/1
100 CAPSULE ORAL 2 TIMES DAILY
Qty: 60 CAPSULE | Refills: 0 | Status: CANCELLED | OUTPATIENT
Start: 2023-05-09 | End: 2023-06-08

## 2023-05-09 NOTE — PROGRESS NOTES
Subjective  Chepe Bowers, 70 y.o. male presents today with:  Chief Complaint   Patient presents with    Follow-up     States he needs med refills & will be out before he would get refills from mail order. Needs 30 days sent to local pharmacy & a 90 day sent to mail order. Results     Would like to review results. Alcohol Problem     Has not had anything to drink since first week of April. Leg Swelling     States he continues to have leg swelling, but Gabapentin is helping with his numbness/tingling. States without the medication he had to use his walker to get around with. Is now only using zuleta. Depression     Feels his mood is stable. Denies feeling down & depressed. Chronic Kidney Disease    Hypertension    Insomnia    Hyperlipidemia       Patient with 1 month follow-up appointment and for hospital follow-up appointment. Patient recently in the hospital in ER for gout. He continues to have gout symptoms. He is currently on steroid taper. Not currently on any uric acid lowering agents. He is not scheduled to see podiatry. We will need a new referral.  Blood pressure medication stopped with hydrochlorothiazide and denies future gout attacks. He continues to have some pain in the foot. He did get recent prescription from the ER. Patient also with hypertension. He is slightly hypertensive in the office today. He is currently on amlodipine 10 mg daily and Toprol- daily. Tolerating well. No side effects of medication. No chest pain, shortness of breath, palpitations, headaches or dizziness. Patient also with depression. He is on sertraline 50 mg. feels that this is well controlled at this time. No side effects. No suicidal or homicidal ideation. Patient also with alcohol use disorder. Has not had a drink since beginning of April. This was the reason for his elevated liver enzymes. They have now stabilized. Patient also with GERD.   He is on pantoprazole 40

## 2023-05-10 ASSESSMENT — ENCOUNTER SYMPTOMS
ABDOMINAL PAIN: 0
DIARRHEA: 0
SHORTNESS OF BREATH: 0
COUGH: 0
EYE DISCHARGE: 0
SORE THROAT: 0
VOMITING: 0
NAUSEA: 0
RHINORRHEA: 0
WHEEZING: 0

## 2023-05-18 ENCOUNTER — HOSPITAL ENCOUNTER (EMERGENCY)
Age: 68
Discharge: HOME | End: 2023-05-18
Payer: COMMERCIAL

## 2023-05-18 VITALS
BODY MASS INDEX: 29.62 KG/M2 | RESPIRATION RATE: 16 BRPM | HEART RATE: 94 BPM | SYSTOLIC BLOOD PRESSURE: 143 MMHG | DIASTOLIC BLOOD PRESSURE: 72 MMHG | HEIGHT: 69 IN | OXYGEN SATURATION: 94 % | TEMPERATURE: 98.4 F | WEIGHT: 200 LBS

## 2023-05-18 DIAGNOSIS — M10.9 ACUTE GOUT OF RIGHT FOOT, UNSPECIFIED CAUSE: Primary | ICD-10-CM

## 2023-05-18 PROCEDURE — 6360000002 HC RX W HCPCS: Performed by: PHYSICIAN ASSISTANT

## 2023-05-18 PROCEDURE — 96372 THER/PROPH/DIAG INJ SC/IM: CPT

## 2023-05-18 PROCEDURE — 99284 EMERGENCY DEPT VISIT MOD MDM: CPT

## 2023-05-18 PROCEDURE — 6370000000 HC RX 637 (ALT 250 FOR IP): Performed by: PHYSICIAN ASSISTANT

## 2023-05-18 RX ORDER — OXYCODONE HYDROCHLORIDE AND ACETAMINOPHEN 5; 325 MG/1; MG/1
1 TABLET ORAL EVERY 6 HOURS PRN
Qty: 12 TABLET | Refills: 0 | Status: SHIPPED | OUTPATIENT
Start: 2023-05-18 | End: 2023-05-21

## 2023-05-18 RX ORDER — MORPHINE SULFATE 2 MG/ML
4 INJECTION, SOLUTION INTRAMUSCULAR; INTRAVENOUS ONCE
Status: COMPLETED | OUTPATIENT
Start: 2023-05-18 | End: 2023-05-18

## 2023-05-18 RX ORDER — ONDANSETRON 4 MG/1
4 TABLET, ORALLY DISINTEGRATING ORAL ONCE
Status: COMPLETED | OUTPATIENT
Start: 2023-05-18 | End: 2023-05-18

## 2023-05-18 RX ORDER — PREDNISONE 10 MG/1
40 TABLET ORAL DAILY
Qty: 12 TABLET | Refills: 0 | Status: SHIPPED | OUTPATIENT
Start: 2023-05-18 | End: 2023-05-21

## 2023-05-18 RX ORDER — METHYLPREDNISOLONE ACETATE 80 MG/ML
80 INJECTION, SUSPENSION INTRA-ARTICULAR; INTRALESIONAL; INTRAMUSCULAR; SOFT TISSUE ONCE
Status: COMPLETED | OUTPATIENT
Start: 2023-05-18 | End: 2023-05-18

## 2023-05-18 RX ADMIN — MORPHINE SULFATE 4 MG: 2 INJECTION, SOLUTION INTRAMUSCULAR; INTRAVENOUS at 16:59

## 2023-05-18 RX ADMIN — ONDANSETRON 4 MG: 4 TABLET, ORALLY DISINTEGRATING ORAL at 16:59

## 2023-05-18 RX ADMIN — METHYLPREDNISOLONE ACETATE 80 MG: 80 INJECTION, SUSPENSION INTRA-ARTICULAR; INTRALESIONAL; INTRAMUSCULAR; SOFT TISSUE at 17:02

## 2023-05-18 ASSESSMENT — ENCOUNTER SYMPTOMS
CHEST TIGHTNESS: 0
SHORTNESS OF BREATH: 0

## 2023-05-18 ASSESSMENT — PAIN DESCRIPTION - ORIENTATION
ORIENTATION: LEFT;RIGHT
ORIENTATION: RIGHT;LEFT

## 2023-05-18 ASSESSMENT — PAIN SCALES - GENERAL
PAINLEVEL_OUTOF10: 9
PAINLEVEL_OUTOF10: 10

## 2023-05-18 ASSESSMENT — PAIN DESCRIPTION - LOCATION: LOCATION: FOOT

## 2023-05-18 ASSESSMENT — PAIN - FUNCTIONAL ASSESSMENT: PAIN_FUNCTIONAL_ASSESSMENT: 0-10

## 2023-05-18 ASSESSMENT — PAIN DESCRIPTION - ONSET: ONSET: ON-GOING

## 2023-05-18 ASSESSMENT — PAIN DESCRIPTION - PAIN TYPE: TYPE: ACUTE PAIN

## 2023-05-18 ASSESSMENT — PAIN DESCRIPTION - DESCRIPTORS: DESCRIPTORS: ACHING

## 2023-05-18 ASSESSMENT — PAIN DESCRIPTION - FREQUENCY: FREQUENCY: CONTINUOUS

## 2023-05-18 NOTE — ED TRIAGE NOTES
Pt c/o bilat foot pain and edema, denies trauma, Hx of gout, recently here for the same, Pt is A&OX4, clam, afebrile, breathes are equal and unlabored,

## 2023-05-18 NOTE — ED PROVIDER NOTES
[facility] ED  eMERGENCYdEPARTMENT eNCOUnter        Pt Name: Seema Roque  MRN: [id number]  [place] 9/45/9669XM evaluation: 5/18/2023  Provider:Piyush Matamoros PA-C    CHIEF COMPLAINT       Chief Complaint   Patient presents with    Foot Pain     Pt c/o bilat foot pain, denies trauma, Hx of gout, recently seen here for the same symptoms         HISTORY OF PRESENT ILLNESS  (Location/Symptom, Timing/Onset, Context/Setting, Quality, Duration, Modifying Factors, Severity.)   Seema Roque is a 70 y.o. male who presents to the emergency department with complaint of exacerbation of gouty arthritis that began upon awakening this morning. Patient has been seen in the emergency department several times for the same complaints as well as being seen by family doctor and podiatry. Patient states he saw the podiatrist yesterday who offered him a cortisone injection however patient was not having any pain at that time so he declined. Patient states that he woke up with the pain and the swelling. He denies any injuries to the area. Patient denies any calf pain or tenderness. No fevers or constitutional symptoms    HPI    Nursing Notes were reviewed and I agree. REVIEW OF SYSTEMS    (2-9 systems for level 4, 10 or more for level 5)     Review of Systems   Constitutional:  Negative for fever. Respiratory:  Negative for chest tightness and shortness of breath. Cardiovascular:  Negative for chest pain. Musculoskeletal:  Positive for arthralgias and joint swelling. as noted above the remainder of the review of systems was reviewed and negative.        PAST MEDICAL HISTORY     Past Medical History:   Diagnosis Date    Alcohol abuse     Alcohol abuse     Allergic rhinitis     Aortic regurgitation     Cervical radiculopathy at  10/13/2013    Chronic back pain     Chronic back pain     COPD (chronic obstructive pulmonary disease) ([facility])     Erectile dysfunction     GERD (gastroesophageal reflux

## 2023-05-23 ENCOUNTER — PATIENT MESSAGE (OUTPATIENT)
Dept: FAMILY MEDICINE CLINIC | Age: 68
End: 2023-05-23

## 2023-05-23 ENCOUNTER — OFFICE VISIT (OUTPATIENT)
Dept: FAMILY MEDICINE CLINIC | Age: 68
End: 2023-05-23
Payer: COMMERCIAL

## 2023-05-23 VITALS
SYSTOLIC BLOOD PRESSURE: 148 MMHG | OXYGEN SATURATION: 95 % | WEIGHT: 197.4 LBS | HEART RATE: 78 BPM | BODY MASS INDEX: 29.24 KG/M2 | DIASTOLIC BLOOD PRESSURE: 62 MMHG | TEMPERATURE: 97.5 F | HEIGHT: 69 IN

## 2023-05-23 DIAGNOSIS — M10.9 ACUTE GOUT INVOLVING TOE, UNSPECIFIED CAUSE, UNSPECIFIED LATERALITY: ICD-10-CM

## 2023-05-23 DIAGNOSIS — M10.9 ACUTE GOUT INVOLVING TOE, UNSPECIFIED CAUSE, UNSPECIFIED LATERALITY: Primary | ICD-10-CM

## 2023-05-23 DIAGNOSIS — I10 BENIGN ESSENTIAL HYPERTENSION: ICD-10-CM

## 2023-05-23 PROCEDURE — 3078F DIAST BP <80 MM HG: CPT | Performed by: STUDENT IN AN ORGANIZED HEALTH CARE EDUCATION/TRAINING PROGRAM

## 2023-05-23 PROCEDURE — 99214 OFFICE O/P EST MOD 30 MIN: CPT | Performed by: STUDENT IN AN ORGANIZED HEALTH CARE EDUCATION/TRAINING PROGRAM

## 2023-05-23 PROCEDURE — 3074F SYST BP LT 130 MM HG: CPT | Performed by: STUDENT IN AN ORGANIZED HEALTH CARE EDUCATION/TRAINING PROGRAM

## 2023-05-23 PROCEDURE — 1123F ACP DISCUSS/DSCN MKR DOCD: CPT | Performed by: STUDENT IN AN ORGANIZED HEALTH CARE EDUCATION/TRAINING PROGRAM

## 2023-05-23 RX ORDER — COLCHICINE 0.6 MG/1
0.6 TABLET ORAL DAILY
Qty: 90 TABLET | Refills: 1 | Status: SHIPPED | OUTPATIENT
Start: 2023-05-23 | End: 2023-05-24 | Stop reason: SDUPTHER

## 2023-05-23 NOTE — PROGRESS NOTES
Subjective  Ed Canseco, 70 y.o. male presents today with:  Chief Complaint   Patient presents with    Follow-up     Went to the ER 5/18/23 with bilat foot pain & edema. States he has finished steroid. Swelling & pain are still present. States pain has decreased, but still present. Denies redness or drainage. Other     Has form requesting to be filled out for [employer] from most recent admission. Patient with appointment for ER follow-up. Went to ER on 5/18 with bilateral foot pain and edema. He has been suffering from acute gout. He was started on steroid. He is nauseous. He does feel little bit better than it did. He denies any redness or swelling. He has been taking allopurinol 300 mg daily. He has not been on colchicine. He has been using other NSAIDs. He has a form to be completed for insurance. Patient also with mildly elevated blood pressure reading today. He is on amlodipine 10 mg and Toprol- mg. No chest pain, shortness breath, palpitations, or dizziness. No side effects of medication. No other concerns at this time. Review of Systems   Constitutional:  Negative for chills, fatigue, fever and unexpected weight change. HENT:  Negative for congestion, rhinorrhea and sore throat. Eyes:  Negative for discharge. Respiratory:  Negative for cough, shortness of breath and wheezing. Cardiovascular:  Negative for chest pain, palpitations and leg swelling. Gastrointestinal:  Negative for abdominal pain, diarrhea, nausea and vomiting. Genitourinary:  Negative for difficulty urinating. Musculoskeletal:  Positive for gait problem. Negative for arthralgias and joint swelling. Skin:  Negative for rash. Neurological:  Negative for weakness, light-headedness, numbness and headaches. Psychiatric/Behavioral:  Negative for confusion and suicidal ideas. The patient is not nervous/anxious.       Past Medical History:   Diagnosis Date    Alcohol abuse

## 2023-05-24 RX ORDER — COLCHICINE 0.6 MG/1
0.6 TABLET ORAL DAILY
Qty: 90 TABLET | Refills: 1 | Status: SHIPPED | OUTPATIENT
Start: 2023-05-24 | End: 2023-08-22

## 2023-05-24 ASSESSMENT — ENCOUNTER SYMPTOMS
DIARRHEA: 0
COUGH: 0
EYE DISCHARGE: 0
SORE THROAT: 0
ABDOMINAL PAIN: 0
VOMITING: 0
SHORTNESS OF BREATH: 0
WHEEZING: 0
NAUSEA: 0
RHINORRHEA: 0

## 2023-06-16 LAB
ALANINE AMINOTRANSFERASE (SGPT) (U/L) IN SER/PLAS: 13 U/L (ref 10–52)
ALBUMIN (G/DL) IN SER/PLAS: 4.1 G/DL (ref 3.4–5)
ALKALINE PHOSPHATASE (U/L) IN SER/PLAS: 104 U/L (ref 33–136)
ASPARTATE AMINOTRANSFERASE (SGOT) (U/L) IN SER/PLAS: 20 U/L (ref 9–39)
BILIRUBIN DIRECT (MG/DL) IN SER/PLAS: 0.1 MG/DL (ref 0–0.3)
BILIRUBIN TOTAL (MG/DL) IN SER/PLAS: 0.7 MG/DL (ref 0–1.2)
CREATINE KINASE (U/L) IN SER/PLAS: 36 U/L (ref 0–325)
CREATININE (MG/DL) IN SER/PLAS: 0.74 MG/DL (ref 0.5–1.3)
ERYTHROCYTE DISTRIBUTION WIDTH (RATIO) BY AUTOMATED COUNT: 22.6 % (ref 11.5–14.5)
ERYTHROCYTE MEAN CORPUSCULAR HEMOGLOBIN CONCENTRATION (G/DL) BY AUTOMATED: 30.9 G/DL (ref 32–36)
ERYTHROCYTE MEAN CORPUSCULAR VOLUME (FL) BY AUTOMATED COUNT: 93 FL (ref 80–100)
ERYTHROCYTES (10*6/UL) IN BLOOD BY AUTOMATED COUNT: 3.82 X10E12/L (ref 4.5–5.9)
GFR MALE: >90 ML/MIN/1.73M2
HEMATOCRIT (%) IN BLOOD BY AUTOMATED COUNT: 35.6 % (ref 41–52)
HEMOGLOBIN (G/DL) IN BLOOD: 11 G/DL (ref 13.5–17.5)
LEUKOCYTES (10*3/UL) IN BLOOD BY AUTOMATED COUNT: 6.9 X10E9/L (ref 4.4–11.3)
PLATELETS (10*3/UL) IN BLOOD AUTOMATED COUNT: 423 X10E9/L (ref 150–450)
PROTEIN TOTAL: 7.4 G/DL (ref 6.4–8.2)
RHEUMATOID FACTOR (IU/ML) IN SERUM OR PLASMA: <10 IU/ML (ref 0–15)
URATE (MG/DL) IN SER/PLAS: 3.8 MG/DL (ref 4–7.5)
UREA NITROGEN (MG/DL) IN SER/PLAS: 8 MG/DL (ref 6–23)

## 2023-06-17 LAB — CITRULLINE ANTIBODY, IGG: <1 U/ML

## 2023-06-21 ENCOUNTER — OFFICE VISIT (OUTPATIENT)
Dept: UROLOGY | Age: 68
End: 2023-06-21
Payer: COMMERCIAL

## 2023-06-21 VITALS
HEART RATE: 78 BPM | SYSTOLIC BLOOD PRESSURE: 162 MMHG | WEIGHT: 189 LBS | OXYGEN SATURATION: 95 % | DIASTOLIC BLOOD PRESSURE: 70 MMHG | BODY MASS INDEX: 27.99 KG/M2 | HEIGHT: 69 IN

## 2023-06-21 DIAGNOSIS — R97.20 ELEVATED PSA: Primary | ICD-10-CM

## 2023-06-21 DIAGNOSIS — N40.0 BPH WITHOUT OBSTRUCTION/LOWER URINARY TRACT SYMPTOMS: ICD-10-CM

## 2023-06-21 LAB
BILIRUBIN, POC: ABNORMAL
BLOOD URINE, POC: ABNORMAL
CLARITY, POC: CLEAR
COLOR, POC: YELLOW
GLUCOSE URINE, POC: ABNORMAL
KETONES, POC: ABNORMAL
LEUKOCYTE EST, POC: ABNORMAL
NITRITE, POC: ABNORMAL
PH, POC: 7
PROTEIN, POC: ABNORMAL
SPECIFIC GRAVITY, POC: 1.01
UROBILINOGEN, POC: 1

## 2023-06-21 PROCEDURE — 99204 OFFICE O/P NEW MOD 45 MIN: CPT | Performed by: UROLOGY

## 2023-06-21 PROCEDURE — 1123F ACP DISCUSS/DSCN MKR DOCD: CPT | Performed by: UROLOGY

## 2023-06-21 PROCEDURE — 3077F SYST BP >= 140 MM HG: CPT | Performed by: UROLOGY

## 2023-06-21 PROCEDURE — 3078F DIAST BP <80 MM HG: CPT | Performed by: UROLOGY

## 2023-06-21 PROCEDURE — 81003 URINALYSIS AUTO W/O SCOPE: CPT | Performed by: UROLOGY

## 2023-06-21 ASSESSMENT — ENCOUNTER SYMPTOMS
BACK PAIN: 1
ABDOMINAL PAIN: 0
SHORTNESS OF BREATH: 0
GASTROINTESTINAL NEGATIVE: 1
ABDOMINAL DISTENTION: 0

## 2023-06-21 NOTE — PROGRESS NOTES
Subjective:      Patient ID: Jennifer Ramos is a 70 y.o. male    HPI This is a 71 yo male with h/o CBP, Gout, COPD, GERD, HTN, Depression, PUD, CKD and sent for evaluation by DEX Rivera for an elevated PSA. He has no prior  history. He has no hematuria or dysuria. He has no abd or flank pain. He has no wt loss or abnl bone pains. He has CBP and uses a cane. He has a good flow, no PVD and no intermittency or urgency/UI. He has no prior UTI's or stones. He feels the bladder empties. He has NF 1. I reviewed the prior PSA history today with the patient and his wife. He had a Lt total hip replacement 20 yrs ago. He hinson snot smoke ciggs. He has no family h/o  malignancies.      Past Medical History:   Diagnosis Date    Alcohol abuse     Alcohol abuse     Allergic rhinitis     Aortic regurgitation     Cervical radiculopathy at  10/13/2013    Chronic back pain     Chronic back pain     COPD (chronic obstructive pulmonary disease) (HCC)     Erectile dysfunction     GERD (gastroesophageal reflux disease)     Hyperlipidemia     Hypertension     Insomnia     Moderate episode of recurrent major depressive disorder ([facility] [address] 8/18/2021    Osteoarthritis     PUD (peptic ulcer disease)     Restless leg syndrome     Smoker unmotivated to quit     Stage 3a chronic kidney disease ([facility] [address] 4/13/2023    Vitamin D deficiency      Past Surgical History:   Procedure Laterality Date    COLONOSCOPY N/A 08/03/2020    COLONOSCOPY WITH POLYPECTOMY performed by Geronimo Perkins MD at [address] [place] [address]  2008    Left hip replacement    SPINE SURGERY  2006    fusion L4-L5     Social History     Socioeconomic History    Marital status:      Spouse name: None    Number of children: None    Years of education: None    Highest education level: None   Tobacco Use    Smoking status: Former     Packs/day: 0.25     Years: 49.00     Pack years: 12.25     Types: Cigars, Cigarettes     Quit date: 10/1/2021     Years since quitting:

## 2023-07-17 LAB — URATE (MG/DL) IN SER/PLAS: 3.4 MG/DL (ref 4–7.5)

## 2023-07-21 DIAGNOSIS — R97.20 ELEVATED PSA: ICD-10-CM

## 2023-07-21 LAB — PSA SERPL-MCNC: 4.94 NG/ML (ref 0–4)

## 2023-07-24 ENCOUNTER — OFFICE VISIT (OUTPATIENT)
Dept: UROLOGY | Age: 68
End: 2023-07-24
Payer: COMMERCIAL

## 2023-07-24 VITALS
HEART RATE: 74 BPM | BODY MASS INDEX: 28.14 KG/M2 | HEIGHT: 69 IN | WEIGHT: 190 LBS | DIASTOLIC BLOOD PRESSURE: 78 MMHG | SYSTOLIC BLOOD PRESSURE: 132 MMHG

## 2023-07-24 DIAGNOSIS — R97.20 ELEVATED PSA: Primary | ICD-10-CM

## 2023-07-24 PROCEDURE — 1123F ACP DISCUSS/DSCN MKR DOCD: CPT | Performed by: UROLOGY

## 2023-07-24 PROCEDURE — 99213 OFFICE O/P EST LOW 20 MIN: CPT | Performed by: UROLOGY

## 2023-07-24 PROCEDURE — 3078F DIAST BP <80 MM HG: CPT | Performed by: UROLOGY

## 2023-07-24 PROCEDURE — 3075F SYST BP GE 130 - 139MM HG: CPT | Performed by: UROLOGY

## 2023-07-24 ASSESSMENT — ENCOUNTER SYMPTOMS
ABDOMINAL PAIN: 0
ABDOMINAL DISTENTION: 0

## 2023-07-24 NOTE — PROGRESS NOTES
Subjective:      Patient ID: Priti Acevedo is a 70 y.o. male    HPI  This is a 71 yo male with h/o CBP, Gout, COPD, GERD, HTN, Depression, PUD, CKD and back for continued evaluation of an elevated PSA. Since last seen on 23, he has no new voiding complaints. He has no pain and I reviewed the interval PSA.     Past Medical History:   Diagnosis Date    Alcohol abuse     Alcohol abuse     Allergic rhinitis     Aortic regurgitation     Cervical radiculopathy at C8 10/13/2013    Chronic back pain     Chronic back pain     COPD (chronic obstructive pulmonary disease) (HCC)     Erectile dysfunction     GERD (gastroesophageal reflux disease)     Hyperlipidemia     Hypertension     Insomnia     Moderate episode of recurrent major depressive disorder ([facility]) 2021    Osteoarthritis     PUD (peptic ulcer disease)     Restless leg syndrome     Smoker unmotivated to quit     Stage 3a chronic kidney disease ([facility]) 2023    Vitamin D deficiency      Past Surgical History:   Procedure Laterality Date    COLONOSCOPY N/A 2020    COLONOSCOPY WITH POLYPECTOMY performed by Nat Goins MD at [address]      Left hip replacement    SPINE SURGERY  2006    fusion L4-L5     Social History     Socioeconomic History    Marital status:      Spouse name: None    Number of children: None    Years of education: None    Highest education level: None   Tobacco Use    Smoking status: Former     Packs/day: 0.25     Years: 49.00     Pack years: 12.25     Types: Cigars, Cigarettes     Quit date: 10/1/2021     Years since quittin.8    Smokeless tobacco: Never    Tobacco comments:     started age 24   Vaping Use    Vaping Use: Never used   Substance and Sexual Activity    Alcohol use: Yes     Comment: has one can of beer couple times a week    Drug use: Yes     Frequency: 1.0 times per week     Types: Marijuana Mar Chars)     Comment: once weekly    Sexual activity: Not Currently     Social

## 2023-07-26 RX ORDER — POLYETHYLENE GLYCOL 3350, SODIUM CHLORIDE, SODIUM BICARBONATE, POTASSIUM CHLORIDE 420; 11.2; 5.72; 1.48 G/4L; G/4L; G/4L; G/4L
4000 POWDER, FOR SOLUTION ORAL ONCE
Qty: 4000 ML | Refills: 0 | Status: SHIPPED | OUTPATIENT
Start: 2023-07-26 | End: 2023-07-26

## 2023-08-10 ENCOUNTER — PREP FOR PROCEDURE (OUTPATIENT)
Dept: GASTROENTEROLOGY | Age: 68
End: 2023-08-10

## 2023-08-10 RX ORDER — SODIUM CHLORIDE 9 MG/ML
INJECTION, SOLUTION INTRAVENOUS CONTINUOUS
Status: CANCELLED | OUTPATIENT
Start: 2023-08-10

## 2023-08-10 RX ORDER — SODIUM CHLORIDE 0.9 % (FLUSH) 0.9 %
5-40 SYRINGE (ML) INJECTION EVERY 12 HOURS SCHEDULED
Status: CANCELLED | OUTPATIENT
Start: 2023-08-10

## 2023-08-10 RX ORDER — SODIUM CHLORIDE 9 MG/ML
INJECTION, SOLUTION INTRAVENOUS PRN
Status: CANCELLED | OUTPATIENT
Start: 2023-08-10

## 2023-08-11 ENCOUNTER — ANESTHESIA EVENT (OUTPATIENT)
Dept: ENDOSCOPY | Age: 68
End: 2023-08-11
Payer: COMMERCIAL

## 2023-08-11 ASSESSMENT — ENCOUNTER SYMPTOMS: SHORTNESS OF BREATH: 1

## 2023-08-14 ENCOUNTER — ANESTHESIA (OUTPATIENT)
Dept: ENDOSCOPY | Age: 68
End: 2023-08-14
Payer: COMMERCIAL

## 2023-08-14 ENCOUNTER — HOSPITAL ENCOUNTER (OUTPATIENT)
Age: 68
Setting detail: OUTPATIENT SURGERY
Discharge: HOME | End: 2023-08-14
Attending: SPECIALIST | Admitting: SPECIALIST
Payer: COMMERCIAL

## 2023-08-14 VITALS
RESPIRATION RATE: 18 BRPM | WEIGHT: 190 LBS | BODY MASS INDEX: 28.14 KG/M2 | OXYGEN SATURATION: 96 % | HEART RATE: 71 BPM | SYSTOLIC BLOOD PRESSURE: 169 MMHG | DIASTOLIC BLOOD PRESSURE: 78 MMHG | HEIGHT: 69 IN | TEMPERATURE: 97.5 F

## 2023-08-14 DIAGNOSIS — Z86.010 HISTORY OF COLON POLYPS: ICD-10-CM

## 2023-08-14 PROBLEM — Z86.0100 HISTORY OF COLON POLYPS: Status: ACTIVE | Noted: 2023-08-14

## 2023-08-14 PROCEDURE — 6370000000 HC RX 637 (ALT 250 FOR IP): Performed by: SPECIALIST

## 2023-08-14 PROCEDURE — 3700000001 HC ADD 15 MINUTES (ANESTHESIA): Performed by: SPECIALIST

## 2023-08-14 PROCEDURE — 7100000011 HC PHASE II RECOVERY - ADDTL 15 MIN: Performed by: SPECIALIST

## 2023-08-14 PROCEDURE — 6360000002 HC RX W HCPCS: Performed by: NURSE ANESTHETIST, CERTIFIED REGISTERED

## 2023-08-14 PROCEDURE — 3609027000 HC COLONOSCOPY: Performed by: SPECIALIST

## 2023-08-14 PROCEDURE — 7100000010 HC PHASE II RECOVERY - FIRST 15 MIN: Performed by: SPECIALIST

## 2023-08-14 PROCEDURE — 2580000003 HC RX 258

## 2023-08-14 PROCEDURE — 2709999900 HC NON-CHARGEABLE SUPPLY: Performed by: SPECIALIST

## 2023-08-14 PROCEDURE — 2500000003 HC RX 250 WO HCPCS: Performed by: NURSE ANESTHETIST, CERTIFIED REGISTERED

## 2023-08-14 PROCEDURE — 3700000000 HC ANESTHESIA ATTENDED CARE: Performed by: SPECIALIST

## 2023-08-14 PROCEDURE — 2580000003 HC RX 258: Performed by: SPECIALIST

## 2023-08-14 PROCEDURE — 88305 TISSUE EXAM BY PATHOLOGIST: CPT

## 2023-08-14 RX ORDER — SIMETHICONE 20 MG/.3ML
EMULSION ORAL PRN
Status: DISCONTINUED | OUTPATIENT
Start: 2023-08-14 | End: 2023-08-14 | Stop reason: ALTCHOICE

## 2023-08-14 RX ORDER — SODIUM CHLORIDE 0.9 % (FLUSH) 0.9 %
5-40 SYRINGE (ML) INJECTION EVERY 12 HOURS SCHEDULED
Status: DISCONTINUED | OUTPATIENT
Start: 2023-08-14 | End: 2023-08-14 | Stop reason: HOSPADM

## 2023-08-14 RX ORDER — MAGNESIUM HYDROXIDE 1200 MG/15ML
LIQUID ORAL PRN
Status: DISCONTINUED | OUTPATIENT
Start: 2023-08-14 | End: 2023-08-14 | Stop reason: ALTCHOICE

## 2023-08-14 RX ORDER — SODIUM CHLORIDE 9 MG/ML
INJECTION, SOLUTION INTRAVENOUS PRN
Status: DISCONTINUED | OUTPATIENT
Start: 2023-08-14 | End: 2023-08-14 | Stop reason: HOSPADM

## 2023-08-14 RX ORDER — LIDOCAINE HYDROCHLORIDE 20 MG/ML
INJECTION, SOLUTION INFILTRATION; PERINEURAL PRN
Status: DISCONTINUED | OUTPATIENT
Start: 2023-08-14 | End: 2023-08-14 | Stop reason: SDUPTHER

## 2023-08-14 RX ORDER — SODIUM CHLORIDE 9 MG/ML
INJECTION, SOLUTION INTRAVENOUS
Status: COMPLETED
Start: 2023-08-14 | End: 2023-08-14

## 2023-08-14 RX ORDER — SODIUM CHLORIDE 9 MG/ML
INJECTION, SOLUTION INTRAVENOUS CONTINUOUS
Status: DISCONTINUED | OUTPATIENT
Start: 2023-08-14 | End: 2023-08-14 | Stop reason: HOSPADM

## 2023-08-14 RX ORDER — PROPOFOL 10 MG/ML
INJECTION, EMULSION INTRAVENOUS PRN
Status: DISCONTINUED | OUTPATIENT
Start: 2023-08-14 | End: 2023-08-14 | Stop reason: SDUPTHER

## 2023-08-14 RX ADMIN — PROPOFOL 50 MG: 10 INJECTION, EMULSION INTRAVENOUS at 08:40

## 2023-08-14 RX ADMIN — SODIUM CHLORIDE: 9 INJECTION, SOLUTION INTRAVENOUS at 07:58

## 2023-08-14 RX ADMIN — PROPOFOL 150 MG: 10 INJECTION, EMULSION INTRAVENOUS at 08:33

## 2023-08-14 RX ADMIN — PROPOFOL 50 MG: 10 INJECTION, EMULSION INTRAVENOUS at 08:43

## 2023-08-14 RX ADMIN — PROPOFOL 50 MG: 10 INJECTION, EMULSION INTRAVENOUS at 08:37

## 2023-08-14 RX ADMIN — PROPOFOL 50 MG: 10 INJECTION, EMULSION INTRAVENOUS at 08:46

## 2023-08-14 RX ADMIN — PROPOFOL 50 MG: 10 INJECTION, EMULSION INTRAVENOUS at 08:35

## 2023-08-14 RX ADMIN — LIDOCAINE HYDROCHLORIDE 50 MG: 20 INJECTION, SOLUTION INFILTRATION; PERINEURAL at 08:33

## 2023-08-14 ASSESSMENT — PAIN SCALES - GENERAL
PAINLEVEL_OUTOF10: 0
PAINLEVEL_OUTOF10: 0

## 2023-08-14 ASSESSMENT — PAIN - FUNCTIONAL ASSESSMENT: PAIN_FUNCTIONAL_ASSESSMENT: 0-10

## 2023-08-14 ASSESSMENT — ENCOUNTER SYMPTOMS: SHORTNESS OF BREATH: 1

## 2023-08-14 NOTE — DISCHARGE INSTRUCTIONS
Lower Discharge Instructions    Patient Name: Jorge Ahumada  Patient ID: [id number]  YOB: 1955  Procedure: Jennifer Arguelles  Referring Physician: [unfilled]  Procedure Date: 8/14/2023    Recommendations:  []   Follow-up appointment with family physician in 4 weeks. [x]   Colonoscopy recommended in 5  years. []   Follow-up appointment with endoscopist in  Reports of your procedure and these recommendations have been sent to:  [unfilled]    Sedative medication given for procedures can slow your reaction time and coordination for many hours. If you receive medications, it is important for your safety to follow the instructions below for the remainder of the day:  BE TAKEN directly home from the center and rest quietly. DO NOT resume normal activities until tomorrow. Do NOT drive, return to work, or operate any machinery or power tools. Do NOT make any important personal or business decisions, sign any legal papers or perform any activity that depends on your full concentration power or mental judgement. Do NOT drink any alcohol or take nerve or sleeping drugs. They add to the effects of the medicine still present in your body.    [] (Checked if a biopsy or polyp removal was performed)  There is a slight risk of bleeding. If you had a biopsy or a polyp removed, we suggest that you follow the instructions below:  Do NOT take aspirin or similar anti-inflammatory medicine for ___ days. Do NOT exercise, jog, or do any heavy lifting or straining for 1 day. Potential common after effects and treatment following the procedure:  Mild abdominal pain, bloating, or excessive gas - rest, eat lightly, and use a heating pad. Redness and/or swelling where the IV was - apply heat and elevate. Symptoms to report to your physician:  Severe abdominal pain or bloating. Chills or fever above 101 degrees occurring within 24 hours after procedure. Large amounts of rectal bleeding that does not stop.  Small amount of rectal

## 2023-08-14 NOTE — ANESTHESIA POSTPROCEDURE EVALUATION
Department of Anesthesiology  Postprocedure Note    Patient: Brayden Garcia  MRN: [id number]  YOB: 1955  Date of evaluation: 8/14/2023      Procedure Summary     Date: 08/14/23 Room / Location: [address] OR 01 / [address]    Anesthesia Start: 4436 Anesthesia Stop: 6596    Procedure: COLORECTAL CANCER SCREENING, HIGH RISK Diagnosis:       History of colon polyps      (History of colon polyps [Z86.010])    Surgeons: Demarcus John MD Responsible Provider: NOAH Ridley CRNA    Anesthesia Type: MAC ASA Status: 3          Anesthesia Type: No value filed.     Sabrina Phase I:      Sabrina Phase II:        Anesthesia Post Evaluation    Patient location during evaluation: PACU  Level of consciousness: awake  Pain score: 0  Airway patency: patent  Nausea & Vomiting: no vomiting and no nausea  Complications: no  Cardiovascular status: hemodynamically stable  Respiratory status: acceptable  Hydration status: stable  Pain management: adequate and satisfactory to patient

## 2023-08-22 ENCOUNTER — OFFICE VISIT (OUTPATIENT)
Dept: FAMILY MEDICINE CLINIC | Age: 68
End: 2023-08-22
Payer: COMMERCIAL

## 2023-08-22 VITALS
SYSTOLIC BLOOD PRESSURE: 138 MMHG | OXYGEN SATURATION: 95 % | DIASTOLIC BLOOD PRESSURE: 66 MMHG | HEART RATE: 65 BPM | HEIGHT: 69 IN | BODY MASS INDEX: 29.68 KG/M2 | WEIGHT: 200.4 LBS | TEMPERATURE: 97.7 F

## 2023-08-22 VITALS
WEIGHT: 200.4 LBS | SYSTOLIC BLOOD PRESSURE: 138 MMHG | HEIGHT: 69 IN | HEART RATE: 65 BPM | OXYGEN SATURATION: 95 % | BODY MASS INDEX: 29.68 KG/M2 | TEMPERATURE: 97.7 F | DIASTOLIC BLOOD PRESSURE: 66 MMHG

## 2023-08-22 DIAGNOSIS — N18.31 STAGE 3A CHRONIC KIDNEY DISEASE (HCC): ICD-10-CM

## 2023-08-22 DIAGNOSIS — M10.9 ACUTE GOUT INVOLVING TOE, UNSPECIFIED CAUSE, UNSPECIFIED LATERALITY: ICD-10-CM

## 2023-08-22 DIAGNOSIS — F33.1 MODERATE EPISODE OF RECURRENT MAJOR DEPRESSIVE DISORDER (HCC): ICD-10-CM

## 2023-08-22 DIAGNOSIS — E78.5 DYSLIPIDEMIA: ICD-10-CM

## 2023-08-22 DIAGNOSIS — I10 BENIGN ESSENTIAL HYPERTENSION: Primary | ICD-10-CM

## 2023-08-22 DIAGNOSIS — F10.10 ALCOHOL ABUSE: ICD-10-CM

## 2023-08-22 DIAGNOSIS — Z00.00 MEDICARE ANNUAL WELLNESS VISIT, SUBSEQUENT: Primary | ICD-10-CM

## 2023-08-22 PROCEDURE — 3078F DIAST BP <80 MM HG: CPT | Performed by: STUDENT IN AN ORGANIZED HEALTH CARE EDUCATION/TRAINING PROGRAM

## 2023-08-22 PROCEDURE — G0439 PPPS, SUBSEQ VISIT: HCPCS | Performed by: STUDENT IN AN ORGANIZED HEALTH CARE EDUCATION/TRAINING PROGRAM

## 2023-08-22 PROCEDURE — 3075F SYST BP GE 130 - 139MM HG: CPT | Performed by: STUDENT IN AN ORGANIZED HEALTH CARE EDUCATION/TRAINING PROGRAM

## 2023-08-22 PROCEDURE — 1123F ACP DISCUSS/DSCN MKR DOCD: CPT | Performed by: STUDENT IN AN ORGANIZED HEALTH CARE EDUCATION/TRAINING PROGRAM

## 2023-08-22 PROCEDURE — 99214 OFFICE O/P EST MOD 30 MIN: CPT | Performed by: STUDENT IN AN ORGANIZED HEALTH CARE EDUCATION/TRAINING PROGRAM

## 2023-08-22 RX ORDER — NEOMYCIN SULFATE, POLYMYXIN B SULFATE AND DEXAMETHASONE 3.5; 10000; 1 MG/ML; [USP'U]/ML; MG/ML
SUSPENSION/ DROPS OPHTHALMIC
COMMUNITY
Start: 2023-08-09

## 2023-08-22 RX ORDER — FOLIC ACID 1 MG/1
1000 TABLET ORAL DAILY
Qty: 90 TABLET | Refills: 3 | Status: SHIPPED | OUTPATIENT
Start: 2023-08-22

## 2023-08-22 RX ORDER — KETOROLAC TROMETHAMINE 5 MG/ML
SOLUTION OPHTHALMIC
COMMUNITY
Start: 2023-08-09

## 2023-08-22 ASSESSMENT — ENCOUNTER SYMPTOMS
EYE DISCHARGE: 0
SHORTNESS OF BREATH: 0
ABDOMINAL PAIN: 0
NAUSEA: 0
DIARRHEA: 0
VOMITING: 0
RHINORRHEA: 0
SORE THROAT: 0
WHEEZING: 0
COUGH: 0

## 2023-08-22 ASSESSMENT — PATIENT HEALTH QUESTIONNAIRE - PHQ9
1. LITTLE INTEREST OR PLEASURE IN DOING THINGS: 0
7. TROUBLE CONCENTRATING ON THINGS, SUCH AS READING THE NEWSPAPER OR WATCHING TELEVISION: 0
SUM OF ALL RESPONSES TO PHQ QUESTIONS 1-9: 0
SUM OF ALL RESPONSES TO PHQ QUESTIONS 1-9: 0
5. POOR APPETITE OR OVEREATING: 0
SUM OF ALL RESPONSES TO PHQ9 QUESTIONS 1 & 2: 0
4. FEELING TIRED OR HAVING LITTLE ENERGY: 0
9. THOUGHTS THAT YOU WOULD BE BETTER OFF DEAD, OR OF HURTING YOURSELF: 0
SUM OF ALL RESPONSES TO PHQ QUESTIONS 1-9: 0
SUM OF ALL RESPONSES TO PHQ QUESTIONS 1-9: 0
6. FEELING BAD ABOUT YOURSELF - OR THAT YOU ARE A FAILURE OR HAVE LET YOURSELF OR YOUR FAMILY DOWN: 0
2. FEELING DOWN, DEPRESSED OR HOPELESS: 0
10. IF YOU CHECKED OFF ANY PROBLEMS, HOW DIFFICULT HAVE THESE PROBLEMS MADE IT FOR YOU TO DO YOUR WORK, TAKE CARE OF THINGS AT HOME, OR GET ALONG WITH OTHER PEOPLE: 0
3. TROUBLE FALLING OR STAYING ASLEEP: 0
8. MOVING OR SPEAKING SO SLOWLY THAT OTHER PEOPLE COULD HAVE NOTICED. OR THE OPPOSITE, BEING SO FIGETY OR RESTLESS THAT YOU HAVE BEEN MOVING AROUND A LOT MORE THAN USUAL: 0

## 2023-08-22 ASSESSMENT — LIFESTYLE VARIABLES
HOW MANY STANDARD DRINKS CONTAINING ALCOHOL DO YOU HAVE ON A TYPICAL DAY: PATIENT DOES NOT DRINK
HOW OFTEN DO YOU HAVE A DRINK CONTAINING ALCOHOL: NEVER

## 2023-08-22 NOTE — PROGRESS NOTES
rhonchi or rales. Abdominal:      General: Bowel sounds are normal. There is no distension. Palpations: Abdomen is soft. Tenderness: There is no abdominal tenderness. Musculoskeletal:         General: No swelling or deformity. Cervical back: Normal range of motion and neck supple. Right lower leg: No edema. Left lower leg: No edema. Lymphadenopathy:      Cervical: No cervical adenopathy. Skin:     General: Skin is warm and dry. Findings: No rash. Neurological:      General: No focal deficit present. Mental Status: He is alert. Gait: Gait is intact. Psychiatric:         Mood and Affect: Mood normal.         Behavior: Behavior normal.          Assessment & Plan       1. Benign essential hypertension  Stable, chronic. Slightly hypertensive today. Continue to monitor. Continue amlodipine 10 mg daily metoprolol succinate 100 mg daily. Refill not needed. Check metabolic panel. Continue to monitor. Follow-up as scheduled. - Comprehensive Metabolic Panel; Future    2. Dyslipidemia  Stable, chronic. Continue atorvastatin 40 mg daily. No refill needed. Continue to monitor. Follow-up as scheduled. 3. Stage 3a chronic kidney disease (HCC)  Stable, chronic. Check labs. Review at follow-up appointment. - Comprehensive Metabolic Panel; Future  - CBC with Auto Differential; Future    4. Alcohol abuse  Stable, chronic. Continue folic acid. Refill sent to the pharmacy. - folic acid (FOLVITE) 1 MG tablet; Take 1 tablet by mouth daily  Dispense: 90 tablet; Refill: 3    5. Acute gout involving toe, unspecified cause, unspecified laterality  Stable, chronic. Continue allopurinol daily. Continue to monitor. Follow-up as scheduled. 6. Moderate episode of recurrent major depressive disorder (HCC)  Stable, chronic. Continue sertraline 50 mg daily. Consider tapering off at next appointment. Continue to monitor. Follow-up as scheduled.     Orders Placed This

## 2023-09-29 ENCOUNTER — HOSPITAL ENCOUNTER (EMERGENCY)
Age: 68
Discharge: HOME | End: 2023-09-29
Payer: COMMERCIAL

## 2023-09-29 ENCOUNTER — APPOINTMENT (OUTPATIENT)
Dept: GENERAL RADIOLOGY | Age: 68
End: 2023-09-29
Payer: COMMERCIAL

## 2023-09-29 VITALS
OXYGEN SATURATION: 94 % | BODY MASS INDEX: 29.62 KG/M2 | SYSTOLIC BLOOD PRESSURE: 189 MMHG | DIASTOLIC BLOOD PRESSURE: 76 MMHG | HEIGHT: 69 IN | WEIGHT: 200 LBS | HEART RATE: 79 BPM | TEMPERATURE: 98.2 F | RESPIRATION RATE: 18 BRPM

## 2023-09-29 DIAGNOSIS — M25.521 RIGHT ELBOW PAIN: Primary | ICD-10-CM

## 2023-09-29 DIAGNOSIS — M25.421 ELBOW EFFUSION, RIGHT: ICD-10-CM

## 2023-09-29 LAB — URATE SERPL-MCNC: 3.8 MG/DL (ref 3.4–7)

## 2023-09-29 PROCEDURE — 36415 COLL VENOUS BLD VENIPUNCTURE: CPT

## 2023-09-29 PROCEDURE — 6370000000 HC RX 637 (ALT 250 FOR IP): Performed by: NURSE PRACTITIONER

## 2023-09-29 PROCEDURE — 99284 EMERGENCY DEPT VISIT MOD MDM: CPT

## 2023-09-29 PROCEDURE — 73080 X-RAY EXAM OF ELBOW: CPT

## 2023-09-29 PROCEDURE — 73030 X-RAY EXAM OF SHOULDER: CPT

## 2023-09-29 PROCEDURE — 84550 ASSAY OF BLOOD/URIC ACID: CPT

## 2023-09-29 RX ORDER — PREDNISONE 10 MG/1
30 TABLET ORAL DAILY
Qty: 15 TABLET | Refills: 0 | Status: SHIPPED | OUTPATIENT
Start: 2023-09-29 | End: 2023-10-04

## 2023-09-29 RX ORDER — OXYCODONE HYDROCHLORIDE AND ACETAMINOPHEN 5; 325 MG/1; MG/1
1 TABLET ORAL ONCE
Status: COMPLETED | OUTPATIENT
Start: 2023-09-29 | End: 2023-09-29

## 2023-09-29 RX ORDER — OXYCODONE HYDROCHLORIDE AND ACETAMINOPHEN 5; 325 MG/1; MG/1
1 TABLET ORAL EVERY 6 HOURS PRN
Qty: 10 TABLET | Refills: 0 | Status: SHIPPED | OUTPATIENT
Start: 2023-09-29 | End: 2023-10-02

## 2023-09-29 RX ADMIN — OXYCODONE HYDROCHLORIDE AND ACETAMINOPHEN 1 TABLET: 5; 325 TABLET ORAL at 20:04

## 2023-09-29 ASSESSMENT — PAIN DESCRIPTION - ORIENTATION
ORIENTATION: RIGHT
ORIENTATION: RIGHT

## 2023-09-29 ASSESSMENT — PAIN SCALES - GENERAL
PAINLEVEL_OUTOF10: 6
PAINLEVEL_OUTOF10: 8

## 2023-09-29 ASSESSMENT — ENCOUNTER SYMPTOMS
SHORTNESS OF BREATH: 0
COUGH: 0
ABDOMINAL PAIN: 0
BACK PAIN: 0

## 2023-09-29 ASSESSMENT — PAIN DESCRIPTION - FREQUENCY: FREQUENCY: INTERMITTENT

## 2023-09-29 ASSESSMENT — PAIN DESCRIPTION - DESCRIPTORS: DESCRIPTORS: ACHING;DISCOMFORT;CRUSHING

## 2023-09-29 ASSESSMENT — PAIN DESCRIPTION - LOCATION
LOCATION: ARM
LOCATION: ELBOW

## 2023-09-29 ASSESSMENT — PAIN DESCRIPTION - PAIN TYPE: TYPE: ACUTE PAIN

## 2023-09-29 ASSESSMENT — PAIN - FUNCTIONAL ASSESSMENT: PAIN_FUNCTIONAL_ASSESSMENT: 0-10

## 2023-09-29 NOTE — ED TRIAGE NOTES
Pt to ed from home via triage with c/o right arm pain, worse at elbow  Pt reports onset of pain Wednesday upon awakening  Pt reports history of gout, states he has been taking allopurinol   Pt denies injury or trauma, radial pulse strong, edema noted to right elbow, no redness or warmth, skin intact  On arrival pt skin WDI, respirations even and unlabored   Pt calm and cooperative, alert and oriented. No s/s of acute distress noted.

## 2023-10-13 PROBLEM — M10.9 GOUT: Status: ACTIVE | Noted: 2023-10-13

## 2023-10-13 PROBLEM — M17.11 PRIMARY OSTEOARTHRITIS OF RIGHT KNEE: Status: ACTIVE | Noted: 2023-10-13

## 2023-10-13 PROBLEM — M06.9 RHEUMATOID ARTHRITIS (MULTI): Status: ACTIVE | Noted: 2023-10-13

## 2023-10-13 PROBLEM — M25.561 RIGHT KNEE PAIN: Status: ACTIVE | Noted: 2023-10-13

## 2023-10-13 PROBLEM — M51.36 DISC DEGENERATION, LUMBAR: Status: ACTIVE | Noted: 2023-10-13

## 2023-10-13 PROBLEM — M47.816 LUMBAR SPONDYLOSIS: Status: ACTIVE | Noted: 2023-10-13

## 2023-10-13 PROBLEM — M25.352 INSTABILITY OF LEFT HIP JOINT: Status: ACTIVE | Noted: 2023-10-13

## 2023-10-13 PROBLEM — M51.369 DISC DEGENERATION, LUMBAR: Status: ACTIVE | Noted: 2023-10-13

## 2023-10-13 PROBLEM — G47.00 INSOMNIA: Status: ACTIVE | Noted: 2023-10-13

## 2023-10-13 RX ORDER — ALLOPURINOL 300 MG/1
300 TABLET ORAL DAILY
COMMUNITY

## 2023-10-13 RX ORDER — OXYCODONE HYDROCHLORIDE 60 MG/1
60 TABLET, FILM COATED, EXTENDED RELEASE ORAL 2 TIMES DAILY
COMMUNITY
Start: 2014-02-20

## 2023-10-13 RX ORDER — ZOLPIDEM TARTRATE 10 MG/1
1 TABLET ORAL NIGHTLY
COMMUNITY
Start: 2012-10-12

## 2023-10-13 RX ORDER — PANTOPRAZOLE SODIUM 40 MG/1
40 TABLET, DELAYED RELEASE ORAL
COMMUNITY

## 2023-10-13 RX ORDER — OXYCODONE AND ACETAMINOPHEN 10; 325 MG/1; MG/1
1 TABLET ORAL 2 TIMES DAILY
COMMUNITY
Start: 2014-02-20

## 2023-10-13 RX ORDER — ATORVASTATIN CALCIUM 40 MG/1
40 TABLET, FILM COATED ORAL
COMMUNITY

## 2023-10-13 RX ORDER — AMLODIPINE BESYLATE 10 MG/1
TABLET ORAL
COMMUNITY
Start: 2013-03-25

## 2023-10-13 RX ORDER — TRAZODONE HYDROCHLORIDE 50 MG/1
50 TABLET ORAL
COMMUNITY

## 2023-10-13 RX ORDER — METOPROLOL TARTRATE 100 MG/1
100 TABLET ORAL DAILY
COMMUNITY

## 2023-10-13 RX ORDER — GABAPENTIN 100 MG/1
100 CAPSULE ORAL 2 TIMES DAILY
COMMUNITY

## 2023-10-13 RX ORDER — SERTRALINE HYDROCHLORIDE 50 MG/1
50 TABLET, FILM COATED ORAL
COMMUNITY

## 2023-10-13 RX ORDER — FOLIC ACID 1 MG/1
TABLET ORAL DAILY
COMMUNITY

## 2023-10-16 ENCOUNTER — OFFICE VISIT (OUTPATIENT)
Dept: ORTHOPEDIC SURGERY | Facility: CLINIC | Age: 68
End: 2023-10-16
Payer: COMMERCIAL

## 2023-10-16 ENCOUNTER — APPOINTMENT (OUTPATIENT)
Dept: RHEUMATOLOGY | Facility: CLINIC | Age: 68
End: 2023-10-16
Payer: COMMERCIAL

## 2023-10-16 VITALS — HEIGHT: 69 IN | WEIGHT: 200 LBS | BODY MASS INDEX: 29.62 KG/M2

## 2023-10-16 DIAGNOSIS — M77.11 LATERAL EPICONDYLITIS OF RIGHT ELBOW: ICD-10-CM

## 2023-10-16 DIAGNOSIS — M75.111 NONTRAUMATIC INCOMPLETE TEAR OF RIGHT ROTATOR CUFF: Primary | ICD-10-CM

## 2023-10-16 PROCEDURE — 1159F MED LIST DOCD IN RCRD: CPT | Performed by: ORTHOPAEDIC SURGERY

## 2023-10-16 PROCEDURE — 99213 OFFICE O/P EST LOW 20 MIN: CPT | Performed by: ORTHOPAEDIC SURGERY

## 2023-10-16 PROCEDURE — 99203 OFFICE O/P NEW LOW 30 MIN: CPT | Performed by: ORTHOPAEDIC SURGERY

## 2023-10-16 PROCEDURE — 1036F TOBACCO NON-USER: CPT | Performed by: ORTHOPAEDIC SURGERY

## 2023-10-16 RX ORDER — IBUPROFEN 600 MG/1
600 TABLET ORAL EVERY 8 HOURS PRN
Qty: 90 TABLET | Refills: 0 | Status: SHIPPED | OUTPATIENT
Start: 2023-10-16 | End: 2023-11-15

## 2023-10-16 ASSESSMENT — ENCOUNTER SYMPTOMS
DEPRESSION: 0
LOSS OF SENSATION IN FEET: 0
OCCASIONAL FEELINGS OF UNSTEADINESS: 1

## 2023-10-16 NOTE — PROGRESS NOTES
History of Present Illness   No chief complaint on file.      History of Present Illness   Patient presents today endorsing shoulder pain.  The pain is worse with overhead activity and tends to wake the patient at night. The pain is sharp in nature, localizes lateral and deep.  Better with rest.  Has been sore and uncomfortable for 2 to 3 weeks.  He also has some complaints of elbow pain as well pain is relieved by resting to use it.  He is icing it down and states that she was started to feel little bit better on its own    []  Imaging  Shoulder: No acute fractures dislocations.  No arthritic change.  Elbow: No acute fractures or dislocations.  Minor arthritic change  Assessment:   Right shoulder cuff tendinitis  Right elbow lateral condylitis/tennis elbow    Plan:  We reviewed the role of imaging, physical therapy, injections and the time frame to healing and correlation with outcome.  1.  Continue with conservative treatment.  No injection today   2.  NSAID: Ibuprofen 600 mg prescription sent to the pharmacy.  GI side effects and medical risks discussed  3.  Ice: 30 minutes on and off  4.  Exercise home program: Medically directed Shoulder therapy / Handout given  5.  Injection options discussed.  Follow-up as needed  Physical Exam  Well-nourished, well-developed. No acute distress. Alert and oriented x3. Responds appropriately to questioning. Good mood. Normal affect.  Physical Exam  Right shoulder:  Skin healthy to gross inspection. No ecchymosis, no swelling, no gross atrophy  No tenderness to palpation over acromioclavicular joint  Mild pain in the biceps  ROM: 130 forward flexion  Strength: 5/5 Resisted elevation, external rotation   Pain with lift off and Speeds test + impingement  Negative Spurling´s test  Positive Neer and Hawkin´s test  Neurovascular exam normal distally  Right elbow: Full passive motion, minor pain over the lateral condyle.  Pain resisted middle finger extension.     Review of Systems    GENERAL: Negative for malaise, significant weight loss, fever  MUSCULOSKELETAL: See HPI  NEURO:  Negative     Past Medical History:   Diagnosis Date    Chronic sinusitis, unspecified 08/12/2013    Sinusitis    Other conditions influencing health status 08/12/2013    Sacral Disorder    Other intervertebral disc degeneration, lumbar region 05/23/2016    Disc degeneration, lumbar    Pain in unspecified hip 08/12/2013    Joint pain, hip    Personal history of other diseases of the musculoskeletal system and connective tissue 08/12/2013    History of low back pain    Personal history of other diseases of the nervous system and sense organs 08/12/2013    History of sleep apnea    Radiculopathy, thoracic region 08/12/2013    Thoracic neuritis    Spinal stenosis, site unspecified 08/12/2013    Spinal stenosis    Spondylosis without myelopathy or radiculopathy, lumbar region 12/23/2013    Lumbar spondylosis    Unilateral primary osteoarthritis, unspecified hip 08/12/2013    Primary localized osteoarthrosis of the hip       Medication Documentation Review Audit       Reviewed by Renetta Steele CMA (Medical Assistant) on 10/16/23 at 1617      Medication Order Taking? Sig Documenting Provider Last Dose Status   allopurinol (Zyloprim) 300 mg tablet [id number]  Take 1 tablet (300 mg) by mouth once daily. Historical Provider, MD  Active   amLODIPine (Norvasc) 10 mg tablet [id number]  Take by mouth. Historical Provider, MD  Active   atorvastatin (Lipitor) 40 mg tablet [id number]  Take 1 tablet (40 mg) by mouth. Historical Provider, MD  Active   folic acid (Folvite) 1 mg tablet [id number]  Take by mouth once daily. Historical Provider, MD  Active   gabapentin (Neurontin) 100 mg capsule [id number]  Take 1 capsule (100 mg) by mouth 2 times a day. Historical Provider, MD  Active   metoprolol tartrate (Lopressor) 100 mg tablet [id number]  Take 1 tablet (100 mg) by mouth once daily. Historical Provider, MD  Active   oxyCODONE ER (OxyCONTIN)  60 mg 12 hr tablet [id number]  Take 1 tablet (60 mg) by mouth 2 times a day. Historical Provider, MD  Active   oxyCODONE-acetaminophen (Percocet)  mg tablet [id number]  Take 1 tablet by mouth 2 times a day. Historical Provider, MD  Active   pantoprazole (ProtoNix) 40 mg EC tablet [id number]  Take 1 tablet (40 mg) by mouth once daily in the morning. Take before meals. Do not crush, chew, or split. Historical Provider, MD  Active   sertraline (Zoloft) 50 mg tablet [id number]  Take 1 tablet (50 mg) by mouth. Historical Provider, MD  Active   traZODone (Desyrel) 50 mg tablet [id number]  Take 1 tablet (50 mg) by mouth. Historical Provider, MD  Active   zolpidem (Ambien) 10 mg tablet [id number]  Take 1 tablet (10 mg) by mouth once daily at bedtime. Historical Provider, MD  Active                    No Known Allergies    Social History     Socioeconomic History    Marital status: Unknown     Spouse name: Not on file    Number of children: Not on file    Years of education: Not on file    Highest education level: Not on file   Occupational History    Not on file   Tobacco Use    Smoking status: Former     Types: Cigarettes     Quit date: 2020     Years since quitting: 3.7    Smokeless tobacco: Never   Substance and Sexual Activity    Alcohol use: Not on file    Drug use: Not on file    Sexual activity: Not on file   Other Topics Concern    Not on file   Social History Narrative    Not on file     Social Determinants of Health     Financial Resource Strain: Not on file   Food Insecurity: Not on file   Transportation Needs: Not on file   Physical Activity: Not on file   Stress: Not on file   Social Connections: Not on file   Intimate Partner Violence: Not on file   Housing Stability: Not on file       History reviewed. No pertinent surgical history.    No results found.

## 2023-10-19 DIAGNOSIS — F33.1 MODERATE EPISODE OF RECURRENT MAJOR DEPRESSIVE DISORDER (HCC): ICD-10-CM

## 2023-10-25 ENCOUNTER — OFFICE VISIT (OUTPATIENT)
Dept: UROLOGY | Age: 68
End: 2023-10-25

## 2023-10-25 VITALS
BODY MASS INDEX: 29.62 KG/M2 | SYSTOLIC BLOOD PRESSURE: 136 MMHG | WEIGHT: 200 LBS | DIASTOLIC BLOOD PRESSURE: 84 MMHG | HEART RATE: 68 BPM | HEIGHT: 69 IN | OXYGEN SATURATION: 96 %

## 2023-10-25 DIAGNOSIS — R97.20 ELEVATED PSA: Primary | ICD-10-CM

## 2023-10-25 PROCEDURE — 3079F DIAST BP 80-89 MM HG: CPT | Performed by: UROLOGY

## 2023-10-25 PROCEDURE — 3075F SYST BP GE 130 - 139MM HG: CPT | Performed by: UROLOGY

## 2023-10-25 PROCEDURE — 1123F ACP DISCUSS/DSCN MKR DOCD: CPT | Performed by: UROLOGY

## 2023-10-25 PROCEDURE — 99214 OFFICE O/P EST MOD 30 MIN: CPT | Performed by: UROLOGY

## 2023-10-25 ASSESSMENT — ENCOUNTER SYMPTOMS: ABDOMINAL PAIN: 0

## 2023-10-25 NOTE — PROGRESS NOTES
Subjective:      Patient ID: Maria Luisa Noel is a 70 y.o. male    HPI  This is a 71 yo male with h/o CBP, Gout, COPD, GERD, HTN, Depression, PUD, CKD and back for continued evaluation of an elevated PSA. Since last seen on 23, he has no new voiding complaints. He has no pain and I reviewed the interval PSA. He is here with his wife today. He has no interval medical or surgical problems.      Past Medical History:   Diagnosis Date    Alcohol abuse     Alcohol abuse     Allergic rhinitis     Aortic regurgitation     Cervical radiculopathy at  10/13/2013    Chronic back pain     Chronic back pain     COPD (chronic obstructive pulmonary disease) (HCC)     Erectile dysfunction     GERD (gastroesophageal reflux disease)     Hyperlipidemia     Hypertension     Insomnia     Moderate episode of recurrent major depressive disorder ([address]) 2021    Osteoarthritis     PUD (peptic ulcer disease)     Restless leg syndrome     Smoker unmotivated to quit     Stage 3a chronic kidney disease ([address]) 2023    Vitamin D deficiency      Past Surgical History:   Procedure Laterality Date    CATARACT EXTRACTION Right 2023    COLONOSCOPY N/A 2020    COLONOSCOPY WITH POLYPECTOMY performed by Lupe Alan MD at [address] [place] [address] 2023    COLORECTAL CANCER SCREENING, HIGH RISK performed by Lupe Alan MD at [address]      Left hip replacement    SPINE SURGERY  2006    fusion L4-L5     Social History     Socioeconomic History    Marital status:    Tobacco Use    Smoking status: Former     Packs/day: 0.25     Years: 49.00     Additional pack years: 0.00     Total pack years: 12.25     Types: Cigars, Cigarettes     Quit date: 10/1/2021     Years since quittin.0    Smokeless tobacco: Never    Tobacco comments:     started age 24   Vaping Use    Vaping Use: Never used   Substance and Sexual Activity    Alcohol use: Never    Drug use: Not Currently

## 2023-10-28 DIAGNOSIS — M10.9 ACUTE GOUT INVOLVING TOE, UNSPECIFIED CAUSE, UNSPECIFIED LATERALITY: ICD-10-CM

## 2023-10-30 RX ORDER — ALLOPURINOL 300 MG/1
300 TABLET ORAL DAILY
Qty: 90 TABLET | Refills: 0 | Status: SHIPPED | OUTPATIENT
Start: 2023-10-30

## 2023-11-06 DIAGNOSIS — K21.9 GASTROESOPHAGEAL REFLUX DISEASE, UNSPECIFIED WHETHER ESOPHAGITIS PRESENT: ICD-10-CM

## 2023-11-07 RX ORDER — PANTOPRAZOLE SODIUM 40 MG/1
40 TABLET, DELAYED RELEASE ORAL DAILY
Qty: 90 TABLET | Refills: 1 | Status: SHIPPED | OUTPATIENT
Start: 2023-11-07

## 2023-11-19 NOTE — PROGRESS NOTES
Subjective   Patient ID: [id number]   Go Decker is a 68 y.o. pleasant African American M with gout, OA (s/p L hip replacement), HTN, HLD, GERD, anxiety, and BMI 27, presents for follow up for gout.      Current rheum meds:  - Allopurinol 300mg daily  - Colchicine 0.6mg daily    Previous rheum meds:  - None     HPI  Saw ortho in 10/23 for shoulder pain diagnosed as right shoulder cuff tendinitis and right elbow lateral epicondylitis/tennis elbow, given conservative tx with NSAIDs and home exercises, reports it resolved within a few days of taking NSAIDs  The patient is doing well  No painful joints or swelling, no attacks of gout, it has been years   No alcohol  No high fructose corn syrup containing beverages  No shellfish  Once a week  red meats   No rashes   No kidney stones  No CKD  Compliant with meds  No side effects reported    ROS:  As per HPI     Rheum hx (Recall from Dr. Black's notes):  Pt states he initially got diagnosed with Gout at [facility] where he was admitted for 6 days. He went in on 4/22/23 with complaints of new onset intense bilateral feet pain with associated swelling involving bilateral toes as well as the foot. He does not recall significant redness or warmth but the skin at this area has become more dark in coloration. Denies any other joint involvement. No prior issues with gout. Initially, he was treated with Percocet and morphine. He has required more than 3 ED visits and a visit with PCP for same issues since that time.      He was started on Allopurinol 300mg by PCP 2-3 weeks ago + colchicine 0.6mg daily (however was asked to stop colchicine at some point, so just has been on Allopurinol 300mg). He has been having recurrent symptoms although less severe - mostly in feet/toe since that time. He was put on prednisone X2 courses, prescribed by ED, due to multiple visits, he was asked to see us for management of gout.      He denies smoking, prior ETOH use including beer/hard  liquor but has cut back down since above. Also monitoring dietary changes that he was instructed about. He is not consuming significant high sugar/fructose drinks currently  No prior aspiration studies     Today complains of pain at the bilateral big toe, mild swelling at the toe. Rest foot is ok. No symptoms in knees, hand, wrist or elbow. His problem list notes Rheumatoid arthritis- pt unsure about this diagnosis or when this was put in. He has not been on IS therapy per recollection or our records.     Patient Active Problem List   Diagnosis    Disc degeneration, lumbar    Insomnia    Instability of left hip joint    Primary osteoarthritis of right knee    Rheumatoid arthritis (CMS/HCC)    Right knee pain    Gout    Lumbar spondylosis      Past Medical History:   Diagnosis Date    Chronic sinusitis, unspecified 08/12/2013    Sinusitis    Other conditions influencing health status 08/12/2013    Sacral Disorder    Other intervertebral disc degeneration, lumbar region 05/23/2016    Disc degeneration, lumbar    Pain in unspecified hip 08/12/2013    Joint pain, hip    Personal history of other diseases of the musculoskeletal system and connective tissue 08/12/2013    History of low back pain    Personal history of other diseases of the nervous system and sense organs 08/12/2013    History of sleep apnea    Radiculopathy, thoracic region 08/12/2013    Thoracic neuritis    Spinal stenosis, site unspecified 08/12/2013    Spinal stenosis    Spondylosis without myelopathy or radiculopathy, lumbar region 12/23/2013    Lumbar spondylosis    Unilateral primary osteoarthritis, unspecified hip 08/12/2013    Primary localized osteoarthrosis of the hip      History reviewed. No pertinent surgical history.     Social History     Socioeconomic History    Marital status: Unknown     Spouse name: Not on file    Number of children: Not on file    Years of education: Not on file    Highest education level: Not on file   Occupational  History    Not on file   Tobacco Use    Smoking status: Former     Types: Cigarettes     Quit date: 2020     Years since quitting: 3.8    Smokeless tobacco: Never   Substance and Sexual Activity    Alcohol use: Not on file    Drug use: Not on file    Sexual activity: Not on file   Other Topics Concern    Not on file   Social History Narrative    Not on file     Social Determinants of Health     Financial Resource Strain: Not on file   Food Insecurity: Not on file   Transportation Needs: Not on file   Physical Activity: Not on file   Stress: Not on file   Social Connections: Not on file   Intimate Partner Violence: Not on file   Housing Stability: Not on file      No Known Allergies     Current Outpatient Medications:     allopurinol (Zyloprim) 300 mg tablet, Take 1 tablet (300 mg) by mouth once daily., Disp: , Rfl:     amLODIPine (Norvasc) 10 mg tablet, Take by mouth., Disp: , Rfl:     atorvastatin (Lipitor) 40 mg tablet, Take 1 tablet (40 mg) by mouth., Disp: , Rfl:     folic acid (Folvite) 1 mg tablet, Take by mouth once daily., Disp: , Rfl:     gabapentin (Neurontin) 100 mg capsule, Take 1 capsule (100 mg) by mouth 2 times a day., Disp: , Rfl:     oxyCODONE ER (OxyCONTIN) 60 mg 12 hr tablet, Take 1 tablet (60 mg) by mouth 2 times a day., Disp: , Rfl:     pantoprazole (ProtoNix) 40 mg EC tablet, Take 1 tablet (40 mg) by mouth once daily in the morning. Take before meals. Do not crush, chew, or split., Disp: , Rfl:     sertraline (Zoloft) 50 mg tablet, Take 1 tablet (50 mg) by mouth., Disp: , Rfl:     traZODone (Desyrel) 50 mg tablet, Take 1 tablet (50 mg) by mouth., Disp: , Rfl:     zolpidem (Ambien) 10 mg tablet, Take 1 tablet (10 mg) by mouth once daily at bedtime., Disp: , Rfl:     metoprolol tartrate (Lopressor) 100 mg tablet, Take 1 tablet (100 mg) by mouth once daily., Disp: , Rfl:     oxyCODONE-acetaminophen (Percocet)  mg tablet, Take 1 tablet by mouth 2 times a day., Disp: , Rfl:        Objective    Visit Vitals  /78 (BP Location: Left arm, Patient Position: Sitting)   Pulse 96      Physical Exam:  General: AAOx3, Cooperative  Eyes: EOMI, conjunctiva clear, sclera white, anicteric  Throat/Mouth: No oral deformities, no cheek swelling, mucosa appear moist, no oral ulcers noted or loss of dentition   Skin: No rashes, ulcers or photosensitive areas. Very dry   MSK: Upper Extremities:  Hand/Fingers: No erythema, edema, tenderness or warmth at DIP, PIP, or MCP joints, FROM grossly. Good hand . No nodules. No deformities   Wrists: No erythema, edema, warmth or tenderness at wrist, FROM grossly  Elbows: No tenderness, edema, erythema or warmth at elbows, FROM grossly. No nodules   Shoulders: No edema, erythema, tenderness or warmth at shoulders. FROM  Lower Extremities:   Hips: No obvious deformities. No joint tenderness, normal ROM grossly. Kathryn test is negative bilaterally. No trochanteric bursae TTP  Knees: No tenderness, deformities, edema, rashes, or warmth, normal ROM grossly. No crepitus, no pes anserine bursa TTP   Ankles, feet: No deformities, tenderness, edema, erythema, ulceration, or warmth at the ankle or MTP/IP joints, normal ROM grossly  Spine: No spinal tenderness to palpation. No SI joint tenderness      Lab Results   Component Value Date    WBC 6.9 06/16/2023    HGB 11.0 (L) 06/16/2023    HCT 35.6 (L) 06/16/2023    MCV 93 06/16/2023     06/16/2023        Chemistry    Lab Results   Component Value Date/Time    BUN 8 06/16/2023 1407    CREATININE 0.74 06/16/2023 1407    Lab Results   Component Value Date/Time    ALKPHOS 104 06/16/2023 1407    AST 20 06/16/2023 1407    ALT 13 06/16/2023 1407    BILITOT 0.7 06/16/2023 1407        Lab Results   Component Value Date    ALT 13 06/16/2023    AST 20 06/16/2023    ALKPHOS 104 06/16/2023    BILITOT 0.7 06/16/2023      Lab Results   Component Value Date    URICACID 3.4 (L) 07/17/2023        Assessment/Plan:  This is a 68 Y pleasant   American M with gout and OA (s/p L hip replacement), presents for follow up for non tophaceous gout.   Last seen by Dr. Black in 8/23     Pt with intermittent episodes of inflammatory arthritis in great toe, feet, and ankle, new onset X2-3 months with intermittent response to oral prednisone, colchicine and elevated uric acid is suggestive of gout. He has had no prior aspiration studies. Doubt infectious etiology as ongoing X2-3 months without fever, resolution of symptoms with above tx. Although rheumatoid arthritis is listed in problem list, pt unsure about this diagnosis. His exam findings are not suggestive of untreated rheumatoid arthritis deformities, RF/CCP negative.    Labs:  6/16/23 Hb 11.0, CK, CMP wnl, RF/CCP negative  Uric acid: 9.1 (4/23) -> 3.8 (6/23, active joints at the time, was already on allopurinol 300mg at the time of this testing) -> 3.4 (7/23)        OHIE 4/22/23:  Feet: There is no evidence of acute fracture. There is normal alignment of the tarsometatarsal joints. No acute joint abnormality. No focal osseous lesion. No focal soft tissue abnormality. Hallux valgus deformity great toe  4/5/23 hip xray: A left total hip arthroplasty is seen. Alignment is normal. There is no periprosthetic fracture or abnormal lucency    Knee xray: There is severe degenerative changes, osteoarthritis of the medial compartment of the right knee.     # Non tophaceous, non crystal proven gout: Well controlled, no flares, UA at goal, 3.4<6  # OA of the hips, knees    - Make colchicine 0.6mg PRN for flares  - Continue Allopurinol 300mg every day  - Labs today and before next ricardo (CBC, CMP, UA)  - Lifestyle modifications reviewed  - Dietary habits dicussed    RTC in 4 months     Plan, including risks and benefits, was discussed with the patient, informed on how to reach us.     To schedule an appointment, call [phone]  To reach the rheumatology office, call [phone]      Maria Teresa Spicer MD   Division of  [facility]

## 2023-11-22 ENCOUNTER — LAB (OUTPATIENT)
Dept: LAB | Facility: LAB | Age: 68
End: 2023-11-22
Payer: COMMERCIAL

## 2023-11-22 ENCOUNTER — OFFICE VISIT (OUTPATIENT)
Dept: RHEUMATOLOGY | Facility: CLINIC | Age: 68
End: 2023-11-22
Payer: COMMERCIAL

## 2023-11-22 VITALS
HEART RATE: 96 BPM | HEIGHT: 69 IN | WEIGHT: 212 LBS | SYSTOLIC BLOOD PRESSURE: 142 MMHG | BODY MASS INDEX: 31.4 KG/M2 | DIASTOLIC BLOOD PRESSURE: 78 MMHG

## 2023-11-22 DIAGNOSIS — Z51.81 ENCOUNTER FOR MONITORING ALLOPURINOL THERAPY: ICD-10-CM

## 2023-11-22 DIAGNOSIS — Z79.899 ENCOUNTER FOR MONITORING ALLOPURINOL THERAPY: ICD-10-CM

## 2023-11-22 DIAGNOSIS — M15.9 PRIMARY OSTEOARTHRITIS INVOLVING MULTIPLE JOINTS: ICD-10-CM

## 2023-11-22 DIAGNOSIS — M47.816 LUMBAR SPONDYLOSIS: ICD-10-CM

## 2023-11-22 DIAGNOSIS — M1A.09X0 IDIOPATHIC CHRONIC GOUT OF MULTIPLE SITES WITHOUT TOPHUS: Primary | ICD-10-CM

## 2023-11-22 DIAGNOSIS — M1A.09X0 IDIOPATHIC CHRONIC GOUT OF MULTIPLE SITES WITHOUT TOPHUS: ICD-10-CM

## 2023-11-22 LAB
ALBUMIN SERPL BCP-MCNC: 4.2 G/DL (ref 3.4–5)
ALP SERPL-CCNC: 123 U/L (ref 33–136)
ALT SERPL W P-5'-P-CCNC: 10 U/L (ref 10–52)
ANION GAP SERPL CALC-SCNC: 16 MMOL/L (ref 10–20)
AST SERPL W P-5'-P-CCNC: 11 U/L (ref 9–39)
BASOPHILS # BLD AUTO: 0.05 X10*3/UL (ref 0–0.1)
BASOPHILS NFR BLD AUTO: 0.5 %
BILIRUB SERPL-MCNC: 0.7 MG/DL (ref 0–1.2)
BUN SERPL-MCNC: 11 MG/DL (ref 6–23)
CALCIUM SERPL-MCNC: 9 MG/DL (ref 8.6–10.3)
CHLORIDE SERPL-SCNC: 100 MMOL/L (ref 98–107)
CO2 SERPL-SCNC: 31 MMOL/L (ref 21–32)
CREAT SERPL-MCNC: 0.69 MG/DL (ref 0.5–1.3)
EOSINOPHIL # BLD AUTO: 0.09 X10*3/UL (ref 0–0.7)
EOSINOPHIL NFR BLD AUTO: 1 %
ERYTHROCYTE [DISTWIDTH] IN BLOOD BY AUTOMATED COUNT: 19 % (ref 11.5–14.5)
GFR SERPL CREATININE-BSD FRML MDRD: >90 ML/MIN/1.73M*2
GLUCOSE SERPL-MCNC: 129 MG/DL (ref 74–99)
HCT VFR BLD AUTO: 35 % (ref 41–52)
HGB BLD-MCNC: 11.1 G/DL (ref 13.5–17.5)
IMM GRANULOCYTES # BLD AUTO: 0.02 X10*3/UL (ref 0–0.7)
IMM GRANULOCYTES NFR BLD AUTO: 0.2 % (ref 0–0.9)
LYMPHOCYTES # BLD AUTO: 2.49 X10*3/UL (ref 1.2–4.8)
LYMPHOCYTES NFR BLD AUTO: 26.8 %
MCH RBC QN AUTO: 29.7 PG (ref 26–34)
MCHC RBC AUTO-ENTMCNC: 31.7 G/DL (ref 32–36)
MCV RBC AUTO: 94 FL (ref 80–100)
MONOCYTES # BLD AUTO: 1.08 X10*3/UL (ref 0.1–1)
MONOCYTES NFR BLD AUTO: 11.6 %
NEUTROPHILS # BLD AUTO: 5.55 X10*3/UL (ref 1.2–7.7)
NEUTROPHILS NFR BLD AUTO: 59.9 %
NRBC BLD-RTO: 0 /100 WBCS (ref 0–0)
PLATELET # BLD AUTO: 339 X10*3/UL (ref 150–450)
POTASSIUM SERPL-SCNC: 3.5 MMOL/L (ref 3.5–5.3)
PROT SERPL-MCNC: 7 G/DL (ref 6.4–8.2)
RBC # BLD AUTO: 3.74 X10*6/UL (ref 4.5–5.9)
SODIUM SERPL-SCNC: 143 MMOL/L (ref 136–145)
URATE SERPL-MCNC: 4.5 MG/DL (ref 4–7.5)
WBC # BLD AUTO: 9.3 X10*3/UL (ref 4.4–11.3)

## 2023-11-22 PROCEDURE — 1159F MED LIST DOCD IN RCRD: CPT | Performed by: STUDENT IN AN ORGANIZED HEALTH CARE EDUCATION/TRAINING PROGRAM

## 2023-11-22 PROCEDURE — 1160F RVW MEDS BY RX/DR IN RCRD: CPT | Performed by: STUDENT IN AN ORGANIZED HEALTH CARE EDUCATION/TRAINING PROGRAM

## 2023-11-22 PROCEDURE — 85025 COMPLETE CBC W/AUTO DIFF WBC: CPT

## 2023-11-22 PROCEDURE — 99213 OFFICE O/P EST LOW 20 MIN: CPT | Performed by: STUDENT IN AN ORGANIZED HEALTH CARE EDUCATION/TRAINING PROGRAM

## 2023-11-22 PROCEDURE — 84550 ASSAY OF BLOOD/URIC ACID: CPT

## 2023-11-22 PROCEDURE — 1036F TOBACCO NON-USER: CPT | Performed by: STUDENT IN AN ORGANIZED HEALTH CARE EDUCATION/TRAINING PROGRAM

## 2023-11-22 PROCEDURE — 36415 COLL VENOUS BLD VENIPUNCTURE: CPT

## 2023-11-22 PROCEDURE — 80053 COMPREHEN METABOLIC PANEL: CPT

## 2023-12-25 DIAGNOSIS — F51.01 PRIMARY INSOMNIA: ICD-10-CM

## 2023-12-26 RX ORDER — TRAZODONE HYDROCHLORIDE 50 MG/1
TABLET ORAL
Qty: 180 TABLET | Refills: 0 | Status: SHIPPED | OUTPATIENT
Start: 2023-12-26

## 2023-12-26 NOTE — TELEPHONE ENCOUNTER
Future Appointments    Encounter Information   Provider Department Appt Notes   1/29/2024 Kirstie Gitelman, MD [facility] Urology 3M PSA   2/27/2024 [facility] 6 Month follow up     Past Visits    Date Provider Specialty Visit Type Primary Dx   10/25/2023 Kirstie Gitelman, MD Urology Office Visit Elevated PSA   08/22/2023 Jaren Medina DO Family Medicine Office Visit Medicare annual wellness visit, subsequent

## 2024-01-10 DIAGNOSIS — M10.9 ACUTE GOUT INVOLVING TOE, UNSPECIFIED CAUSE, UNSPECIFIED LATERALITY: ICD-10-CM

## 2024-01-11 RX ORDER — ALLOPURINOL 300 MG/1
300 TABLET ORAL DAILY
Qty: 90 TABLET | Refills: 0 | Status: SHIPPED | OUTPATIENT
Start: 2024-01-11

## 2024-01-23 RX ORDER — AMLODIPINE BESYLATE 10 MG/1
10 TABLET ORAL DAILY
Qty: 90 TABLET | Refills: 1 | Status: SHIPPED | OUTPATIENT
Start: 2024-01-23

## 2024-01-24 DIAGNOSIS — R97.20 ELEVATED PSA: ICD-10-CM

## 2024-01-24 LAB — PSA SERPL-MCNC: 5.28 NG/ML (ref 0–4)

## 2024-01-29 ENCOUNTER — OFFICE VISIT (OUTPATIENT)
Dept: UROLOGY | Age: 69
End: 2024-01-29
Payer: COMMERCIAL

## 2024-01-29 VITALS
HEIGHT: 69 IN | SYSTOLIC BLOOD PRESSURE: 138 MMHG | OXYGEN SATURATION: 94 % | BODY MASS INDEX: 29.62 KG/M2 | WEIGHT: 200 LBS | DIASTOLIC BLOOD PRESSURE: 72 MMHG | HEART RATE: 98 BPM

## 2024-01-29 DIAGNOSIS — R97.20 ELEVATED PSA: Primary | ICD-10-CM

## 2024-01-29 PROCEDURE — 3075F SYST BP GE 130 - 139MM HG: CPT | Performed by: UROLOGY

## 2024-01-29 PROCEDURE — 99214 OFFICE O/P EST MOD 30 MIN: CPT | Performed by: UROLOGY

## 2024-01-29 PROCEDURE — 3078F DIAST BP <80 MM HG: CPT | Performed by: UROLOGY

## 2024-01-29 PROCEDURE — 1123F ACP DISCUSS/DSCN MKR DOCD: CPT | Performed by: UROLOGY

## 2024-01-29 RX ORDER — FINASTERIDE 5 MG/1
5 TABLET, FILM COATED ORAL DAILY
Qty: 90 TABLET | Refills: 3 | Status: SHIPPED | OUTPATIENT
Start: 2024-01-29

## 2024-01-29 ASSESSMENT — ENCOUNTER SYMPTOMS: ABDOMINAL PAIN: 0

## 2024-01-29 NOTE — PROGRESS NOTES
Subjective:      Patient ID: Donovan Nelson is a 68 y.o. male    HPI  This is a 69 yo male with h/o CBP, Gout, COPD, GERD, HTN, Depression, PUD, CKD and back for continued evaluation of an elevated PSA. Since last seen on 10/25/23, he has no new voiding complaints. He has no pain and I reviewed the interval PSA. He is here with his wife today. He has no interval medical or surgical problems.      Past Medical History:   Diagnosis Date    Alcohol abuse     Alcohol abuse     Allergic rhinitis     Aortic regurgitation     Cervical radiculopathy at  10/13/2013    Chronic back pain     Chronic back pain     COPD (chronic obstructive pulmonary disease) ([facility])     Erectile dysfunction     GERD (gastroesophageal reflux disease)     Hyperlipidemia     Hypertension     Insomnia     Moderate episode of recurrent major depressive disorder ([facility]) 2021    Osteoarthritis     PUD (peptic ulcer disease)     Restless leg syndrome     Smoker unmotivated to quit     Stage 3a chronic kidney disease ([facility]) 2023    Vitamin D deficiency      Past Surgical History:   Procedure Laterality Date    CATARACT EXTRACTION Right 2023    COLONOSCOPY N/A 2020    COLONOSCOPY WITH POLYPECTOMY performed by Ascencion Landon MD at [facility]    COLONOSCOPY N/A 2023    COLORECTAL CANCER SCREENING, HIGH RISK performed by Ascencion Landon MD at [facility]    JOINT REPLACEMENT  2008    Left hip replacement    SPINE SURGERY  2006    fusion L4-L5     Social History     Socioeconomic History    Marital status:    Tobacco Use    Smoking status: Former     Current packs/day: 0.00     Average packs/day: 0.3 packs/day for 49.0 years (12.3 ttl pk-yrs)     Types: Cigars, Cigarettes     Start date: 10/1/1972     Quit date: 10/1/2021     Years since quittin.3    Smokeless tobacco: Never    Tobacco comments:     started age 17   Vaping Use    Vaping Use: Never used   Substance and Sexual Activity    Alcohol use:

## 2024-02-02 DIAGNOSIS — I10 ESSENTIAL HYPERTENSION, BENIGN: ICD-10-CM

## 2024-02-02 DIAGNOSIS — R25.1 TREMORS OF NERVOUS SYSTEM: ICD-10-CM

## 2024-02-05 RX ORDER — METOPROLOL SUCCINATE 100 MG/1
100 TABLET, EXTENDED RELEASE ORAL DAILY
Qty: 90 TABLET | Refills: 1 | Status: SHIPPED | OUTPATIENT
Start: 2024-02-05

## 2024-02-05 NOTE — TELEPHONE ENCOUNTER
Future Appointments    Encounter Information   Provider Department Appt Notes   2/27/2024 Rachna Dukes DO [facility] 6 Month follow up   5/29/2024 Jovan Mckeon MD [facility] Urology 4M PSA     Past Visits    Date Provider Specialty Visit Type Primary Dx   01/29/2024 Jovan Mckeon MD Urology Office Visit Elevated PSA   10/25/2023 Jovan Mckeon MD Urology Office Visit Elevated PSA   08/22/2023 Rachna Dukes DO Family Medicine Office Visit Medicare annual wellness visit, subsequent

## 2024-02-23 DIAGNOSIS — E78.5 DYSLIPIDEMIA: ICD-10-CM

## 2024-02-23 RX ORDER — ATORVASTATIN CALCIUM 40 MG/1
40 TABLET, FILM COATED ORAL DAILY
Qty: 90 TABLET | Refills: 2 | Status: SHIPPED | OUTPATIENT
Start: 2024-02-23

## 2024-02-27 ENCOUNTER — OFFICE VISIT (OUTPATIENT)
Dept: FAMILY MEDICINE CLINIC | Age: 69
End: 2024-02-27
Payer: COMMERCIAL

## 2024-02-27 VITALS
HEIGHT: 69 IN | HEART RATE: 94 BPM | OXYGEN SATURATION: 93 % | BODY MASS INDEX: 32.44 KG/M2 | DIASTOLIC BLOOD PRESSURE: 70 MMHG | WEIGHT: 219 LBS | TEMPERATURE: 98.4 F | SYSTOLIC BLOOD PRESSURE: 160 MMHG

## 2024-02-27 VITALS
SYSTOLIC BLOOD PRESSURE: 160 MMHG | DIASTOLIC BLOOD PRESSURE: 70 MMHG | BODY MASS INDEX: 32.44 KG/M2 | WEIGHT: 219 LBS | OXYGEN SATURATION: 93 % | TEMPERATURE: 98.4 F | HEART RATE: 94 BPM | HEIGHT: 69 IN

## 2024-02-27 DIAGNOSIS — F51.01 PRIMARY INSOMNIA: ICD-10-CM

## 2024-02-27 DIAGNOSIS — Z00.00 MEDICARE ANNUAL WELLNESS VISIT, SUBSEQUENT: Primary | ICD-10-CM

## 2024-02-27 DIAGNOSIS — J44.89 OBSTRUCTIVE CHRONIC BRONCHITIS WITHOUT EXACERBATION: ICD-10-CM

## 2024-02-27 DIAGNOSIS — I10 ESSENTIAL HYPERTENSION, BENIGN: Primary | ICD-10-CM

## 2024-02-27 DIAGNOSIS — K21.9 GASTROESOPHAGEAL REFLUX DISEASE, UNSPECIFIED WHETHER ESOPHAGITIS PRESENT: ICD-10-CM

## 2024-02-27 DIAGNOSIS — F33.1 MODERATE EPISODE OF RECURRENT MAJOR DEPRESSIVE DISORDER (HCC): ICD-10-CM

## 2024-02-27 DIAGNOSIS — E78.5 DYSLIPIDEMIA: ICD-10-CM

## 2024-02-27 DIAGNOSIS — N18.31 STAGE 3A CHRONIC KIDNEY DISEASE (HCC): ICD-10-CM

## 2024-02-27 DIAGNOSIS — R73.9 HYPERGLYCEMIA: ICD-10-CM

## 2024-02-27 PROCEDURE — 3077F SYST BP >= 140 MM HG: CPT | Performed by: STUDENT IN AN ORGANIZED HEALTH CARE EDUCATION/TRAINING PROGRAM

## 2024-02-27 PROCEDURE — 1123F ACP DISCUSS/DSCN MKR DOCD: CPT | Performed by: STUDENT IN AN ORGANIZED HEALTH CARE EDUCATION/TRAINING PROGRAM

## 2024-02-27 PROCEDURE — G0439 PPPS, SUBSEQ VISIT: HCPCS | Performed by: STUDENT IN AN ORGANIZED HEALTH CARE EDUCATION/TRAINING PROGRAM

## 2024-02-27 PROCEDURE — 99214 OFFICE O/P EST MOD 30 MIN: CPT | Performed by: STUDENT IN AN ORGANIZED HEALTH CARE EDUCATION/TRAINING PROGRAM

## 2024-02-27 PROCEDURE — 3078F DIAST BP <80 MM HG: CPT | Performed by: STUDENT IN AN ORGANIZED HEALTH CARE EDUCATION/TRAINING PROGRAM

## 2024-02-27 RX ORDER — TRAZODONE HYDROCHLORIDE 100 MG/1
100 TABLET ORAL NIGHTLY
COMMUNITY
End: 2024-02-27 | Stop reason: SDUPTHER

## 2024-02-27 RX ORDER — PANTOPRAZOLE SODIUM 40 MG/1
40 TABLET, DELAYED RELEASE ORAL DAILY
Qty: 90 TABLET | Refills: 1 | Status: SHIPPED | OUTPATIENT
Start: 2024-02-27

## 2024-02-27 RX ORDER — ALBUTEROL SULFATE 90 UG/1
2 AEROSOL, METERED RESPIRATORY (INHALATION) EVERY 6 HOURS PRN
Qty: 3 EACH | Refills: 3 | Status: SHIPPED | OUTPATIENT
Start: 2024-02-27

## 2024-02-27 RX ORDER — TRAZODONE HYDROCHLORIDE 100 MG/1
100 TABLET ORAL NIGHTLY
Qty: 90 TABLET | Refills: 1 | Status: SHIPPED | OUTPATIENT
Start: 2024-02-27

## 2024-02-27 ASSESSMENT — PATIENT HEALTH QUESTIONNAIRE - PHQ9
SUM OF ALL RESPONSES TO PHQ QUESTIONS 1-9: 0
SUM OF ALL RESPONSES TO PHQ QUESTIONS 1-9: 0
2. FEELING DOWN, DEPRESSED OR HOPELESS: 0
3. TROUBLE FALLING OR STAYING ASLEEP: 0
6. FEELING BAD ABOUT YOURSELF - OR THAT YOU ARE A FAILURE OR HAVE LET YOURSELF OR YOUR FAMILY DOWN: 0
SUM OF ALL RESPONSES TO PHQ9 QUESTIONS 1 & 2: 0
4. FEELING TIRED OR HAVING LITTLE ENERGY: 0
1. LITTLE INTEREST OR PLEASURE IN DOING THINGS: 0
10. IF YOU CHECKED OFF ANY PROBLEMS, HOW DIFFICULT HAVE THESE PROBLEMS MADE IT FOR YOU TO DO YOUR WORK, TAKE CARE OF THINGS AT HOME, OR GET ALONG WITH OTHER PEOPLE: 0
7. TROUBLE CONCENTRATING ON THINGS, SUCH AS READING THE NEWSPAPER OR WATCHING TELEVISION: 0
SUM OF ALL RESPONSES TO PHQ QUESTIONS 1-9: 0
8. MOVING OR SPEAKING SO SLOWLY THAT OTHER PEOPLE COULD HAVE NOTICED. OR THE OPPOSITE, BEING SO FIGETY OR RESTLESS THAT YOU HAVE BEEN MOVING AROUND A LOT MORE THAN USUAL: 0
5. POOR APPETITE OR OVEREATING: 0
9. THOUGHTS THAT YOU WOULD BE BETTER OFF DEAD, OR OF HURTING YOURSELF: 0
SUM OF ALL RESPONSES TO PHQ QUESTIONS 1-9: 0

## 2024-02-27 ASSESSMENT — LIFESTYLE VARIABLES
HOW MANY STANDARD DRINKS CONTAINING ALCOHOL DO YOU HAVE ON A TYPICAL DAY: 3 OR 4
HOW OFTEN DO YOU HAVE A DRINK CONTAINING ALCOHOL: MONTHLY OR LESS

## 2024-02-27 NOTE — PATIENT INSTRUCTIONS
Learning About Being Active as an Older Adult  Why is being active important as you get older?     Being active is one of the best things you can do for your health. And it's never too late to start. Being active--or getting active, if you aren't already--has definite benefits. It can:  Give you more energy,  Keep your mind sharp.  Improve balance to reduce your risk of falls.  Help you manage chronic illness with fewer medicines.  No matter how old you are, how fit you are, or what health problems you have, there is a form of activity that will work for you. And the more physical activity you can do, the better your overall health will be.  What kinds of activity can help you stay healthy?  Being more active will make your daily activities easier. Physical activity includes planned exercise and things you do in daily life. There are four types of activity:  Aerobic.  Doing aerobic activity makes your heart and lungs strong.  Includes walking, dancing, and gardening.  Aim for at least 2½ hours spread throughout the week.  It improves your energy and can help you sleep better.  Muscle-strengthening.  This type of activity can help maintain muscle and strengthen bones.  Includes climbing stairs, using resistance bands, and lifting or carrying heavy loads.  Aim for at least twice a week.  It can help protect the knees and other joints.  Stretching.  Stretching gives you better range of motion in joints and muscles.  Includes upper arm stretches, calf stretches, and gentle yoga.  Aim for at least twice a week, preferably after your muscles are warmed up from other activities.  It can help you function better in daily life.  Balancing.  This helps you stay coordinated and have good posture.  Includes heel-to-toe walking, lester chi, and certain types of yoga.  Aim for at least 3 days a week.  It can reduce your risk of falling.  Even if you have a hard time meeting the recommendations, it's better to be more active

## 2024-02-27 NOTE — PROGRESS NOTES
Medication Sig Dispense Refill    albuterol sulfate HFA (PROVENTIL HFA) 108 (90 Base) MCG/ACT inhaler Inhale 2 puffs into the lungs every 6 hours as needed for Wheezing 3 each 3    pantoprazole (PROTONIX) 40 MG tablet Take 1 tablet by mouth daily 90 tablet 1    sertraline (ZOLOFT) 50 MG tablet Take 1 tablet by mouth daily 90 tablet 1    traZODone (DESYREL) 100 MG tablet Take 1 tablet by mouth nightly 90 tablet 1    atorvastatin (LIPITOR) 40 MG tablet TAKE 1 TABLET DAILY 90 tablet 2    metoprolol succinate (TOPROL XL) 100 MG extended release tablet TAKE 1 TABLET DAILY 90 tablet 1    finasteride (PROSCAR) 5 MG tablet Take 1 tablet by mouth daily 90 tablet 3    amLODIPine (NORVASC) 10 MG tablet TAKE 1 TABLET DAILY 90 tablet 1    folic acid (FOLVITE) 1 MG tablet Take 1 tablet by mouth daily 90 tablet 3    colchicine (COLCRYS) 0.6 MG tablet Take 1 tablet by mouth daily (Patient taking differently: Take 1 tablet by mouth as needed) 90 tablet 1    Handicap Placard MISC by Does not apply route Good for 5 years 1 each 0    allopurinol (ZYLOPRIM) 300 MG tablet TAKE 1 TABLET DAILY (Patient not taking: Reported on 2/27/2024) 90 tablet 0     No current facility-administered medications for this visit.     Allergies, PMH, Surgical Hx, Family Hx, and Social Hx reviewed and updated.  Health Maintenance reviewed.    Objective    Vitals:    02/27/24 1458 02/27/24 1554   BP: (!) 160/70 (!) 160/70   Pulse: 94    Temp: 98.4 °F (36.9 °C)    SpO2: 93%    Weight: 99.3 kg (219 lb)    Height: 1.753 m (5' 9\")        Physical Exam  Vitals reviewed.   Constitutional:       General: He is not in acute distress.  HENT:      Head: Normocephalic and atraumatic.   Eyes:      Conjunctiva/sclera: Conjunctivae normal.   Neck:      Thyroid: No thyromegaly or thyroid tenderness.   Cardiovascular:      Rate and Rhythm: Normal rate and regular rhythm.      Heart sounds: No murmur heard.     No friction rub. No gallop.   Pulmonary:      Effort: Pulmonary

## 2024-02-27 NOTE — PROGRESS NOTES
Medicare Annual Wellness Visit    Donovan Nelson is here for Medicare AWV    Assessment & Plan   Medicare annual wellness visit, subsequent  Recommendations for Preventive Services Due: see orders and patient instructions/AVS.  Recommended screening schedule for the next 5-10 years is provided to the patient in written form: see Patient Instructions/AVS.     No follow-ups on file.     Subjective       Patient's complete Health Risk Assessment and screening values have been reviewed and are found in Flowsheets. The following problems were reviewed today and where indicated follow up appointments were made and/or referrals ordered.    Positive Risk Factor Screenings with Interventions:               General HRA Questions:  Select all that apply: (!) New or Increased Pain    Pain Interventions:  Patient declined any further interventions or treatment      Activity, Diet, and Weight:  On average, how many days per week do you engage in moderate to strenuous exercise (like a brisk walk)?: 0 days  On average, how many minutes do you engage in exercise at this level?: 0 min    Do you eat balanced/healthy meals regularly?: Yes    Body mass index is 32.34 kg/m². (!) Abnormal      Inactivity Interventions:  Patient declined any further interventions or treatment  Obesity Interventions:  Patient declines any further evaluation or treatment             Hearing Screen:  Do you or your family notice any trouble with your hearing that hasn't been managed with hearing aids?: (!) Yes    Interventions:  Patient declines any further evaluation or treatment                     Objective   Vitals:    02/27/24 1531 02/27/24 1555   BP: (!) 160/70 (!) 160/70   Pulse: 94    Temp: 98.4 °F (36.9 °C)    SpO2: 93%    Weight: 99.3 kg (219 lb)    Height: 1.753 m (5' 9\")       Body mass index is 32.34 kg/m².               No Known Allergies  Prior to Visit Medications    Medication Sig Taking? Authorizing Provider   albuterol sulfate HFA

## 2024-02-28 ASSESSMENT — ENCOUNTER SYMPTOMS
DIARRHEA: 0
COUGH: 0
NAUSEA: 0
SORE THROAT: 0
VOMITING: 0
EYE DISCHARGE: 0
WHEEZING: 0
SHORTNESS OF BREATH: 0
ABDOMINAL PAIN: 0
RHINORRHEA: 0

## 2024-03-11 NOTE — PROGRESS NOTES
Subjective   Patient ID: [id number]   Go Decker is a 69 y.o. pleasant African American M with gout, OA (s/p L hip replacement), HTN, HLD, GERD, anxiety, and BMI 27, presents for follow up for gout     Current rheum meds:  - Allopurinol 300mg daily  - Colchicine 0.6mg daily PRN    Previous rheum meds:  - None     HPI  The patient reports no gout attacks, years now   Doing well from that side  Reports chronic low back pain, had back surgery before for neuropathy   Bilateral chronic knee and hip pains, had left hips replacement 30 years ago  His pains are mechanical, takes ibu, helps some but not 100%  Feels like his prosthesis cracks   Saw ortho in 10/23 for shoulder pain diagnosed as right shoulder cuff tendinitis and right elbow lateral epicondylitis/tennis elbow, given conservative tx with NSAIDs and home exercises, reports it resolved within a few days of taking NSAIDs  Some vodka occasionally   Some high fructose corn syrup containing beverages  No shellfish, lobster on occasions   Once a week red meats   No rashes   No kidney stones  No CKD  Compliant with meds  No side effects reported    ROS:  As per HPI     Rheum hx (Recall from Dr. Balck's notes):  Pt states he initially got diagnosed with Gout at [facility] where he was admitted for 6 days. He went in on 4/22/23 with complaints of new onset intense bilateral feet pain with associated swelling involving bilateral toes as well as the foot. He does not recall significant redness or warmth but the skin at this area has become more dark in coloration. Denies any other joint involvement. No prior issues with gout. Initially, he was treated with Percocet and morphine. He has required more than 3 ED visits and a visit with PCP for same issues since that time.      He was started on Allopurinol 300mg by PCP 2-3 weeks ago + colchicine 0.6mg daily (however was asked to stop colchicine at some point, so just has been on Allopurinol 300mg). He has been having  recurrent symptoms although less severe - mostly in feet/toe since that time. He was put on prednisone X2 courses, prescribed by ED, due to multiple visits, he was asked to see us for management of gout.      He denies smoking, prior ETOH use including beer/hard liquor but has cut back down since above. Also monitoring dietary changes that he was instructed about. He is not consuming significant high sugar/fructose drinks currently  No prior aspiration studies     Today complains of pain at the bilateral big toe, mild swelling at the toe. Rest foot is ok. No symptoms in knees, hand, wrist or elbow. His problem list notes Rheumatoid arthritis- pt unsure about this diagnosis or when this was put in. He has not been on IS therapy per recollection or our records.     Patient Active Problem List   Diagnosis    Disc degeneration, lumbar    Insomnia    Instability of left hip joint    Primary osteoarthritis of right knee    Rheumatoid arthritis (CMS/HCC)    Right knee pain    Gout    Lumbar spondylosis      Past Medical History:   Diagnosis Date    Chronic sinusitis, unspecified 08/12/2013    Sinusitis    Other conditions influencing health status 08/12/2013    Sacral Disorder    Other intervertebral disc degeneration, lumbar region 05/23/2016    Disc degeneration, lumbar    Pain in unspecified hip 08/12/2013    Joint pain, hip    Personal history of other diseases of the musculoskeletal system and connective tissue 08/12/2013    History of low back pain    Personal history of other diseases of the nervous system and sense organs 08/12/2013    History of sleep apnea    Radiculopathy, thoracic region 08/12/2013    Thoracic neuritis    Spinal stenosis, site unspecified 08/12/2013    Spinal stenosis    Spondylosis without myelopathy or radiculopathy, lumbar region 12/23/2013    Lumbar spondylosis    Unilateral primary osteoarthritis, unspecified hip 08/12/2013    Primary localized osteoarthrosis of the hip      History  reviewed. No pertinent surgical history.     Social History     Socioeconomic History    Marital status: Unknown     Spouse name: Not on file    Number of children: Not on file    Years of education: Not on file    Highest education level: Not on file   Occupational History    Not on file   Tobacco Use    Smoking status: Former     Types: Cigarettes     Quit date: 2020     Years since quittin.2    Smokeless tobacco: Never   Substance and Sexual Activity    Alcohol use: Not on file    Drug use: Not on file    Sexual activity: Not on file   Other Topics Concern    Not on file   Social History Narrative    Not on file     Social Determinants of Health     Financial Resource Strain: Not on file   Food Insecurity: Not on file   Transportation Needs: Not on file   Physical Activity: Not on file   Stress: Not on file   Social Connections: Not on file   Intimate Partner Violence: Not on file   Housing Stability: Not on file      No Known Allergies     Current Outpatient Medications:     allopurinol (Zyloprim) 300 mg tablet, Take 1 tablet (300 mg) by mouth once daily., Disp: , Rfl:     amLODIPine (Norvasc) 10 mg tablet, Take by mouth., Disp: , Rfl:     atorvastatin (Lipitor) 40 mg tablet, Take 1 tablet (40 mg) by mouth., Disp: , Rfl:     folic acid (Folvite) 1 mg tablet, Take by mouth once daily., Disp: , Rfl:     gabapentin (Neurontin) 100 mg capsule, Take 1 capsule (100 mg) by mouth 2 times a day., Disp: , Rfl:     meloxicam (Mobic) 7.5 mg tablet, Take 1 tablet (7.5 mg) by mouth once daily., Disp: 90 tablet, Rfl: 1    metoprolol tartrate (Lopressor) 100 mg tablet, Take 1 tablet (100 mg) by mouth once daily., Disp: , Rfl:     oxyCODONE ER (OxyCONTIN) 60 mg 12 hr tablet, Take 1 tablet (60 mg) by mouth 2 times a day., Disp: , Rfl:     oxyCODONE-acetaminophen (Percocet)  mg tablet, Take 1 tablet by mouth 2 times a day., Disp: , Rfl:     pantoprazole (ProtoNix) 40 mg EC tablet, Take 1 tablet (40 mg) by mouth once  daily in the morning. Take before meals. Do not crush, chew, or split., Disp: , Rfl:     sertraline (Zoloft) 50 mg tablet, Take 1 tablet (50 mg) by mouth., Disp: , Rfl:     traZODone (Desyrel) 50 mg tablet, Take 1 tablet (50 mg) by mouth., Disp: , Rfl:     zolpidem (Ambien) 10 mg tablet, Take 1 tablet (10 mg) by mouth once daily at bedtime., Disp: , Rfl:      Objective   Visit Vitals  /80   Pulse 80   Temp 36.8 °C (98.3 °F)   Resp 20      Physical Exam:  General: AAOx3, Cooperative  Eyes: EOMI, conjunctiva clear, sclera white, anicteric  Throat/Mouth: No oral deformities, no cheek swelling, mucosa appear moist, no oral ulcers noted or loss of dentition   Skin: No rashes, ulcers or photosensitive areas. Very dry   MSK: Upper Extremities:  Hand/Fingers: No erythema, edema, tenderness or warmth at DIP, PIP, or MCP joints, FROM grossly. Good hand . No nodules. No deformities   Wrists: No erythema, edema, warmth or tenderness at wrist, FROM grossly  Elbows: No tenderness, edema, erythema or warmth at elbows, FROM grossly. No nodules   Shoulders: No edema, erythema, tenderness or warmth at shoulders. FROM  Lower Extremities:   Hips: No obvious deformities. No joint tenderness, normal ROM grossly. Kathryn test is negative bilaterally. No trochanteric bursae TTP  Knees: No tenderness, deformities, edema, rashes, or warmth, normal ROM grossly. No crepitus, no pes anserine bursa TTP   Ankles, feet: No deformities, tenderness, edema, erythema, ulceration, or warmth at the ankle or MTP/IP joints, normal ROM grossly  Spine: No spinal tenderness to palpation. No SI joint tenderness      Lab Results   Component Value Date    WBC 9.3 11/22/2023    HGB 11.1 (L) 11/22/2023    HCT 35.0 (L) 11/22/2023    MCV 94 11/22/2023     11/22/2023        Chemistry    Lab Results   Component Value Date/Time     11/22/2023 1201    K 3.5 11/22/2023 1201     11/22/2023 1201    CO2 31 11/22/2023 1201    BUN 11 11/22/2023  1201    CREATININE 0.69 11/22/2023 1201    Lab Results   Component Value Date/Time    CALCIUM 9.0 11/22/2023 1201    ALKPHOS 123 11/22/2023 1201    AST 11 11/22/2023 1201    ALT 10 11/22/2023 1201    BILITOT 0.7 11/22/2023 1201        Lab Results   Component Value Date    ALT 10 11/22/2023    AST 11 11/22/2023    ALKPHOS 123 11/22/2023    BILITOT 0.7 11/22/2023      Lab Results   Component Value Date    URICACID 4.5 11/22/2023        Assessment/Plan    This is a 68 Y pleasant  M with gout and OA (s/p L hip replacement), presents for follow up for non tophaceous gout.   Last seen in 11/23     Pt with intermittent episodes of inflammatory arthritis in great toe, feet, and ankle, new onset X2-3 months with intermittent response to oral prednisone, colchicine and elevated uric acid is suggestive of gout. He has had no prior aspiration studies. Doubt infectious etiology as ongoing X2-3 months without fever, resolution of symptoms with above tx. Although rheumatoid arthritis is listed in problem list, pt unsure about this diagnosis. His exam findings are not suggestive of untreated rheumatoid arthritis deformities, RF/CCP negative.    Labs:  11/23: Hb 11.1, ALC 1.08, CMP, rest of CBC wnl, UA 4.5  6/16/23 Hb 11.0, CK, CMP wnl, RF/CCP negative  Uric acid: 9.1 (4/23) -> 3.8 (6/23, active joints at the time, was already on allopurinol 300mg at the time of this testing) -> 3.4 (7/23)        OHIE 4/22/23:  Feet: There is no evidence of acute fracture. There is normal alignment of the tarsometatarsal joints. No acute joint abnormality. No focal osseous lesion. No focal soft tissue abnormality. Hallux valgus deformity great toe  4/5/23 hip xray: A left total hip arthroplasty is seen. Alignment is normal. There is no periprosthetic fracture or abnormal lucency    Knee xray: There is severe degenerative changes, osteoarthritis of the medial compartment of the right knee.     # Non tophaceous, non crystal proven  gout: Well controlled, no flares, UA at goal, 4.5<6  # OA of the hips, knees  # OA of the backs/p surgery    - Make colchicine 0.6mg PRN for flares  - Continue Allopurinol 300mg every day  - Meloxicam 7.5 mg daily PRN for OA, not to be taken with ibu same day   - Labs today and before next ricardo (CBC, CMP, UA)  - Xrays knees, hips and L spine  - Refer to ortho for hips   - Lifestyle modifications reviewed  - Dietary habits dicussed    RTC in 6 months     Plan, including risks and benefits, was discussed with the patient, informed on how to reach us.     To schedule an appointment, call [phone]  To reach the rheumatology office, call [phone]      Maria Teresa Spicer MD   Division of Rheumatology  [facility]     Addendum:  Hb 11.6, K 3.4, UA 5.7 (at goal), rest of CBC, CMP wnl    Xray L spine 3/24: Postsurgical changes of multilevel lumbar laminectomy with L3-L5  fusion. There is the suggestion of some lucency surrounding the L3 pedicle screws which could suggest loosening. Correlation with previous postoperative radiographs would be helpful    Xray knees 3/24: Fairly advanced osteoarthritis bilateral knees    Xray hips 3/24: Mild right hip osteoarthritis. Satisfactory appearance left total hip arthroplasty    - > keep same dose of Allopurinol  -> Ortho referral for the hip and knees  -> Spine referral for the back

## 2024-03-13 DIAGNOSIS — R73.9 HYPERGLYCEMIA: ICD-10-CM

## 2024-03-13 DIAGNOSIS — E78.5 DYSLIPIDEMIA: ICD-10-CM

## 2024-03-13 DIAGNOSIS — I10 ESSENTIAL HYPERTENSION, BENIGN: ICD-10-CM

## 2024-03-13 DIAGNOSIS — N18.31 STAGE 3A CHRONIC KIDNEY DISEASE (HCC): ICD-10-CM

## 2024-03-13 LAB
ALBUMIN SERPL-MCNC: 4.2 G/DL (ref 3.5–4.6)
ALP SERPL-CCNC: 110 U/L (ref 35–104)
ALT SERPL-CCNC: 10 U/L (ref 0–41)
ANION GAP SERPL CALCULATED.3IONS-SCNC: 14 MEQ/L (ref 9–15)
AST SERPL-CCNC: 15 U/L (ref 0–40)
BASOPHILS # BLD: 0.1 K/UL (ref 0–0.2)
BASOPHILS NFR BLD: 0.6 %
BILIRUB SERPL-MCNC: 0.4 MG/DL (ref 0.2–0.7)
BUN SERPL-MCNC: 14 MG/DL (ref 8–23)
CALCIUM SERPL-MCNC: 9.3 MG/DL (ref 8.5–9.9)
CHLORIDE SERPL-SCNC: 102 MEQ/L (ref 95–107)
CHOLEST SERPL-MCNC: 158 MG/DL (ref 0–199)
CO2 SERPL-SCNC: 27 MEQ/L (ref 20–31)
CREAT SERPL-MCNC: 0.81 MG/DL (ref 0.7–1.2)
EOSINOPHIL # BLD: 0.1 K/UL (ref 0–0.7)
EOSINOPHIL NFR BLD: 0.8 %
ERYTHROCYTE [DISTWIDTH] IN BLOOD BY AUTOMATED COUNT: 17.9 % (ref 11.5–14.5)
GLOBULIN SER CALC-MCNC: 2.9 G/DL (ref 2.3–3.5)
GLUCOSE FASTING: 106 MG/DL (ref 70–99)
HBA1C MFR BLD: 5.8 % (ref 4.8–5.9)
HCT VFR BLD AUTO: 36.4 % (ref 42–52)
HDLC SERPL-MCNC: 54 MG/DL (ref 40–59)
HGB BLD-MCNC: 11.4 G/DL (ref 14–18)
LDL CHOLESTEROL CALCULATED: 81 MG/DL (ref 0–129)
LYMPHOCYTES # BLD: 1.8 K/UL (ref 1–4.8)
LYMPHOCYTES NFR BLD: 17.9 %
MCH RBC QN AUTO: 28.9 PG (ref 27–31.3)
MCHC RBC AUTO-ENTMCNC: 31.3 % (ref 33–37)
MCV RBC AUTO: 92.2 FL (ref 79–92.2)
MONOCYTES # BLD: 1.1 K/UL (ref 0.2–0.8)
MONOCYTES NFR BLD: 10.6 %
NEUTROPHILS # BLD: 7 K/UL (ref 1.4–6.5)
NEUTS SEG NFR BLD: 69.7 %
PLATELET # BLD AUTO: 369 K/UL (ref 130–400)
POTASSIUM SERPL-SCNC: 3.2 MEQ/L (ref 3.4–4.9)
PROT SERPL-MCNC: 7.1 G/DL (ref 6.3–8)
RBC # BLD AUTO: 3.95 M/UL (ref 4.7–6.1)
SODIUM SERPL-SCNC: 143 MEQ/L (ref 135–144)
TRIGLYCERIDE, FASTING: 116 MG/DL (ref 0–150)
WBC # BLD AUTO: 10.1 K/UL (ref 4.8–10.8)

## 2024-03-23 DIAGNOSIS — M10.9 ACUTE GOUT INVOLVING TOE, UNSPECIFIED CAUSE, UNSPECIFIED LATERALITY: ICD-10-CM

## 2024-03-24 RX ORDER — ALLOPURINOL 300 MG/1
300 TABLET ORAL DAILY
Qty: 90 TABLET | Refills: 0 | Status: SHIPPED | OUTPATIENT
Start: 2024-03-24

## 2024-03-25 ENCOUNTER — HOSPITAL ENCOUNTER (OUTPATIENT)
Dept: RADIOLOGY | Facility: CLINIC | Age: 69
Discharge: HOME | End: 2024-03-25
Payer: COMMERCIAL

## 2024-03-25 ENCOUNTER — LAB (OUTPATIENT)
Dept: LAB | Facility: LAB | Age: 69
End: 2024-03-25
Payer: COMMERCIAL

## 2024-03-25 ENCOUNTER — OFFICE VISIT (OUTPATIENT)
Dept: RHEUMATOLOGY | Facility: CLINIC | Age: 69
End: 2024-03-25
Payer: COMMERCIAL

## 2024-03-25 VITALS
BODY MASS INDEX: 31.9 KG/M2 | SYSTOLIC BLOOD PRESSURE: 162 MMHG | TEMPERATURE: 98.3 F | HEART RATE: 80 BPM | RESPIRATION RATE: 20 BRPM | WEIGHT: 216 LBS | DIASTOLIC BLOOD PRESSURE: 80 MMHG

## 2024-03-25 DIAGNOSIS — M47.816 LUMBAR SPONDYLOSIS: ICD-10-CM

## 2024-03-25 DIAGNOSIS — M1A.09X0 IDIOPATHIC CHRONIC GOUT OF MULTIPLE SITES WITHOUT TOPHUS: ICD-10-CM

## 2024-03-25 DIAGNOSIS — Z79.899 ENCOUNTER FOR MONITORING ALLOPURINOL THERAPY: ICD-10-CM

## 2024-03-25 DIAGNOSIS — M15.9 PRIMARY OSTEOARTHRITIS INVOLVING MULTIPLE JOINTS: ICD-10-CM

## 2024-03-25 DIAGNOSIS — Z51.81 ENCOUNTER FOR MONITORING ALLOPURINOL THERAPY: ICD-10-CM

## 2024-03-25 DIAGNOSIS — M1A.09X0 IDIOPATHIC CHRONIC GOUT OF MULTIPLE SITES WITHOUT TOPHUS: Primary | ICD-10-CM

## 2024-03-25 LAB
ALBUMIN SERPL BCP-MCNC: 4.3 G/DL (ref 3.4–5)
ALP SERPL-CCNC: 102 U/L (ref 33–136)
ALT SERPL W P-5'-P-CCNC: 15 U/L (ref 10–52)
ANION GAP SERPL CALC-SCNC: 10 MMOL/L (ref 10–20)
AST SERPL W P-5'-P-CCNC: 22 U/L (ref 9–39)
BASOPHILS # BLD AUTO: 0.07 X10*3/UL (ref 0–0.1)
BASOPHILS NFR BLD AUTO: 0.8 %
BILIRUB SERPL-MCNC: 0.4 MG/DL (ref 0–1.2)
BUN SERPL-MCNC: 14 MG/DL (ref 6–23)
CALCIUM SERPL-MCNC: 10 MG/DL (ref 8.6–10.3)
CHLORIDE SERPL-SCNC: 102 MMOL/L (ref 98–107)
CO2 SERPL-SCNC: 32 MMOL/L (ref 21–32)
CREAT SERPL-MCNC: 0.99 MG/DL (ref 0.5–1.3)
EGFRCR SERPLBLD CKD-EPI 2021: 82 ML/MIN/1.73M*2
EOSINOPHIL # BLD AUTO: 0.15 X10*3/UL (ref 0–0.7)
EOSINOPHIL NFR BLD AUTO: 1.6 %
ERYTHROCYTE [DISTWIDTH] IN BLOOD BY AUTOMATED COUNT: 18.8 % (ref 11.5–14.5)
GLUCOSE SERPL-MCNC: 114 MG/DL (ref 74–99)
HCT VFR BLD AUTO: 37.4 % (ref 41–52)
HGB BLD-MCNC: 11.6 G/DL (ref 13.5–17.5)
IMM GRANULOCYTES # BLD AUTO: 0.02 X10*3/UL (ref 0–0.7)
IMM GRANULOCYTES NFR BLD AUTO: 0.2 % (ref 0–0.9)
LYMPHOCYTES # BLD AUTO: 1.99 X10*3/UL (ref 1.2–4.8)
LYMPHOCYTES NFR BLD AUTO: 21.5 %
MCH RBC QN AUTO: 29.6 PG (ref 26–34)
MCHC RBC AUTO-ENTMCNC: 31 G/DL (ref 32–36)
MCV RBC AUTO: 95 FL (ref 80–100)
MONOCYTES # BLD AUTO: 0.94 X10*3/UL (ref 0.1–1)
MONOCYTES NFR BLD AUTO: 10.2 %
NEUTROPHILS # BLD AUTO: 6.09 X10*3/UL (ref 1.2–7.7)
NEUTROPHILS NFR BLD AUTO: 65.7 %
NRBC BLD-RTO: 0 /100 WBCS (ref 0–0)
PLATELET # BLD AUTO: 343 X10*3/UL (ref 150–450)
POTASSIUM SERPL-SCNC: 3.4 MMOL/L (ref 3.5–5.3)
PROT SERPL-MCNC: 7.5 G/DL (ref 6.4–8.2)
RBC # BLD AUTO: 3.92 X10*6/UL (ref 4.5–5.9)
SODIUM SERPL-SCNC: 141 MMOL/L (ref 136–145)
URATE SERPL-MCNC: 5.7 MG/DL (ref 4–7.5)
WBC # BLD AUTO: 9.3 X10*3/UL (ref 4.4–11.3)

## 2024-03-25 PROCEDURE — 1159F MED LIST DOCD IN RCRD: CPT | Performed by: STUDENT IN AN ORGANIZED HEALTH CARE EDUCATION/TRAINING PROGRAM

## 2024-03-25 PROCEDURE — 72100 X-RAY EXAM L-S SPINE 2/3 VWS: CPT

## 2024-03-25 PROCEDURE — 73522 X-RAY EXAM HIPS BI 3-4 VIEWS: CPT

## 2024-03-25 PROCEDURE — 84550 ASSAY OF BLOOD/URIC ACID: CPT

## 2024-03-25 PROCEDURE — 1036F TOBACCO NON-USER: CPT | Performed by: STUDENT IN AN ORGANIZED HEALTH CARE EDUCATION/TRAINING PROGRAM

## 2024-03-25 PROCEDURE — 73562 X-RAY EXAM OF KNEE 3: CPT | Mod: BILATERAL PROCEDURE | Performed by: RADIOLOGY

## 2024-03-25 PROCEDURE — 73522 X-RAY EXAM HIPS BI 3-4 VIEWS: CPT | Mod: BILATERAL PROCEDURE | Performed by: RADIOLOGY

## 2024-03-25 PROCEDURE — 72100 X-RAY EXAM L-S SPINE 2/3 VWS: CPT | Performed by: RADIOLOGY

## 2024-03-25 PROCEDURE — 73562 X-RAY EXAM OF KNEE 3: CPT | Mod: 50

## 2024-03-25 PROCEDURE — 80053 COMPREHEN METABOLIC PANEL: CPT

## 2024-03-25 PROCEDURE — 1160F RVW MEDS BY RX/DR IN RCRD: CPT | Performed by: STUDENT IN AN ORGANIZED HEALTH CARE EDUCATION/TRAINING PROGRAM

## 2024-03-25 PROCEDURE — 99214 OFFICE O/P EST MOD 30 MIN: CPT | Performed by: STUDENT IN AN ORGANIZED HEALTH CARE EDUCATION/TRAINING PROGRAM

## 2024-03-25 PROCEDURE — 85025 COMPLETE CBC W/AUTO DIFF WBC: CPT

## 2024-03-25 PROCEDURE — 36415 COLL VENOUS BLD VENIPUNCTURE: CPT

## 2024-03-25 RX ORDER — MELOXICAM 7.5 MG/1
7.5 TABLET ORAL DAILY
Qty: 90 TABLET | Refills: 1 | Status: SHIPPED | OUTPATIENT
Start: 2024-03-25 | End: 2024-09-21

## 2024-03-26 ENCOUNTER — OFFICE VISIT (OUTPATIENT)
Dept: FAMILY MEDICINE CLINIC | Age: 69
End: 2024-03-26
Payer: COMMERCIAL

## 2024-03-26 VITALS
TEMPERATURE: 97.8 F | DIASTOLIC BLOOD PRESSURE: 70 MMHG | WEIGHT: 218 LBS | BODY MASS INDEX: 32.29 KG/M2 | OXYGEN SATURATION: 97 % | SYSTOLIC BLOOD PRESSURE: 164 MMHG | HEART RATE: 60 BPM | HEIGHT: 69 IN

## 2024-03-26 DIAGNOSIS — E87.6 HYPOKALEMIA: ICD-10-CM

## 2024-03-26 DIAGNOSIS — N18.31 ANEMIA DUE TO STAGE 3A CHRONIC KIDNEY DISEASE (HCC): ICD-10-CM

## 2024-03-26 DIAGNOSIS — I10 BENIGN ESSENTIAL HYPERTENSION: Primary | ICD-10-CM

## 2024-03-26 DIAGNOSIS — J06.9 VIRAL UPPER RESPIRATORY TRACT INFECTION: ICD-10-CM

## 2024-03-26 DIAGNOSIS — J44.89 OBSTRUCTIVE CHRONIC BRONCHITIS WITHOUT EXACERBATION (HCC): ICD-10-CM

## 2024-03-26 DIAGNOSIS — M54.2 NECK PAIN: ICD-10-CM

## 2024-03-26 DIAGNOSIS — D63.1 ANEMIA DUE TO STAGE 3A CHRONIC KIDNEY DISEASE (HCC): ICD-10-CM

## 2024-03-26 PROCEDURE — 3078F DIAST BP <80 MM HG: CPT | Performed by: STUDENT IN AN ORGANIZED HEALTH CARE EDUCATION/TRAINING PROGRAM

## 2024-03-26 PROCEDURE — 3077F SYST BP >= 140 MM HG: CPT | Performed by: STUDENT IN AN ORGANIZED HEALTH CARE EDUCATION/TRAINING PROGRAM

## 2024-03-26 PROCEDURE — 1123F ACP DISCUSS/DSCN MKR DOCD: CPT | Performed by: STUDENT IN AN ORGANIZED HEALTH CARE EDUCATION/TRAINING PROGRAM

## 2024-03-26 PROCEDURE — 99214 OFFICE O/P EST MOD 30 MIN: CPT | Performed by: STUDENT IN AN ORGANIZED HEALTH CARE EDUCATION/TRAINING PROGRAM

## 2024-03-26 RX ORDER — LISINOPRIL 10 MG/1
10 TABLET ORAL DAILY
Qty: 90 TABLET | Refills: 1 | Status: SHIPPED | OUTPATIENT
Start: 2024-03-26

## 2024-03-26 RX ORDER — MELOXICAM 7.5 MG/1
7.5 TABLET ORAL DAILY
COMMUNITY
Start: 2024-03-25 | End: 2024-09-21

## 2024-03-26 RX ORDER — PREDNISONE 10 MG/1
TABLET ORAL
Qty: 45 TABLET | Refills: 0 | Status: SHIPPED | OUTPATIENT
Start: 2024-03-26

## 2024-03-26 NOTE — PROGRESS NOTES
feel like he needs anything at this time.    Patient also with neck pain.  Has been going on the last 3 weeks.  He states pain is constant and he does have decreased range of motion.  No injury or trauma.  He is scheduled for follow-up with Ortho later this month.  He is also contemplating making appointment with his rheumatologist.  He has no other concerns at this time.      Review of Systems   Constitutional:  Negative for chills, fatigue, fever and unexpected weight change.   HENT:  Negative for congestion, rhinorrhea and sore throat.    Eyes:  Negative for discharge.   Respiratory:  Positive for cough and shortness of breath. Negative for wheezing.    Cardiovascular:  Negative for chest pain, palpitations and leg swelling.   Gastrointestinal:  Negative for abdominal pain, diarrhea, nausea and vomiting.   Genitourinary:  Negative for difficulty urinating.   Musculoskeletal:  Positive for neck pain. Negative for arthralgias and joint swelling.   Skin:  Negative for rash.   Neurological:  Negative for weakness, light-headedness, numbness and headaches.   Psychiatric/Behavioral:  Negative for confusion and suicidal ideas. The patient is not nervous/anxious.        Past Medical History:   Diagnosis Date    Alcohol abuse     Alcohol abuse     Allergic rhinitis     Aortic regurgitation     Cervical radiculopathy at C8 10/13/2013    Chronic back pain     Chronic back pain     COPD (chronic obstructive pulmonary disease) ([facility])     Erectile dysfunction     GERD (gastroesophageal reflux disease)     Hyperlipidemia     Hypertension     Insomnia     Moderate episode of recurrent major depressive disorder ([facility]) 8/18/2021    Osteoarthritis     PUD (peptic ulcer disease)     Restless leg syndrome     Smoker unmotivated to quit     Stage 3a chronic kidney disease ([facility]) 4/13/2023    Vitamin D deficiency      Past Surgical History:   Procedure Laterality Date    CATARACT EXTRACTION Right 08/16/2023    COLONOSCOPY N/A 08/03/2020

## 2024-03-27 ASSESSMENT — ENCOUNTER SYMPTOMS
DIARRHEA: 0
NAUSEA: 0
WHEEZING: 0
COUGH: 1
ABDOMINAL PAIN: 0
SORE THROAT: 0
SHORTNESS OF BREATH: 1
RHINORRHEA: 0
EYE DISCHARGE: 0
VOMITING: 0

## 2024-04-26 ENCOUNTER — OFFICE VISIT (OUTPATIENT)
Dept: FAMILY MEDICINE CLINIC | Age: 69
End: 2024-04-26
Payer: COMMERCIAL

## 2024-04-26 VITALS
OXYGEN SATURATION: 95 % | SYSTOLIC BLOOD PRESSURE: 128 MMHG | HEIGHT: 69 IN | TEMPERATURE: 97.7 F | BODY MASS INDEX: 31.99 KG/M2 | HEART RATE: 66 BPM | DIASTOLIC BLOOD PRESSURE: 60 MMHG | WEIGHT: 216 LBS

## 2024-04-26 DIAGNOSIS — D63.1 ANEMIA DUE TO STAGE 3A CHRONIC KIDNEY DISEASE (HCC): ICD-10-CM

## 2024-04-26 DIAGNOSIS — N18.31 ANEMIA DUE TO STAGE 3A CHRONIC KIDNEY DISEASE (HCC): ICD-10-CM

## 2024-04-26 DIAGNOSIS — I10 BENIGN ESSENTIAL HYPERTENSION: Primary | ICD-10-CM

## 2024-04-26 DIAGNOSIS — M10.9 ACUTE GOUT INVOLVING TOE, UNSPECIFIED CAUSE, UNSPECIFIED LATERALITY: ICD-10-CM

## 2024-04-26 DIAGNOSIS — F10.10 ALCOHOL ABUSE: ICD-10-CM

## 2024-04-26 DIAGNOSIS — J40 BRONCHITIS: ICD-10-CM

## 2024-04-26 LAB
ANION GAP SERPL CALCULATED.3IONS-SCNC: 19 MEQ/L (ref 9–15)
BASOPHILS # BLD: 0.1 K/UL (ref 0–0.2)
BASOPHILS NFR BLD: 0.9 %
BUN SERPL-MCNC: 21 MG/DL (ref 8–23)
CALCIUM SERPL-MCNC: 9 MG/DL (ref 8.5–9.9)
CHLORIDE SERPL-SCNC: 103 MEQ/L (ref 95–107)
CO2 SERPL-SCNC: 22 MEQ/L (ref 20–31)
CREAT SERPL-MCNC: 1.21 MG/DL (ref 0.7–1.2)
EOSINOPHIL # BLD: 0.2 K/UL (ref 0–0.7)
EOSINOPHIL NFR BLD: 2.9 %
ERYTHROCYTE [DISTWIDTH] IN BLOOD BY AUTOMATED COUNT: 19.9 % (ref 11.5–14.5)
GLUCOSE SERPL-MCNC: 90 MG/DL (ref 70–99)
HCT VFR BLD AUTO: 36.9 % (ref 42–52)
HGB BLD-MCNC: 12.2 G/DL (ref 14–18)
LYMPHOCYTES # BLD: 2.3 K/UL (ref 1–4.8)
LYMPHOCYTES NFR BLD: 35.6 %
MCH RBC QN AUTO: 30.1 PG (ref 27–31.3)
MCHC RBC AUTO-ENTMCNC: 33.1 % (ref 33–37)
MCV RBC AUTO: 91.1 FL (ref 79–92.2)
MONOCYTES # BLD: 1 K/UL (ref 0.2–0.8)
MONOCYTES NFR BLD: 15.2 %
NEUTROPHILS # BLD: 2.9 K/UL (ref 1.4–6.5)
NEUTS SEG NFR BLD: 45.2 %
PLATELET # BLD AUTO: 348 K/UL (ref 130–400)
POTASSIUM SERPL-SCNC: 3.8 MEQ/L (ref 3.4–4.9)
RBC # BLD AUTO: 4.05 M/UL (ref 4.7–6.1)
SODIUM SERPL-SCNC: 144 MEQ/L (ref 135–144)
WBC # BLD AUTO: 6.5 K/UL (ref 4.8–10.8)

## 2024-04-26 PROCEDURE — 1123F ACP DISCUSS/DSCN MKR DOCD: CPT | Performed by: STUDENT IN AN ORGANIZED HEALTH CARE EDUCATION/TRAINING PROGRAM

## 2024-04-26 PROCEDURE — 3078F DIAST BP <80 MM HG: CPT | Performed by: STUDENT IN AN ORGANIZED HEALTH CARE EDUCATION/TRAINING PROGRAM

## 2024-04-26 PROCEDURE — 99214 OFFICE O/P EST MOD 30 MIN: CPT | Performed by: STUDENT IN AN ORGANIZED HEALTH CARE EDUCATION/TRAINING PROGRAM

## 2024-04-26 PROCEDURE — 3074F SYST BP LT 130 MM HG: CPT | Performed by: STUDENT IN AN ORGANIZED HEALTH CARE EDUCATION/TRAINING PROGRAM

## 2024-04-26 RX ORDER — AZITHROMYCIN 250 MG/1
TABLET, FILM COATED ORAL
Qty: 6 TABLET | Refills: 0 | Status: SHIPPED | OUTPATIENT
Start: 2024-04-26 | End: 2024-05-06

## 2024-04-26 RX ORDER — FOLIC ACID 1 MG/1
1000 TABLET ORAL DAILY
Qty: 90 TABLET | Refills: 3 | Status: SHIPPED | OUTPATIENT
Start: 2024-04-26

## 2024-04-26 RX ORDER — ALLOPURINOL 300 MG/1
300 TABLET ORAL DAILY
Qty: 90 TABLET | Refills: 3 | Status: SHIPPED | OUTPATIENT
Start: 2024-04-26

## 2024-04-26 RX ORDER — AMLODIPINE BESYLATE 10 MG/1
10 TABLET ORAL DAILY
Qty: 90 TABLET | Refills: 3 | Status: SHIPPED | OUTPATIENT
Start: 2024-04-26

## 2024-04-26 SDOH — ECONOMIC STABILITY: FOOD INSECURITY: WITHIN THE PAST 12 MONTHS, THE FOOD YOU BOUGHT JUST DIDN'T LAST AND YOU DIDN'T HAVE MONEY TO GET MORE.: NEVER TRUE

## 2024-04-26 SDOH — ECONOMIC STABILITY: FOOD INSECURITY: WITHIN THE PAST 12 MONTHS, YOU WORRIED THAT YOUR FOOD WOULD RUN OUT BEFORE YOU GOT MONEY TO BUY MORE.: NEVER TRUE

## 2024-04-26 SDOH — ECONOMIC STABILITY: INCOME INSECURITY: HOW HARD IS IT FOR YOU TO PAY FOR THE VERY BASICS LIKE FOOD, HOUSING, MEDICAL CARE, AND HEATING?: NOT HARD AT ALL

## 2024-04-26 ASSESSMENT — ENCOUNTER SYMPTOMS
SHORTNESS OF BREATH: 0
ABDOMINAL PAIN: 0
COUGH: 1
NAUSEA: 0
SORE THROAT: 0
DIARRHEA: 0
WHEEZING: 0
RHINORRHEA: 0
EYE DISCHARGE: 0
VOMITING: 0

## 2024-04-26 NOTE — PROGRESS NOTES
Subjective  Donovan Nelson, 69 y.o. male presents today with:  Chief Complaint   Patient presents with    Follow-up     Did not have lab work done. Will have done before leaving office today.     Hypertension     Does not check BP at home. Denies chest pains, heart palpitations, headaches, dizziness, SOB or edema.     Cough     Started about 2 weeks ago. Cough is dry & non productive. Denies fevers or chills. Denies nasal congestion, PND or sore throat. Denies headaches or ear pain. Denies SOB or wheezing. Denies N/V/D. Has taken OTC cough meds with no relief.        Patient with 1 month follow-up appointment.    Patient with essential hypertension.  Normotensive in the office today.  He is currently on amlodipine 10 mg daily, lisinopril 10 mg daily metoprolol succinate 100 mg daily.  He does not check blood pressure at home.  He did not have his lab work done.  He will get it done today.    Patient also with cough.  Started about 2 weeks ago.  Did not start after taking the medication.  He states cough is dry nonproductive.  No fevers or chills.  His wife was sick just before he developed the cough.  He does have some congestion with postnasal drip and sore throat.  No headaches or ear pain.  No shortness of breath or wheezing.  He has taken over-the-counter cough medicine with little relief.  He states it feels like when he typically has bronchitis.    Patient also with gout.  He uses allopurinol 300 mg daily.  No side effects from the medication.  No recent gout flareups.    Patient also with history of alcohol abuse.  Takes 1 mg daily of folic acid.  Doing well.  Does need a refill today.  He has no other concerns at this time.      Review of Systems   Constitutional:  Negative for chills, fatigue, fever and unexpected weight change.   HENT:  Positive for congestion. Negative for rhinorrhea and sore throat.    Eyes:  Negative for discharge.   Respiratory:  Positive for cough. Negative for shortness of breath

## 2024-04-29 NOTE — RESULT ENCOUNTER NOTE
Please inform patient that lab work is stable.  Remains slightly anemic.  Kidney function did go up a little bit.  We will recheck this at his follow-up appointment.  Thanks

## 2024-05-25 DIAGNOSIS — R97.20 ELEVATED PSA: ICD-10-CM

## 2024-05-25 DIAGNOSIS — N18.31 STAGE 3A CHRONIC KIDNEY DISEASE (HCC): ICD-10-CM

## 2024-05-25 DIAGNOSIS — I10 BENIGN ESSENTIAL HYPERTENSION: ICD-10-CM

## 2024-05-25 LAB
ALBUMIN SERPL-MCNC: 4.1 G/DL (ref 3.5–4.6)
ALP SERPL-CCNC: 126 U/L (ref 35–104)
ALT SERPL-CCNC: 11 U/L (ref 0–41)
ANION GAP SERPL CALCULATED.3IONS-SCNC: 13 MEQ/L (ref 9–15)
AST SERPL-CCNC: 21 U/L (ref 0–40)
BASOPHILS # BLD: 0.1 K/UL (ref 0–0.2)
BASOPHILS NFR BLD: 1 %
BILIRUB SERPL-MCNC: 0.3 MG/DL (ref 0.2–0.7)
BUN SERPL-MCNC: 18 MG/DL (ref 8–23)
CALCIUM SERPL-MCNC: 7 MG/DL (ref 8.5–9.9)
CHLORIDE SERPL-SCNC: 105 MEQ/L (ref 95–107)
CO2 SERPL-SCNC: 24 MEQ/L (ref 20–31)
CREAT SERPL-MCNC: 0.87 MG/DL (ref 0.7–1.2)
EOSINOPHIL # BLD: 0.2 K/UL (ref 0–0.7)
EOSINOPHIL NFR BLD: 2.5 %
ERYTHROCYTE [DISTWIDTH] IN BLOOD BY AUTOMATED COUNT: 18.7 % (ref 11.5–14.5)
GLOBULIN SER CALC-MCNC: 2.5 G/DL (ref 2.3–3.5)
GLUCOSE SERPL-MCNC: 83 MG/DL (ref 70–99)
HCT VFR BLD AUTO: 33.7 % (ref 42–52)
HGB BLD-MCNC: 11 G/DL (ref 14–18)
LYMPHOCYTES # BLD: 2.9 K/UL (ref 1–4.8)
LYMPHOCYTES NFR BLD: 39.3 %
MCH RBC QN AUTO: 30.8 PG (ref 27–31.3)
MCHC RBC AUTO-ENTMCNC: 32.6 % (ref 33–37)
MCV RBC AUTO: 94.4 FL (ref 79–92.2)
MONOCYTES # BLD: 0.9 K/UL (ref 0.2–0.8)
MONOCYTES NFR BLD: 12.8 %
NEUTROPHILS # BLD: 3.2 K/UL (ref 1.4–6.5)
NEUTS SEG NFR BLD: 44.1 %
PLATELET # BLD AUTO: 196 K/UL (ref 130–400)
POTASSIUM SERPL-SCNC: 3.4 MEQ/L (ref 3.4–4.9)
PROT SERPL-MCNC: 6.6 G/DL (ref 6.3–8)
PSA SERPL-MCNC: 2.97 NG/ML (ref 0–4)
RBC # BLD AUTO: 3.57 M/UL (ref 4.7–6.1)
SODIUM SERPL-SCNC: 142 MEQ/L (ref 135–144)
WBC # BLD AUTO: 7.3 K/UL (ref 4.8–10.8)

## 2024-05-28 DIAGNOSIS — E83.51 HYPOCALCEMIA: Primary | ICD-10-CM

## 2024-05-28 DIAGNOSIS — D53.9 MACROCYTIC ANEMIA: ICD-10-CM

## 2024-05-28 NOTE — RESULT ENCOUNTER NOTE
Please let patient know that kidney function is improved.  Calcium is little bit low.  One of his liver enzymes is a little bit up, we will need to monitor this.  He does remain anemic, I have ordered additional lab work for him to complete.  It is nonfasting.  Please have him do this at his convenience.  Thanks

## 2024-05-29 ENCOUNTER — OFFICE VISIT (OUTPATIENT)
Dept: UROLOGY | Age: 69
End: 2024-05-29
Payer: COMMERCIAL

## 2024-05-29 VITALS
SYSTOLIC BLOOD PRESSURE: 148 MMHG | HEART RATE: 69 BPM | DIASTOLIC BLOOD PRESSURE: 60 MMHG | HEIGHT: 69 IN | OXYGEN SATURATION: 98 % | WEIGHT: 200 LBS | BODY MASS INDEX: 29.62 KG/M2

## 2024-05-29 DIAGNOSIS — R97.20 ELEVATED PSA: Primary | ICD-10-CM

## 2024-05-29 DIAGNOSIS — N13.8 BPH WITH OBSTRUCTION/LOWER URINARY TRACT SYMPTOMS: ICD-10-CM

## 2024-05-29 DIAGNOSIS — N40.1 BPH WITH OBSTRUCTION/LOWER URINARY TRACT SYMPTOMS: ICD-10-CM

## 2024-05-29 PROCEDURE — 99213 OFFICE O/P EST LOW 20 MIN: CPT | Performed by: UROLOGY

## 2024-05-29 PROCEDURE — 1123F ACP DISCUSS/DSCN MKR DOCD: CPT | Performed by: UROLOGY

## 2024-05-29 PROCEDURE — 3077F SYST BP >= 140 MM HG: CPT | Performed by: UROLOGY

## 2024-05-29 PROCEDURE — 3078F DIAST BP <80 MM HG: CPT | Performed by: UROLOGY

## 2024-05-29 ASSESSMENT — ENCOUNTER SYMPTOMS
ABDOMINAL DISTENTION: 0
ABDOMINAL PAIN: 0

## 2024-05-29 NOTE — PROGRESS NOTES
Options for further evaluation were again reviewed and the option of a prostate biopsy (can not get MRI given spinal hardware and remote hip) vs use of [facility] vs continued closer PSA F/U were reviewed.       Plan:      F/U 6 mo for PSA        Jovan Mckeon MD

## 2024-06-10 ENCOUNTER — OFFICE VISIT (OUTPATIENT)
Dept: FAMILY MEDICINE CLINIC | Age: 69
End: 2024-06-10
Payer: COMMERCIAL

## 2024-06-10 VITALS
DIASTOLIC BLOOD PRESSURE: 80 MMHG | TEMPERATURE: 98.1 F | HEIGHT: 69 IN | OXYGEN SATURATION: 96 % | SYSTOLIC BLOOD PRESSURE: 150 MMHG | HEART RATE: 69 BPM | WEIGHT: 218 LBS | BODY MASS INDEX: 32.29 KG/M2

## 2024-06-10 DIAGNOSIS — I10 BENIGN ESSENTIAL HYPERTENSION: Primary | ICD-10-CM

## 2024-06-10 DIAGNOSIS — R05.8 ACE-INHIBITOR COUGH: ICD-10-CM

## 2024-06-10 DIAGNOSIS — T46.4X5A ACE-INHIBITOR COUGH: ICD-10-CM

## 2024-06-10 PROCEDURE — 3077F SYST BP >= 140 MM HG: CPT | Performed by: STUDENT IN AN ORGANIZED HEALTH CARE EDUCATION/TRAINING PROGRAM

## 2024-06-10 PROCEDURE — 1123F ACP DISCUSS/DSCN MKR DOCD: CPT | Performed by: STUDENT IN AN ORGANIZED HEALTH CARE EDUCATION/TRAINING PROGRAM

## 2024-06-10 PROCEDURE — 3079F DIAST BP 80-89 MM HG: CPT | Performed by: STUDENT IN AN ORGANIZED HEALTH CARE EDUCATION/TRAINING PROGRAM

## 2024-06-10 PROCEDURE — 99214 OFFICE O/P EST MOD 30 MIN: CPT | Performed by: STUDENT IN AN ORGANIZED HEALTH CARE EDUCATION/TRAINING PROGRAM

## 2024-06-10 RX ORDER — SPIRONOLACTONE 25 MG/1
25 TABLET ORAL 2 TIMES DAILY
Qty: 180 TABLET | Refills: 1 | Status: SHIPPED | OUTPATIENT
Start: 2024-06-10

## 2024-06-10 ASSESSMENT — ENCOUNTER SYMPTOMS
SHORTNESS OF BREATH: 0
VOMITING: 0
NAUSEA: 0
DIARRHEA: 0
SORE THROAT: 0
WHEEZING: 0
COUGH: 1
ABDOMINAL PAIN: 0
RHINORRHEA: 0
EYE DISCHARGE: 0

## 2024-06-10 NOTE — PROGRESS NOTES
Subjective  Donovan Nelson, 69 y.o. male presents today with:  Chief Complaint   Patient presents with    Follow-up    Cough     States he stopped taking the Lisinopril as directed. Today is the 3rd day without it. States cough has improved some, but is still present. States before stopping he was coughing every 15 min. Now its about every hour. Denies chest pains, heart palpitations, headaches, dizziness, SOB or edema. Does not check BP at home.        Patient with acute appointment today.  Patient developed a cough after starting ACE inhibitor.  We had to go off of the triamterene-hydrochlorothiazide previously because of hydrochlorothiazide contributing to his gout symptoms.  He has not had gout since stopping that medication.  He is on amlodipine 10 mg daily and metoprolol succinate 100 mg daily.  He previously did well with the potassium sparing diuretic.  Would be interested in trying this again.  He does not check blood pressure at home.  He is hypertensive in the office today.  He states that since stopping the medication on Friday his cough has been improving.  He has no other concerns at this time.      Review of Systems   Constitutional:  Negative for chills, fatigue, fever and unexpected weight change.   HENT:  Negative for congestion, rhinorrhea and sore throat.    Eyes:  Negative for discharge.   Respiratory:  Positive for cough. Negative for shortness of breath and wheezing.    Cardiovascular:  Negative for chest pain, palpitations and leg swelling.   Gastrointestinal:  Negative for abdominal pain, diarrhea, nausea and vomiting.   Genitourinary:  Negative for difficulty urinating.   Musculoskeletal:  Negative for arthralgias and joint swelling.   Skin:  Negative for rash.   Neurological:  Negative for weakness, light-headedness, numbness and headaches.   Psychiatric/Behavioral:  Negative for confusion and suicidal ideas. The patient is not nervous/anxious.        Past Medical History:   Diagnosis

## 2024-06-28 DIAGNOSIS — R25.1 TREMORS OF NERVOUS SYSTEM: ICD-10-CM

## 2024-06-28 DIAGNOSIS — I10 ESSENTIAL HYPERTENSION, BENIGN: ICD-10-CM

## 2024-07-01 RX ORDER — METOPROLOL SUCCINATE 100 MG/1
100 TABLET, EXTENDED RELEASE ORAL DAILY
Qty: 90 TABLET | Refills: 1 | Status: SHIPPED | OUTPATIENT
Start: 2024-07-01

## 2024-07-08 ENCOUNTER — OFFICE VISIT (OUTPATIENT)
Dept: FAMILY MEDICINE CLINIC | Age: 69
End: 2024-07-08

## 2024-07-08 VITALS
OXYGEN SATURATION: 94 % | TEMPERATURE: 97.9 F | SYSTOLIC BLOOD PRESSURE: 130 MMHG | BODY MASS INDEX: 32.88 KG/M2 | DIASTOLIC BLOOD PRESSURE: 68 MMHG | WEIGHT: 222 LBS | HEIGHT: 69 IN | HEART RATE: 74 BPM

## 2024-07-08 DIAGNOSIS — R05.8 DRY COUGH: ICD-10-CM

## 2024-07-08 DIAGNOSIS — I10 BENIGN ESSENTIAL HYPERTENSION: Primary | ICD-10-CM

## 2024-07-08 DIAGNOSIS — E78.5 DYSLIPIDEMIA: ICD-10-CM

## 2024-07-08 DIAGNOSIS — F33.1 MODERATE EPISODE OF RECURRENT MAJOR DEPRESSIVE DISORDER (HCC): ICD-10-CM

## 2024-07-08 DIAGNOSIS — K21.9 GASTROESOPHAGEAL REFLUX DISEASE, UNSPECIFIED WHETHER ESOPHAGITIS PRESENT: ICD-10-CM

## 2024-07-08 DIAGNOSIS — D53.9 MACROCYTIC ANEMIA: ICD-10-CM

## 2024-07-08 DIAGNOSIS — R06.2 WHEEZING: ICD-10-CM

## 2024-07-08 DIAGNOSIS — E83.51 HYPOCALCEMIA: ICD-10-CM

## 2024-07-08 DIAGNOSIS — F51.01 PRIMARY INSOMNIA: ICD-10-CM

## 2024-07-08 RX ORDER — TRAZODONE HYDROCHLORIDE 100 MG/1
100 TABLET ORAL NIGHTLY
Qty: 90 TABLET | Refills: 1 | Status: SHIPPED | OUTPATIENT
Start: 2024-07-08

## 2024-07-08 RX ORDER — PANTOPRAZOLE SODIUM 40 MG/1
40 TABLET, DELAYED RELEASE ORAL DAILY
Qty: 90 TABLET | Refills: 1 | Status: SHIPPED | OUTPATIENT
Start: 2024-07-08

## 2024-07-08 NOTE — PROGRESS NOTES
Subjective  Donovan Nelson, 69 y.o. male presents today with:  Chief Complaint   Patient presents with    Follow-up    Hypertension     Has not checked BP in about 3 weeks. States it was high when he last checked it. Denies any chest pains, heart palpitations, headaches, dizziness, SOB or edema.     Cough     Has improved, but cough is still present. States cough is dry & non productive.     Insomnia     Is sleeping good. Is able to fall asleep & stay asleep.     Depression     Feels mood is stable. Denies feeling down & depressed.     Dyslipidemia     Lipid panel done 3/13/24. Taking medication as directed, no side effects.     Chronic Kidney Disease       Patient with routine follow-up appointment.    Patient with essentially pretension.  Normotensive in the office today.  Has not been checking blood pressure at home lately.  He is asymptomatic.  He is on amlodipine 10 mg daily, metoprolol succinate 100 mg daily, spironolactone 25 mg daily.  Tolerating well.  No side effects from medication.    Patient also with major depressive disorder.  He is on sertraline 50 mg daily.  Tolerating well.  No side effects from medication.  He does report that mood is stable.    Patient also following up on dyslipidemia.  He is on atorvastatin 40 mg daily.  Tolerating well.  No muscle aches or pains.    Patient also with insomnia.  He is on trazodone 100 mg nightly.  Tolerating well.  No side effects from medication.    Patient also following up on dry cough.  He states it has improved since getting off of the lisinopril.  He states he still does have slight cough and wheezing.  Overall feeling better but still not great.  He has no other concerns at this time.      Review of Systems   Constitutional:  Negative for chills, fatigue, fever and unexpected weight change.   HENT:  Negative for congestion, rhinorrhea and sore throat.    Eyes:  Negative for discharge.   Respiratory:  Positive for cough and wheezing. Negative for

## 2024-07-09 DIAGNOSIS — R93.89 ABNORMAL CHEST X-RAY: Primary | ICD-10-CM

## 2024-07-09 LAB
COMMENT: NORMAL
FOLATE: >20 NG/ML (ref 4.8–24.2)
MISCELLANEOUS LAB TEST RESULT: NORMAL
VITAMIN B-12: 252 PG/ML (ref 232–1245)
VITAMIN D 25-HYDROXY: 16.4 NG/ML (ref 30–100)

## 2024-07-11 ASSESSMENT — ENCOUNTER SYMPTOMS
EYE DISCHARGE: 0
DIARRHEA: 0
RHINORRHEA: 0
SORE THROAT: 0
WHEEZING: 1
NAUSEA: 0
SHORTNESS OF BREATH: 0
COUGH: 1
VOMITING: 0
ABDOMINAL PAIN: 0

## 2024-07-11 NOTE — RESULT ENCOUNTER NOTE
Please let patient know that vitamin D level was low.  All other labs were normal.  Follow-up as scheduled.  Please find out if he is taking vitamin D supplement.  Thank

## 2024-07-23 ENCOUNTER — APPOINTMENT (OUTPATIENT)
Dept: CT IMAGING | Age: 69
End: 2024-07-23
Payer: COMMERCIAL

## 2024-07-23 ENCOUNTER — HOSPITAL ENCOUNTER (EMERGENCY)
Age: 69
Discharge: HOME | End: 2024-07-23
Payer: COMMERCIAL

## 2024-07-23 ENCOUNTER — APPOINTMENT (OUTPATIENT)
Dept: GENERAL RADIOLOGY | Age: 69
End: 2024-07-23
Payer: COMMERCIAL

## 2024-07-23 VITALS
BODY MASS INDEX: 32.58 KG/M2 | OXYGEN SATURATION: 93 % | RESPIRATION RATE: 12 BRPM | WEIGHT: 220 LBS | HEIGHT: 69 IN | TEMPERATURE: 98.7 F | SYSTOLIC BLOOD PRESSURE: 177 MMHG | DIASTOLIC BLOOD PRESSURE: 89 MMHG | HEART RATE: 92 BPM

## 2024-07-23 DIAGNOSIS — R07.89 MUSCULAR CHEST PAIN: ICD-10-CM

## 2024-07-23 DIAGNOSIS — M54.32 SCIATICA OF LEFT SIDE: Primary | ICD-10-CM

## 2024-07-23 DIAGNOSIS — M54.42 ACUTE LEFT-SIDED LOW BACK PAIN WITH LEFT-SIDED SCIATICA: ICD-10-CM

## 2024-07-23 LAB
ALBUMIN SERPL-MCNC: 4.4 G/DL (ref 3.5–4.6)
ALP SERPL-CCNC: 118 U/L (ref 35–104)
ALT SERPL-CCNC: 11 U/L (ref 0–41)
ANION GAP SERPL CALCULATED.3IONS-SCNC: 14 MEQ/L (ref 9–15)
AST SERPL-CCNC: 17 U/L (ref 0–40)
BACTERIA URNS QL MICRO: NEGATIVE /HPF
BASOPHILS # BLD: 0 K/UL (ref 0–0.2)
BASOPHILS NFR BLD: 0.3 %
BILIRUB SERPL-MCNC: 0.6 MG/DL (ref 0.2–0.7)
BILIRUB UR QL STRIP: NEGATIVE
BUN SERPL-MCNC: 11 MG/DL (ref 8–23)
CALCIUM SERPL-MCNC: 8.7 MG/DL (ref 8.5–9.9)
CHLORIDE SERPL-SCNC: 102 MEQ/L (ref 95–107)
CLARITY UR: CLEAR
CO2 SERPL-SCNC: 25 MEQ/L (ref 20–31)
COLOR UR: YELLOW
CREAT SERPL-MCNC: 0.68 MG/DL (ref 0.7–1.2)
EOSINOPHIL # BLD: 0 K/UL (ref 0–0.7)
EOSINOPHIL NFR BLD: 0.3 %
EPI CELLS #/AREA URNS AUTO: ABNORMAL /HPF (ref 0–5)
ERYTHROCYTE [DISTWIDTH] IN BLOOD BY AUTOMATED COUNT: 16 % (ref 11.5–14.5)
GLOBULIN SER CALC-MCNC: 3.1 G/DL (ref 2.3–3.5)
GLUCOSE SERPL-MCNC: 122 MG/DL (ref 70–99)
GLUCOSE UR STRIP-MCNC: NEGATIVE MG/DL
HCT VFR BLD AUTO: 36.1 % (ref 42–52)
HGB BLD-MCNC: 11.7 G/DL (ref 14–18)
HGB UR QL STRIP: ABNORMAL
HYALINE CASTS #/AREA URNS AUTO: ABNORMAL /HPF (ref 0–5)
KETONES UR STRIP-MCNC: NEGATIVE MG/DL
LEUKOCYTE ESTERASE UR QL STRIP: NEGATIVE
LIPASE SERPL-CCNC: 28 U/L (ref 12–95)
LYMPHOCYTES # BLD: 1.5 K/UL (ref 1–4.8)
LYMPHOCYTES NFR BLD: 12.9 %
MCH RBC QN AUTO: 31.1 PG (ref 27–31.3)
MCHC RBC AUTO-ENTMCNC: 32.4 % (ref 33–37)
MCV RBC AUTO: 96 FL (ref 79–92.2)
MONOCYTES # BLD: 1.3 K/UL (ref 0.2–0.8)
MONOCYTES NFR BLD: 11.3 %
NEUTROPHILS # BLD: 8.7 K/UL (ref 1.4–6.5)
NEUTS SEG NFR BLD: 74.9 %
NITRITE UR QL STRIP: NEGATIVE
PERFORMED ON: NORMAL
PH UR STRIP: 7.5 [PH] (ref 5–9)
PLATELET # BLD AUTO: 322 K/UL (ref 130–400)
POC CREATININE WHOLE BLOOD: 0.8
POC CREATININE: 0.8 MG/DL (ref 0.8–1.3)
POC SAMPLE TYPE: NORMAL
POTASSIUM SERPL-SCNC: 3.6 MEQ/L (ref 3.4–4.9)
PROT SERPL-MCNC: 7.5 G/DL (ref 6.3–8)
PROT UR STRIP-MCNC: NEGATIVE MG/DL
RBC # BLD AUTO: 3.76 M/UL (ref 4.7–6.1)
RBC #/AREA URNS AUTO: ABNORMAL /HPF (ref 0–5)
SODIUM SERPL-SCNC: 141 MEQ/L (ref 135–144)
SP GR UR STRIP: 1.03 (ref 1–1.03)
TROPONIN, HIGH SENSITIVITY: 10 NG/L (ref 0–19)
TROPONIN, HIGH SENSITIVITY: 9 NG/L (ref 0–19)
TROPONIN, HIGH SENSITIVITY: 9 NG/L (ref 0–19)
URINE REFLEX TO CULTURE: ABNORMAL
UROBILINOGEN UR STRIP-ACNC: 0.2 E.U./DL
WBC # BLD AUTO: 11.7 K/UL (ref 4.8–10.8)
WBC #/AREA URNS AUTO: ABNORMAL /HPF (ref 0–5)

## 2024-07-23 PROCEDURE — 36415 COLL VENOUS BLD VENIPUNCTURE: CPT

## 2024-07-23 PROCEDURE — 6360000002 HC RX W HCPCS: Performed by: PHYSICIAN ASSISTANT

## 2024-07-23 PROCEDURE — 93005 ELECTROCARDIOGRAM TRACING: CPT | Performed by: PHYSICIAN ASSISTANT

## 2024-07-23 PROCEDURE — 73502 X-RAY EXAM HIP UNI 2-3 VIEWS: CPT

## 2024-07-23 PROCEDURE — 83690 ASSAY OF LIPASE: CPT

## 2024-07-23 PROCEDURE — 96374 THER/PROPH/DIAG INJ IV PUSH: CPT

## 2024-07-23 PROCEDURE — 96375 TX/PRO/DX INJ NEW DRUG ADDON: CPT

## 2024-07-23 PROCEDURE — 85025 COMPLETE CBC W/AUTO DIFF WBC: CPT

## 2024-07-23 PROCEDURE — 6360000004 HC RX CONTRAST MEDICATION: Performed by: PHYSICIAN ASSISTANT

## 2024-07-23 PROCEDURE — 81001 URINALYSIS AUTO W/SCOPE: CPT

## 2024-07-23 PROCEDURE — 84484 ASSAY OF TROPONIN QUANT: CPT

## 2024-07-23 PROCEDURE — 80053 COMPREHEN METABOLIC PANEL: CPT

## 2024-07-23 PROCEDURE — 71260 CT THORAX DX C+: CPT

## 2024-07-23 PROCEDURE — 99285 EMERGENCY DEPT VISIT HI MDM: CPT

## 2024-07-23 PROCEDURE — 74177 CT ABD & PELVIS W/CONTRAST: CPT

## 2024-07-23 RX ORDER — ONDANSETRON 2 MG/ML
4 INJECTION INTRAMUSCULAR; INTRAVENOUS ONCE
Status: COMPLETED | OUTPATIENT
Start: 2024-07-23 | End: 2024-07-23

## 2024-07-23 RX ORDER — KETOROLAC TROMETHAMINE 15 MG/ML
15 INJECTION, SOLUTION INTRAMUSCULAR; INTRAVENOUS ONCE
Status: COMPLETED | OUTPATIENT
Start: 2024-07-23 | End: 2024-07-23

## 2024-07-23 RX ORDER — MORPHINE SULFATE 4 MG/ML
4 INJECTION, SOLUTION INTRAMUSCULAR; INTRAVENOUS ONCE
Status: COMPLETED | OUTPATIENT
Start: 2024-07-23 | End: 2024-07-23

## 2024-07-23 RX ORDER — OXYCODONE HYDROCHLORIDE AND ACETAMINOPHEN 5; 325 MG/1; MG/1
1 TABLET ORAL EVERY 6 HOURS PRN
Qty: 12 TABLET | Refills: 0 | Status: SHIPPED | OUTPATIENT
Start: 2024-07-23 | End: 2024-07-26

## 2024-07-23 RX ADMIN — KETOROLAC TROMETHAMINE 15 MG: 15 INJECTION, SOLUTION INTRAMUSCULAR; INTRAVENOUS at 10:00

## 2024-07-23 RX ADMIN — MORPHINE SULFATE 4 MG: 4 INJECTION, SOLUTION INTRAMUSCULAR; INTRAVENOUS at 08:41

## 2024-07-23 RX ADMIN — ONDANSETRON 4 MG: 2 INJECTION INTRAMUSCULAR; INTRAVENOUS at 08:42

## 2024-07-23 RX ADMIN — IOPAMIDOL 75 ML: 755 INJECTION, SOLUTION INTRAVENOUS at 09:44

## 2024-07-23 ASSESSMENT — ENCOUNTER SYMPTOMS
NAUSEA: 1
ABDOMINAL PAIN: 1
BACK PAIN: 1
SHORTNESS OF BREATH: 1

## 2024-07-23 NOTE — ED NOTES
Pt. Presents with spouse with c/o pain that he think is related to a fall that occurred 6 days ago.  Reports he fell on dirt onto his back.  Reports progressive left sided rib, lateral abdominal pain, and left hip pain since.  Has taken Tylenol without relief.  Denies striking his head.  Wife denies CHI or AMS s/s.  Ambulates with walker and has chronic pain.  No further co.    Pt. Moved to exam room and clothing removed.  No crepitus, ecchymosis, abrasions or other obvious external s/s of trauma present upon exam.  He does have point tenderness to the lateral rib cage with palpation.  Lateral abdomen is unremarkable, abdomen is round and softly distended.  No bruising/crepitus to the hip.  No shortening or rotation.  MSP's intact.  Pt. Did spring out of the wheelchair and bare weight with ease while transfer to the exam cot.  Student PA at bedside to examine pt.  Spouse remains with pt.  Warm blankets provided.

## 2024-07-23 NOTE — ED PROVIDER NOTES
[facility] ED  eMERGENCY dEPARTMENTeNCOUnter      Pt Name: Donovan Nelson  MRN: [id number]  Birthdate 1955  Date ofevaluation: 7/23/2024  Provider: Ashly Alarcon PA-C    CHIEF COMPLAINT       Chief Complaint   Patient presents with    Rib Pain (injury)    Hip Pain    Abdominal Pain         HISTORY OF PRESENT ILLNESS   (Location/Symptom, Timing/Onset,Context/Setting, Quality, Duration, Modifying Factors, Severity)  Note limiting factors.   Donovan Nelson is a 69 y.o. male who presents to the emergency department left sided diffuse pain from upper thigh, medial thoracic and lumbar vertebral and left paravertebral pain, anterior LLQ, LMQ, and LUQ into the left region of the patients chest. Patient reports past medical history of lumbar fusion. Patient experienced a fall landing on his back last Wednesday, he denied blood thinners, trauma to his head, and symptoms directly after fall. Patient states pain started up spontaneously yesterday. The most bothersome pain is from his left lateral hip to his LUQ. Patient states pain feels like it radiates up, and that his legs feel weaker then normal. Patient states OTC pain medication did not help to alleviate symptoms, and pain is worsened with movement. When the patient bends his knee he had shooting pain to his left knee. He endorsed nausea and chest pain with movement. Patient denied change in shortness of breath or palpitations, vomiting, changes in bowel or bladder, blood in stool, and incontinence.         HPI    NursingNotes were reviewed.    REVIEW OF SYSTEMS    (2-9 systems for level 4, 10 or more for level 5)     Review of Systems   Respiratory:  Positive for shortness of breath.    Cardiovascular:  Positive for chest pain.   Gastrointestinal:  Positive for abdominal pain and nausea.   Musculoskeletal:  Positive for back pain.       Except as noted above the remainder of the review of systems was reviewed and negative.       PAST MEDICAL HISTORY

## 2024-07-24 LAB
EKG ATRIAL RATE: 90 BPM
EKG P AXIS: 65 DEGREES
EKG P-R INTERVAL: 228 MS
EKG Q-T INTERVAL: 384 MS
EKG QRS DURATION: 96 MS
EKG QTC CALCULATION (BAZETT): 469 MS
EKG R AXIS: -64 DEGREES
EKG T AXIS: 61 DEGREES
EKG VENTRICULAR RATE: 90 BPM

## 2024-08-16 ENCOUNTER — OFFICE VISIT (OUTPATIENT)
Dept: FAMILY MEDICINE CLINIC | Age: 69
End: 2024-08-16
Payer: COMMERCIAL

## 2024-08-16 VITALS
HEART RATE: 73 BPM | WEIGHT: 216 LBS | DIASTOLIC BLOOD PRESSURE: 64 MMHG | SYSTOLIC BLOOD PRESSURE: 138 MMHG | TEMPERATURE: 97.6 F | HEIGHT: 69 IN | OXYGEN SATURATION: 93 % | BODY MASS INDEX: 31.99 KG/M2

## 2024-08-16 DIAGNOSIS — I10 BENIGN ESSENTIAL HYPERTENSION: Primary | ICD-10-CM

## 2024-08-16 DIAGNOSIS — F51.01 PRIMARY INSOMNIA: ICD-10-CM

## 2024-08-16 DIAGNOSIS — E78.5 DYSLIPIDEMIA: ICD-10-CM

## 2024-08-16 DIAGNOSIS — M10.9 ACUTE GOUT INVOLVING TOE, UNSPECIFIED CAUSE, UNSPECIFIED LATERALITY: ICD-10-CM

## 2024-08-16 DIAGNOSIS — F33.1 MODERATE EPISODE OF RECURRENT MAJOR DEPRESSIVE DISORDER (HCC): ICD-10-CM

## 2024-08-16 DIAGNOSIS — K21.9 GASTROESOPHAGEAL REFLUX DISEASE, UNSPECIFIED WHETHER ESOPHAGITIS PRESENT: ICD-10-CM

## 2024-08-16 PROCEDURE — 1123F ACP DISCUSS/DSCN MKR DOCD: CPT | Performed by: STUDENT IN AN ORGANIZED HEALTH CARE EDUCATION/TRAINING PROGRAM

## 2024-08-16 PROCEDURE — 99214 OFFICE O/P EST MOD 30 MIN: CPT | Performed by: STUDENT IN AN ORGANIZED HEALTH CARE EDUCATION/TRAINING PROGRAM

## 2024-08-16 PROCEDURE — 3078F DIAST BP <80 MM HG: CPT | Performed by: STUDENT IN AN ORGANIZED HEALTH CARE EDUCATION/TRAINING PROGRAM

## 2024-08-16 PROCEDURE — 3075F SYST BP GE 130 - 139MM HG: CPT | Performed by: STUDENT IN AN ORGANIZED HEALTH CARE EDUCATION/TRAINING PROGRAM

## 2024-08-16 NOTE — PROGRESS NOTES
chills, fatigue, fever and unexpected weight change.   HENT:  Negative for congestion, rhinorrhea and sore throat.    Eyes:  Negative for discharge.   Respiratory:  Negative for cough, shortness of breath and wheezing.    Cardiovascular:  Negative for chest pain, palpitations and leg swelling.   Gastrointestinal:  Negative for abdominal pain, diarrhea, nausea and vomiting.   Genitourinary:  Negative for difficulty urinating.   Musculoskeletal:  Negative for arthralgias and joint swelling.   Skin:  Negative for rash.   Neurological:  Negative for weakness, light-headedness, numbness and headaches.   Psychiatric/Behavioral:  Negative for confusion and suicidal ideas. The patient is not nervous/anxious.        Past Medical History:   Diagnosis Date    Alcohol abuse     Alcohol abuse     Allergic rhinitis     Aortic regurgitation     Cervical radiculopathy at C8 10/13/2013    Chronic back pain     Chronic back pain     COPD (chronic obstructive pulmonary disease) ([facility])     Erectile dysfunction     GERD (gastroesophageal reflux disease)     Hyperlipidemia     Hypertension     Insomnia     Moderate episode of recurrent major depressive disorder ([facility]) 8/18/2021    Osteoarthritis     PUD (peptic ulcer disease)     Restless leg syndrome     Smoker unmotivated to quit     Stage 3a chronic kidney disease ([facility]) 4/13/2023    Vitamin D deficiency      Past Surgical History:   Procedure Laterality Date    CATARACT EXTRACTION Right 08/16/2023    COLONOSCOPY N/A 08/03/2020    COLONOSCOPY WITH POLYPECTOMY performed by Ascencion Landon MD at [facility]    COLONOSCOPY N/A 08/14/2023    COLORECTAL CANCER SCREENING, HIGH RISK performed by Ascencion Landon MD at [facility]    JOINT REPLACEMENT  2008    Left hip replacement    SPINE SURGERY  2006    fusion L4-L5     Current Outpatient Medications   Medication Sig Dispense Refill    pantoprazole (PROTONIX) 40 MG tablet Take 1 tablet by mouth daily 90 tablet 1    sertraline

## 2024-08-19 ASSESSMENT — ENCOUNTER SYMPTOMS
COUGH: 0
VOMITING: 0
WHEEZING: 0
ABDOMINAL PAIN: 0
NAUSEA: 0
EYE DISCHARGE: 0
SHORTNESS OF BREATH: 0
DIARRHEA: 0
RHINORRHEA: 0
SORE THROAT: 0

## 2024-09-25 ENCOUNTER — APPOINTMENT (OUTPATIENT)
Dept: RHEUMATOLOGY | Facility: CLINIC | Age: 69
End: 2024-09-25
Payer: COMMERCIAL

## 2024-10-06 DIAGNOSIS — E78.5 DYSLIPIDEMIA: ICD-10-CM

## 2024-10-07 RX ORDER — ATORVASTATIN CALCIUM 40 MG/1
40 TABLET, FILM COATED ORAL DAILY
Qty: 90 TABLET | Refills: 3 | Status: SHIPPED | OUTPATIENT
Start: 2024-10-07

## 2024-10-24 DIAGNOSIS — Z51.81 ENCOUNTER FOR MONITORING ALLOPURINOL THERAPY: ICD-10-CM

## 2024-10-24 DIAGNOSIS — M1A.09X0 IDIOPATHIC CHRONIC GOUT OF MULTIPLE SITES WITHOUT TOPHUS: ICD-10-CM

## 2024-10-24 DIAGNOSIS — Z79.899 ENCOUNTER FOR MONITORING ALLOPURINOL THERAPY: ICD-10-CM

## 2024-10-24 DIAGNOSIS — M47.816 LUMBAR SPONDYLOSIS: ICD-10-CM

## 2024-10-24 DIAGNOSIS — M15.0 PRIMARY OSTEOARTHRITIS INVOLVING MULTIPLE JOINTS: ICD-10-CM

## 2024-10-24 RX ORDER — MELOXICAM 7.5 MG/1
7.5 TABLET ORAL DAILY
Qty: 90 TABLET | Refills: 1 | Status: SHIPPED | OUTPATIENT
Start: 2024-10-24

## 2024-11-08 ENCOUNTER — OFFICE VISIT (OUTPATIENT)
Dept: FAMILY MEDICINE CLINIC | Age: 69
End: 2024-11-08

## 2024-11-08 VITALS
DIASTOLIC BLOOD PRESSURE: 70 MMHG | OXYGEN SATURATION: 95 % | WEIGHT: 217 LBS | HEART RATE: 69 BPM | HEIGHT: 69 IN | BODY MASS INDEX: 32.14 KG/M2 | TEMPERATURE: 97.8 F | SYSTOLIC BLOOD PRESSURE: 160 MMHG

## 2024-11-08 DIAGNOSIS — I10 BENIGN ESSENTIAL HYPERTENSION: ICD-10-CM

## 2024-11-08 DIAGNOSIS — E78.5 DYSLIPIDEMIA: ICD-10-CM

## 2024-11-08 DIAGNOSIS — I10 ESSENTIAL HYPERTENSION, BENIGN: Primary | ICD-10-CM

## 2024-11-08 DIAGNOSIS — Z96.642 HISTORY OF LEFT HIP REPLACEMENT: ICD-10-CM

## 2024-11-08 DIAGNOSIS — F51.01 PRIMARY INSOMNIA: ICD-10-CM

## 2024-11-08 DIAGNOSIS — F33.1 MODERATE EPISODE OF RECURRENT MAJOR DEPRESSIVE DISORDER (HCC): ICD-10-CM

## 2024-11-08 DIAGNOSIS — M17.0 PRIMARY OSTEOARTHRITIS OF BOTH KNEES: ICD-10-CM

## 2024-11-08 DIAGNOSIS — R25.1 TREMORS OF NERVOUS SYSTEM: ICD-10-CM

## 2024-11-08 DIAGNOSIS — K21.9 GASTROESOPHAGEAL REFLUX DISEASE, UNSPECIFIED WHETHER ESOPHAGITIS PRESENT: ICD-10-CM

## 2024-11-08 RX ORDER — MELOXICAM 7.5 MG/1
7.5 TABLET ORAL DAILY
COMMUNITY
End: 2024-11-08

## 2024-11-08 RX ORDER — PANTOPRAZOLE SODIUM 40 MG/1
40 TABLET, DELAYED RELEASE ORAL DAILY
Qty: 90 TABLET | Refills: 3 | Status: SHIPPED | OUTPATIENT
Start: 2024-11-08

## 2024-11-08 RX ORDER — SPIRONOLACTONE 25 MG/1
25 TABLET ORAL 2 TIMES DAILY
Qty: 180 TABLET | Refills: 3 | Status: SHIPPED | OUTPATIENT
Start: 2024-11-08

## 2024-11-08 RX ORDER — METOPROLOL SUCCINATE 100 MG/1
100 TABLET, EXTENDED RELEASE ORAL DAILY
Qty: 90 TABLET | Refills: 3 | Status: SHIPPED | OUTPATIENT
Start: 2024-11-08

## 2024-11-08 RX ORDER — TRAZODONE HYDROCHLORIDE 100 MG/1
100 TABLET ORAL NIGHTLY
Qty: 90 TABLET | Refills: 1 | Status: SHIPPED | OUTPATIENT
Start: 2024-11-08

## 2024-11-08 NOTE — PROGRESS NOTES
Subjective  Donovan Nelson, 69 y.o. male presents today with:  Chief Complaint   Patient presents with    Follow-up    Hypertension     Does not check BP at home. Denies chest pains, heart palpitations, headaches, dizziness, SOB or edema.     Depression     Feels mood is stable. Denies feeling down & depressed.     Insomnia     Is taking Trazodone as directed. Is falling asleep & staying asleep, well at this time.     Gastroesophageal Reflux     Feels this is controlled at this time.     Gout     Has been controlled. Taking medication as directed.     Anemia     Labs done 7/23/24.    Chronic Kidney Disease     Labs done 7/23/24.    Dyslipidemia     Lipid panel done 3/13/24.    Osteoarthritis     States he has been having an increase in bilateral knee pain. States right is worse than left.        History of Present Illness  The patient is a 69-year-old male who presents for a follow-up visit. He is accompanied by his wife.    He reports feeling well overall but does not monitor his blood pressure at home. He is currently taking metoprolol succinate 100 mg once daily, spironolactone 25 mg twice daily, and amlodipine 10 mg for blood pressure management. Additionally, he takes atorvastatin 40 mg for cholesterol control and pantoprazole 40 mg once daily for acid reflux. He reports no chest pain or shortness of breath.     He finds Zoloft beneficial for his mood and reports that trazodone aids his sleep. His wife has observed him gagging while sleeping or lying in bed.  He does not wish to pursue testing at this time.    He has been experiencing knee pain for some time, which has recently worsened. He is not currently taking any medication for this pain. He underwent imaging earlier this year.  He is interested in seeing Ortho.    He also reports a squeaking sound in his left hip when he walks, which was replaced 25 years ago.    He reports no issues with gout.  He has no other concerns at this time.      Past Medical

## 2024-11-20 NOTE — PROGRESS NOTES
Patient ID:  Donovan Nelson is a 69 y.o. male who presents today for evaluation of bilateral knee pain.  Right more    Injury: no  Metal Allergy: no    Location: bilateral knee pain, located on the global aspect of the knee  Pain: yes; 7 on a scale of 1 to 10  Onset: gradual  Duration: 3 years  Frequency:  occurs daily  Quality: aching and boring   Swelling: patient notes intermittent swelling of the joint  Aggravating factors: weight bearing activity, standing, and walking  Alleviating factors: removing weight from leg and rest  Mechanical symptoms: none  Radiation: no    Activities: walking independently  Restriction:  decreased ambulatory tolerance  Progression:  worsening    Previous treatment:  none  NSAIDs:  none  PT:  none    Medications:    Current Outpatient Medications on File Prior to Visit   Medication Sig Dispense Refill    metoprolol succinate (TOPROL XL) 100 MG extended release tablet Take 1 tablet by mouth daily 90 tablet 3    pantoprazole (PROTONIX) 40 MG tablet Take 1 tablet by mouth daily 90 tablet 3    sertraline (ZOLOFT) 50 MG tablet Take 1 tablet by mouth daily 90 tablet 1    spironolactone (ALDACTONE) 25 MG tablet Take 1 tablet by mouth 2 times daily 180 tablet 3    traZODone (DESYREL) 100 MG tablet Take 1 tablet by mouth nightly 90 tablet 1    atorvastatin (LIPITOR) 40 MG tablet TAKE 1 TABLET DAILY 90 tablet 3    allopurinol (ZYLOPRIM) 300 MG tablet Take 1 tablet by mouth daily 90 tablet 3    amLODIPine (NORVASC) 10 MG tablet Take 1 tablet by mouth daily 90 tablet 3    folic acid (FOLVITE) 1 MG tablet Take 1 tablet by mouth daily 90 tablet 3    albuterol sulfate HFA (PROVENTIL HFA) 108 (90 Base) MCG/ACT inhaler Inhale 2 puffs into the lungs every 6 hours as needed for Wheezing 3 each 3    finasteride (PROSCAR) 5 MG tablet Take 1 tablet by mouth daily 90 tablet 3    Handicap Placard MISC by Does not apply route Good for 5 years 1 each 0     No current facility-administered medications on

## 2024-11-21 ENCOUNTER — OFFICE VISIT (OUTPATIENT)
Dept: ORTHOPEDIC SURGERY | Age: 69
End: 2024-11-21

## 2024-11-21 VITALS — WEIGHT: 216 LBS | TEMPERATURE: 97.8 F | HEART RATE: 88 BPM | OXYGEN SATURATION: 96 % | BODY MASS INDEX: 31.9 KG/M2

## 2024-11-21 DIAGNOSIS — M17.11 PRIMARY OSTEOARTHRITIS OF RIGHT KNEE: Primary | ICD-10-CM

## 2024-11-21 DIAGNOSIS — M17.12 PRIMARY OSTEOARTHRITIS OF LEFT KNEE: ICD-10-CM

## 2024-11-21 RX ORDER — TRIAMCINOLONE ACETONIDE 40 MG/ML
40 INJECTION, SUSPENSION INTRA-ARTICULAR; INTRAMUSCULAR ONCE
Status: COMPLETED | OUTPATIENT
Start: 2024-11-21 | End: 2024-11-21

## 2024-11-21 RX ORDER — LIDOCAINE HYDROCHLORIDE 10 MG/ML
8 INJECTION, SOLUTION INFILTRATION; PERINEURAL ONCE
Status: COMPLETED | OUTPATIENT
Start: 2024-11-21 | End: 2024-11-21

## 2024-11-21 RX ADMIN — LIDOCAINE HYDROCHLORIDE 8 ML: 10 INJECTION, SOLUTION INFILTRATION; PERINEURAL at 10:36

## 2024-11-21 RX ADMIN — LIDOCAINE HYDROCHLORIDE 8 ML: 10 INJECTION, SOLUTION INFILTRATION; PERINEURAL at 10:33

## 2024-11-21 RX ADMIN — TRIAMCINOLONE ACETONIDE 40 MG: 40 INJECTION, SUSPENSION INTRA-ARTICULAR; INTRAMUSCULAR at 10:34

## 2024-11-21 RX ADMIN — TRIAMCINOLONE ACETONIDE 40 MG: 40 INJECTION, SUSPENSION INTRA-ARTICULAR; INTRAMUSCULAR at 10:35

## 2024-11-21 ASSESSMENT — ENCOUNTER SYMPTOMS
COUGH: 0
EYE PAIN: 0
EYE DISCHARGE: 0
EYE ITCHING: 0
SHORTNESS OF BREATH: 0
CONSTIPATION: 0
ABDOMINAL PAIN: 0
DIARRHEA: 0

## 2024-12-02 DIAGNOSIS — N40.1 BPH WITH OBSTRUCTION/LOWER URINARY TRACT SYMPTOMS: Primary | ICD-10-CM

## 2024-12-02 DIAGNOSIS — I10 ESSENTIAL HYPERTENSION, BENIGN: ICD-10-CM

## 2024-12-02 DIAGNOSIS — N13.8 BPH WITH OBSTRUCTION/LOWER URINARY TRACT SYMPTOMS: Primary | ICD-10-CM

## 2024-12-02 DIAGNOSIS — R97.20 ELEVATED PSA: Primary | ICD-10-CM

## 2024-12-02 DIAGNOSIS — E78.5 DYSLIPIDEMIA: ICD-10-CM

## 2024-12-02 LAB
ALBUMIN SERPL-MCNC: 4.2 G/DL (ref 3.5–4.6)
ALP SERPL-CCNC: 113 U/L (ref 35–104)
ALT SERPL-CCNC: 27 U/L (ref 0–41)
ANION GAP SERPL CALCULATED.3IONS-SCNC: 10 MEQ/L (ref 9–15)
AST SERPL-CCNC: 30 U/L (ref 0–40)
BASOPHILS # BLD: 0.1 K/UL (ref 0–0.2)
BASOPHILS NFR BLD: 0.4 %
BILIRUB SERPL-MCNC: 0.6 MG/DL (ref 0.2–0.7)
BUN SERPL-MCNC: 23 MG/DL (ref 8–23)
CALCIUM SERPL-MCNC: 10.1 MG/DL (ref 8.5–9.9)
CHLORIDE SERPL-SCNC: 102 MEQ/L (ref 95–107)
CHOLEST SERPL-MCNC: 184 MG/DL (ref 0–199)
CO2 SERPL-SCNC: 27 MEQ/L (ref 20–31)
CREAT SERPL-MCNC: 1.06 MG/DL (ref 0.7–1.2)
EOSINOPHIL # BLD: 0.1 K/UL (ref 0–0.7)
EOSINOPHIL NFR BLD: 0.9 %
ERYTHROCYTE [DISTWIDTH] IN BLOOD BY AUTOMATED COUNT: 16.6 % (ref 11.5–14.5)
GLOBULIN SER CALC-MCNC: 2.8 G/DL (ref 2.3–3.5)
GLUCOSE FASTING: 98 MG/DL (ref 70–99)
HCT VFR BLD AUTO: 38.6 % (ref 42–52)
HDLC SERPL-MCNC: 91 MG/DL (ref 40–59)
HGB BLD-MCNC: 12.8 G/DL (ref 14–18)
LDL CHOLESTEROL: 64 MG/DL (ref 0–129)
LYMPHOCYTES # BLD: 3.4 K/UL (ref 1–4.8)
LYMPHOCYTES NFR BLD: 28.7 %
MCH RBC QN AUTO: 32.7 PG (ref 27–31.3)
MCHC RBC AUTO-ENTMCNC: 33.2 % (ref 33–37)
MCV RBC AUTO: 98.5 FL (ref 79–92.2)
MONOCYTES # BLD: 1.2 K/UL (ref 0.2–0.8)
MONOCYTES NFR BLD: 10.2 %
NEUTROPHILS # BLD: 7 K/UL (ref 1.4–6.5)
NEUTS SEG NFR BLD: 59.5 %
PLATELET # BLD AUTO: 219 K/UL (ref 130–400)
POTASSIUM SERPL-SCNC: 4.3 MEQ/L (ref 3.4–4.9)
PROT SERPL-MCNC: 7 G/DL (ref 6.3–8)
PSA SERPL-MCNC: 2.76 NG/ML (ref 0–4)
RBC # BLD AUTO: 3.92 M/UL (ref 4.7–6.1)
SODIUM SERPL-SCNC: 139 MEQ/L (ref 135–144)
TRIGLYCERIDE, FASTING: 147 MG/DL (ref 0–150)
WBC # BLD AUTO: 11.8 K/UL (ref 4.8–10.8)

## 2024-12-02 RX ORDER — FINASTERIDE 5 MG/1
5 TABLET, FILM COATED ORAL DAILY
Qty: 90 TABLET | Refills: 3 | Status: SHIPPED | OUTPATIENT
Start: 2024-12-02

## 2024-12-05 ENCOUNTER — OFFICE VISIT (OUTPATIENT)
Dept: UROLOGY | Age: 69
End: 2024-12-05
Payer: COMMERCIAL

## 2024-12-05 VITALS
HEART RATE: 85 BPM | DIASTOLIC BLOOD PRESSURE: 72 MMHG | HEIGHT: 69 IN | BODY MASS INDEX: 31.1 KG/M2 | SYSTOLIC BLOOD PRESSURE: 138 MMHG | WEIGHT: 210 LBS

## 2024-12-05 DIAGNOSIS — R97.20 ELEVATED PSA: Primary | ICD-10-CM

## 2024-12-05 PROCEDURE — 1160F RVW MEDS BY RX/DR IN RCRD: CPT | Performed by: UROLOGY

## 2024-12-05 PROCEDURE — 1123F ACP DISCUSS/DSCN MKR DOCD: CPT | Performed by: UROLOGY

## 2024-12-05 PROCEDURE — 1159F MED LIST DOCD IN RCRD: CPT | Performed by: UROLOGY

## 2024-12-05 PROCEDURE — 3078F DIAST BP <80 MM HG: CPT | Performed by: UROLOGY

## 2024-12-05 PROCEDURE — 99213 OFFICE O/P EST LOW 20 MIN: CPT | Performed by: UROLOGY

## 2024-12-05 PROCEDURE — 3075F SYST BP GE 130 - 139MM HG: CPT | Performed by: UROLOGY

## 2024-12-05 RX ORDER — FINASTERIDE 5 MG/1
5 TABLET, FILM COATED ORAL DAILY
Qty: 90 TABLET | Refills: 3 | Status: SHIPPED | OUTPATIENT
Start: 2024-12-05

## 2024-12-05 ASSESSMENT — ENCOUNTER SYMPTOMS
ABDOMINAL PAIN: 0
ABDOMINAL DISTENTION: 0

## 2024-12-05 NOTE — PROGRESS NOTES
starting Proscar in an appropriate fashion. He understands the approx 25 % risk for prostate cancer based on the current PSA. Options for further evaluation were again reviewed and the option of a prostate biopsy (can not get MRI given spinal hardware and remote hip) vs use of [facility]dx vs continued closer PSA F/U were reviewed and he wants yearly PSA..       Plan:      F/U 1 yr for PSA (pt choice)        Jovan Mckeon MD

## 2024-12-16 NOTE — PROGRESS NOTES
Patient ID:  Donovan Nelson is a 69 y.o. male who presents today for evaluation of left hip pain.    Injury: no    Location of pain:  left groin  Pain: yes; 3 on a scale of 1 to 10  Onset: gradual  Duration: 2 months  Frequency:  occurs daily  Quality: aching and boring   Swelling: patient does not note any swelling of the joint  Aggravating factors: weight bearing activity, standing, and walking  Alleviating factors: removing weight from leg and rest  Mechanical symptoms: none  Radiation: no    Activities: walking independently  Restriction:  decreased ambulatory tolerance  Progression:  worsening    Previous treatment:  none  NSAIDs:  none  PT:  none    Medications:    Current Outpatient Medications on File Prior to Visit   Medication Sig Dispense Refill    finasteride (PROSCAR) 5 MG tablet Take 1 tablet by mouth daily 90 tablet 3    finasteride (PROSCAR) 5 MG tablet Take 1 tablet by mouth daily 90 tablet 3    metoprolol succinate (TOPROL XL) 100 MG extended release tablet Take 1 tablet by mouth daily 90 tablet 3    pantoprazole (PROTONIX) 40 MG tablet Take 1 tablet by mouth daily 90 tablet 3    sertraline (ZOLOFT) 50 MG tablet Take 1 tablet by mouth daily 90 tablet 1    spironolactone (ALDACTONE) 25 MG tablet Take 1 tablet by mouth 2 times daily 180 tablet 3    traZODone (DESYREL) 100 MG tablet Take 1 tablet by mouth nightly 90 tablet 1    atorvastatin (LIPITOR) 40 MG tablet TAKE 1 TABLET DAILY 90 tablet 3    allopurinol (ZYLOPRIM) 300 MG tablet Take 1 tablet by mouth daily 90 tablet 3    amLODIPine (NORVASC) 10 MG tablet Take 1 tablet by mouth daily 90 tablet 3    folic acid (FOLVITE) 1 MG tablet Take 1 tablet by mouth daily 90 tablet 3    albuterol sulfate HFA (PROVENTIL HFA) 108 (90 Base) MCG/ACT inhaler Inhale 2 puffs into the lungs every 6 hours as needed for Wheezing 3 each 3    Handicap Placard MISC by Does not apply route Good for 5 years 1 each 0     No current facility-administered medications on

## 2024-12-20 ENCOUNTER — OFFICE VISIT (OUTPATIENT)
Dept: ORTHOPEDIC SURGERY | Age: 69
End: 2024-12-20

## 2024-12-20 VITALS
HEIGHT: 69 IN | HEART RATE: 97 BPM | WEIGHT: 210 LBS | OXYGEN SATURATION: 98 % | BODY MASS INDEX: 31.1 KG/M2 | TEMPERATURE: 98.4 F

## 2024-12-20 DIAGNOSIS — Z96.642 PRESENCE OF ARTIFICIAL HIP JOINT, LEFT: Primary | ICD-10-CM

## 2025-01-21 ENCOUNTER — TELEPHONE (OUTPATIENT)
Age: 70
End: 2025-01-21

## 2025-01-27 ENCOUNTER — TELEMEDICINE (OUTPATIENT)
Age: 70
End: 2025-01-27
Payer: MEDICARE

## 2025-01-27 DIAGNOSIS — Z00.00 MEDICARE ANNUAL WELLNESS VISIT, SUBSEQUENT: Primary | ICD-10-CM

## 2025-01-27 PROCEDURE — 1123F ACP DISCUSS/DSCN MKR DOCD: CPT | Performed by: STUDENT IN AN ORGANIZED HEALTH CARE EDUCATION/TRAINING PROGRAM

## 2025-01-27 PROCEDURE — G0439 PPPS, SUBSEQ VISIT: HCPCS | Performed by: STUDENT IN AN ORGANIZED HEALTH CARE EDUCATION/TRAINING PROGRAM

## 2025-01-27 PROCEDURE — 1159F MED LIST DOCD IN RCRD: CPT | Performed by: STUDENT IN AN ORGANIZED HEALTH CARE EDUCATION/TRAINING PROGRAM

## 2025-01-27 PROCEDURE — 1160F RVW MEDS BY RX/DR IN RCRD: CPT | Performed by: STUDENT IN AN ORGANIZED HEALTH CARE EDUCATION/TRAINING PROGRAM

## 2025-01-27 RX ORDER — MELOXICAM 7.5 MG/1
7.5 TABLET ORAL DAILY
COMMUNITY
Start: 2025-01-21

## 2025-01-27 SDOH — ECONOMIC STABILITY: FOOD INSECURITY: WITHIN THE PAST 12 MONTHS, YOU WORRIED THAT YOUR FOOD WOULD RUN OUT BEFORE YOU GOT MONEY TO BUY MORE.: NEVER TRUE

## 2025-01-27 SDOH — ECONOMIC STABILITY: FOOD INSECURITY: WITHIN THE PAST 12 MONTHS, THE FOOD YOU BOUGHT JUST DIDN'T LAST AND YOU DIDN'T HAVE MONEY TO GET MORE.: NEVER TRUE

## 2025-01-27 ASSESSMENT — PATIENT HEALTH QUESTIONNAIRE - PHQ9
10. IF YOU CHECKED OFF ANY PROBLEMS, HOW DIFFICULT HAVE THESE PROBLEMS MADE IT FOR YOU TO DO YOUR WORK, TAKE CARE OF THINGS AT HOME, OR GET ALONG WITH OTHER PEOPLE: NOT DIFFICULT AT ALL
SUM OF ALL RESPONSES TO PHQ9 QUESTIONS 1 & 2: 0
9. THOUGHTS THAT YOU WOULD BE BETTER OFF DEAD, OR OF HURTING YOURSELF: NOT AT ALL
SUM OF ALL RESPONSES TO PHQ QUESTIONS 1-9: 0
SUM OF ALL RESPONSES TO PHQ QUESTIONS 1-9: 0
6. FEELING BAD ABOUT YOURSELF - OR THAT YOU ARE A FAILURE OR HAVE LET YOURSELF OR YOUR FAMILY DOWN: NOT AT ALL
SUM OF ALL RESPONSES TO PHQ QUESTIONS 1-9: 0
7. TROUBLE CONCENTRATING ON THINGS, SUCH AS READING THE NEWSPAPER OR WATCHING TELEVISION: NOT AT ALL
5. POOR APPETITE OR OVEREATING: NOT AT ALL
3. TROUBLE FALLING OR STAYING ASLEEP: NOT AT ALL
1. LITTLE INTEREST OR PLEASURE IN DOING THINGS: NOT AT ALL
SUM OF ALL RESPONSES TO PHQ QUESTIONS 1-9: 0
4. FEELING TIRED OR HAVING LITTLE ENERGY: NOT AT ALL
8. MOVING OR SPEAKING SO SLOWLY THAT OTHER PEOPLE COULD HAVE NOTICED. OR THE OPPOSITE, BEING SO FIGETY OR RESTLESS THAT YOU HAVE BEEN MOVING AROUND A LOT MORE THAN USUAL: NOT AT ALL
2. FEELING DOWN, DEPRESSED OR HOPELESS: NOT AT ALL

## 2025-01-27 ASSESSMENT — LIFESTYLE VARIABLES
HOW OFTEN DURING THE LAST YEAR HAVE YOU FAILED TO DO WHAT WAS NORMALLY EXPECTED FROM YOU BECAUSE OF DRINKING: NEVER
HOW OFTEN DURING THE LAST YEAR HAVE YOU HAD A FEELING OF GUILT OR REMORSE AFTER DRINKING: NEVER
HOW OFTEN DURING THE LAST YEAR HAVE YOU BEEN UNABLE TO REMEMBER WHAT HAPPENED THE NIGHT BEFORE BECAUSE YOU HAD BEEN DRINKING: NEVER
HAS A RELATIVE, FRIEND, DOCTOR, OR ANOTHER HEALTH PROFESSIONAL EXPRESSED CONCERN ABOUT YOUR DRINKING OR SUGGESTED YOU CUT DOWN: NO
HOW OFTEN DURING THE LAST YEAR HAVE YOU FOUND THAT YOU WERE NOT ABLE TO STOP DRINKING ONCE YOU HAD STARTED: WEEKLY
HAVE YOU OR SOMEONE ELSE BEEN INJURED AS A RESULT OF YOUR DRINKING: NO
HOW OFTEN DO YOU HAVE A DRINK CONTAINING ALCOHOL: 2-4 TIMES A MONTH
HOW OFTEN DURING THE LAST YEAR HAVE YOU NEEDED AN ALCOHOLIC DRINK FIRST THING IN THE MORNING TO GET YOURSELF GOING AFTER A NIGHT OF HEAVY DRINKING: NEVER
HOW MANY STANDARD DRINKS CONTAINING ALCOHOL DO YOU HAVE ON A TYPICAL DAY: 3 OR 4

## 2025-01-27 NOTE — PROGRESS NOTES
Medicare Annual Wellness Visit    Donovan Nelson is here for Medicare AWV    Assessment & Plan   Medicare annual wellness visit, subsequent       No follow-ups on file.     Subjective       Patient's complete Health Risk Assessment and screening values have been reviewed and are found in Flowsheets. The following problems were reviewed today and where indicated follow up appointments were made and/or referrals ordered.    Positive Risk Factor Screenings with Interventions:    Fall Risk:  Do you feel unsteady or are you worried about falling? : (!) yes  2 or more falls in past year?: no  Fall with injury in past year?: no     Interventions:    Reviewed medications, home hazards, visual acuity, and co-morbidities that can increase risk for falls      Alcohol Screening:  AUDIT-C Score: 5  AUDIT Total Score: 5  Total Score Interpretation: 0-7 suggests low risk alcohol consumption but assess individual risks   Interventions:  Patient declined any further intervention or treatment              Inactivity:  On average, how many days per week do you engage in moderate to strenuous exercise (like a brisk walk)?: 0 days (!) Abnormal  On average, how many minutes do you engage in exercise at this level?: 0 min  Interventions:  Patient declined any further interventions or treatment    Poor Eating Habits/Diet:  Do you eat balanced/healthy meals regularly?: (!) No  Interventions:  Patient declines any further evaluation or treatment    Abnormal BMI (obese):  There is no height or weight on file to calculate BMI. (!) Abnormal  Interventions:  Patient declines any further evaluation or treatment           Safety:  Do you have non-slip mats or non-slip surfaces or shower bars or grab bars in your shower or bathtub?: (!) No  Interventions:  Home safety tips provided                   Objective    Patient-Reported Vitals  No data recorded             No Known Allergies  Prior to Visit Medications    Medication Sig Taking?

## 2025-03-04 DIAGNOSIS — F51.01 PRIMARY INSOMNIA: ICD-10-CM

## 2025-03-04 RX ORDER — TRAZODONE HYDROCHLORIDE 100 MG/1
100 TABLET ORAL NIGHTLY
Qty: 90 TABLET | Refills: 1 | Status: SHIPPED | OUTPATIENT
Start: 2025-03-04

## 2025-03-26 ENCOUNTER — OFFICE VISIT (OUTPATIENT)
Age: 70
End: 2025-03-26
Payer: MEDICARE

## 2025-03-26 ENCOUNTER — RESULTS FOLLOW-UP (OUTPATIENT)
Age: 70
End: 2025-03-26

## 2025-03-26 VITALS
OXYGEN SATURATION: 95 % | BODY MASS INDEX: 32.73 KG/M2 | TEMPERATURE: 97.7 F | WEIGHT: 221 LBS | SYSTOLIC BLOOD PRESSURE: 156 MMHG | HEIGHT: 69 IN | DIASTOLIC BLOOD PRESSURE: 70 MMHG | HEART RATE: 65 BPM

## 2025-03-26 DIAGNOSIS — I10 BENIGN ESSENTIAL HYPERTENSION: Primary | ICD-10-CM

## 2025-03-26 DIAGNOSIS — F10.10 ALCOHOL ABUSE: ICD-10-CM

## 2025-03-26 DIAGNOSIS — N18.31 ANEMIA DUE TO STAGE 3A CHRONIC KIDNEY DISEASE: ICD-10-CM

## 2025-03-26 DIAGNOSIS — Z87.09 HX OF RESPIRATORY FAILURE: ICD-10-CM

## 2025-03-26 DIAGNOSIS — M10.9 ACUTE GOUT INVOLVING TOE, UNSPECIFIED CAUSE, UNSPECIFIED LATERALITY: ICD-10-CM

## 2025-03-26 DIAGNOSIS — M54.50 CHRONIC BILATERAL LOW BACK PAIN, UNSPECIFIED WHETHER SCIATICA PRESENT: ICD-10-CM

## 2025-03-26 DIAGNOSIS — J44.89 OBSTRUCTIVE CHRONIC BRONCHITIS WITHOUT EXACERBATION (HCC): ICD-10-CM

## 2025-03-26 DIAGNOSIS — R73.03 PREDIABETES: ICD-10-CM

## 2025-03-26 DIAGNOSIS — F33.1 MODERATE EPISODE OF RECURRENT MAJOR DEPRESSIVE DISORDER (HCC): ICD-10-CM

## 2025-03-26 DIAGNOSIS — M17.11 PRIMARY OSTEOARTHRITIS OF RIGHT KNEE: ICD-10-CM

## 2025-03-26 DIAGNOSIS — D63.1 ANEMIA DUE TO STAGE 3A CHRONIC KIDNEY DISEASE: ICD-10-CM

## 2025-03-26 DIAGNOSIS — G89.29 CHRONIC BILATERAL LOW BACK PAIN, UNSPECIFIED WHETHER SCIATICA PRESENT: ICD-10-CM

## 2025-03-26 DIAGNOSIS — R05.8 DRY COUGH: ICD-10-CM

## 2025-03-26 DIAGNOSIS — M17.12 PRIMARY OSTEOARTHRITIS OF LEFT KNEE: ICD-10-CM

## 2025-03-26 DIAGNOSIS — F10.21 ALCOHOL USE DISORDER, MODERATE, IN EARLY REMISSION: ICD-10-CM

## 2025-03-26 LAB — HBA1C MFR BLD: 5.4 %

## 2025-03-26 PROCEDURE — 1159F MED LIST DOCD IN RCRD: CPT | Performed by: STUDENT IN AN ORGANIZED HEALTH CARE EDUCATION/TRAINING PROGRAM

## 2025-03-26 PROCEDURE — 83036 HEMOGLOBIN GLYCOSYLATED A1C: CPT | Performed by: STUDENT IN AN ORGANIZED HEALTH CARE EDUCATION/TRAINING PROGRAM

## 2025-03-26 PROCEDURE — 3078F DIAST BP <80 MM HG: CPT | Performed by: STUDENT IN AN ORGANIZED HEALTH CARE EDUCATION/TRAINING PROGRAM

## 2025-03-26 PROCEDURE — 99214 OFFICE O/P EST MOD 30 MIN: CPT | Performed by: STUDENT IN AN ORGANIZED HEALTH CARE EDUCATION/TRAINING PROGRAM

## 2025-03-26 PROCEDURE — 1123F ACP DISCUSS/DSCN MKR DOCD: CPT | Performed by: STUDENT IN AN ORGANIZED HEALTH CARE EDUCATION/TRAINING PROGRAM

## 2025-03-26 PROCEDURE — 3077F SYST BP >= 140 MM HG: CPT | Performed by: STUDENT IN AN ORGANIZED HEALTH CARE EDUCATION/TRAINING PROGRAM

## 2025-03-26 PROCEDURE — G2211 COMPLEX E/M VISIT ADD ON: HCPCS | Performed by: STUDENT IN AN ORGANIZED HEALTH CARE EDUCATION/TRAINING PROGRAM

## 2025-03-26 RX ORDER — FOLIC ACID 1 MG/1
1000 TABLET ORAL DAILY
Qty: 90 TABLET | Refills: 3 | Status: SHIPPED | OUTPATIENT
Start: 2025-03-26

## 2025-03-26 RX ORDER — ALLOPURINOL 300 MG/1
300 TABLET ORAL DAILY
Qty: 90 TABLET | Refills: 3 | Status: SHIPPED | OUTPATIENT
Start: 2025-03-26 | End: 2025-03-26 | Stop reason: ALTCHOICE

## 2025-03-26 RX ORDER — SPIRONOLACTONE 50 MG/1
50 TABLET, FILM COATED ORAL 2 TIMES DAILY
Qty: 180 TABLET | Refills: 1 | Status: SHIPPED | OUTPATIENT
Start: 2025-03-26

## 2025-03-26 RX ORDER — AMLODIPINE BESYLATE 10 MG/1
10 TABLET ORAL DAILY
Qty: 90 TABLET | Refills: 3 | Status: SHIPPED | OUTPATIENT
Start: 2025-03-26

## 2025-03-26 NOTE — PROGRESS NOTES
Subjective  Donovan RENZO Nelson, 70 y.o. male presents today with:  Chief Complaint   Patient presents with    Follow-up    Hypertension     Does not check BP at home. Denies chest pains, heart palpitations, headaches, dizziness, SOB or edema.     Depression     Feels mood is stable. Denies feeling down & depressed.     Insomnia     Feels he is sleeping well at this time.     Gastroesophageal Reflux     Controlled at this time with medication.     Gout     Controlled at thist [facility].     Anemia     Labs done 12/2/24.    Chronic Kidney Disease     Labs done 12/2/24.    Dyslipidemia     Lipid panel done 12/2/24.    Osteoarthritis     Taking Mobic as directed. Does not feel like this is really helping.     Cough     Wife states patient coughs all of the time. States he has a dry, non productive cough. States this has been going on for 2-3 months.        History of Present Illness  The patient is a 70-year-old male who presents for follow-up, accompanied by his wife.    A persistent, non-productive cough has been present for several months, occurring throughout the day. Respiratory function is reported to be satisfactory, and no inhalers are used.    Weight gain and increased appetite are noted. Alcohol consumption is admitted, with a pint lasting a couple of days.    The allopurinol regimen has been discontinued as he no longer experiences gout symptoms.    Mood remains stable, and he continues his daily Zoloft 50 mg regimen.    Meloxicam is reported to be ineffective in alleviating arthritis symptoms. No orthopedic consultation has been sought, nor has physical therapy been engaged. Pain is predominantly localized in the knees and back.    Adherence to prescribed antihypertensive medications, including amlodipine 10 mg, metoprolol 100 mg, and spironolactone 25 mg twice daily, is reported with no missed doses.    He is also on atorvastatin 40 mg and pantoprazole 40 mg for acid reflux. No other concerns at this time.

## 2025-04-04 ENCOUNTER — HOSPITAL ENCOUNTER (OUTPATIENT)
Dept: PHYSICAL THERAPY | Age: 70
Setting detail: THERAPIES SERIES
Discharge: HOME | End: 2025-04-04
Attending: STUDENT IN AN ORGANIZED HEALTH CARE EDUCATION/TRAINING PROGRAM
Payer: MEDICARE

## 2025-04-04 PROCEDURE — 97163 PT EVAL HIGH COMPLEX 45 MIN: CPT

## 2025-04-04 PROCEDURE — 97110 THERAPEUTIC EXERCISES: CPT

## 2025-04-04 NOTE — PLAN OF CARE
Physical Therapy Evaluation/Plan of Care   [facility] [place] [facility] PT  [address]  [place] [address]  Dept: [phone]  Dept Fax: [phone]  Loc: [phone]    Physical Therapy: Initial Evaluation    General Information    Patient: Donovan Nelson (70 y.o.     male)   Examination Date: 2025   :  1955 ;    Confirmed: Yes MRN: [id number]  CSN: [id number]   Insurance: Payor: T MEDICARE / Plan: T MEDICARE-ADVANTAGE PPO / Product Type: Medicare /   Insurance ID: [id number] - (Medicare Managed)    Secondary Insurance (if applicable):     Referring Physician: Rachna Dukes DO       Visits to Date/Visits Approved: 1 /  (BMN)    No Show/Cancelled Appts: 0 / 0     Medical Diagnosis: Primary osteoarthritis of left knee [M17.12]  Primary osteoarthritis of right knee [M17.11]  Chronic bilateral low back pain, unspecified whether sciatica present [M54.50, G89.29]        Treatment Diagnosis: pain, decreased lumbar AROM, BLE strength, flexibility, balance, increased risk for falls, TTP B lumbar paraspinals.      SUBJECTIVE:     Onset date: chronic       Subjective/ Mechanism of Injury: Pt is a 69 y/o M who is referred to PT for B knee OA(worse in RLE) and chronic LBP.  Pt reports history low back surgical fusion and L TRISTEN.  Pt reports LBP is localized in middle of lower back.  Pt reports he can walk or standing for 5 min before increase in pain.  Pt has history of falls, last fall was one year ago.  Pt uses [facility] to amb in community, no AD in household         Precautions/Contraindications/Restrictions: falls risk                Changes in Bowel/Bladder Function: no  Saddle Anesthesia: no  Unexplained Weight Loss: no  Unrelenting Night Pain: no  Sudden Weakness/Falls: yes    Interventions for current problem: rest      Pain: Low back  Current: 2-3/10 (7 w/ walking)   Best: 2/10   Worst:9/10 (occurs with increased activity).     Symptom Type / Quality:

## 2025-04-08 ENCOUNTER — HOSPITAL ENCOUNTER (OUTPATIENT)
Dept: PHYSICAL THERAPY | Age: 70
Setting detail: THERAPIES SERIES
Discharge: HOME | End: 2025-04-08
Attending: STUDENT IN AN ORGANIZED HEALTH CARE EDUCATION/TRAINING PROGRAM
Payer: MEDICARE

## 2025-04-08 PROCEDURE — 97110 THERAPEUTIC EXERCISES: CPT

## 2025-04-08 ASSESSMENT — PAIN DESCRIPTION - ORIENTATION: ORIENTATION: RIGHT;LEFT

## 2025-04-08 ASSESSMENT — PAIN DESCRIPTION - LOCATION: LOCATION: BACK;HIP;KNEE

## 2025-04-08 ASSESSMENT — PAIN DESCRIPTION - PAIN TYPE: TYPE: CHRONIC PAIN

## 2025-04-08 ASSESSMENT — PAIN DESCRIPTION - DESCRIPTORS: DESCRIPTORS: ACHING

## 2025-04-08 ASSESSMENT — PAIN SCALES - GENERAL: PAINLEVEL_OUTOF10: 6

## 2025-04-08 NOTE — PROGRESS NOTES
[facility] [facility] [address] [place] [address]  Phone: [phone]      Date: 2025  Patient: Donovan Nelson  : 1955   Confirmed: Yes  MRN: [id number]  Referring Provider: Rachna Dukes DO    Medical Diagnosis: Primary osteoarthritis of left knee [M17.12]  Primary osteoarthritis of right knee [M17.11]  Chronic bilateral low back pain, unspecified whether sciatica present [M54.50, G89.29]       Treatment Diagnosis: pain, decreased lumbar AROM, BLE strength, flexibility, balance, increased risk for falls, TTP B lumbar paraspinals.    Visit Information:  Insurance: Payor: [employer] MEDICARE / Plan: AETNA MEDICARE-ADVANTAGE PPO / Product Type: Medicare /   PT Visit Information  Total # of Visits Approved:  (BMN)  Total # of Visits to Date: 2  Plan of Care/Certification Expiration Date: 25  No Show: 0  Progress Note Due Date: 25  Canceled Appointment: 0  Progress Note Counter:     Subjective Information:  Subjective: Pt reports he has been doing HEP. Reports ongoing pain in multiple joints and having \"arthritis.\"  HEP Compliance:  [x] Good [] Fair [] Poor [] Reports not doing due to:    Pain Screening  Patient Currently in Pain: Yes  Pain Assessment: 0-10  Pain Level: 6  Pain Type: Chronic pain  Pain Location: Back, Hip, Knee  Pain Orientation: Right, Left (R>L)  Pain Descriptors: Aching    Treatment:  Exercises:  Exercises  Exercise 1: LTR 5\"x10  Exercise 2: TA isometrics 5\" 2x10  Exercise 3: Clams*  Exercise 4: Bridges 5\" 2x10- isometrics-no lift  Exercise 5: Scifit: Seat@ 20, UE @7 L1.0 x5 mins  Exercise 6: Hip abd/add RTB/ball 5\" 2x10  Exercise 7: Reverse clams*  Exercise 8: Standing: , march x10 ea. mini squats, hip abd, ext*  Exercise 9: STS*  Exercise 10: seated ex: march with TA miriam x10, LAQ 5\"x10  Exercise 20: HEP: cont current    *Indicates exercise, modality, or manual techniques to be initiated when appropriate    Objective Measures:   LTG 7 Current

## 2025-04-11 ENCOUNTER — HOSPITAL ENCOUNTER (OUTPATIENT)
Dept: PHYSICAL THERAPY | Age: 70
Setting detail: THERAPIES SERIES
Discharge: HOME | End: 2025-04-11
Attending: STUDENT IN AN ORGANIZED HEALTH CARE EDUCATION/TRAINING PROGRAM
Payer: MEDICARE

## 2025-04-11 PROCEDURE — 97110 THERAPEUTIC EXERCISES: CPT

## 2025-04-11 ASSESSMENT — PAIN SCALES - GENERAL: PAINLEVEL_OUTOF10: 4

## 2025-04-11 ASSESSMENT — PAIN DESCRIPTION - DESCRIPTORS: DESCRIPTORS: ACHING

## 2025-04-11 ASSESSMENT — PAIN DESCRIPTION - ORIENTATION: ORIENTATION: RIGHT;LEFT

## 2025-04-11 ASSESSMENT — PAIN DESCRIPTION - PAIN TYPE: TYPE: CHRONIC PAIN

## 2025-04-11 ASSESSMENT — PAIN DESCRIPTION - LOCATION: LOCATION: BACK;HIP;KNEE

## 2025-04-11 NOTE — PROGRESS NOTES
0933  Time Out: 1015  Minutes: 42  Timed Code Treatment Minutes: 42 Minutes  Procedure Minutes:0  Timed Activity Minutes Units   Ther Ex 42  3     Electronically signed by Shellie Morales PTA on 4/11/25 at 10:26 AM EDT

## 2025-04-15 ENCOUNTER — HOSPITAL ENCOUNTER (OUTPATIENT)
Dept: PHYSICAL THERAPY | Age: 70
Setting detail: THERAPIES SERIES
Discharge: HOME | End: 2025-04-15
Attending: STUDENT IN AN ORGANIZED HEALTH CARE EDUCATION/TRAINING PROGRAM
Payer: MEDICARE

## 2025-04-15 PROCEDURE — 97110 THERAPEUTIC EXERCISES: CPT

## 2025-04-15 NOTE — PROGRESS NOTES
[place] [address] [place] [address]  Phone: [phone]      Date: 4/15/2025  Patient: Donovan Nelson  : 1955   Confirmed: Yes  MRN: [id number]  Referring Provider: Rachna Dukes DO    Medical Diagnosis: Primary osteoarthritis of left knee [M17.12]  Primary osteoarthritis of right knee [M17.11]  Chronic bilateral low back pain, unspecified whether sciatica present [M54.50, G89.29]       Treatment Diagnosis: pain, decreased lumbar AROM, BLE strength, flexibility, balance, increased risk for falls, TTP B lumbar paraspinals.    Visit Information:  Insurance: Payor: A[employer] MEDICARE / Plan: AETNA MEDICARE-ADVANTAGE PPO / Product Type: Medicare /   PT Visit Information  Total # of Visits to Date: 4  Plan of Care/Certification Expiration Date: 25  No Show: 0  Progress Note Due Date: 25  Canceled Appointment: 0  Progress Note Counter: -    Subjective Information:  Subjective: Pt noted he has not noticed much of a change yet. Everything seems to be the same since starting therapy.  HEP Compliance:  [x] Good [] Fair [] Poor [] Reports not doing due to:    Pain Screening  Patient Currently in Pain: Denies    Treatment:  Exercises:  Exercises  Exercise 1: LTR 5\"x10  Exercise 2: TA isometrics 5\" 2x10  Exercise 3: Clams x 15  Exercise 4: Bridges 5\" 2x10- isometrics-no lift  Exercise 5: Scifit: Seat@ 20, UE @7 L1.0 x5 mins  Exercise 6: Hip abd/add RTB/ball 5\" 2x15  Exercise 8: sink exercises x15 ea  Exercise 9: STS from mat x10 without UE support; STS x 5 = 15.52 sec  Exercise 10: seated ex: march with TA miriam RTB 2 x15, LAQ 5\"x15  Exercise 20: HEP: cont current       *Indicates exercise, modality, or manual techniques to be initiated when appropriate    Objective Measures:     LTG 1 Current Status:: 4/15: 4-5/10 pain with ADL's    LTG 6 Current Status:: 4/15/25: STS x 5 = 15.52 sec      Assessment:      Assessment: Tested STS x 5 goal, pt able to complete in 15.52

## 2025-04-18 ENCOUNTER — HOSPITAL ENCOUNTER (OUTPATIENT)
Dept: PHYSICAL THERAPY | Age: 70
Setting detail: THERAPIES SERIES
Discharge: HOME | End: 2025-04-18
Attending: STUDENT IN AN ORGANIZED HEALTH CARE EDUCATION/TRAINING PROGRAM
Payer: MEDICARE

## 2025-04-18 NOTE — PROGRESS NOTES
Therapy                            Cancellation/No-show Note      Date: 2025  Patient: Donovan Nelson (70 y.o. male)  : 1955  MRN:  [id number]  Referring Physician: Rachna Dukes DO    Medical Diagnosis: Primary osteoarthritis of left knee [M17.12]  Primary osteoarthritis of right knee [M17.11]  Chronic bilateral low back pain, unspecified whether sciatica present [M54.50, G89.29]      Visit Information:  Visits to Date 4   No Show/Cancelled Appts: 0       For today's appointment patient:  [x]  Cancelled  []  Rescheduled appointment  []  No-show   []  Called pt to remind of next appointment     Reason given by patient:  []  Patient ill  [x]  Conflicting appointment  []  No transportation    []  Conflict with work  []  No reason given  []  Other:      [x] Pt has future appointments scheduled, no follow up needed  [] Pt requests to be on hold.    Reason:   If > 2 weeks please discuss with therapist.  [] Therapist to call pt for follow up  [] [place] to call to re-schedule      Comments:       Signature: Electronically signed by Nakul Maynard PT on 25 at 10:34 AM EDT

## 2025-04-23 ENCOUNTER — HOSPITAL ENCOUNTER (OUTPATIENT)
Dept: PHYSICAL THERAPY | Age: 70
Setting detail: THERAPIES SERIES
Discharge: HOME | End: 2025-04-23
Attending: STUDENT IN AN ORGANIZED HEALTH CARE EDUCATION/TRAINING PROGRAM
Payer: MEDICARE

## 2025-04-23 PROCEDURE — 97110 THERAPEUTIC EXERCISES: CPT

## 2025-04-23 ASSESSMENT — PAIN SCALES - GENERAL: PAINLEVEL_OUTOF10: 8

## 2025-04-23 ASSESSMENT — PAIN DESCRIPTION - ORIENTATION: ORIENTATION: RIGHT;LEFT;LOWER

## 2025-04-23 ASSESSMENT — PAIN DESCRIPTION - DESCRIPTORS: DESCRIPTORS: ACHING;SORE

## 2025-04-23 ASSESSMENT — PAIN DESCRIPTION - LOCATION: LOCATION: BACK;KNEE

## 2025-04-23 ASSESSMENT — PAIN DESCRIPTION - PAIN TYPE: TYPE: CHRONIC PAIN

## 2025-04-23 NOTE — PROGRESS NOTES
[place] [address] [place] [address]  Phone: [phone]      Date: 2025  Patient: Donovan Nelson  : 1955   Confirmed: Yes  MRN: [id number]  Referring Provider: Rachna Dukes DO    Medical Diagnosis: Primary osteoarthritis of left knee [M17.12]  Primary osteoarthritis of right knee [M17.11]  Chronic bilateral low back pain, unspecified whether sciatica present [M54.50, G89.29]       Treatment Diagnosis: pain, decreased lumbar AROM, BLE strength, flexibility, balance, increased risk for falls, TTP B lumbar paraspinals.    Visit Information:  Insurance: Payor: AETNA MEDICARE / Plan: AETNA MEDICARE-ADVANTAGE PPO / Product Type: Medicare /   PT Visit Information  Total # of Visits to Date: 5  Plan of Care/Certification Expiration Date: 25  No Show: 0  Progress Note Due Date: 25  Canceled Appointment: 1  Progress Note Counter:     Subjective Information:  Subjective: Pt reports he had a procedure on his R toe for ingrown toe nail and it's still a little sore. Pt states he cannot do HR/TR. Pt reports everything else \"is about the same.\"  HEP Compliance:  [x] Good [] Fair [] Poor [] Reports not doing due to:    Pain Screening  Patient Currently in Pain: Yes  Pain Assessment: 0-10  Pain Level: 8 (knees 8/10 LBP 7/10)  Pain Type: Chronic pain  Pain Location: Back, Knee  Pain Orientation: Right, Left, Lower  Pain Descriptors: Aching, Sore    Treatment:  Exercises:  Exercises  Exercise 1: LTR 5\"x10  Exercise 2: TA isometrics 5\" x15  Exercise 3: Clams x 15  Exercise 4: Bridges 5\" 2x15- isometrics-no lift  Exercise 5: Scifit: Seat@ 20, UE @7 L1.5 x5 mins  Exercise 6: Hip abd/add RTB/ball 5\" 2x15  Exercise 11: TA SLR x10 B  Exercise 20: HEP hip abd, hip add     *Indicates exercise, modality, or manual techniques to be initiated when appropriate    Objective Measures:     Assessment:      Assessment: Tx session focused primarily on supine and seated exercises as pt

## 2025-04-30 ENCOUNTER — HOSPITAL ENCOUNTER (OUTPATIENT)
Dept: PHYSICAL THERAPY | Age: 70
Setting detail: THERAPIES SERIES
Discharge: HOME | End: 2025-04-30
Attending: STUDENT IN AN ORGANIZED HEALTH CARE EDUCATION/TRAINING PROGRAM
Payer: MEDICARE

## 2025-04-30 PROCEDURE — 97110 THERAPEUTIC EXERCISES: CPT

## 2025-04-30 ASSESSMENT — PAIN DESCRIPTION - ORIENTATION: ORIENTATION: RIGHT;LEFT;LOWER

## 2025-04-30 ASSESSMENT — PAIN DESCRIPTION - LOCATION: LOCATION: BACK;KNEE

## 2025-04-30 ASSESSMENT — PAIN DESCRIPTION - PAIN TYPE: TYPE: CHRONIC PAIN

## 2025-04-30 ASSESSMENT — PAIN SCALES - GENERAL: PAINLEVEL_OUTOF10: 4

## 2025-04-30 ASSESSMENT — PAIN DESCRIPTION - DESCRIPTORS: DESCRIPTORS: ACHING;SORE

## 2025-04-30 NOTE — PROGRESS NOTES
Pt demo's improved lumbar rotation b/l, however reports most pain w/ R rot. Progressed therex to include dominique step overs and step ups. Pt reports fatigue, though able to tolerate all standing therex w/o seated rest break.  Treatment Diagnosis: pain, decreased lumbar AROM, BLE strength, flexibility, balance, increased risk for falls, TTP B lumbar paraspinals.  Therapy Prognosis: Good       Patient Education: [] NA   Goals/progress    Post-Pain Assessment:       Pain Rating (0-10 pain scale):   \"I feel good right now\"/10   Location and pain description same as pre-treatment unless indicated.   Action: [] NA   [x] Perform HEP  [] Meds as prescribed  [] Modalities as prescribed   [] Call Physician     GOALS   Patient Goal(s): Patient Goals : Get rid of the pain    Long Term Goals Completed by 6-8 weeks Goal Status   LTG 1 The pt will have decreased pain </= 1/10 in order to increase ease with ADL's In progress   LTG 2 The pt will demo improved B LE strength >/= 4+/5 in order to safely ambulate with decreased pain/limitations In progress   LTG 3 The pt will demo improved lumbar AROM in order to increase ease of ADL's In progress   LTG 4 The pt will have a decrease in ANGELINA score >/=10 points in order to increase functional activity tolerance In progress   LTG 5 The pt will have an increase in LEFS score >/=10 points in order to increase functional activity tolerance In progress   LTG 6 The pt will demo improved 5x sit to stand </=15 seconds and TUG </=12 seconds in order to increase functional indep and decrease risk for falls In progress   LTG 7 The pt will be independent/compliant with PT HEP in order to demonstrate self management of symptoms upon D/C In progress          Plan:  Frequency/Duration:  Plan  Plan Frequency: 2  Plan weeks: 6-8  Specific Instructions for Next Treatment: PN for Medicare  Current Treatment Recommendations: Strengthening, ROM, Balance training, Transfer training, Gait training, Stair

## 2025-05-02 ENCOUNTER — HOSPITAL ENCOUNTER (OUTPATIENT)
Dept: PHYSICAL THERAPY | Age: 70
Setting detail: THERAPIES SERIES
Discharge: HOME | End: 2025-05-02
Attending: STUDENT IN AN ORGANIZED HEALTH CARE EDUCATION/TRAINING PROGRAM
Payer: MEDICARE

## 2025-05-02 PROCEDURE — 97110 THERAPEUTIC EXERCISES: CPT

## 2025-05-02 ASSESSMENT — PAIN DESCRIPTION - ORIENTATION: ORIENTATION: RIGHT

## 2025-05-02 ASSESSMENT — PAIN DESCRIPTION - DESCRIPTORS: DESCRIPTORS: ACHING;SORE

## 2025-05-02 ASSESSMENT — PAIN DESCRIPTION - LOCATION: LOCATION: KNEE

## 2025-05-02 ASSESSMENT — PAIN SCALES - GENERAL: PAINLEVEL_OUTOF10: 5

## 2025-05-02 ASSESSMENT — PAIN DESCRIPTION - PAIN TYPE: TYPE: CHRONIC PAIN

## 2025-05-02 NOTE — PROGRESS NOTES
[place] [address] [facility] [address] [place] [address]  Phone: [phone]    [] Certification  [] Recertification []  Plan of Care  [x] Progress Note [] Discharge      Referring Provider: Rachna Dukes DO     From:  Nakul Maynard PT, DPT  Patient: Donovan Nelson (70 y.o. male) : 1955 Date: 2025  Medical Diagnosis: Primary osteoarthritis of left knee [M17.12]  Primary osteoarthritis of right knee [M17.11]  Chronic bilateral low back pain, unspecified whether sciatica present [M54.50, G89.29]       Treatment Diagnosis: pain, decreased lumbar AROM, BLE strength, flexibility, balance, increased risk for falls, TTP B lumbar paraspinals.    Plan of Care/Certification Expiration Date: 25   Progress Report Period from:  2025  to 2025    Visits to Date: 7 No Show: 0 Cancelled Appts: 1    OBJECTIVE:     Long Term Goals - Time Frame for Long Term Goals : 6-8 weeks  Goals Current/ Discharge status Status   Long Term Goal 1: The pt will have decreased pain </= 1/10 in order to increase ease with ADL's LTG 1 Current Status:: 25 pain ranges 3-8/10   In progress   Long Term Goal 2: The pt will demo improved B LE strength >/= 4+/5 in order to safely ambulate with decreased pain/limitations LTG 2 Current Status:: 25   In progress   Long Term Goal 3: The pt will demo improved lumbar AROM in order to increase ease of ADL's LTG 3 Current Status:: 25 lumbar flex 75% ext 50% B SB 75% B rot 100% - pt reports most pain w/ R rot   In progress   Long Term Goal 4: The pt will have a decrease in ANGELINA score >/=10 points in order to increase functional activity tolerance LTG 4 Current Status:: 25   In progress   Long Term Goal 5: The pt will have an increase in LEFS score >/=10 points in order to increase functional activity tolerance LTG 5 Current Status::  (15 pt improvement)   Met   Long term goal 6: The pt will demo improved 5x sit to stand </=15 seconds and TUG </=12 
  LTG 3 The pt will demo improved lumbar AROM in order to increase ease of ADL's In progress   LTG 4 The pt will have a decrease in ANGELINA score >/=10 points in order to increase functional activity tolerance In progress   LTG 5 The pt will have an increase in LEFS score >/=10 points in order to increase functional activity tolerance Met   LTG 6 The pt will demo improved 5x sit to stand </=15 seconds and TUG </=12 seconds in order to increase functional indep and decrease risk for falls In progress   LTG 7 The pt will be independent/compliant with PT HEP in order to demonstrate self management of symptoms upon D/C In progress          Plan:  Frequency/Duration:  Plan  Plan Frequency: 2  Plan weeks: 6-8  Current Treatment Recommendations: Strengthening, ROM, Balance training, Transfer training, Gait training, Stair training, Neuromuscular re-education, Manual, Home exercise program, Safety education & training, Modalities, Therapeutic activities  Modalities: Heat/Cold, E-stim - unattended  Additional Comments: PN completed for Medicare  Pt to continue current HEP.  See objective section for any therapeutic exercise changes, additions or modifications this date.    Therapy Time:      PT Individual Minutes  Time In: 1000  Time Out: 1040  Minutes: 40  Timed Code Treatment Minutes: 40 Minutes  Procedure Minutes: 0    Timed Activity Minutes Units   Ther Ex 40 3     Electronically signed by Brenda Booth PTA on 5/2/25 at 10:33 AM EDT

## 2025-05-07 ENCOUNTER — HOSPITAL ENCOUNTER (OUTPATIENT)
Dept: PHYSICAL THERAPY | Age: 70
Setting detail: THERAPIES SERIES
Discharge: HOME | End: 2025-05-07
Attending: STUDENT IN AN ORGANIZED HEALTH CARE EDUCATION/TRAINING PROGRAM
Payer: MEDICARE

## 2025-05-07 ENCOUNTER — OFFICE VISIT (OUTPATIENT)
Age: 70
End: 2025-05-07

## 2025-05-07 VITALS
TEMPERATURE: 97.8 F | WEIGHT: 219 LBS | DIASTOLIC BLOOD PRESSURE: 62 MMHG | HEART RATE: 67 BPM | HEIGHT: 69 IN | BODY MASS INDEX: 32.44 KG/M2 | OXYGEN SATURATION: 94 % | SYSTOLIC BLOOD PRESSURE: 138 MMHG

## 2025-05-07 DIAGNOSIS — M17.12 PRIMARY OSTEOARTHRITIS OF LEFT KNEE: ICD-10-CM

## 2025-05-07 DIAGNOSIS — E78.5 DYSLIPIDEMIA: ICD-10-CM

## 2025-05-07 DIAGNOSIS — M54.50 CHRONIC BILATERAL LOW BACK PAIN, UNSPECIFIED WHETHER SCIATICA PRESENT: ICD-10-CM

## 2025-05-07 DIAGNOSIS — M17.11 PRIMARY OSTEOARTHRITIS OF RIGHT KNEE: ICD-10-CM

## 2025-05-07 DIAGNOSIS — G89.29 CHRONIC BILATERAL LOW BACK PAIN, UNSPECIFIED WHETHER SCIATICA PRESENT: ICD-10-CM

## 2025-05-07 DIAGNOSIS — I10 BENIGN ESSENTIAL HYPERTENSION: Primary | ICD-10-CM

## 2025-05-07 PROCEDURE — 97110 THERAPEUTIC EXERCISES: CPT

## 2025-05-07 RX ORDER — MUPIROCIN 20 MG/G
OINTMENT TOPICAL
COMMUNITY
Start: 2025-04-09

## 2025-05-07 ASSESSMENT — PAIN DESCRIPTION - LOCATION: LOCATION: KNEE;HIP;BACK

## 2025-05-07 ASSESSMENT — PAIN DESCRIPTION - DESCRIPTORS: DESCRIPTORS: ACHING;SORE;TIGHTNESS

## 2025-05-07 ASSESSMENT — PAIN DESCRIPTION - PAIN TYPE: TYPE: CHRONIC PAIN

## 2025-05-07 ASSESSMENT — PAIN SCALES - GENERAL: PAINLEVEL_OUTOF10: 3

## 2025-05-07 ASSESSMENT — PAIN DESCRIPTION - ORIENTATION: ORIENTATION: RIGHT;LEFT;LOWER

## 2025-05-07 NOTE — PROGRESS NOTES
[place] [address] [place] [address]  Phone: [phone]      Date: 2025  Patient: Donovan Nelson  : 1955   Confirmed: Yes  MRN: [id number]  Referring Provider: Rachna Dukes DO    Medical Diagnosis: Primary osteoarthritis of left knee [M17.12]  Primary osteoarthritis of right knee [M17.11]  Chronic bilateral low back pain, unspecified whether sciatica present [M54.50, G89.29]       Treatment Diagnosis: pain, decreased lumbar AROM, BLE strength, flexibility, balance, increased risk for falls, TTP B lumbar paraspinals.    Visit Information:  Insurance: Payor: AETNA MEDICARE / Plan: AETNA MEDICARE-ADVANTAGE PPO / Product Type: Medicare /   PT Visit Information  Total # of Visits to Date: 8  Plan of Care/Certification Expiration Date: 25  No Show: 0  Progress Note Due Date: 25  Canceled Appointment: 1  Progress Note Counter:     Subjective Information:  Subjective: I got pain every day, I have pain in my low back, Right knee and Left hip.  HEP Compliance:  [x] Good [] Fair [] Poor [] Reports not doing due to:    Pain Screening  Patient Currently in Pain: Yes  Pain Assessment: 0-10  Pain Level: 3  Pain Type: Chronic pain  Pain Location: Knee, Hip, Back  Pain Orientation: Right, Left, Lower (Right knee, Left Hip, Low Back)  Pain Descriptors: Aching, Sore, Tightness    Treatment:  Exercises:  Exercises  Exercise 1: LTR 5\"x10  Exercise 2: DLS Marching 5\" x 10 ea.  Exercise 3: Siongle Leg fall outs RTB x 15 ea.  Exercise 4: Bridges 5\" 2x15  Exercise 5: Recumb Bike : Seat 10,  L3  x6 mins  Exercise 8: sink exercises x15 ea  Exercise 10: LAQ RTB  5 x12 B  Exercise 11: SeatedHS curls RTB 2 x 12  Exercise 20: HEP:  cont with current.       Objective Measures:             Assessment:      Assessment: Pt reports mild soreness with use of Recub Bike today, while pt reports overall likes the bike better than SciFit. Introducation of additional core strengthening

## 2025-05-07 NOTE — PROGRESS NOTES
Subjective  Donovan Nelson, 70 y.o. male presents today with:  Chief Complaint   Patient presents with    Follow-up    Hypertension     Does not check BP at home. Denies chest pains, heart palpitations, headaches, dizziness, SOB or edema.     Arthritis     Taking Mobic as directed. Feels this helps very minimally. Is going to PT.     Cough     Has resolved.        History of Present Illness  The patient is a 70-year-old male who presents for follow-up of arthritis, cough, and hypertension.    He reports minimal improvement in his arthritis symptoms despite ongoing therapy. Pain persists in his knee, back, and hip, which he describes as having a squeaking sensation. He is currently taking meloxicam but does not find it effective in managing his pain. He has previously consulted with Dr. Blas, who administered injections into his knee, providing temporary relief. He is interested in getting back in with ortho.     His cough has resolved.    His blood pressure is well controlled on his current regimen. He has no other concerns at this time.       Past Medical History:   Diagnosis Date    Alcohol abuse     Alcohol abuse     Allergic rhinitis     Aortic regurgitation     Cervical radiculopathy at  10/13/2013    Chronic back pain     Chronic back pain     COPD (chronic obstructive pulmonary disease) ([facility])     Erectile dysfunction     GERD (gastroesophageal reflux disease)     Hyperlipidemia     Hypertension     Insomnia     Moderate episode of recurrent major depressive disorder ([facility]) 8/18/2021    Osteoarthritis     PUD (peptic ulcer disease)     Restless leg syndrome     Smoker unmotivated to quit     Stage 3a chronic kidney disease ([facility]) 4/13/2023    Vitamin D deficiency      Past Surgical History:   Procedure Laterality Date    CATARACT EXTRACTION Right 08/16/2023    COLONOSCOPY N/A 08/03/2020    COLONOSCOPY WITH POLYPECTOMY performed by Ascencion Landon MD at [facility]    COLONOSCOPY N/A 08/14/2023

## 2025-05-09 ENCOUNTER — HOSPITAL ENCOUNTER (OUTPATIENT)
Dept: PHYSICAL THERAPY | Age: 70
Setting detail: THERAPIES SERIES
Discharge: HOME | End: 2025-05-09
Attending: STUDENT IN AN ORGANIZED HEALTH CARE EDUCATION/TRAINING PROGRAM
Payer: MEDICARE

## 2025-05-09 PROCEDURE — 97110 THERAPEUTIC EXERCISES: CPT

## 2025-05-09 ASSESSMENT — PAIN DESCRIPTION - ORIENTATION: ORIENTATION: RIGHT

## 2025-05-09 ASSESSMENT — PAIN DESCRIPTION - LOCATION: LOCATION: KNEE

## 2025-05-09 ASSESSMENT — PAIN DESCRIPTION - PAIN TYPE: TYPE: CHRONIC PAIN

## 2025-05-09 ASSESSMENT — PAIN SCALES - GENERAL: PAINLEVEL_OUTOF10: 5

## 2025-05-09 ASSESSMENT — PAIN DESCRIPTION - DESCRIPTORS: DESCRIPTORS: ACHING;SORE

## 2025-05-14 ENCOUNTER — HOSPITAL ENCOUNTER (OUTPATIENT)
Dept: PHYSICAL THERAPY | Age: 70
Setting detail: THERAPIES SERIES
Discharge: HOME | End: 2025-05-14
Attending: STUDENT IN AN ORGANIZED HEALTH CARE EDUCATION/TRAINING PROGRAM
Payer: MEDICARE

## 2025-05-14 PROCEDURE — 97110 THERAPEUTIC EXERCISES: CPT

## 2025-05-14 ASSESSMENT — PAIN DESCRIPTION - ORIENTATION: ORIENTATION: RIGHT;LEFT;LOWER

## 2025-05-14 ASSESSMENT — PAIN DESCRIPTION - DESCRIPTORS: DESCRIPTORS: ACHING;SORE

## 2025-05-14 ASSESSMENT — PAIN DESCRIPTION - PAIN TYPE: TYPE: CHRONIC PAIN

## 2025-05-14 ASSESSMENT — PAIN DESCRIPTION - LOCATION: LOCATION: KNEE;BACK

## 2025-05-14 ASSESSMENT — PAIN SCALES - GENERAL: PAINLEVEL_OUTOF10: 7

## 2025-05-14 NOTE — PROGRESS NOTES
Strengthening, ROM, Balance training, Transfer training, Gait training, Stair training, Neuromuscular re-education, Manual, Home exercise program, Safety education & training, Modalities, Therapeutic activities  Modalities: Heat/Cold, E-stim - unattended  Pt to continue current HEP.  See objective section for any therapeutic exercise changes, additions or modifications this date.    Therapy Time:      PT Individual Minutes  Time In: 1100  Time Out: 1138  Minutes: 38  Timed Code Treatment Minutes: 38 Minutes  Procedure Minutes: 0    Timed Activity Minutes Units   Ther Ex 38 3     Electronically signed by Brenda Booth PTA on 5/14/25 at 11:04 AM EDT

## 2025-05-16 ENCOUNTER — HOSPITAL ENCOUNTER (OUTPATIENT)
Dept: PHYSICAL THERAPY | Age: 70
Setting detail: THERAPIES SERIES
Discharge: HOME | End: 2025-05-16
Attending: STUDENT IN AN ORGANIZED HEALTH CARE EDUCATION/TRAINING PROGRAM
Payer: MEDICARE

## 2025-05-16 PROCEDURE — 97110 THERAPEUTIC EXERCISES: CPT

## 2025-05-16 PROCEDURE — 97140 MANUAL THERAPY 1/> REGIONS: CPT

## 2025-05-16 ASSESSMENT — PAIN DESCRIPTION - DESCRIPTORS: DESCRIPTORS: ACHING;SORE

## 2025-05-16 ASSESSMENT — PAIN SCALES - GENERAL: PAINLEVEL_OUTOF10: 5

## 2025-05-16 ASSESSMENT — PAIN DESCRIPTION - PAIN TYPE: TYPE: CHRONIC PAIN

## 2025-05-16 ASSESSMENT — PAIN DESCRIPTION - LOCATION: LOCATION: KNEE;BACK

## 2025-05-16 ASSESSMENT — PAIN DESCRIPTION - ORIENTATION: ORIENTATION: RIGHT;LEFT;LOWER

## 2025-05-16 NOTE — PROGRESS NOTES
[place] [address] [facility] [place] [address]  Phone: [phone]      Date: 2025  Patient: Donovan Nelson  : 1955   Confirmed: Yes  MRN: [id number]  Referring Provider: Rachna Dukes DO    Medical Diagnosis: Primary osteoarthritis of left knee [M17.12]  Primary osteoarthritis of right knee [M17.11]  Chronic bilateral low back pain, unspecified whether sciatica present [M54.50, G89.29]       Treatment Diagnosis: pain, decreased lumbar AROM, BLE strength, flexibility, balance, increased risk for falls, TTP B lumbar paraspinals.    Visit Information:  Insurance: Payor: AETNA MEDICARE / Plan: AETNA MEDICARE-ADVANTAGE PPO / Product Type: Medicare /   PT Visit Information  Total # of Visits to Date: 11  Plan of Care/Certification Expiration Date: 25  No Show: 0  Progress Note Due Date: 25  Canceled Appointment: 1  Progress Note Counter: - (PN 25)    Subjective Information:  Subjective: Pt reports he knee pain is still there with R worse than L.  Pt also c/o increase L hip pain and continued low back pain.  HEP Compliance:  [x] Good [] Fair [] Poor [] Reports not doing due to:    Pain Screening  Patient Currently in Pain: Yes  Pain Level: 5  Pain Type: Chronic pain  Pain Location: Knee, Back  Pain Orientation: Right, Left, Lower  Pain Descriptors: Aching, Sore    Treatment:  Exercises:  Exercises  Exercise 7: STS from table at chair height w/o UE's 2x10  Exercise 10: LAQ 3# 2x15  Exercise 11: Seated HS curls GTB 2 x 15  Exercise 19: MMT assessed  Exercise 20: HEP:  cont with current       Manual:   Manual Therapy  Manual Traction: LLE Hip Distraction (Leg pulls) x 5 min       *Indicates exercise, modality, or manual techniques to be initiated when appropriate    Objective Measures:        LTG 2 Current Status:: 25: Hip:  Right:   Flexion 4+/5, Extension(Bridge)  4-/5, Abduction  4+/5, Adduction 4+/5, ER  4/5, IR  4+/5  Left:   Flexion  4/5, painful,

## 2025-05-19 ENCOUNTER — HOSPITAL ENCOUNTER (OUTPATIENT)
Dept: PHYSICAL THERAPY | Age: 70
Setting detail: THERAPIES SERIES
Discharge: HOME | End: 2025-05-19
Attending: STUDENT IN AN ORGANIZED HEALTH CARE EDUCATION/TRAINING PROGRAM
Payer: MEDICARE

## 2025-05-19 PROCEDURE — 97110 THERAPEUTIC EXERCISES: CPT

## 2025-05-19 ASSESSMENT — PAIN DESCRIPTION - LOCATION: LOCATION: KNEE;BACK

## 2025-05-19 ASSESSMENT — PAIN DESCRIPTION - DESCRIPTORS: DESCRIPTORS: ACHING;SORE

## 2025-05-19 ASSESSMENT — PAIN DESCRIPTION - ORIENTATION: ORIENTATION: RIGHT;LEFT;LOWER

## 2025-05-19 ASSESSMENT — PAIN DESCRIPTION - PAIN TYPE: TYPE: CHRONIC PAIN

## 2025-05-19 ASSESSMENT — PAIN SCALES - GENERAL: PAINLEVEL_OUTOF10: 5

## 2025-05-19 NOTE — PROGRESS NOTES
Stair training, Neuromuscular re-education, Manual, Home exercise program, Safety education & training, Modalities, Therapeutic activities  Modalities: Heat/Cold, E-stim - unattended  Pt to continue current HEP.  See objective section for any therapeutic exercise changes, additions or modifications this date.    Therapy Time:      PT Individual Minutes  Time In: 1040  Time Out: 1118  Minutes: 38  Timed Code Treatment Minutes: 38 Minutes  Procedure Minutes: 0  Timed Activity Minutes Units   Ther Ex 38 3   Electronically signed by Paco Wynn PTA on 5/19/25 at 12:04 PM EDT

## 2025-05-23 ENCOUNTER — HOSPITAL ENCOUNTER (OUTPATIENT)
Dept: PHYSICAL THERAPY | Age: 70
Setting detail: THERAPIES SERIES
Discharge: HOME | End: 2025-05-23
Attending: STUDENT IN AN ORGANIZED HEALTH CARE EDUCATION/TRAINING PROGRAM
Payer: MEDICARE

## 2025-05-23 NOTE — PROGRESS NOTES
Therapy                            Cancellation/No-show Note      Date: 2025  Patient: Donovan Nelson (70 y.o. male)  : 1955  MRN:  [id number]  Referring Physician: Rachna Dukes DO    Medical Diagnosis: Primary osteoarthritis of left knee [M17.12]  Primary osteoarthritis of right knee [M17.11]  Chronic bilateral low back pain, unspecified whether sciatica present [M54.50, G89.29]      Visit Information:  Visits to Date 12   No Show/Cancelled Appts: 0       For today's appointment patient:  [x]  Cancelled  []  Rescheduled appointment  []  No-show   []  Called pt to remind of next appointment     Reason given by patient:  [x]  Patient ill  []  Conflicting appointment  []  No transportation    []  Conflict with work  []  No reason given  []  Other:      [] Pt has future appointments scheduled, no follow up needed  [] Pt requests to be on hold.    Reason:   If > 2 weeks please discuss with therapist.  [] Therapist to call pt for follow up  [x]  to call to re-schedule      Comments:      Signature: Electronically signed by Nakul Maynard PT on 25 at 9:11 AM EDT

## 2025-05-27 NOTE — PROGRESS NOTES
Therapy                            Cancellation/No-show Note      Date: 2025  Patient: Donovan Nelson (70 y.o. male)  : 1955  MRN:  [id number]  Referring Physician: Rachna Dukes DO    Medical Diagnosis: Primary osteoarthritis of left knee [M17.12]  Primary osteoarthritis of right knee [M17.11]  Chronic bilateral low back pain, unspecified whether sciatica present [M54.50, G89.29]      Visit Information:  Visits to Date 12   No Show/Cancelled Appts: 0 / 3      For today's appointment patient:  [x]  Cancelled  []  Rescheduled appointment  []  No-show   []  Called pt to remind of next appointment     Reason given by patient:  []  Patient ill  []  Conflicting appointment  []  No transportation    []  Conflict with work  []  No reason given  [x]  Other:  conflict     [] Pt has future appointments scheduled, no follow up needed  [] Pt requests to be on hold.    Reason:   If > 2 weeks please discuss with therapist.  [] Therapist to call pt for follow up  []  to call to re-schedule      Comments:       Signature: Electronically signed by Teresa Gallego PT on 25 at 1:50 PM EDT

## 2025-05-28 ENCOUNTER — HOSPITAL ENCOUNTER (OUTPATIENT)
Dept: PHYSICAL THERAPY | Age: 70
Setting detail: THERAPIES SERIES
Discharge: HOME | End: 2025-05-28
Attending: STUDENT IN AN ORGANIZED HEALTH CARE EDUCATION/TRAINING PROGRAM
Payer: MEDICARE

## 2025-05-28 DIAGNOSIS — I10 ESSENTIAL HYPERTENSION, BENIGN: ICD-10-CM

## 2025-05-28 DIAGNOSIS — R25.1 TREMORS OF NERVOUS SYSTEM: ICD-10-CM

## 2025-05-29 ENCOUNTER — HOSPITAL ENCOUNTER (OUTPATIENT)
Dept: PHYSICAL THERAPY | Age: 70
Setting detail: THERAPIES SERIES
Discharge: HOME | End: 2025-05-29
Attending: STUDENT IN AN ORGANIZED HEALTH CARE EDUCATION/TRAINING PROGRAM
Payer: MEDICARE

## 2025-05-29 DIAGNOSIS — F10.10 ALCOHOL ABUSE: ICD-10-CM

## 2025-05-29 DIAGNOSIS — I10 BENIGN ESSENTIAL HYPERTENSION: ICD-10-CM

## 2025-05-29 DIAGNOSIS — F51.01 PRIMARY INSOMNIA: ICD-10-CM

## 2025-05-29 PROCEDURE — 97110 THERAPEUTIC EXERCISES: CPT

## 2025-05-29 PROCEDURE — 97140 MANUAL THERAPY 1/> REGIONS: CPT

## 2025-05-29 ASSESSMENT — PAIN SCALES - GENERAL: PAINLEVEL_OUTOF10: 5

## 2025-05-29 ASSESSMENT — PAIN DESCRIPTION - DESCRIPTORS: DESCRIPTORS: ACHING;SORE

## 2025-05-29 ASSESSMENT — PAIN DESCRIPTION - ORIENTATION: ORIENTATION: RIGHT;LEFT;LOWER

## 2025-05-29 ASSESSMENT — PAIN DESCRIPTION - PAIN TYPE: TYPE: CHRONIC PAIN

## 2025-05-29 ASSESSMENT — PAIN DESCRIPTION - LOCATION: LOCATION: KNEE;BACK

## 2025-05-29 NOTE — PROGRESS NOTES
[place] [address] [place] [address]  Phone: [phone]      Date: 2025  Patient: Donovan Nelson  : 1955   Confirmed: Yes  MRN: [id number]  Referring Provider: Rachna Dukes DO    Medical Diagnosis: Primary osteoarthritis of left knee [M17.12]  Primary osteoarthritis of right knee [M17.11]  Chronic bilateral low back pain, unspecified whether sciatica present [M54.50, G89.29]       Treatment Diagnosis: pain, decreased lumbar AROM, BLE strength, flexibility, balance, increased risk for falls, TTP B lumbar paraspinals.    Visit Information:  Insurance: Payor: AETNA MEDICARE / Plan: AETNA MEDICARE-ADVANTAGE PPO / Product Type: Medicare /   PT Visit Information  Total # of Visits to Date: 13  Plan of Care/Certification Expiration Date: 25  No Show: 0  Progress Note Due Date: 25  Canceled Appointment: 3  Progress Note Counter: - (PN 25)    Subjective Information:  Subjective: Pt reports he has been feeling tired today.  He reports consistent pain in B knees and lower back.  HEP Compliance:  [x] Good [] Fair [] Poor [] Reports not doing due to:    Pain Screening  Patient Currently in Pain: Yes  Pain Assessment: 0-10  Pain Level: 5  Pain Type: Chronic pain  Pain Location: Knee, Back  Pain Orientation: Right, Left, Lower  Pain Descriptors: Aching, Sore    Treatment:  Exercises:  Exercises  Exercise 1: LTR 5\"x15 derik  Exercise 2: DLS Marching 5\" x 10 ea.  Exercise 3: Single Leg fall outs RTB x 15 ea.  Exercise 4: Bridges 5\" x15  Exercise 5: Standing Marches, HRs x 15 ea  Exercise 6: seated pball thoracolumbar stretch 3-way 10''x5 ea  Exercise 7: STS from table at chair height w/o UE's 2x10  Exercise 9: Seated Hip Abd x 20 x 5\" GTB  Exercise 12: step ups 6'' F/L x10 ea B in // bars  Exercise 20: HEP:  cont with current       Manual:   Manual Therapy  Manual Traction: LLE Hip Distraction (Leg pulls) x 5 min       *Indicates exercise, modality, or manual

## 2025-05-29 NOTE — TELEPHONE ENCOUNTER
3 Rx pending provider's review.    Per pt he would like the folic acid to go to local [facility] and the other 2 to the [facility]-in [facility].    Conf 4mo ov 9/8/25 w/ pt.

## 2025-05-30 RX ORDER — FOLIC ACID 1 MG/1
1000 TABLET ORAL DAILY
Qty: 90 TABLET | Refills: 3 | Status: SHIPPED | OUTPATIENT
Start: 2025-05-30

## 2025-05-30 RX ORDER — METOPROLOL SUCCINATE 100 MG/1
100 TABLET, EXTENDED RELEASE ORAL DAILY
Qty: 90 TABLET | Refills: 3 | Status: SHIPPED | OUTPATIENT
Start: 2025-05-30

## 2025-05-30 RX ORDER — TRAZODONE HYDROCHLORIDE 100 MG/1
100 TABLET ORAL NIGHTLY
Qty: 90 TABLET | Refills: 3 | Status: SHIPPED | OUTPATIENT
Start: 2025-05-30

## 2025-05-30 RX ORDER — AMLODIPINE BESYLATE 10 MG/1
10 TABLET ORAL DAILY
Qty: 90 TABLET | Refills: 3 | Status: SHIPPED | OUTPATIENT
Start: 2025-05-30

## 2025-06-04 ENCOUNTER — HOSPITAL ENCOUNTER (OUTPATIENT)
Dept: PHYSICAL THERAPY | Age: 70
Setting detail: THERAPIES SERIES
Discharge: HOME | End: 2025-06-04
Attending: STUDENT IN AN ORGANIZED HEALTH CARE EDUCATION/TRAINING PROGRAM
Payer: MEDICARE

## 2025-06-04 PROCEDURE — 97110 THERAPEUTIC EXERCISES: CPT

## 2025-06-04 ASSESSMENT — PAIN SCALES - GENERAL: PAINLEVEL_OUTOF10: 4

## 2025-06-04 ASSESSMENT — PAIN DESCRIPTION - DESCRIPTORS: DESCRIPTORS: ACHING;SORE

## 2025-06-04 ASSESSMENT — PAIN DESCRIPTION - LOCATION: LOCATION: BACK;KNEE

## 2025-06-04 ASSESSMENT — PAIN DESCRIPTION - PAIN TYPE: TYPE: CHRONIC PAIN

## 2025-06-04 ASSESSMENT — PAIN DESCRIPTION - ORIENTATION: ORIENTATION: RIGHT;LEFT;LOWER

## 2025-06-04 NOTE — PROGRESS NOTES
[place] [address] [facility] [place] [address]  Phone: [phone]      Date: 2025  Patient: Donovan Nelson  : 1955   Confirmed: Yes  MRN: [id number]  Referring Provider: Rachna Dukes DO    Medical Diagnosis: Primary osteoarthritis of left knee [M17.12]  Primary osteoarthritis of right knee [M17.11]  Chronic bilateral low back pain, unspecified whether sciatica present [M54.50, G89.29]       Treatment Diagnosis: pain, decreased lumbar AROM, BLE strength, flexibility, balance, increased risk for falls, TTP B lumbar paraspinals.    Visit Information:  Insurance: Payor: AETNA MEDICARE / Plan: AETNA MEDICARE-ADVANTAGE PPO / Product Type: Medicare /   PT Visit Information  Total # of Visits to Date: 14  Plan of Care/Certification Expiration Date: 25  No Show: 0  Progress Note Due Date: 25  Canceled Appointment: 3  Progress Note Counter: - (PN 25)    Subjective Information:  Subjective: Pt reports \"the pains been up and down depending on what I'm doing.\"  HEP Compliance:  [x] Good [] Fair [] Poor [] Reports not doing due to:    Pain Screening  Patient Currently in Pain: Yes  Pain Assessment: 0-10  Pain Level: 4  Pain Type: Chronic pain  Pain Location: Back, Knee  Pain Orientation: Right, Left, Lower  Pain Descriptors: Aching, Sore    Treatment:  Exercises:  Exercises  Exercise 19: objective measures, discussed goals/progress towards goals  Exercise 20: comprehensive HEP administered and reviewed (to be scanned at D/C), gave pt GTB     *Indicates exercise, modality, or manual techniques to be initiated when appropriate    Objective Measures:     LTG 1 Current Status:: 25 pain ranges 3-8/10; pt reports most difficulty w/ prolonged standing  LTG 2 Current Status:: 25: Hip:  Right:   Flexion 4+/5, Extension(Bridge)  4-/5, Abduction  4+/5, Adduction 4+/5, ER  4/5, IR  4+/5  Left:   Flexion  4/5, painful, Extension(Bridges)  4-/5, Abduction  4-/5, painful,

## 2025-06-04 NOTE — PROGRESS NOTES
[id number] [facility] [place] [address]  Phone: [phone]    [] Certification  [] Recertification []  Plan of Care  [] Progress Note [x] Discharge      Referring Provider: Rachna Dukes DO     From:  Nakul Lerma PT, DPT  Patient: Donovan Nelson (70 y.o. male) : 1955 Date: 2025  Medical Diagnosis: Primary osteoarthritis of left knee [M17.12]  Primary osteoarthritis of right knee [M17.11]  Chronic bilateral low back pain, unspecified whether sciatica present [M54.50, G89.29]       Treatment Diagnosis: pain, decreased lumbar AROM, BLE strength, flexibility, balance, increased risk for falls, TTP B lumbar paraspinals.    Plan of Care/Certification Expiration Date: 25   Progress Report Period from:  2025  to 2025    Visits to Date: 14 No Show: 0 Cancelled Appts: 3    OBJECTIVE:   Long Term Goals - Time Frame for Long Term Goals : 6-8 weeks  Goals Current/ Discharge status Status   Long Term Goal 1: The pt will have decreased pain </= 1/10 in order to increase ease with ADL's LTG 1 Current Status:: 25 pain ranges 3-8/10; pt reports most difficulty w/ prolonged standing   Not Met   Long Term Goal 2: The pt will demo improved B LE strength >/= 4+/5 in order to safely ambulate with decreased pain/limitations LTG 2 Current Status:: 25: Hip:  Right:   Flexion 4+/5, Extension(Bridge)  4-/5, Abduction  4+/5, Adduction 4+/5, ER  4/5, IR  4+/5  Left:   Flexion  4/5, painful, Extension(Bridges)  4-/5, Abduction  4-/5, painful, Adduction 4/5, ER  4-/5, painful IR  4/5, painful     Knee:   Right: Flexion  5/5 Extension  5/5  Left: Flexion  4+/5  Extension  4+/5   Partially met   Long Term Goal 3: The pt will demo improved lumbar AROM in order to increase ease of ADL's LTG 3 Current Status:: 25 lumbar 90% ext 100% R % L SB 75% B rot 100% - most pain w/ ext   Partially met   Long Term Goal 4: The pt will have a decrease in ANGELINA score >/=10 points in order

## (undated) DEVICE — TRAP POLYP BALEEN

## (undated) DEVICE — TUBE SET 96 MM 64 MM H2O PERISTALTIC STD AUX CHANNEL

## (undated) DEVICE — CONMED SCOPE SAVER BITE BLOCK, 20X27 MM: Brand: SCOPE SAVER

## (undated) DEVICE — TUBING, SUCTION, 1/4" X 10', STRAIGHT: Brand: MEDLINE

## (undated) DEVICE — Device: Brand: ENDO SMARTCAP

## (undated) DEVICE — SNARE ENDOSCP AD L240CM LOOP W10MM SHTH DIA2.4MM RND INSUL

## (undated) DEVICE — BRUSH ENDO CLN L90.5IN SHTH DIA1.7MM BRIST DIA5-7MM 2-6MM

## (undated) DEVICE — FORCEPS BX L240CM JAW DIA2.8MM L CAP W/ NDL MIC MESH TOOTH

## (undated) DEVICE — Z DISCONTINUED W/NO SUB ELECTRODE PT RET L9FT HI MOIST COND ADH HYDRGEL CORDED

## (undated) DEVICE — 4-PORT MANIFOLD: Brand: NEPTUNE 2

## (undated) DEVICE — ADAPTER FLSH PMP FLD MGMT GI IRRIG OFP 2 DISPOSABLE

## (undated) DEVICE — ENDO CARRY-ON PROCEDURE KIT: Brand: ENDO CARRY-ON PROCEDURE KIT

## (undated) DEVICE — SINGLE PORT MANIFOLD: Brand: NEPTUNE 2

## (undated) DEVICE — GLOVE ORANGE PI 8 1/2   MSG9085